# Patient Record
Sex: FEMALE | Race: WHITE | Employment: OTHER | URBAN - METROPOLITAN AREA
[De-identification: names, ages, dates, MRNs, and addresses within clinical notes are randomized per-mention and may not be internally consistent; named-entity substitution may affect disease eponyms.]

---

## 2017-01-10 RX ORDER — AMLODIPINE BESYLATE 10 MG/1
TABLET ORAL
Qty: 90 TABLET | Refills: 1 | Status: SHIPPED | OUTPATIENT
Start: 2017-01-10 | End: 2017-07-31 | Stop reason: SDUPTHER

## 2017-01-10 RX ORDER — SIMVASTATIN 20 MG
TABLET ORAL
Qty: 90 TABLET | Refills: 1 | Status: SHIPPED | OUTPATIENT
Start: 2017-01-10 | End: 2017-07-31 | Stop reason: SDUPTHER

## 2017-01-10 RX ORDER — LOSARTAN POTASSIUM 100 MG/1
TABLET ORAL
Qty: 90 TABLET | Refills: 1 | Status: SHIPPED | OUTPATIENT
Start: 2017-01-10 | End: 2017-07-31 | Stop reason: SDUPTHER

## 2017-01-10 RX ORDER — HYDROCHLOROTHIAZIDE 25 MG/1
TABLET ORAL
Qty: 90 TABLET | Refills: 1 | Status: SHIPPED | OUTPATIENT
Start: 2017-01-10 | End: 2017-07-31 | Stop reason: SDUPTHER

## 2017-01-10 RX ORDER — ALENDRONATE SODIUM 70 MG/1
TABLET ORAL
Qty: 12 TABLET | Refills: 1 | Status: SHIPPED | OUTPATIENT
Start: 2017-01-10 | End: 2017-07-31 | Stop reason: SDUPTHER

## 2017-05-16 ENCOUNTER — TELEPHONE (OUTPATIENT)
Dept: INTERNAL MEDICINE CLINIC | Age: 72
End: 2017-05-16

## 2017-05-16 DIAGNOSIS — I10 ESSENTIAL HYPERTENSION: ICD-10-CM

## 2017-05-16 DIAGNOSIS — Z13.9 SCREENING: ICD-10-CM

## 2017-05-16 DIAGNOSIS — R73.01 IFG (IMPAIRED FASTING GLUCOSE): ICD-10-CM

## 2017-05-16 DIAGNOSIS — M85.80 OSTEOPENIA: ICD-10-CM

## 2017-05-16 DIAGNOSIS — Z12.31 ENCOUNTER FOR SCREENING MAMMOGRAM FOR MALIGNANT NEOPLASM OF BREAST: ICD-10-CM

## 2017-05-16 DIAGNOSIS — E78.5 HYPERLIPIDEMIA, UNSPECIFIED HYPERLIPIDEMIA TYPE: Primary | ICD-10-CM

## 2017-06-05 ENCOUNTER — HOSPITAL ENCOUNTER (OUTPATIENT)
Dept: MAMMOGRAPHY | Age: 72
Discharge: HOME | End: 2017-06-05
Attending: INTERNAL MEDICINE | Admitting: INTERNAL MEDICINE

## 2017-06-05 DIAGNOSIS — Z12.31 ENCOUNTER FOR SCREENING MAMMOGRAM FOR BREAST CANCER: ICD-10-CM

## 2017-06-05 DIAGNOSIS — Z13.9 SCREENING: ICD-10-CM

## 2017-06-05 LAB
A/G RATIO: 1.9 (ref 1.1–2.2)
ALBUMIN SERPL-MCNC: 4.9 G/DL (ref 3.4–5)
ALP BLD-CCNC: 75 U/L (ref 40–129)
ALT SERPL-CCNC: 22 U/L (ref 10–40)
ANION GAP SERPL CALCULATED.3IONS-SCNC: 16 MMOL/L (ref 3–16)
AST SERPL-CCNC: 16 U/L (ref 15–37)
BILIRUB SERPL-MCNC: 0.7 MG/DL (ref 0–1)
BUN BLDV-MCNC: 15 MG/DL (ref 7–20)
CALCIUM SERPL-MCNC: 9.8 MG/DL (ref 8.3–10.6)
CHLORIDE BLD-SCNC: 100 MMOL/L (ref 99–110)
CHOLESTEROL, FASTING: 167 MG/DL (ref 0–199)
CO2: 27 MMOL/L (ref 21–32)
CREAT SERPL-MCNC: 0.6 MG/DL (ref 0.6–1.2)
GFR AFRICAN AMERICAN: >60
GFR NON-AFRICAN AMERICAN: >60
GLOBULIN: 2.6 G/DL
GLUCOSE FASTING: 104 MG/DL (ref 70–99)
HDLC SERPL-MCNC: 48 MG/DL (ref 40–60)
LDL CHOLESTEROL CALCULATED: 87 MG/DL
POTASSIUM SERPL-SCNC: 3.8 MMOL/L (ref 3.5–5.1)
SODIUM BLD-SCNC: 143 MMOL/L (ref 136–145)
TOTAL PROTEIN: 7.5 G/DL (ref 6.4–8.2)
TRIGLYCERIDE, FASTING: 161 MG/DL (ref 0–150)
VITAMIN D 25-HYDROXY: 32.3 NG/ML
VLDLC SERPL CALC-MCNC: 32 MG/DL

## 2017-06-08 ENCOUNTER — OFFICE VISIT (OUTPATIENT)
Dept: INTERNAL MEDICINE CLINIC | Age: 72
End: 2017-06-08

## 2017-06-08 VITALS
HEART RATE: 68 BPM | WEIGHT: 129 LBS | SYSTOLIC BLOOD PRESSURE: 136 MMHG | DIASTOLIC BLOOD PRESSURE: 80 MMHG | BODY MASS INDEX: 26.05 KG/M2 | OXYGEN SATURATION: 96 %

## 2017-06-08 DIAGNOSIS — L60.0 INGROWN TOENAIL: ICD-10-CM

## 2017-06-08 DIAGNOSIS — E78.5 HYPERLIPIDEMIA, UNSPECIFIED HYPERLIPIDEMIA TYPE: ICD-10-CM

## 2017-06-08 DIAGNOSIS — I10 ESSENTIAL HYPERTENSION: ICD-10-CM

## 2017-06-08 DIAGNOSIS — R73.01 IFG (IMPAIRED FASTING GLUCOSE): ICD-10-CM

## 2017-06-08 DIAGNOSIS — M79.2 NEURITIS: Primary | ICD-10-CM

## 2017-06-08 PROCEDURE — 1036F TOBACCO NON-USER: CPT | Performed by: INTERNAL MEDICINE

## 2017-06-08 PROCEDURE — 3017F COLORECTAL CA SCREEN DOC REV: CPT | Performed by: INTERNAL MEDICINE

## 2017-06-08 PROCEDURE — 3014F SCREEN MAMMO DOC REV: CPT | Performed by: INTERNAL MEDICINE

## 2017-06-08 PROCEDURE — 1090F PRES/ABSN URINE INCON ASSESS: CPT | Performed by: INTERNAL MEDICINE

## 2017-06-08 PROCEDURE — 1123F ACP DISCUSS/DSCN MKR DOCD: CPT | Performed by: INTERNAL MEDICINE

## 2017-06-08 PROCEDURE — 4040F PNEUMOC VAC/ADMIN/RCVD: CPT | Performed by: INTERNAL MEDICINE

## 2017-06-08 PROCEDURE — G8399 PT W/DXA RESULTS DOCUMENT: HCPCS | Performed by: INTERNAL MEDICINE

## 2017-06-08 PROCEDURE — G8427 DOCREV CUR MEDS BY ELIG CLIN: HCPCS | Performed by: INTERNAL MEDICINE

## 2017-06-08 PROCEDURE — G8420 CALC BMI NORM PARAMETERS: HCPCS | Performed by: INTERNAL MEDICINE

## 2017-06-08 PROCEDURE — 99214 OFFICE O/P EST MOD 30 MIN: CPT | Performed by: INTERNAL MEDICINE

## 2017-06-08 RX ORDER — MELOXICAM 15 MG/1
TABLET ORAL
COMMUNITY
Start: 2017-03-06 | End: 2017-07-28 | Stop reason: ALTCHOICE

## 2017-06-08 ASSESSMENT — PATIENT HEALTH QUESTIONNAIRE - PHQ9
SUM OF ALL RESPONSES TO PHQ9 QUESTIONS 1 & 2: 0
SUM OF ALL RESPONSES TO PHQ QUESTIONS 1-9: 0
2. FEELING DOWN, DEPRESSED OR HOPELESS: 0
1. LITTLE INTEREST OR PLEASURE IN DOING THINGS: 0

## 2017-06-08 ASSESSMENT — ENCOUNTER SYMPTOMS
CHEST TIGHTNESS: 0
ABDOMINAL DISTENTION: 0
COUGH: 0
VOMITING: 0
DIARRHEA: 0
WHEEZING: 0
SHORTNESS OF BREATH: 0
CONSTIPATION: 0
BACK PAIN: 1
NAUSEA: 0

## 2017-06-12 ENCOUNTER — TELEPHONE (OUTPATIENT)
Dept: INTERNAL MEDICINE CLINIC | Age: 72
End: 2017-06-12

## 2017-06-12 DIAGNOSIS — R92.8 ABNORMAL MAMMOGRAM: Primary | ICD-10-CM

## 2017-06-19 ENCOUNTER — HOSPITAL ENCOUNTER (OUTPATIENT)
Dept: MAMMOGRAPHY | Age: 72
Discharge: HOME | End: 2017-06-19
Attending: INTERNAL MEDICINE | Admitting: INTERNAL MEDICINE

## 2017-06-19 DIAGNOSIS — R92.8 OTHER ABNORMAL AND INCONCLUSIVE FINDINGS ON DIAGNOSTIC IMAGING OF BREAST: ICD-10-CM

## 2017-06-19 DIAGNOSIS — R92.8 ABNORMAL MAMMOGRAM, UNSPECIFIED: ICD-10-CM

## 2017-07-28 ENCOUNTER — OFFICE VISIT (OUTPATIENT)
Dept: PULMONOLOGY | Age: 72
End: 2017-07-28

## 2017-07-28 VITALS
HEIGHT: 59 IN | RESPIRATION RATE: 16 BRPM | SYSTOLIC BLOOD PRESSURE: 126 MMHG | HEART RATE: 72 BPM | WEIGHT: 129.4 LBS | OXYGEN SATURATION: 95 % | TEMPERATURE: 97.8 F | BODY MASS INDEX: 26.08 KG/M2 | DIASTOLIC BLOOD PRESSURE: 60 MMHG

## 2017-07-28 DIAGNOSIS — G47.10 EXCESSIVE SOMNOLENCE DISORDER: Primary | ICD-10-CM

## 2017-07-28 PROCEDURE — 99203 OFFICE O/P NEW LOW 30 MIN: CPT | Performed by: INTERNAL MEDICINE

## 2017-07-28 PROCEDURE — 1123F ACP DISCUSS/DSCN MKR DOCD: CPT | Performed by: INTERNAL MEDICINE

## 2017-07-28 PROCEDURE — 4040F PNEUMOC VAC/ADMIN/RCVD: CPT | Performed by: INTERNAL MEDICINE

## 2017-07-28 PROCEDURE — G8419 CALC BMI OUT NRM PARAM NOF/U: HCPCS | Performed by: INTERNAL MEDICINE

## 2017-07-28 PROCEDURE — 1090F PRES/ABSN URINE INCON ASSESS: CPT | Performed by: INTERNAL MEDICINE

## 2017-07-28 PROCEDURE — 3014F SCREEN MAMMO DOC REV: CPT | Performed by: INTERNAL MEDICINE

## 2017-07-28 PROCEDURE — G8399 PT W/DXA RESULTS DOCUMENT: HCPCS | Performed by: INTERNAL MEDICINE

## 2017-07-28 PROCEDURE — 1036F TOBACCO NON-USER: CPT | Performed by: INTERNAL MEDICINE

## 2017-07-28 PROCEDURE — 3017F COLORECTAL CA SCREEN DOC REV: CPT | Performed by: INTERNAL MEDICINE

## 2017-07-28 PROCEDURE — G8427 DOCREV CUR MEDS BY ELIG CLIN: HCPCS | Performed by: INTERNAL MEDICINE

## 2017-07-28 ASSESSMENT — SLEEP AND FATIGUE QUESTIONNAIRES
HOW LIKELY ARE YOU TO NOD OFF OR FALL ASLEEP WHILE SITTING AND TALKING TO SOMEONE: 0
HOW LIKELY ARE YOU TO NOD OFF OR FALL ASLEEP IN A CAR, WHILE STOPPED FOR A FEW MINUTES IN TRAFFIC: 0
HOW LIKELY ARE YOU TO NOD OFF OR FALL ASLEEP WHEN YOU ARE A PASSENGER IN A CAR FOR AN HOUR WITHOUT A BREAK: 2
NECK CIRCUMFERENCE (INCHES): 13
HOW LIKELY ARE YOU TO NOD OFF OR FALL ASLEEP WHILE LYING DOWN TO REST IN THE AFTERNOON WHEN CIRCUMSTANCES PERMIT: 1
HOW LIKELY ARE YOU TO NOD OFF OR FALL ASLEEP WHILE SITTING AND READING: 1
HOW LIKELY ARE YOU TO NOD OFF OR FALL ASLEEP WHILE SITTING QUIETLY AFTER LUNCH WITHOUT ALCOHOL: 1
ESS TOTAL SCORE: 10
HOW LIKELY ARE YOU TO NOD OFF OR FALL ASLEEP WHILE SITTING INACTIVE IN A PUBLIC PLACE: 2
HOW LIKELY ARE YOU TO NOD OFF OR FALL ASLEEP WHILE WATCHING TV: 3

## 2017-07-28 ASSESSMENT — ENCOUNTER SYMPTOMS
CHEST TIGHTNESS: 0
SORE THROAT: 0
CONSTIPATION: 0
EYE ITCHING: 0
ABDOMINAL PAIN: 0
EYE PAIN: 0
SHORTNESS OF BREATH: 0
COUGH: 0
VOICE CHANGE: 0
DIARRHEA: 0
EYE DISCHARGE: 0

## 2017-07-31 RX ORDER — HYDROCHLOROTHIAZIDE 25 MG/1
TABLET ORAL
Qty: 90 TABLET | Refills: 1 | Status: SHIPPED | OUTPATIENT
Start: 2017-07-31 | End: 2018-04-11 | Stop reason: SDUPTHER

## 2017-07-31 RX ORDER — SIMVASTATIN 20 MG
TABLET ORAL
Qty: 90 TABLET | Refills: 1 | Status: SHIPPED | OUTPATIENT
Start: 2017-07-31 | End: 2018-04-11 | Stop reason: SDUPTHER

## 2017-07-31 RX ORDER — LOSARTAN POTASSIUM 100 MG/1
TABLET ORAL
Qty: 90 TABLET | Refills: 1 | Status: SHIPPED | OUTPATIENT
Start: 2017-07-31 | End: 2018-04-11 | Stop reason: SDUPTHER

## 2017-07-31 RX ORDER — ALENDRONATE SODIUM 70 MG/1
TABLET ORAL
Qty: 12 TABLET | Refills: 1 | Status: SHIPPED | OUTPATIENT
Start: 2017-07-31 | End: 2018-04-09 | Stop reason: SDUPTHER

## 2017-07-31 RX ORDER — AMLODIPINE BESYLATE 10 MG/1
TABLET ORAL
Qty: 90 TABLET | Refills: 1 | Status: SHIPPED | OUTPATIENT
Start: 2017-07-31 | End: 2018-04-11 | Stop reason: SDUPTHER

## 2017-08-28 ENCOUNTER — OFFICE VISIT (OUTPATIENT)
Dept: INTERNAL MEDICINE CLINIC | Age: 72
End: 2017-08-28

## 2017-08-28 VITALS
WEIGHT: 131 LBS | HEART RATE: 85 BPM | BODY MASS INDEX: 26.46 KG/M2 | OXYGEN SATURATION: 97 % | SYSTOLIC BLOOD PRESSURE: 138 MMHG | DIASTOLIC BLOOD PRESSURE: 88 MMHG

## 2017-08-28 DIAGNOSIS — M54.41 CHRONIC RIGHT-SIDED LOW BACK PAIN WITH RIGHT-SIDED SCIATICA: ICD-10-CM

## 2017-08-28 DIAGNOSIS — R10.32 LEFT LOWER QUADRANT PAIN: Primary | ICD-10-CM

## 2017-08-28 DIAGNOSIS — I10 ESSENTIAL HYPERTENSION: ICD-10-CM

## 2017-08-28 DIAGNOSIS — G89.29 CHRONIC RIGHT-SIDED LOW BACK PAIN WITH RIGHT-SIDED SCIATICA: ICD-10-CM

## 2017-08-28 DIAGNOSIS — E78.5 HYPERLIPIDEMIA, UNSPECIFIED HYPERLIPIDEMIA TYPE: ICD-10-CM

## 2017-08-28 PROCEDURE — 3014F SCREEN MAMMO DOC REV: CPT | Performed by: INTERNAL MEDICINE

## 2017-08-28 PROCEDURE — 1036F TOBACCO NON-USER: CPT | Performed by: INTERNAL MEDICINE

## 2017-08-28 PROCEDURE — 1123F ACP DISCUSS/DSCN MKR DOCD: CPT | Performed by: INTERNAL MEDICINE

## 2017-08-28 PROCEDURE — G8419 CALC BMI OUT NRM PARAM NOF/U: HCPCS | Performed by: INTERNAL MEDICINE

## 2017-08-28 PROCEDURE — G8427 DOCREV CUR MEDS BY ELIG CLIN: HCPCS | Performed by: INTERNAL MEDICINE

## 2017-08-28 PROCEDURE — 99214 OFFICE O/P EST MOD 30 MIN: CPT | Performed by: INTERNAL MEDICINE

## 2017-08-28 PROCEDURE — 1090F PRES/ABSN URINE INCON ASSESS: CPT | Performed by: INTERNAL MEDICINE

## 2017-08-28 PROCEDURE — 3017F COLORECTAL CA SCREEN DOC REV: CPT | Performed by: INTERNAL MEDICINE

## 2017-08-28 PROCEDURE — 4040F PNEUMOC VAC/ADMIN/RCVD: CPT | Performed by: INTERNAL MEDICINE

## 2017-08-28 PROCEDURE — G8399 PT W/DXA RESULTS DOCUMENT: HCPCS | Performed by: INTERNAL MEDICINE

## 2017-08-28 RX ORDER — METRONIDAZOLE 500 MG/1
500 TABLET ORAL 3 TIMES DAILY
Qty: 30 TABLET | Refills: 0 | Status: SHIPPED | OUTPATIENT
Start: 2017-08-28 | End: 2017-09-07

## 2017-08-28 RX ORDER — CIPROFLOXACIN 500 MG/1
500 TABLET, FILM COATED ORAL 2 TIMES DAILY
Qty: 20 TABLET | Refills: 0 | Status: SHIPPED | OUTPATIENT
Start: 2017-08-28 | End: 2017-09-07

## 2017-08-28 ASSESSMENT — ENCOUNTER SYMPTOMS
NAUSEA: 0
CHEST TIGHTNESS: 0
VOMITING: 0
ABDOMINAL DISTENTION: 0
BACK PAIN: 0
COUGH: 0
SHORTNESS OF BREATH: 0
WHEEZING: 0
ABDOMINAL PAIN: 1
DIARRHEA: 0
CONSTIPATION: 0

## 2017-11-13 ENCOUNTER — OFFICE VISIT (OUTPATIENT)
Dept: INTERNAL MEDICINE CLINIC | Age: 72
End: 2017-11-13

## 2017-11-13 VITALS
WEIGHT: 134 LBS | HEART RATE: 75 BPM | SYSTOLIC BLOOD PRESSURE: 128 MMHG | OXYGEN SATURATION: 97 % | BODY MASS INDEX: 27.06 KG/M2 | DIASTOLIC BLOOD PRESSURE: 64 MMHG

## 2017-11-13 DIAGNOSIS — S33.6XXD STRAIN OF SACROILIAC LIGAMENT, SUBSEQUENT ENCOUNTER: ICD-10-CM

## 2017-11-13 DIAGNOSIS — L72.3 SEBACEOUS CYST: ICD-10-CM

## 2017-11-13 DIAGNOSIS — K57.92 DIVERTICULITIS OF INTESTINE WITHOUT PERFORATION OR ABSCESS WITHOUT BLEEDING, UNSPECIFIED PART OF INTESTINAL TRACT: Primary | ICD-10-CM

## 2017-11-13 PROCEDURE — 99214 OFFICE O/P EST MOD 30 MIN: CPT | Performed by: NURSE PRACTITIONER

## 2017-11-13 PROCEDURE — 1036F TOBACCO NON-USER: CPT | Performed by: NURSE PRACTITIONER

## 2017-11-13 PROCEDURE — G8417 CALC BMI ABV UP PARAM F/U: HCPCS | Performed by: NURSE PRACTITIONER

## 2017-11-13 PROCEDURE — G8484 FLU IMMUNIZE NO ADMIN: HCPCS | Performed by: NURSE PRACTITIONER

## 2017-11-13 PROCEDURE — G8399 PT W/DXA RESULTS DOCUMENT: HCPCS | Performed by: NURSE PRACTITIONER

## 2017-11-13 PROCEDURE — 3014F SCREEN MAMMO DOC REV: CPT | Performed by: NURSE PRACTITIONER

## 2017-11-13 PROCEDURE — 4040F PNEUMOC VAC/ADMIN/RCVD: CPT | Performed by: NURSE PRACTITIONER

## 2017-11-13 PROCEDURE — G8427 DOCREV CUR MEDS BY ELIG CLIN: HCPCS | Performed by: NURSE PRACTITIONER

## 2017-11-13 PROCEDURE — 3017F COLORECTAL CA SCREEN DOC REV: CPT | Performed by: NURSE PRACTITIONER

## 2017-11-13 PROCEDURE — 1090F PRES/ABSN URINE INCON ASSESS: CPT | Performed by: NURSE PRACTITIONER

## 2017-11-13 PROCEDURE — 1123F ACP DISCUSS/DSCN MKR DOCD: CPT | Performed by: NURSE PRACTITIONER

## 2017-11-13 RX ORDER — PREDNISONE 20 MG/1
TABLET ORAL
Qty: 19 TABLET | Refills: 0 | Status: SHIPPED | OUTPATIENT
Start: 2017-11-13 | End: 2017-12-27 | Stop reason: ALTCHOICE

## 2017-11-13 ASSESSMENT — ENCOUNTER SYMPTOMS
BACK PAIN: 1
FACIAL SWELLING: 0
COUGH: 0
NAUSEA: 0
SORE THROAT: 0
DIARRHEA: 0
ABDOMINAL PAIN: 1
SINUS PRESSURE: 0
VOMITING: 0

## 2017-11-13 NOTE — PROGRESS NOTES
Subjective:      Patient ID: Tiera Cline is a 81 y.o. female. HPI  Chief Complaint   Patient presents with    Follow-Up from Hospital    Diverticulitis     Diverticulitis  Since being home from ED she states she has improved slowly. She continues to feel some intermittent pressure of lower abdomen. No fever. No vomiting. Mild nausea intermittently. Loose stools since hospital. No blood in stool. Appetite normal.     Rt thoracic region with cyst? Or lump. Noticed about 1 month ago. No redness. No drainage. Non tender. Rt buttocks pain that radiates towards tailbone. Bothers are at night when lying. Denies numbness or tingling. Denies urinary symptoms. Denies IBUprofen, Advil but will use Tylenol extra strength - minimal relief. Tried ice/heat some relief. Walking increases the pain so she has decreased from 3mi/day. Has reviewed this with Dr. Jen Whitfield in the past and she recommended steroid tx. Prior to Visit Medications    Medication Sig Taking? Authorizing Provider   ciprofloxacin (CIPRO) 500 MG tablet Take 1 tablet by mouth 2 times daily for 10 days Yes Isiah Boyd MD   metroNIDAZOLE (FLAGYL) 500 MG tablet Take 1 tablet by mouth 3 times daily for 10 days Yes Isiah Boyd MD   amLODIPine (NORVASC) 10 MG tablet Take 1 tablet by mouth  daily Yes Kenneth Galan MD   alendronate (FOSAMAX) 70 MG tablet Take 1 tablet by mouth  every 7 days Yes Kenneth Galan MD   hydrochlorothiazide (HYDRODIURIL) 25 MG tablet Take 1 tablet by mouth  daily Yes Kenneth Galan MD   simvastatin (ZOCOR) 20 MG tablet Take 1 tablet by mouth  nightly Yes Kenneth Galan MD   losartan (COZAAR) 100 MG tablet Take 1 tablet by mouth  daily Yes Kenneth Galan MD   Calcium Carb-Cholecalciferol (CALCIUM + D3) 600-200 MG-UNIT TABS Take 1 tablet by mouth 2 times daily  Yes Historical Provider, MD   aspirin 81 MG EC tablet Take 81 mg by mouth daily.  Yes Historical Provider, MD     Social History     Social History    Marital status:      Spouse name: N/A    Number of children: N/A    Years of education: N/A     Occupational History    Not on file. Social History Main Topics    Smoking status: Former Smoker     Packs/day: 2.00     Years: 30.00     Quit date: 1/1/2004    Smokeless tobacco: Never Used    Alcohol use No    Drug use: No    Sexual activity: No     Other Topics Concern    Not on file     Social History Narrative    No narrative on file     Family History   Problem Relation Age of Onset    Heart Disease Mother     High Blood Pressure Mother     Heart Disease Father        Review of Systems   Constitutional: Negative for appetite change, chills, fatigue, fever and unexpected weight change. HENT: Negative for congestion, ear discharge, ear pain, facial swelling, hearing loss, sinus pressure, sneezing and sore throat. Respiratory: Negative for cough. Cardiovascular: Negative for chest pain. Gastrointestinal: Positive for abdominal pain. Negative for diarrhea (loose stool), nausea and vomiting. Genitourinary: Negative for difficulty urinating, dysuria, hematuria and urgency. Musculoskeletal: Positive for back pain (Rt lower back pain/buttocks). Negative for arthralgias and gait problem. Skin: Positive for rash and wound. Neurological: Negative for dizziness, weakness and headaches. Hematological: Negative for adenopathy. Psychiatric/Behavioral: Negative for sleep disturbance and suicidal ideas. Objective:   Physical Exam   Constitutional: She is oriented to person, place, and time. She appears well-developed and well-nourished. No distress. HENT:   Head: Normocephalic and atraumatic. Cardiovascular: Normal rate, regular rhythm and normal heart sounds. Pulmonary/Chest: Effort normal and breath sounds normal. She has no wheezes. She has no rales. Abdominal: Soft. She exhibits no mass. There is no rebound and no guarding. Hyperactive bowel sounds.  Tenderness over LLQ and

## 2017-12-27 ENCOUNTER — OFFICE VISIT (OUTPATIENT)
Dept: INTERNAL MEDICINE CLINIC | Age: 72
End: 2017-12-27

## 2017-12-27 VITALS
WEIGHT: 135 LBS | HEART RATE: 76 BPM | SYSTOLIC BLOOD PRESSURE: 126 MMHG | BODY MASS INDEX: 27.27 KG/M2 | OXYGEN SATURATION: 95 % | DIASTOLIC BLOOD PRESSURE: 78 MMHG

## 2017-12-27 DIAGNOSIS — I10 ESSENTIAL HYPERTENSION: Primary | ICD-10-CM

## 2017-12-27 DIAGNOSIS — E78.5 HYPERLIPIDEMIA, UNSPECIFIED HYPERLIPIDEMIA TYPE: ICD-10-CM

## 2017-12-27 DIAGNOSIS — R73.01 IFG (IMPAIRED FASTING GLUCOSE): ICD-10-CM

## 2017-12-27 DIAGNOSIS — S33.6XXD STRAIN OF SACROILIAC LIGAMENT, SUBSEQUENT ENCOUNTER: ICD-10-CM

## 2017-12-27 PROCEDURE — 99213 OFFICE O/P EST LOW 20 MIN: CPT | Performed by: INTERNAL MEDICINE

## 2017-12-27 PROCEDURE — 1036F TOBACCO NON-USER: CPT | Performed by: INTERNAL MEDICINE

## 2017-12-27 PROCEDURE — G8399 PT W/DXA RESULTS DOCUMENT: HCPCS | Performed by: INTERNAL MEDICINE

## 2017-12-27 PROCEDURE — 3017F COLORECTAL CA SCREEN DOC REV: CPT | Performed by: INTERNAL MEDICINE

## 2017-12-27 PROCEDURE — G8427 DOCREV CUR MEDS BY ELIG CLIN: HCPCS | Performed by: INTERNAL MEDICINE

## 2017-12-27 PROCEDURE — 4040F PNEUMOC VAC/ADMIN/RCVD: CPT | Performed by: INTERNAL MEDICINE

## 2017-12-27 PROCEDURE — 1123F ACP DISCUSS/DSCN MKR DOCD: CPT | Performed by: INTERNAL MEDICINE

## 2017-12-27 PROCEDURE — 3014F SCREEN MAMMO DOC REV: CPT | Performed by: INTERNAL MEDICINE

## 2017-12-27 PROCEDURE — 1090F PRES/ABSN URINE INCON ASSESS: CPT | Performed by: INTERNAL MEDICINE

## 2017-12-27 PROCEDURE — G8484 FLU IMMUNIZE NO ADMIN: HCPCS | Performed by: INTERNAL MEDICINE

## 2017-12-27 PROCEDURE — G8417 CALC BMI ABV UP PARAM F/U: HCPCS | Performed by: INTERNAL MEDICINE

## 2017-12-27 RX ORDER — PREDNISONE 20 MG/1
TABLET ORAL
Qty: 19 TABLET | Refills: 0 | Status: SHIPPED | OUTPATIENT
Start: 2017-12-27 | End: 2018-06-27 | Stop reason: CLARIF

## 2017-12-27 ASSESSMENT — ENCOUNTER SYMPTOMS
COUGH: 0
WHEEZING: 0
VOMITING: 0
CHEST TIGHTNESS: 0
SHORTNESS OF BREATH: 0
NAUSEA: 0
CONSTIPATION: 0
ABDOMINAL DISTENTION: 0
DIARRHEA: 0

## 2017-12-27 NOTE — PROGRESS NOTES
Subjective:      Patient ID: Donnell Sullivan is a 81 y.o. female. Hip Pain    Pertinent negatives include no numbness. Patient has a h/o htn, hyperlipidemia and IFG. Patient feels well. htn has improved since starting bp medication. Has been watching her diet. Takes cholesterol medication in the evenings. Review of Systems   Constitutional: Negative for unexpected weight change. Respiratory: Negative for cough, chest tightness, shortness of breath and wheezing. Cardiovascular: Negative for chest pain, palpitations and leg swelling. Gastrointestinal: Negative for abdominal distention, constipation, diarrhea, nausea and vomiting. Musculoskeletal: Negative for arthralgias and myalgias. Neurological: Negative for tremors and numbness. All other systems reviewed and are negative. Objective:   Physical Exam   Constitutional: She is oriented to person, place, and time. She appears well-developed and well-nourished. No distress. Neck: Neck supple. No thyromegaly present. Cardiovascular: Normal rate, regular rhythm and normal heart sounds. Pulmonary/Chest: Effort normal and breath sounds normal. No respiratory distress. She has no wheezes. She has no rales. Abdominal: Soft. She exhibits no distension. There is no tenderness. There is no rebound and no guarding. Musculoskeletal: She exhibits no edema. Neurological: She is alert and oriented to person, place, and time. Skin: She is not diaphoretic. Psychiatric: She has a normal mood and affect. Her behavior is normal. Judgment and thought content normal.   Vitals reviewed. Assessment:      IFG (impaired fasting glucose)  Discussed lifestyle modification and patient expressed understanding      Hyperlipidemia, unspecified hyperlipidemia type  Stable. Continue current regimen      Essential hypertension  Controlled.  Continue current regimen                  Plan:     See above plan

## 2018-03-06 ENCOUNTER — TELEPHONE (OUTPATIENT)
Dept: INTERNAL MEDICINE CLINIC | Age: 73
End: 2018-03-06

## 2018-03-06 RX ORDER — MELOXICAM 15 MG/1
15 TABLET ORAL DAILY
Qty: 30 TABLET | Refills: 0 | Status: SHIPPED | OUTPATIENT
Start: 2018-03-06 | End: 2018-04-09 | Stop reason: SDUPTHER

## 2018-04-09 ENCOUNTER — TELEPHONE (OUTPATIENT)
Dept: INTERNAL MEDICINE CLINIC | Age: 73
End: 2018-04-09

## 2018-04-09 DIAGNOSIS — M50.30 DDD (DEGENERATIVE DISC DISEASE), CERVICAL: Primary | ICD-10-CM

## 2018-04-09 RX ORDER — MELOXICAM 15 MG/1
15 TABLET ORAL DAILY
Qty: 90 TABLET | Refills: 0 | Status: SHIPPED | OUTPATIENT
Start: 2018-04-09 | End: 2018-05-28 | Stop reason: SDUPTHER

## 2018-04-09 RX ORDER — ALENDRONATE SODIUM 70 MG/1
TABLET ORAL
Qty: 12 TABLET | Refills: 0 | Status: SHIPPED | OUTPATIENT
Start: 2018-04-09 | End: 2018-06-22 | Stop reason: SDUPTHER

## 2018-04-11 RX ORDER — HYDROCHLOROTHIAZIDE 25 MG/1
TABLET ORAL
Qty: 90 TABLET | Refills: 1 | Status: SHIPPED | OUTPATIENT
Start: 2018-04-11 | End: 2018-08-30 | Stop reason: SDUPTHER

## 2018-04-11 RX ORDER — LOSARTAN POTASSIUM 100 MG/1
TABLET ORAL
Qty: 90 TABLET | Refills: 1 | Status: SHIPPED | OUTPATIENT
Start: 2018-04-11 | End: 2018-08-30 | Stop reason: SDUPTHER

## 2018-04-11 RX ORDER — AMLODIPINE BESYLATE 10 MG/1
TABLET ORAL
Qty: 90 TABLET | Refills: 1 | Status: SHIPPED | OUTPATIENT
Start: 2018-04-11 | End: 2018-08-30 | Stop reason: SDUPTHER

## 2018-04-11 RX ORDER — SIMVASTATIN 20 MG
TABLET ORAL
Qty: 90 TABLET | Refills: 1 | Status: SHIPPED | OUTPATIENT
Start: 2018-04-11 | End: 2018-08-30 | Stop reason: SDUPTHER

## 2018-06-27 ENCOUNTER — OFFICE VISIT (OUTPATIENT)
Dept: INTERNAL MEDICINE CLINIC | Age: 73
End: 2018-06-27

## 2018-06-27 ENCOUNTER — HOSPITAL ENCOUNTER (OUTPATIENT)
Dept: GENERAL RADIOLOGY | Age: 73
Discharge: HOME | End: 2018-06-27
Attending: INTERNAL MEDICINE | Admitting: INTERNAL MEDICINE

## 2018-06-27 VITALS
BODY MASS INDEX: 27.27 KG/M2 | WEIGHT: 135 LBS | DIASTOLIC BLOOD PRESSURE: 86 MMHG | OXYGEN SATURATION: 97 % | HEART RATE: 73 BPM | SYSTOLIC BLOOD PRESSURE: 128 MMHG

## 2018-06-27 DIAGNOSIS — R73.01 IFG (IMPAIRED FASTING GLUCOSE): ICD-10-CM

## 2018-06-27 DIAGNOSIS — E78.5 HYPERLIPIDEMIA, UNSPECIFIED HYPERLIPIDEMIA TYPE: ICD-10-CM

## 2018-06-27 DIAGNOSIS — Z11.59 SCREENING FOR VIRAL DISEASE: ICD-10-CM

## 2018-06-27 DIAGNOSIS — G89.29 CHRONIC RIGHT SHOULDER PAIN: ICD-10-CM

## 2018-06-27 DIAGNOSIS — Z12.31 SCREENING MAMMOGRAM, ENCOUNTER FOR: ICD-10-CM

## 2018-06-27 DIAGNOSIS — Z72.0 TOBACCO USE: ICD-10-CM

## 2018-06-27 DIAGNOSIS — I10 ESSENTIAL HYPERTENSION: Primary | ICD-10-CM

## 2018-06-27 DIAGNOSIS — I10 ESSENTIAL HYPERTENSION: ICD-10-CM

## 2018-06-27 DIAGNOSIS — M25.511 CHRONIC RIGHT SHOULDER PAIN: ICD-10-CM

## 2018-06-27 LAB
A/G RATIO: 2.1 (ref 1.1–2.2)
ALBUMIN SERPL-MCNC: 4.8 G/DL (ref 3.4–5)
ALP BLD-CCNC: 73 U/L (ref 40–129)
ALT SERPL-CCNC: 28 U/L (ref 10–40)
ANION GAP SERPL CALCULATED.3IONS-SCNC: 14 MMOL/L (ref 3–16)
AST SERPL-CCNC: 21 U/L (ref 15–37)
BILIRUB SERPL-MCNC: 0.6 MG/DL (ref 0–1)
BUN BLDV-MCNC: 12 MG/DL (ref 7–20)
CALCIUM SERPL-MCNC: 9.2 MG/DL (ref 8.3–10.6)
CHLORIDE BLD-SCNC: 100 MMOL/L (ref 99–110)
CHOLESTEROL, TOTAL: 186 MG/DL (ref 0–199)
CO2: 28 MMOL/L (ref 21–32)
CREAT SERPL-MCNC: 0.6 MG/DL (ref 0.6–1.2)
ESTIMATED AVERAGE GLUCOSE: 111.2 MG/DL
GFR AFRICAN AMERICAN: >60
GFR NON-AFRICAN AMERICAN: >60
GLOBULIN: 2.3 G/DL
GLUCOSE BLD-MCNC: 108 MG/DL (ref 70–99)
HBA1C MFR BLD: 5.5 %
HDLC SERPL-MCNC: 47 MG/DL (ref 40–60)
HEPATITIS C ANTIBODY INTERPRETATION: NORMAL
LDL CHOLESTEROL CALCULATED: 96 MG/DL
POTASSIUM SERPL-SCNC: 4.5 MMOL/L (ref 3.5–5.1)
SODIUM BLD-SCNC: 142 MMOL/L (ref 136–145)
TOTAL PROTEIN: 7.1 G/DL (ref 6.4–8.2)
TRIGL SERPL-MCNC: 216 MG/DL (ref 0–150)
VLDLC SERPL CALC-MCNC: 43 MG/DL

## 2018-06-27 PROCEDURE — 4040F PNEUMOC VAC/ADMIN/RCVD: CPT | Performed by: INTERNAL MEDICINE

## 2018-06-27 PROCEDURE — 1090F PRES/ABSN URINE INCON ASSESS: CPT | Performed by: INTERNAL MEDICINE

## 2018-06-27 PROCEDURE — 1036F TOBACCO NON-USER: CPT | Performed by: INTERNAL MEDICINE

## 2018-06-27 PROCEDURE — G8417 CALC BMI ABV UP PARAM F/U: HCPCS | Performed by: INTERNAL MEDICINE

## 2018-06-27 PROCEDURE — G8427 DOCREV CUR MEDS BY ELIG CLIN: HCPCS | Performed by: INTERNAL MEDICINE

## 2018-06-27 PROCEDURE — 99214 OFFICE O/P EST MOD 30 MIN: CPT | Performed by: INTERNAL MEDICINE

## 2018-06-27 PROCEDURE — 1123F ACP DISCUSS/DSCN MKR DOCD: CPT | Performed by: INTERNAL MEDICINE

## 2018-06-27 PROCEDURE — 3017F COLORECTAL CA SCREEN DOC REV: CPT | Performed by: INTERNAL MEDICINE

## 2018-06-27 PROCEDURE — G8399 PT W/DXA RESULTS DOCUMENT: HCPCS | Performed by: INTERNAL MEDICINE

## 2018-06-27 ASSESSMENT — ENCOUNTER SYMPTOMS
DIARRHEA: 0
CONSTIPATION: 0
COUGH: 0
CHEST TIGHTNESS: 0
WHEEZING: 0
SHORTNESS OF BREATH: 0
NAUSEA: 0
ABDOMINAL DISTENTION: 0
VOMITING: 0

## 2018-06-27 ASSESSMENT — PATIENT HEALTH QUESTIONNAIRE - PHQ9
1. LITTLE INTEREST OR PLEASURE IN DOING THINGS: 0
2. FEELING DOWN, DEPRESSED OR HOPELESS: 0
SUM OF ALL RESPONSES TO PHQ QUESTIONS 1-9: 0
SUM OF ALL RESPONSES TO PHQ9 QUESTIONS 1 & 2: 0

## 2018-07-10 ENCOUNTER — HOSPITAL ENCOUNTER (OUTPATIENT)
Dept: CT IMAGING | Age: 73
Discharge: HOME | End: 2018-07-10
Admitting: INTERNAL MEDICINE

## 2018-07-10 DIAGNOSIS — Z72.0 TOBACCO USE: ICD-10-CM

## 2018-07-10 DIAGNOSIS — Z12.31 ENCOUNTER FOR SCREENING MAMMOGRAM FOR BREAST CANCER: ICD-10-CM

## 2018-07-13 ENCOUNTER — TELEPHONE (OUTPATIENT)
Dept: INTERNAL MEDICINE CLINIC | Age: 73
End: 2018-07-13

## 2018-07-13 DIAGNOSIS — R92.8 ABNORMAL MAMMOGRAM: Primary | ICD-10-CM

## 2018-07-25 ENCOUNTER — HOSPITAL ENCOUNTER (OUTPATIENT)
Dept: MAMMOGRAPHY | Age: 73
Discharge: HOME | End: 2018-07-25
Payer: MEDICARE

## 2018-07-25 ENCOUNTER — HOSPITAL ENCOUNTER (OUTPATIENT)
Dept: ULTRASOUND IMAGING | Age: 73
Discharge: HOME | End: 2018-07-25
Payer: MEDICARE

## 2018-07-25 DIAGNOSIS — R92.8 ABNORMAL MAMMOGRAM: ICD-10-CM

## 2018-07-25 PROCEDURE — 77065 DX MAMMO INCL CAD UNI: CPT

## 2018-07-26 ENCOUNTER — TELEPHONE (OUTPATIENT)
Dept: INTERNAL MEDICINE CLINIC | Age: 73
End: 2018-07-26

## 2018-07-26 NOTE — TELEPHONE ENCOUNTER
Patient has an appointment with Dr. Mary Murguia on 7/31/18 @ 7:00 a.m. However, she wanted to know from Dr. Mary Murguia if she should continue with the therapy especially since she has an appointment for tomorrow the 27th. I indicated that Dr. Mary Murguia was not here until Monday, and that the decision would be up to her. If she felt she may get some benefit out of the therapy then do so. I advised patient that she can discuss further with Dr. Mary Murguia on her 31st visit.

## 2018-07-26 NOTE — TELEPHONE ENCOUNTER
Max Walter calling to discuss with Dr. Sam Alford that her physical therapy is coming to an end for her right arm. The therapist indicated that Max Walter speak with Dr. Sam Alford about pursuing ortho appointment, X-ray, CT Scan, or MRI of the right arm. Please call Max Walter at [phone] to discuss next steps.

## 2018-07-31 ENCOUNTER — HOSPITAL ENCOUNTER (OUTPATIENT)
Age: 73
Discharge: HOME | End: 2018-07-31
Payer: MEDICARE

## 2018-07-31 ENCOUNTER — HOSPITAL ENCOUNTER (OUTPATIENT)
Dept: GENERAL RADIOLOGY | Age: 73
Discharge: HOME | End: 2018-07-31
Payer: MEDICARE

## 2018-07-31 ENCOUNTER — OFFICE VISIT (OUTPATIENT)
Dept: INTERNAL MEDICINE CLINIC | Age: 73
End: 2018-07-31

## 2018-07-31 VITALS — OXYGEN SATURATION: 95 % | HEART RATE: 74 BPM | SYSTOLIC BLOOD PRESSURE: 132 MMHG | DIASTOLIC BLOOD PRESSURE: 80 MMHG

## 2018-07-31 DIAGNOSIS — S46.911D STRAIN OF RIGHT SHOULDER, SUBSEQUENT ENCOUNTER: ICD-10-CM

## 2018-07-31 DIAGNOSIS — S46.911D STRAIN OF RIGHT SHOULDER, SUBSEQUENT ENCOUNTER: Primary | ICD-10-CM

## 2018-07-31 PROCEDURE — 1090F PRES/ABSN URINE INCON ASSESS: CPT | Performed by: INTERNAL MEDICINE

## 2018-07-31 PROCEDURE — G8427 DOCREV CUR MEDS BY ELIG CLIN: HCPCS | Performed by: INTERNAL MEDICINE

## 2018-07-31 PROCEDURE — 3017F COLORECTAL CA SCREEN DOC REV: CPT | Performed by: INTERNAL MEDICINE

## 2018-07-31 PROCEDURE — 1036F TOBACCO NON-USER: CPT | Performed by: INTERNAL MEDICINE

## 2018-07-31 PROCEDURE — 73030 X-RAY EXAM OF SHOULDER: CPT

## 2018-07-31 PROCEDURE — 4040F PNEUMOC VAC/ADMIN/RCVD: CPT | Performed by: INTERNAL MEDICINE

## 2018-07-31 PROCEDURE — 1101F PT FALLS ASSESS-DOCD LE1/YR: CPT | Performed by: INTERNAL MEDICINE

## 2018-07-31 PROCEDURE — G8417 CALC BMI ABV UP PARAM F/U: HCPCS | Performed by: INTERNAL MEDICINE

## 2018-07-31 PROCEDURE — 99213 OFFICE O/P EST LOW 20 MIN: CPT | Performed by: INTERNAL MEDICINE

## 2018-07-31 PROCEDURE — G8399 PT W/DXA RESULTS DOCUMENT: HCPCS | Performed by: INTERNAL MEDICINE

## 2018-07-31 PROCEDURE — 1123F ACP DISCUSS/DSCN MKR DOCD: CPT | Performed by: INTERNAL MEDICINE

## 2018-07-31 ASSESSMENT — ENCOUNTER SYMPTOMS
BACK PAIN: 0
WHEEZING: 0
CHEST TIGHTNESS: 0
SHORTNESS OF BREATH: 0
COUGH: 0
NAUSEA: 0
CONSTIPATION: 0
DIARRHEA: 0
ABDOMINAL DISTENTION: 0
VOMITING: 0

## 2018-07-31 NOTE — PROGRESS NOTES
Subjective:      Patient ID: Mayra Trinh is a 81 y.o. female. HPI    Here with c/o right shoulder pain that is intermittent x 2 years. Described as achy. Made worse after PT. Pain does not radiate. Gets some relief with ice and Meloxicam. No injury. Denies numbness, tingling, weakness, neck pain    Review of Systems   Constitutional: Negative for unexpected weight change. Respiratory: Negative for cough, chest tightness, shortness of breath and wheezing. Cardiovascular: Negative for chest pain, palpitations and leg swelling. Gastrointestinal: Negative for abdominal distention, constipation, diarrhea, nausea and vomiting. Musculoskeletal: Positive for arthralgias. Negative for back pain and myalgias. Neurological: Negative for tremors and numbness. All other systems reviewed and are negative. Objective:   Physical Exam   Constitutional: She is oriented to person, place, and time. She appears well-developed and well-nourished. No distress. Neck: Neck supple. No thyromegaly present. Cardiovascular: Normal rate, regular rhythm, normal heart sounds and intact distal pulses. Exam reveals no gallop and no friction rub. No murmur heard. Pulmonary/Chest: Effort normal and breath sounds normal. No respiratory distress. She has no wheezes. She has no rales. She exhibits no tenderness. Musculoskeletal: She exhibits no edema. Shoulder with no palpable tenderness or deformity. Pain reproduced with passive internal and external rotation. Right UE neurovascularly intact   Neurological: She is alert and oriented to person, place, and time. Skin: She is not diaphoretic. Psychiatric: She has a normal mood and affect. Her behavior is normal. Judgment and thought content normal.   Vitals reviewed. Assessment:      Amarilys Kang was seen today for referral - general.    Diagnoses and all orders for this visit:    Strain of right shoulder, subsequent encounter  Chronic. Worsening.  Continue meloxicam

## 2018-08-01 ENCOUNTER — TELEPHONE (OUTPATIENT)
Dept: INTERNAL MEDICINE CLINIC | Age: 73
End: 2018-08-01

## 2018-08-27 RX ORDER — ALENDRONATE SODIUM 70 MG/1
TABLET ORAL
Qty: 12 TABLET | Refills: 1 | Status: SHIPPED | OUTPATIENT
Start: 2018-08-27 | End: 2019-02-21 | Stop reason: SDUPTHER

## 2018-08-27 NOTE — TELEPHONE ENCOUNTER
Refill request for fosamax medication.      Name of Pharmacy- [facility]    Last visit - 7-31-18     Pending visit - 12-3-18    Last refill -6-22-18    Medication Contract signed -   Last Brielle george-     Additional Comments

## 2018-08-30 RX ORDER — AMLODIPINE BESYLATE 10 MG/1
TABLET ORAL
Qty: 90 TABLET | Refills: 3 | Status: SHIPPED | OUTPATIENT
Start: 2018-08-30 | End: 2019-08-09 | Stop reason: SDUPTHER

## 2018-08-30 RX ORDER — HYDROCHLOROTHIAZIDE 25 MG/1
TABLET ORAL
Qty: 90 TABLET | Refills: 3 | Status: SHIPPED | OUTPATIENT
Start: 2018-08-30 | End: 2019-08-09 | Stop reason: SDUPTHER

## 2018-08-30 RX ORDER — LOSARTAN POTASSIUM 100 MG/1
TABLET ORAL
Qty: 90 TABLET | Refills: 3 | Status: SHIPPED | OUTPATIENT
Start: 2018-08-30 | End: 2019-08-09 | Stop reason: SDUPTHER

## 2018-08-30 RX ORDER — MELOXICAM 15 MG/1
15 TABLET ORAL DAILY
Qty: 90 TABLET | Refills: 1 | Status: SHIPPED | OUTPATIENT
Start: 2018-08-30 | End: 2019-02-04 | Stop reason: SDUPTHER

## 2018-08-30 RX ORDER — SIMVASTATIN 20 MG
TABLET ORAL
Qty: 90 TABLET | Refills: 3 | Status: SHIPPED | OUTPATIENT
Start: 2018-08-30 | End: 2019-08-09 | Stop reason: SDUPTHER

## 2018-08-30 NOTE — TELEPHONE ENCOUNTER
Refill request for zocor, losartan, amlodipine, hctz  medication.      Name of Pharmacy- [facility]    Last visit - 7-31-18     Pending visit - 12-3-18    Last refill -4-11-18    Medication Contract signed -   Last Stanton george-     Additional Comments

## 2018-12-03 ENCOUNTER — HOSPITAL ENCOUNTER (OUTPATIENT)
Age: 73
Discharge: HOME | End: 2018-12-03
Payer: MEDICARE

## 2018-12-03 ENCOUNTER — OFFICE VISIT (OUTPATIENT)
Dept: INTERNAL MEDICINE CLINIC | Age: 73
End: 2018-12-03
Payer: MEDICARE

## 2018-12-03 ENCOUNTER — TELEPHONE (OUTPATIENT)
Dept: INTERNAL MEDICINE CLINIC | Age: 73
End: 2018-12-03

## 2018-12-03 VITALS
HEART RATE: 81 BPM | SYSTOLIC BLOOD PRESSURE: 126 MMHG | BODY MASS INDEX: 27.67 KG/M2 | WEIGHT: 137 LBS | OXYGEN SATURATION: 96 % | DIASTOLIC BLOOD PRESSURE: 78 MMHG

## 2018-12-03 DIAGNOSIS — I10 ESSENTIAL HYPERTENSION: Primary | ICD-10-CM

## 2018-12-03 DIAGNOSIS — E78.5 HYPERLIPIDEMIA, UNSPECIFIED HYPERLIPIDEMIA TYPE: ICD-10-CM

## 2018-12-03 DIAGNOSIS — K21.9 GASTROESOPHAGEAL REFLUX DISEASE WITHOUT ESOPHAGITIS: ICD-10-CM

## 2018-12-03 DIAGNOSIS — R73.01 IFG (IMPAIRED FASTING GLUCOSE): ICD-10-CM

## 2018-12-03 DIAGNOSIS — I10 ESSENTIAL HYPERTENSION: ICD-10-CM

## 2018-12-03 DIAGNOSIS — D69.6 THROMBOCYTOPENIA (HCC): Primary | ICD-10-CM

## 2018-12-03 DIAGNOSIS — M72.2 PLANTAR FASCIITIS OF RIGHT FOOT: ICD-10-CM

## 2018-12-03 DIAGNOSIS — M54.50 ACUTE RIGHT-SIDED LOW BACK PAIN WITHOUT SCIATICA: ICD-10-CM

## 2018-12-03 LAB
A/G RATIO: 1.9 (ref 1.1–2.2)
ALBUMIN SERPL-MCNC: 4.9 G/DL (ref 3.4–5)
ALP BLD-CCNC: 70 U/L (ref 40–129)
ALT SERPL-CCNC: 24 U/L (ref 10–40)
ANION GAP SERPL CALCULATED.3IONS-SCNC: 12 MMOL/L (ref 3–16)
AST SERPL-CCNC: 17 U/L (ref 15–37)
ATYPICAL LYMPHOCYTE RELATIVE PERCENT: 2 % (ref 0–6)
BANDED NEUTROPHILS RELATIVE PERCENT: 1 % (ref 0–7)
BASOPHILS ABSOLUTE: 0.1 K/UL (ref 0–0.2)
BASOPHILS RELATIVE PERCENT: 1 %
BILIRUB SERPL-MCNC: 0.7 MG/DL (ref 0–1)
BUN BLDV-MCNC: 13 MG/DL (ref 7–20)
CALCIUM SERPL-MCNC: 9.6 MG/DL (ref 8.3–10.6)
CHLORIDE BLD-SCNC: 100 MMOL/L (ref 99–110)
CHOLESTEROL, TOTAL: 199 MG/DL (ref 0–199)
CO2: 26 MMOL/L (ref 21–32)
CREAT SERPL-MCNC: 0.6 MG/DL (ref 0.6–1.2)
EOSINOPHILS ABSOLUTE: 0.2 K/UL (ref 0–0.6)
EOSINOPHILS RELATIVE PERCENT: 2 %
ESTIMATED AVERAGE GLUCOSE: 116.9 MG/DL
GFR AFRICAN AMERICAN: >60
GFR NON-AFRICAN AMERICAN: >60
GLOBULIN: 2.6 G/DL
GLUCOSE BLD-MCNC: 108 MG/DL (ref 70–99)
HBA1C MFR BLD: 5.7 %
HCT VFR BLD CALC: 45 % (ref 36–48)
HDLC SERPL-MCNC: 48 MG/DL (ref 40–60)
HEMOGLOBIN: 15.3 G/DL (ref 12–16)
LDL CHOLESTEROL CALCULATED: 111 MG/DL
LYMPHOCYTES ABSOLUTE: 1.5 K/UL (ref 1–5.1)
LYMPHOCYTES RELATIVE PERCENT: 16 %
MCH RBC QN AUTO: 32 PG (ref 26–34)
MCHC RBC AUTO-ENTMCNC: 33.9 G/DL (ref 31–36)
MCV RBC AUTO: 94.2 FL (ref 80–100)
MONOCYTES ABSOLUTE: 1 K/UL (ref 0–1.3)
MONOCYTES RELATIVE PERCENT: 12 %
NEUTROPHILS ABSOLUTE: 5.5 K/UL (ref 1.7–7.7)
NEUTROPHILS RELATIVE PERCENT: 66 %
PDW BLD-RTO: 12.9 % (ref 12.4–15.4)
PLATELET # BLD: NORMAL K/UL (ref 135–450)
PLATELET SLIDE REVIEW: NORMAL
PMV BLD AUTO: NORMAL FL (ref 5–10.5)
POTASSIUM SERPL-SCNC: 3.6 MMOL/L (ref 3.5–5.1)
RBC # BLD: 4.78 M/UL (ref 4–5.2)
RBC # BLD: NORMAL 10*6/UL
SLIDE REVIEW: NORMAL
SODIUM BLD-SCNC: 138 MMOL/L (ref 136–145)
TOTAL PROTEIN: 7.5 G/DL (ref 6.4–8.2)
TRIGL SERPL-MCNC: 198 MG/DL (ref 0–150)
TSH SERPL DL<=0.05 MIU/L-ACNC: 3.59 UIU/ML (ref 0.27–4.2)
VLDLC SERPL CALC-MCNC: 40 MG/DL
WBC # BLD: 8.2 K/UL (ref 4–11)

## 2018-12-03 PROCEDURE — 84443 ASSAY THYROID STIM HORMONE: CPT

## 2018-12-03 PROCEDURE — 1090F PRES/ABSN URINE INCON ASSESS: CPT | Performed by: INTERNAL MEDICINE

## 2018-12-03 PROCEDURE — 1036F TOBACCO NON-USER: CPT | Performed by: INTERNAL MEDICINE

## 2018-12-03 PROCEDURE — G8417 CALC BMI ABV UP PARAM F/U: HCPCS | Performed by: INTERNAL MEDICINE

## 2018-12-03 PROCEDURE — G8427 DOCREV CUR MEDS BY ELIG CLIN: HCPCS | Performed by: INTERNAL MEDICINE

## 2018-12-03 PROCEDURE — G8399 PT W/DXA RESULTS DOCUMENT: HCPCS | Performed by: INTERNAL MEDICINE

## 2018-12-03 PROCEDURE — 80053 COMPREHEN METABOLIC PANEL: CPT

## 2018-12-03 PROCEDURE — 3017F COLORECTAL CA SCREEN DOC REV: CPT | Performed by: INTERNAL MEDICINE

## 2018-12-03 PROCEDURE — 80061 LIPID PANEL: CPT

## 2018-12-03 PROCEDURE — 99214 OFFICE O/P EST MOD 30 MIN: CPT | Performed by: INTERNAL MEDICINE

## 2018-12-03 PROCEDURE — 83036 HEMOGLOBIN GLYCOSYLATED A1C: CPT

## 2018-12-03 PROCEDURE — G8484 FLU IMMUNIZE NO ADMIN: HCPCS | Performed by: INTERNAL MEDICINE

## 2018-12-03 PROCEDURE — 36415 COLL VENOUS BLD VENIPUNCTURE: CPT

## 2018-12-03 PROCEDURE — 1101F PT FALLS ASSESS-DOCD LE1/YR: CPT | Performed by: INTERNAL MEDICINE

## 2018-12-03 PROCEDURE — 4040F PNEUMOC VAC/ADMIN/RCVD: CPT | Performed by: INTERNAL MEDICINE

## 2018-12-03 PROCEDURE — 85025 COMPLETE CBC W/AUTO DIFF WBC: CPT

## 2018-12-03 PROCEDURE — 1123F ACP DISCUSS/DSCN MKR DOCD: CPT | Performed by: INTERNAL MEDICINE

## 2018-12-03 ASSESSMENT — ENCOUNTER SYMPTOMS
CHEST TIGHTNESS: 0
ABDOMINAL DISTENTION: 0
SHORTNESS OF BREATH: 0
CONSTIPATION: 0
COUGH: 0
WHEEZING: 0
VOMITING: 0
BACK PAIN: 1
NAUSEA: 0
DIARRHEA: 0

## 2018-12-03 NOTE — TELEPHONE ENCOUNTER
Platelet count was 77 , however, there were clumps on the smear, so the value is not accurate. Patient will have to repeat the test if needed.

## 2018-12-03 NOTE — PROGRESS NOTES
No heel tenderness to palpation. No swelling or redness. Full  rom foot and ankle  No lumbar tenderness to palpation. Neg sLR B; symmetric reflexes 5/5 strength B LE   Neurological: She is alert and oriented to person, place, and time. Skin: She is not diaphoretic. Psychiatric: She has a normal mood and affect. Her behavior is normal. Judgment and thought content normal.   Vitals reviewed. Assessment:      Yael Jean was seen today for 6 month follow-up. Diagnoses and all orders for this visit:    Essential hypertension  Stable. Continue to monitor  -     Comprehensive Metabolic Panel; Future  -     TSH without Reflex; Future  -     CBC Auto Differential; Future    Hyperlipidemia, unspecified hyperlipidemia type  Improved. Continue to monitor. Discussed lifestyle modification and patient expressed understanding  -     Lipid Panel; Future  -     Comprehensive Metabolic Panel; Future  -     CBC Auto Differential; Future    IFG (impaired fasting glucose)  Improved. Continue to monitor  -     Hemoglobin A1C; Future  -     CBC Auto Differential; Future    Plantar fasciitis of right foot  Continue with stretches and mobic. Consider icing  -     Amb External Referral To Podiatry    Acute right-sided low back pain without sciatica  Continue mobic. Refuses PT or muscle relaxer. Gastroesophageal reflux disease without esophagitis  Stable.  Continue to monitor  -     CBC Auto Differential; Future          Plan:      See above plan          Malachi Castellanos MD

## 2018-12-05 ENCOUNTER — HOSPITAL ENCOUNTER (OUTPATIENT)
Age: 73
Discharge: HOME | End: 2018-12-05
Payer: MEDICARE

## 2018-12-05 DIAGNOSIS — D69.6 THROMBOCYTOPENIA (HCC): ICD-10-CM

## 2018-12-05 LAB
BASOPHILS ABSOLUTE: 0.1 K/UL (ref 0–0.2)
BASOPHILS RELATIVE PERCENT: 1 %
EOSINOPHILS ABSOLUTE: 0.3 K/UL (ref 0–0.6)
EOSINOPHILS RELATIVE PERCENT: 4.1 %
HCT VFR BLD CALC: 45.6 % (ref 36–48)
HEMOGLOBIN: 15.3 G/DL (ref 12–16)
LYMPHOCYTES ABSOLUTE: 2 K/UL (ref 1–5.1)
LYMPHOCYTES RELATIVE PERCENT: 29.3 %
MCH RBC QN AUTO: 31.6 PG (ref 26–34)
MCHC RBC AUTO-ENTMCNC: 33.5 G/DL (ref 31–36)
MCV RBC AUTO: 94.3 FL (ref 80–100)
MONOCYTES ABSOLUTE: 0.6 K/UL (ref 0–1.3)
MONOCYTES RELATIVE PERCENT: 8.7 %
NEUTROPHILS ABSOLUTE: 3.9 K/UL (ref 1.7–7.7)
NEUTROPHILS RELATIVE PERCENT: 56.9 %
PDW BLD-RTO: 12.9 % (ref 12.4–15.4)
PLATELET # BLD: 134 K/UL (ref 135–450)
PLATELET SLIDE REVIEW: ADEQUATE
PMV BLD AUTO: 9.5 FL (ref 5–10.5)
RBC # BLD: 4.84 M/UL (ref 4–5.2)
WBC # BLD: 6.9 K/UL (ref 4–11)

## 2018-12-05 PROCEDURE — 85025 COMPLETE CBC W/AUTO DIFF WBC: CPT

## 2018-12-05 PROCEDURE — 36415 COLL VENOUS BLD VENIPUNCTURE: CPT

## 2019-02-04 RX ORDER — MELOXICAM 15 MG/1
15 TABLET ORAL DAILY
Qty: 90 TABLET | Refills: 0 | Status: SHIPPED | OUTPATIENT
Start: 2019-02-04 | End: 2019-05-29 | Stop reason: SDUPTHER

## 2019-02-21 RX ORDER — ALENDRONATE SODIUM 70 MG/1
TABLET ORAL
Qty: 12 TABLET | Refills: 3 | Status: SHIPPED | OUTPATIENT
Start: 2019-02-21 | End: 2019-08-09 | Stop reason: SDUPTHER

## 2019-06-03 ENCOUNTER — HOSPITAL ENCOUNTER (OUTPATIENT)
Age: 74
Discharge: HOME | End: 2019-06-03
Payer: MEDICARE

## 2019-06-03 ENCOUNTER — OFFICE VISIT (OUTPATIENT)
Dept: INTERNAL MEDICINE CLINIC | Age: 74
End: 2019-06-03
Payer: MEDICARE

## 2019-06-03 VITALS
SYSTOLIC BLOOD PRESSURE: 126 MMHG | WEIGHT: 131 LBS | HEART RATE: 76 BPM | BODY MASS INDEX: 26.46 KG/M2 | DIASTOLIC BLOOD PRESSURE: 80 MMHG | OXYGEN SATURATION: 98 %

## 2019-06-03 DIAGNOSIS — Z12.39 SCREENING FOR BREAST CANCER: ICD-10-CM

## 2019-06-03 DIAGNOSIS — R73.01 IFG (IMPAIRED FASTING GLUCOSE): ICD-10-CM

## 2019-06-03 DIAGNOSIS — E78.5 HYPERLIPIDEMIA, UNSPECIFIED HYPERLIPIDEMIA TYPE: ICD-10-CM

## 2019-06-03 DIAGNOSIS — Z23 NEED FOR PROPHYLACTIC VACCINATION AGAINST STREPTOCOCCUS PNEUMONIAE (PNEUMOCOCCUS): ICD-10-CM

## 2019-06-03 DIAGNOSIS — I10 ESSENTIAL HYPERTENSION: ICD-10-CM

## 2019-06-03 DIAGNOSIS — M85.88 OSTEOPENIA OF LUMBAR SPINE: ICD-10-CM

## 2019-06-03 DIAGNOSIS — I10 ESSENTIAL HYPERTENSION: Primary | ICD-10-CM

## 2019-06-03 LAB
A/G RATIO: 1.9 (ref 1.1–2.2)
ALBUMIN SERPL-MCNC: 5 G/DL (ref 3.4–5)
ALP BLD-CCNC: 76 U/L (ref 40–129)
ALT SERPL-CCNC: 20 U/L (ref 10–40)
ANION GAP SERPL CALCULATED.3IONS-SCNC: 18 MMOL/L (ref 3–16)
AST SERPL-CCNC: 15 U/L (ref 15–37)
BILIRUB SERPL-MCNC: 0.7 MG/DL (ref 0–1)
BUN BLDV-MCNC: 14 MG/DL (ref 7–20)
CALCIUM SERPL-MCNC: 9.8 MG/DL (ref 8.3–10.6)
CHLORIDE BLD-SCNC: 98 MMOL/L (ref 99–110)
CHOLESTEROL, TOTAL: 192 MG/DL (ref 0–199)
CO2: 24 MMOL/L (ref 21–32)
CREAT SERPL-MCNC: 0.8 MG/DL (ref 0.6–1.2)
GFR AFRICAN AMERICAN: >60
GFR NON-AFRICAN AMERICAN: >60
GLOBULIN: 2.6 G/DL
GLUCOSE BLD-MCNC: 94 MG/DL (ref 70–99)
HDLC SERPL-MCNC: 52 MG/DL (ref 40–60)
LDL CHOLESTEROL CALCULATED: 100 MG/DL
POTASSIUM SERPL-SCNC: 3.4 MMOL/L (ref 3.5–5.1)
SODIUM BLD-SCNC: 140 MMOL/L (ref 136–145)
TOTAL PROTEIN: 7.6 G/DL (ref 6.4–8.2)
TRIGL SERPL-MCNC: 200 MG/DL (ref 0–150)
VLDLC SERPL CALC-MCNC: 40 MG/DL

## 2019-06-03 PROCEDURE — 80053 COMPREHEN METABOLIC PANEL: CPT

## 2019-06-03 PROCEDURE — G0009 ADMIN PNEUMOCOCCAL VACCINE: HCPCS | Performed by: INTERNAL MEDICINE

## 2019-06-03 PROCEDURE — 4040F PNEUMOC VAC/ADMIN/RCVD: CPT | Performed by: INTERNAL MEDICINE

## 2019-06-03 PROCEDURE — 1123F ACP DISCUSS/DSCN MKR DOCD: CPT | Performed by: INTERNAL MEDICINE

## 2019-06-03 PROCEDURE — 3017F COLORECTAL CA SCREEN DOC REV: CPT | Performed by: INTERNAL MEDICINE

## 2019-06-03 PROCEDURE — G8399 PT W/DXA RESULTS DOCUMENT: HCPCS | Performed by: INTERNAL MEDICINE

## 2019-06-03 PROCEDURE — G8427 DOCREV CUR MEDS BY ELIG CLIN: HCPCS | Performed by: INTERNAL MEDICINE

## 2019-06-03 PROCEDURE — 80061 LIPID PANEL: CPT

## 2019-06-03 PROCEDURE — 36415 COLL VENOUS BLD VENIPUNCTURE: CPT

## 2019-06-03 PROCEDURE — 99214 OFFICE O/P EST MOD 30 MIN: CPT | Performed by: INTERNAL MEDICINE

## 2019-06-03 PROCEDURE — 90670 PCV13 VACCINE IM: CPT | Performed by: INTERNAL MEDICINE

## 2019-06-03 PROCEDURE — G8417 CALC BMI ABV UP PARAM F/U: HCPCS | Performed by: INTERNAL MEDICINE

## 2019-06-03 PROCEDURE — 1090F PRES/ABSN URINE INCON ASSESS: CPT | Performed by: INTERNAL MEDICINE

## 2019-06-03 PROCEDURE — 1036F TOBACCO NON-USER: CPT | Performed by: INTERNAL MEDICINE

## 2019-06-03 ASSESSMENT — ENCOUNTER SYMPTOMS
NAUSEA: 0
CONSTIPATION: 0
CHEST TIGHTNESS: 0
WHEEZING: 0
DIARRHEA: 0
ABDOMINAL DISTENTION: 0
VOMITING: 0
SHORTNESS OF BREATH: 0
BACK PAIN: 0
COUGH: 0

## 2019-06-03 ASSESSMENT — PATIENT HEALTH QUESTIONNAIRE - PHQ9
1. LITTLE INTEREST OR PLEASURE IN DOING THINGS: 0
SUM OF ALL RESPONSES TO PHQ9 QUESTIONS 1 & 2: 0
SUM OF ALL RESPONSES TO PHQ QUESTIONS 1-9: 0
2. FEELING DOWN, DEPRESSED OR HOPELESS: 0
SUM OF ALL RESPONSES TO PHQ QUESTIONS 1-9: 0

## 2019-06-03 NOTE — PROGRESS NOTES
Physical Exam   Constitutional: She is oriented to person, place, and time. She appears well-developed and well-nourished. No distress. HENT:   Right Ear: External ear normal.   Left Ear: External ear normal.   Mouth/Throat: Oropharynx is clear and moist. No oropharyngeal exudate. Neck: Normal range of motion. Neck supple. No thyromegaly present. Cardiovascular: Normal rate, regular rhythm, normal heart sounds and intact distal pulses. Pulmonary/Chest: Effort normal and breath sounds normal. No respiratory distress. She has no wheezes. She has no rales. She exhibits no tenderness. Abdominal: Soft. She exhibits no distension and no mass. There is no tenderness. There is no rebound and no guarding. Musculoskeletal: She exhibits no edema. Neurological: She is alert and oriented to person, place, and time. Skin: She is not diaphoretic. Psychiatric: She has a normal mood and affect. Her behavior is normal. Judgment and thought content normal.   Vitals reviewed. Vitals:    06/03/19 0840   BP: 126/80   Pulse: 76   SpO2: 98%         ASSESSMENT/PLAN:  1. Essential hypertension  Stable. Continue to monitor  - Comprehensive Metabolic Panel; Future    2. Hyperlipidemia, unspecified hyperlipidemia type  Stable. Continue current regimen  - Lipid Panel; Future  - Comprehensive Metabolic Panel; Future    3. IFG (impaired fasting glucose)  Stable. Discussed lifestyle modification and patient expressed understanding      4. Need for prophylactic vaccination against Streptococcus pneumoniae (pneumococcus)  - Pneumococcal conjugate vaccine 13-valent    5. Screening for breast cancer  - [facility] DIGITAL SCREEN W CAD BILATERAL; Future    Osteopenia  Continue calcium with vitamin d as well as fosamax.  Dexa scan next visitt

## 2019-06-05 ENCOUNTER — HOSPITAL ENCOUNTER (OUTPATIENT)
Dept: MAMMOGRAPHY | Age: 74
Discharge: HOME | End: 2019-06-05
Payer: MEDICARE

## 2019-06-05 DIAGNOSIS — Z12.39 SCREENING FOR BREAST CANCER: ICD-10-CM

## 2019-06-05 PROCEDURE — 77063 BREAST TOMOSYNTHESIS BI: CPT

## 2019-08-05 ENCOUNTER — HOSPITAL ENCOUNTER (EMERGENCY)
Age: 74
Discharge: HOME | End: 2019-08-05
Payer: MEDICARE

## 2019-08-05 VITALS
WEIGHT: 131 LBS | HEART RATE: 83 BPM | TEMPERATURE: 97.7 F | SYSTOLIC BLOOD PRESSURE: 146 MMHG | DIASTOLIC BLOOD PRESSURE: 69 MMHG | BODY MASS INDEX: 26.41 KG/M2 | HEIGHT: 59 IN | OXYGEN SATURATION: 100 % | RESPIRATION RATE: 16 BRPM

## 2019-08-05 DIAGNOSIS — L72.3 INFECTED SEBACEOUS CYST: Primary | ICD-10-CM

## 2019-08-05 DIAGNOSIS — L08.9 INFECTED SEBACEOUS CYST: Primary | ICD-10-CM

## 2019-08-05 PROCEDURE — 4500000022 HC ED LEVEL 2 PROCEDURE

## 2019-08-05 PROCEDURE — 99282 EMERGENCY DEPT VISIT SF MDM: CPT

## 2019-08-05 RX ORDER — SULFAMETHOXAZOLE AND TRIMETHOPRIM 800; 160 MG/1; MG/1
1 TABLET ORAL 2 TIMES DAILY
Qty: 14 TABLET | Refills: 0 | Status: SHIPPED | OUTPATIENT
Start: 2019-08-05 | End: 2019-08-12

## 2019-08-05 ASSESSMENT — PAIN SCALES - GENERAL: PAINLEVEL_OUTOF10: 4

## 2019-08-05 NOTE — ED NOTES
Assisted Albert HOPKINS at bedside with pt abscess drainage to back/wound applied with gauze/tape.      Bard Jessenia LPN  06/32/37 [id number]

## 2019-08-05 NOTE — ED NOTES
Pt scripts x1 instructed to follow up with PCP/General Surgeon. Assessed per Keshav HOPKINS.      Solitario Ruelas LPN  32/69/65 [id number]

## 2019-08-08 ENCOUNTER — OFFICE VISIT (OUTPATIENT)
Dept: INTERNAL MEDICINE CLINIC | Age: 74
End: 2019-08-08
Payer: MEDICARE

## 2019-08-08 VITALS
SYSTOLIC BLOOD PRESSURE: 158 MMHG | BODY MASS INDEX: 26.61 KG/M2 | WEIGHT: 132 LBS | HEIGHT: 59 IN | HEART RATE: 77 BPM | DIASTOLIC BLOOD PRESSURE: 70 MMHG | OXYGEN SATURATION: 98 %

## 2019-08-08 DIAGNOSIS — I10 ESSENTIAL HYPERTENSION: ICD-10-CM

## 2019-08-08 DIAGNOSIS — E78.5 HYPERLIPIDEMIA, UNSPECIFIED HYPERLIPIDEMIA TYPE: ICD-10-CM

## 2019-08-08 DIAGNOSIS — L08.9 INFECTED SEBACEOUS CYST: Primary | ICD-10-CM

## 2019-08-08 DIAGNOSIS — R73.01 IFG (IMPAIRED FASTING GLUCOSE): ICD-10-CM

## 2019-08-08 DIAGNOSIS — L72.3 INFECTED SEBACEOUS CYST: Primary | ICD-10-CM

## 2019-08-08 PROCEDURE — 99213 OFFICE O/P EST LOW 20 MIN: CPT | Performed by: INTERNAL MEDICINE

## 2019-08-08 PROCEDURE — 1123F ACP DISCUSS/DSCN MKR DOCD: CPT | Performed by: INTERNAL MEDICINE

## 2019-08-08 PROCEDURE — 4040F PNEUMOC VAC/ADMIN/RCVD: CPT | Performed by: INTERNAL MEDICINE

## 2019-08-08 PROCEDURE — G8417 CALC BMI ABV UP PARAM F/U: HCPCS | Performed by: INTERNAL MEDICINE

## 2019-08-08 PROCEDURE — 3288F FALL RISK ASSESSMENT DOCD: CPT | Performed by: INTERNAL MEDICINE

## 2019-08-08 PROCEDURE — 3017F COLORECTAL CA SCREEN DOC REV: CPT | Performed by: INTERNAL MEDICINE

## 2019-08-08 PROCEDURE — 1090F PRES/ABSN URINE INCON ASSESS: CPT | Performed by: INTERNAL MEDICINE

## 2019-08-08 PROCEDURE — G8427 DOCREV CUR MEDS BY ELIG CLIN: HCPCS | Performed by: INTERNAL MEDICINE

## 2019-08-08 PROCEDURE — G8399 PT W/DXA RESULTS DOCUMENT: HCPCS | Performed by: INTERNAL MEDICINE

## 2019-08-08 PROCEDURE — 1036F TOBACCO NON-USER: CPT | Performed by: INTERNAL MEDICINE

## 2019-08-08 ASSESSMENT — ENCOUNTER SYMPTOMS
BACK PAIN: 0
CHEST TIGHTNESS: 0
WHEEZING: 0
ABDOMINAL DISTENTION: 0
CONSTIPATION: 0
DIARRHEA: 0
VOMITING: 0
SHORTNESS OF BREATH: 0
COUGH: 0
NAUSEA: 0
ROS SKIN COMMENTS: CYST

## 2019-08-09 RX ORDER — HYDROCHLOROTHIAZIDE 25 MG/1
TABLET ORAL
Qty: 90 TABLET | Refills: 3 | Status: SHIPPED | OUTPATIENT
Start: 2019-08-09 | End: 2020-07-30

## 2019-08-09 RX ORDER — LOSARTAN POTASSIUM 100 MG/1
TABLET ORAL
Qty: 90 TABLET | Refills: 3 | Status: SHIPPED | OUTPATIENT
Start: 2019-08-09 | End: 2020-07-30

## 2019-08-09 RX ORDER — ALENDRONATE SODIUM 70 MG/1
70 TABLET ORAL
Qty: 12 TABLET | Refills: 3 | Status: SHIPPED | OUTPATIENT
Start: 2019-08-09 | End: 2020-07-24

## 2019-08-09 RX ORDER — SIMVASTATIN 20 MG
TABLET ORAL
Qty: 90 TABLET | Refills: 3 | Status: SHIPPED | OUTPATIENT
Start: 2019-08-09 | End: 2020-07-30

## 2019-08-09 RX ORDER — AMLODIPINE BESYLATE 10 MG/1
TABLET ORAL
Qty: 90 TABLET | Refills: 3 | Status: SHIPPED | OUTPATIENT
Start: 2019-08-09 | End: 2020-07-30

## 2019-08-09 RX ORDER — MELOXICAM 15 MG/1
15 TABLET ORAL DAILY
Qty: 90 TABLET | Refills: 0 | Status: ON HOLD | OUTPATIENT
Start: 2019-08-09 | End: 2019-08-23

## 2019-08-10 ENCOUNTER — HOSPITAL ENCOUNTER (EMERGENCY)
Age: 74
Discharge: HOME | End: 2019-08-11
Attending: EMERGENCY MEDICINE
Payer: MEDICARE

## 2019-08-10 ENCOUNTER — APPOINTMENT (OUTPATIENT)
Dept: GENERAL RADIOLOGY | Age: 74
End: 2019-08-10
Payer: MEDICARE

## 2019-08-10 DIAGNOSIS — R07.9 CHEST PAIN, UNSPECIFIED TYPE: Primary | ICD-10-CM

## 2019-08-10 DIAGNOSIS — R19.7 NAUSEA VOMITING AND DIARRHEA: ICD-10-CM

## 2019-08-10 DIAGNOSIS — R11.2 NAUSEA VOMITING AND DIARRHEA: ICD-10-CM

## 2019-08-10 PROCEDURE — 99285 EMERGENCY DEPT VISIT HI MDM: CPT

## 2019-08-10 PROCEDURE — 81001 URINALYSIS AUTO W/SCOPE: CPT

## 2019-08-10 PROCEDURE — 80053 COMPREHEN METABOLIC PANEL: CPT

## 2019-08-10 PROCEDURE — 2580000003 HC RX 258: Performed by: EMERGENCY MEDICINE

## 2019-08-10 PROCEDURE — 71046 X-RAY EXAM CHEST 2 VIEWS: CPT

## 2019-08-10 PROCEDURE — 83690 ASSAY OF LIPASE: CPT

## 2019-08-10 PROCEDURE — 93005 ELECTROCARDIOGRAM TRACING: CPT | Performed by: EMERGENCY MEDICINE

## 2019-08-10 PROCEDURE — 85025 COMPLETE CBC W/AUTO DIFF WBC: CPT

## 2019-08-10 PROCEDURE — 84484 ASSAY OF TROPONIN QUANT: CPT

## 2019-08-10 PROCEDURE — 6360000002 HC RX W HCPCS: Performed by: EMERGENCY MEDICINE

## 2019-08-10 PROCEDURE — 96374 THER/PROPH/DIAG INJ IV PUSH: CPT

## 2019-08-10 PROCEDURE — 36415 COLL VENOUS BLD VENIPUNCTURE: CPT

## 2019-08-10 PROCEDURE — 6370000000 HC RX 637 (ALT 250 FOR IP): Performed by: EMERGENCY MEDICINE

## 2019-08-10 RX ORDER — OXYCODONE HYDROCHLORIDE AND ACETAMINOPHEN 5; 325 MG/1; MG/1
1 TABLET ORAL ONCE
Status: COMPLETED | OUTPATIENT
Start: 2019-08-11 | End: 2019-08-10

## 2019-08-10 RX ORDER — ONDANSETRON 2 MG/ML
4 INJECTION INTRAMUSCULAR; INTRAVENOUS
Status: DISCONTINUED | OUTPATIENT
Start: 2019-08-10 | End: 2019-08-11 | Stop reason: HOSPADM

## 2019-08-10 RX ORDER — 0.9 % SODIUM CHLORIDE 0.9 %
1000 INTRAVENOUS SOLUTION INTRAVENOUS ONCE
Status: COMPLETED | OUTPATIENT
Start: 2019-08-10 | End: 2019-08-11

## 2019-08-10 RX ORDER — ASPIRIN 81 MG/1
324 TABLET, CHEWABLE ORAL ONCE
Status: COMPLETED | OUTPATIENT
Start: 2019-08-10 | End: 2019-08-10

## 2019-08-10 RX ORDER — MORPHINE SULFATE 4 MG/ML
4 INJECTION, SOLUTION INTRAMUSCULAR; INTRAVENOUS
Status: DISCONTINUED | OUTPATIENT
Start: 2019-08-10 | End: 2019-08-10

## 2019-08-10 RX ADMIN — ASPIRIN 81 MG 324 MG: 81 TABLET ORAL at 22:30

## 2019-08-10 RX ADMIN — OXYCODONE HYDROCHLORIDE AND ACETAMINOPHEN 1 TABLET: 5; 325 TABLET ORAL at 23:58

## 2019-08-10 RX ADMIN — ONDANSETRON 4 MG: 2 INJECTION INTRAMUSCULAR; INTRAVENOUS at 22:30

## 2019-08-10 RX ADMIN — SODIUM CHLORIDE 1000 ML: 9 INJECTION, SOLUTION INTRAVENOUS at 22:29

## 2019-08-10 ASSESSMENT — PAIN DESCRIPTION - FREQUENCY: FREQUENCY: CONTINUOUS

## 2019-08-10 ASSESSMENT — PAIN DESCRIPTION - DESCRIPTORS: DESCRIPTORS: STABBING

## 2019-08-10 ASSESSMENT — PAIN SCALES - GENERAL: PAINLEVEL_OUTOF10: 8

## 2019-08-10 ASSESSMENT — PAIN DESCRIPTION - LOCATION: LOCATION: CHEST;BACK

## 2019-08-10 ASSESSMENT — PAIN DESCRIPTION - PAIN TYPE: TYPE: ACUTE PAIN

## 2019-08-11 VITALS
HEART RATE: 75 BPM | TEMPERATURE: 97.6 F | WEIGHT: 131 LBS | HEIGHT: 59 IN | RESPIRATION RATE: 22 BRPM | BODY MASS INDEX: 26.41 KG/M2 | DIASTOLIC BLOOD PRESSURE: 97 MMHG | SYSTOLIC BLOOD PRESSURE: 110 MMHG | OXYGEN SATURATION: 100 %

## 2019-08-11 LAB
A/G RATIO: 1.7 (ref 1.1–2.2)
ALBUMIN SERPL-MCNC: 4.9 G/DL (ref 3.4–5)
ALP BLD-CCNC: 82 U/L (ref 40–129)
ALT SERPL-CCNC: 21 U/L (ref 10–40)
ANION GAP SERPL CALCULATED.3IONS-SCNC: 13 MMOL/L (ref 3–16)
AST SERPL-CCNC: 17 U/L (ref 15–37)
BACTERIA: ABNORMAL /HPF
BASOPHILS ABSOLUTE: 0.1 K/UL (ref 0–0.2)
BASOPHILS RELATIVE PERCENT: 1.1 %
BILIRUB SERPL-MCNC: 0.4 MG/DL (ref 0–1)
BILIRUBIN URINE: NEGATIVE
BLOOD, URINE: ABNORMAL
BUN BLDV-MCNC: 20 MG/DL (ref 7–20)
CALCIUM SERPL-MCNC: 9.9 MG/DL (ref 8.3–10.6)
CHLORIDE BLD-SCNC: 101 MMOL/L (ref 99–110)
CLARITY: CLEAR
CO2: 26 MMOL/L (ref 21–32)
COLOR: YELLOW
CREAT SERPL-MCNC: 1.1 MG/DL (ref 0.6–1.2)
EKG ATRIAL RATE: 82 BPM
EKG DIAGNOSIS: NORMAL
EKG P AXIS: 73 DEGREES
EKG P-R INTERVAL: 158 MS
EKG Q-T INTERVAL: 388 MS
EKG QRS DURATION: 76 MS
EKG QTC CALCULATION (BAZETT): 453 MS
EKG R AXIS: 71 DEGREES
EKG T AXIS: 50 DEGREES
EKG VENTRICULAR RATE: 82 BPM
EOSINOPHILS ABSOLUTE: 0.3 K/UL (ref 0–0.6)
EOSINOPHILS RELATIVE PERCENT: 2.5 %
EPITHELIAL CELLS, UA: ABNORMAL /HPF
GFR AFRICAN AMERICAN: 59
GFR NON-AFRICAN AMERICAN: 49
GLOBULIN: 2.9 G/DL
GLUCOSE BLD-MCNC: 115 MG/DL (ref 70–99)
GLUCOSE URINE: NEGATIVE MG/DL
HCT VFR BLD CALC: 44.8 % (ref 36–48)
HEMOGLOBIN: 15.3 G/DL (ref 12–16)
KETONES, URINE: NEGATIVE MG/DL
LEUKOCYTE ESTERASE, URINE: NEGATIVE
LIPASE: 57 U/L (ref 13–60)
LYMPHOCYTES ABSOLUTE: 1.7 K/UL (ref 1–5.1)
LYMPHOCYTES RELATIVE PERCENT: 16.1 %
MCH RBC QN AUTO: 31.8 PG (ref 26–34)
MCHC RBC AUTO-ENTMCNC: 34 G/DL (ref 31–36)
MCV RBC AUTO: 93.5 FL (ref 80–100)
MICROSCOPIC EXAMINATION: YES
MONOCYTES ABSOLUTE: 0.9 K/UL (ref 0–1.3)
MONOCYTES RELATIVE PERCENT: 8.3 %
NEUTROPHILS ABSOLUTE: 7.4 K/UL (ref 1.7–7.7)
NEUTROPHILS RELATIVE PERCENT: 72 %
NITRITE, URINE: NEGATIVE
PDW BLD-RTO: 12.7 % (ref 12.4–15.4)
PH UA: 6 (ref 5–8)
PLATELET # BLD: 201 K/UL (ref 135–450)
PMV BLD AUTO: 9.4 FL (ref 5–10.5)
POTASSIUM REFLEX MAGNESIUM: 3.6 MMOL/L (ref 3.5–5.1)
PROTEIN UA: NEGATIVE MG/DL
RBC # BLD: 4.8 M/UL (ref 4–5.2)
RBC UA: ABNORMAL /HPF (ref 0–2)
SODIUM BLD-SCNC: 140 MMOL/L (ref 136–145)
SPECIFIC GRAVITY UA: 1.02 (ref 1–1.03)
TOTAL PROTEIN: 7.8 G/DL (ref 6.4–8.2)
TROPONIN: <0.01 NG/ML
URINE REFLEX TO CULTURE: ABNORMAL
URINE TYPE: ABNORMAL
UROBILINOGEN, URINE: 0.2 E.U./DL
WBC # BLD: 10.3 K/UL (ref 4–11)
WBC UA: ABNORMAL /HPF (ref 0–5)

## 2019-08-11 PROCEDURE — 93010 ELECTROCARDIOGRAM REPORT: CPT | Performed by: INTERNAL MEDICINE

## 2019-08-11 PROCEDURE — 6370000000 HC RX 637 (ALT 250 FOR IP): Performed by: EMERGENCY MEDICINE

## 2019-08-11 RX ORDER — VALACYCLOVIR HYDROCHLORIDE 1 G/1
1000 TABLET, FILM COATED ORAL 3 TIMES DAILY
Qty: 21 TABLET | Refills: 0 | Status: SHIPPED | OUTPATIENT
Start: 2019-08-11 | End: 2019-08-19

## 2019-08-11 RX ORDER — ONDANSETRON 4 MG/1
4 TABLET, ORALLY DISINTEGRATING ORAL EVERY 8 HOURS PRN
Qty: 20 TABLET | Refills: 0 | Status: SHIPPED | OUTPATIENT
Start: 2019-08-11 | End: 2019-08-19 | Stop reason: ALTCHOICE

## 2019-08-11 RX ORDER — PREDNISONE 50 MG/1
50 TABLET ORAL DAILY
Qty: 5 TABLET | Refills: 0 | Status: SHIPPED | OUTPATIENT
Start: 2019-08-11 | End: 2019-08-19

## 2019-08-11 RX ORDER — OXYCODONE HYDROCHLORIDE AND ACETAMINOPHEN 5; 325 MG/1; MG/1
1 TABLET ORAL EVERY 6 HOURS PRN
Qty: 12 TABLET | Refills: 0 | Status: SHIPPED | OUTPATIENT
Start: 2019-08-11 | End: 2019-08-14

## 2019-08-11 RX ORDER — PROMETHAZINE HYDROCHLORIDE 25 MG/1
25 TABLET ORAL ONCE
Status: COMPLETED | OUTPATIENT
Start: 2019-08-11 | End: 2019-08-11

## 2019-08-11 RX ADMIN — PROMETHAZINE HYDROCHLORIDE 25 MG: 25 TABLET ORAL at 00:33

## 2019-08-11 NOTE — ED TRIAGE NOTES
Pt had cyst removed from right back on Monday has been on Bactrim since. Pt started with N/V/D yesterday and has chest and back pain started today.

## 2019-08-13 ENCOUNTER — INITIAL CONSULT (OUTPATIENT)
Dept: SURGERY | Age: 74
End: 2019-08-13
Payer: MEDICARE

## 2019-08-13 VITALS
HEIGHT: 59 IN | WEIGHT: 132 LBS | DIASTOLIC BLOOD PRESSURE: 74 MMHG | BODY MASS INDEX: 26.61 KG/M2 | SYSTOLIC BLOOD PRESSURE: 132 MMHG

## 2019-08-13 DIAGNOSIS — R10.11 RUQ PAIN: ICD-10-CM

## 2019-08-13 DIAGNOSIS — L72.3 INFECTED SEBACEOUS CYST: Primary | ICD-10-CM

## 2019-08-13 DIAGNOSIS — L08.9 INFECTED SEBACEOUS CYST: Primary | ICD-10-CM

## 2019-08-13 PROCEDURE — 99203 OFFICE O/P NEW LOW 30 MIN: CPT | Performed by: SURGERY

## 2019-08-13 PROCEDURE — 1036F TOBACCO NON-USER: CPT | Performed by: SURGERY

## 2019-08-13 PROCEDURE — G8427 DOCREV CUR MEDS BY ELIG CLIN: HCPCS | Performed by: SURGERY

## 2019-08-13 PROCEDURE — 1090F PRES/ABSN URINE INCON ASSESS: CPT | Performed by: SURGERY

## 2019-08-13 PROCEDURE — G8399 PT W/DXA RESULTS DOCUMENT: HCPCS | Performed by: SURGERY

## 2019-08-13 PROCEDURE — 1123F ACP DISCUSS/DSCN MKR DOCD: CPT | Performed by: SURGERY

## 2019-08-13 PROCEDURE — 4040F PNEUMOC VAC/ADMIN/RCVD: CPT | Performed by: SURGERY

## 2019-08-13 PROCEDURE — G8417 CALC BMI ABV UP PARAM F/U: HCPCS | Performed by: SURGERY

## 2019-08-13 PROCEDURE — 3017F COLORECTAL CA SCREEN DOC REV: CPT | Performed by: SURGERY

## 2019-08-13 RX ORDER — SODIUM CHLORIDE 0.9 % (FLUSH) 0.9 %
10 SYRINGE (ML) INJECTION EVERY 12 HOURS SCHEDULED
Status: CANCELLED | OUTPATIENT
Start: 2019-08-13

## 2019-08-13 RX ORDER — SODIUM CHLORIDE 0.9 % (FLUSH) 0.9 %
10 SYRINGE (ML) INJECTION PRN
Status: CANCELLED | OUTPATIENT
Start: 2019-08-13

## 2019-08-15 ENCOUNTER — HOSPITAL ENCOUNTER (OUTPATIENT)
Dept: ULTRASOUND IMAGING | Age: 74
Discharge: HOME | End: 2019-08-15
Payer: MEDICARE

## 2019-08-15 ENCOUNTER — TELEPHONE (OUTPATIENT)
Dept: SURGERY | Age: 74
End: 2019-08-15

## 2019-08-15 ENCOUNTER — PREP FOR PROCEDURE (OUTPATIENT)
Dept: SURGERY | Age: 74
End: 2019-08-15

## 2019-08-15 DIAGNOSIS — R10.11 RUQ PAIN: ICD-10-CM

## 2019-08-15 PROCEDURE — 76705 ECHO EXAM OF ABDOMEN: CPT

## 2019-08-15 NOTE — TELEPHONE ENCOUNTER
Called pt informed her U/S was negative, surgery planned for Cyst excision next week. Pt understands.

## 2019-08-19 ENCOUNTER — OFFICE VISIT (OUTPATIENT)
Dept: INTERNAL MEDICINE CLINIC | Age: 74
End: 2019-08-19
Payer: MEDICARE

## 2019-08-19 VITALS
BODY MASS INDEX: 26.61 KG/M2 | HEIGHT: 59 IN | RESPIRATION RATE: 16 BRPM | OXYGEN SATURATION: 74 % | SYSTOLIC BLOOD PRESSURE: 140 MMHG | DIASTOLIC BLOOD PRESSURE: 62 MMHG | WEIGHT: 132 LBS | HEART RATE: 72 BPM

## 2019-08-19 DIAGNOSIS — K21.9 GASTROESOPHAGEAL REFLUX DISEASE WITHOUT ESOPHAGITIS: Primary | ICD-10-CM

## 2019-08-19 DIAGNOSIS — Z23 NEED FOR PROPHYLACTIC VACCINATION AND INOCULATION AGAINST VARICELLA: ICD-10-CM

## 2019-08-19 DIAGNOSIS — R94.4 DECREASED GFR: ICD-10-CM

## 2019-08-19 PROCEDURE — 3017F COLORECTAL CA SCREEN DOC REV: CPT | Performed by: NURSE PRACTITIONER

## 2019-08-19 PROCEDURE — 1036F TOBACCO NON-USER: CPT | Performed by: NURSE PRACTITIONER

## 2019-08-19 PROCEDURE — G8399 PT W/DXA RESULTS DOCUMENT: HCPCS | Performed by: NURSE PRACTITIONER

## 2019-08-19 PROCEDURE — 99213 OFFICE O/P EST LOW 20 MIN: CPT | Performed by: NURSE PRACTITIONER

## 2019-08-19 PROCEDURE — G8427 DOCREV CUR MEDS BY ELIG CLIN: HCPCS | Performed by: NURSE PRACTITIONER

## 2019-08-19 PROCEDURE — 1090F PRES/ABSN URINE INCON ASSESS: CPT | Performed by: NURSE PRACTITIONER

## 2019-08-19 PROCEDURE — G8417 CALC BMI ABV UP PARAM F/U: HCPCS | Performed by: NURSE PRACTITIONER

## 2019-08-19 PROCEDURE — 4040F PNEUMOC VAC/ADMIN/RCVD: CPT | Performed by: NURSE PRACTITIONER

## 2019-08-19 PROCEDURE — 1123F ACP DISCUSS/DSCN MKR DOCD: CPT | Performed by: NURSE PRACTITIONER

## 2019-08-19 NOTE — PROGRESS NOTES
SUBJECTIVE:  Marce aguiar 69 y. o.female    Chief Complaint   Patient presents with    Follow-Up from Hospital     was in ER did not get admitted    Abdominal Pain     Ongoing for about 2 weeks now    Back Pain     isabela has today, and pain goes from back and radiates to her chest/upper stomach    Chest Pain     No pain today comes every once in a while, feels like pressure more in the upper stomach than chest       Patient recently see in ER for abdominal and back/chest pain. She had had an infected skin cyst removed and was taking bactrim. She started with abd/back pain, N/V/D and went to the ED. CXR negative, EKG unchanged and Troponin negative. She was sent home with pain and nausea medication and she is here to follow up. She does have a history of GERD and diverticulitis. Pain is easing and still present intermittently. She has not been taking stomach medication like she should and has been taking NSAIDs for her back pain. Denies any further N/V. Denies fever/chills. Past Medical History:   Diagnosis Date    Diverticulitis     Hyperlipidemia     Hypertension     Osteoarthritis     Osteopenia     Restless legs syndrome        Past Surgical History:   Procedure Laterality Date    BREAST REDUCTION SURGERY  10/15/15    COLONOSCOPY  11/15/2016    colon polyps, diverticulosis    OTHER SURGICAL HISTORY  08/23/2019    excision back cyst times two    OVARIAN CYST REMOVAL      CT EXCISION TUMOR SOFT TISSUE BACK/FLANK SUBQ <3CM N/A 8/23/2019    EXCISION BACK CYST TIMES TWO performed by Cristela De Paz MD at [facility] OR       Family History   Problem Relation Age of Onset    Heart Disease Mother     High Blood Pressure Mother     Heart Disease Father        Social History     Socioeconomic History    Marital status:       Spouse name: Not on file    Number of children: Not on file    Years of education: Not on file    Highest education level: Not on file   Occupational History    Not on file   Social Needs    Financial resource strain: Not on file    Food insecurity:     Worry: Not on file     Inability: Not on file    Transportation needs:     Medical: Not on file     Non-medical: Not on file   Tobacco Use    Smoking status: Former Smoker     Packs/day: 2.00     Years: 30.00     Pack years: 60.00     Last attempt to quit: 1/1/2004     Years since quitting: 15.6    Smokeless tobacco: Never Used   Substance and Sexual Activity    Alcohol use: No    Drug use: No    Sexual activity: Not on file   Lifestyle    Physical activity:     Days per week: Not on file     Minutes per session: Not on file    Stress: Not on file   Relationships    Social connections:     Talks on phone: Not on file     Gets together: Not on file     Attends Holiness service: Not on file     Active member of club or organization: Not on file     Attends meetings of clubs or organizations: Not on file     Relationship status: Not on file    Intimate partner violence:     Fear of current or ex partner: Not on file     Emotionally abused: Not on file     Physically abused: Not on file     Forced sexual activity: Not on file   Other Topics Concern    Not on file   Social History Narrative    Not on file       Review of Systems   Constitutional: Negative for chills and fever. Respiratory: Negative for cough, chest tightness and shortness of breath. Cardiovascular: Negative for chest pain. Gastrointestinal: Positive for abdominal pain, diarrhea and nausea. Negative for blood in stool, constipation and vomiting. Endocrine: Negative. Genitourinary: Negative. Musculoskeletal: Positive for back pain. Allergic/Immunologic: Negative. Neurological: Negative for dizziness, syncope and light-headedness. Hematological: Negative. Psychiatric/Behavioral: The patient is nervous/anxious.         OBJECTIVE:  BP (!) 140/62 (Site: Right Upper Arm, Position: Sitting, Cuff Size: Medium Adult)   Pulse 72   Resp

## 2019-08-22 ENCOUNTER — ANESTHESIA EVENT (OUTPATIENT)
Dept: OPERATING ROOM | Age: 74
End: 2019-08-22
Payer: MEDICARE

## 2019-08-23 ENCOUNTER — ANESTHESIA (OUTPATIENT)
Dept: OPERATING ROOM | Age: 74
End: 2019-08-23
Payer: MEDICARE

## 2019-08-23 ENCOUNTER — HOSPITAL ENCOUNTER (OUTPATIENT)
Age: 74
Setting detail: OUTPATIENT SURGERY
Discharge: HOME | End: 2019-08-23
Attending: SURGERY | Admitting: SURGERY
Payer: MEDICARE

## 2019-08-23 VITALS — OXYGEN SATURATION: 99 % | SYSTOLIC BLOOD PRESSURE: 108 MMHG | DIASTOLIC BLOOD PRESSURE: 71 MMHG

## 2019-08-23 VITALS
BODY MASS INDEX: 26.41 KG/M2 | DIASTOLIC BLOOD PRESSURE: 80 MMHG | HEIGHT: 59 IN | HEART RATE: 70 BPM | WEIGHT: 131 LBS | SYSTOLIC BLOOD PRESSURE: 152 MMHG | TEMPERATURE: 97.5 F | RESPIRATION RATE: 16 BRPM | OXYGEN SATURATION: 97 %

## 2019-08-23 DIAGNOSIS — L08.9 INFECTED SEBACEOUS CYST: Primary | ICD-10-CM

## 2019-08-23 DIAGNOSIS — L72.3 INFECTED SEBACEOUS CYST: Primary | ICD-10-CM

## 2019-08-23 PROCEDURE — 11402 EXC TR-EXT B9+MARG 1.1-2 CM: CPT | Performed by: SURGERY

## 2019-08-23 PROCEDURE — 2709999900 HC NON-CHARGEABLE SUPPLY: Performed by: SURGERY

## 2019-08-23 PROCEDURE — 6360000002 HC RX W HCPCS: Performed by: SURGERY

## 2019-08-23 PROCEDURE — 2500000003 HC RX 250 WO HCPCS: Performed by: SURGERY

## 2019-08-23 PROCEDURE — 88304 TISSUE EXAM BY PATHOLOGIST: CPT

## 2019-08-23 PROCEDURE — 11401 EXC TR-EXT B9+MARG 0.6-1 CM: CPT | Performed by: SURGERY

## 2019-08-23 PROCEDURE — 2500000003 HC RX 250 WO HCPCS: Performed by: ANESTHESIOLOGY

## 2019-08-23 PROCEDURE — 3600000012 HC SURGERY LEVEL 2 ADDTL 15MIN: Performed by: SURGERY

## 2019-08-23 PROCEDURE — 12031 INTMD RPR S/A/T/EXT 2.5 CM/<: CPT | Performed by: SURGERY

## 2019-08-23 PROCEDURE — 7100000011 HC PHASE II RECOVERY - ADDTL 15 MIN: Performed by: SURGERY

## 2019-08-23 PROCEDURE — 6370000000 HC RX 637 (ALT 250 FOR IP): Performed by: ANESTHESIOLOGY

## 2019-08-23 PROCEDURE — 2580000003 HC RX 258: Performed by: SURGERY

## 2019-08-23 PROCEDURE — 2500000003 HC RX 250 WO HCPCS: Performed by: NURSE ANESTHETIST, CERTIFIED REGISTERED

## 2019-08-23 PROCEDURE — 3700000000 HC ANESTHESIA ATTENDED CARE: Performed by: SURGERY

## 2019-08-23 PROCEDURE — 2580000003 HC RX 258

## 2019-08-23 PROCEDURE — 6360000002 HC RX W HCPCS: Performed by: NURSE ANESTHETIST, CERTIFIED REGISTERED

## 2019-08-23 PROCEDURE — 2580000003 HC RX 258: Performed by: ANESTHESIOLOGY

## 2019-08-23 PROCEDURE — 7100000010 HC PHASE II RECOVERY - FIRST 15 MIN: Performed by: SURGERY

## 2019-08-23 PROCEDURE — 3700000001 HC ADD 15 MINUTES (ANESTHESIA): Performed by: SURGERY

## 2019-08-23 PROCEDURE — 3600000002 HC SURGERY LEVEL 2 BASE: Performed by: SURGERY

## 2019-08-23 RX ORDER — MIDAZOLAM HYDROCHLORIDE 1 MG/ML
INJECTION INTRAMUSCULAR; INTRAVENOUS PRN
Status: DISCONTINUED | OUTPATIENT
Start: 2019-08-23 | End: 2019-08-23 | Stop reason: SDUPTHER

## 2019-08-23 RX ORDER — SODIUM CHLORIDE 0.9 % (FLUSH) 0.9 %
10 SYRINGE (ML) INJECTION PRN
Status: DISCONTINUED | OUTPATIENT
Start: 2019-08-23 | End: 2019-08-23 | Stop reason: HOSPADM

## 2019-08-23 RX ORDER — OXYCODONE HYDROCHLORIDE AND ACETAMINOPHEN 5; 325 MG/1; MG/1
2 TABLET ORAL PRN
Status: COMPLETED | OUTPATIENT
Start: 2019-08-23 | End: 2019-08-23

## 2019-08-23 RX ORDER — HYDRALAZINE HYDROCHLORIDE 20 MG/ML
5 INJECTION INTRAMUSCULAR; INTRAVENOUS EVERY 10 MIN PRN
Status: DISCONTINUED | OUTPATIENT
Start: 2019-08-23 | End: 2019-08-23 | Stop reason: HOSPADM

## 2019-08-23 RX ORDER — SODIUM CHLORIDE 0.9 % (FLUSH) 0.9 %
10 SYRINGE (ML) INJECTION EVERY 12 HOURS SCHEDULED
Status: DISCONTINUED | OUTPATIENT
Start: 2019-08-23 | End: 2019-08-23 | Stop reason: HOSPADM

## 2019-08-23 RX ORDER — LIDOCAINE HYDROCHLORIDE 20 MG/ML
INJECTION, SOLUTION INFILTRATION; PERINEURAL PRN
Status: DISCONTINUED | OUTPATIENT
Start: 2019-08-23 | End: 2019-08-23 | Stop reason: SDUPTHER

## 2019-08-23 RX ORDER — LABETALOL HYDROCHLORIDE 5 MG/ML
5 INJECTION, SOLUTION INTRAVENOUS EVERY 10 MIN PRN
Status: DISCONTINUED | OUTPATIENT
Start: 2019-08-23 | End: 2019-08-23 | Stop reason: HOSPADM

## 2019-08-23 RX ORDER — PROPOFOL 10 MG/ML
INJECTION, EMULSION INTRAVENOUS PRN
Status: DISCONTINUED | OUTPATIENT
Start: 2019-08-23 | End: 2019-08-23 | Stop reason: SDUPTHER

## 2019-08-23 RX ORDER — HYDROCODONE BITARTRATE AND ACETAMINOPHEN 5; 325 MG/1; MG/1
1 TABLET ORAL EVERY 4 HOURS PRN
Qty: 18 TABLET | Refills: 0 | Status: SHIPPED | OUTPATIENT
Start: 2019-08-23 | End: 2019-08-26

## 2019-08-23 RX ORDER — LIDOCAINE HYDROCHLORIDE 10 MG/ML
INJECTION, SOLUTION EPIDURAL; INFILTRATION; INTRACAUDAL; PERINEURAL PRN
Status: DISCONTINUED | OUTPATIENT
Start: 2019-08-23 | End: 2019-08-23 | Stop reason: ALTCHOICE

## 2019-08-23 RX ORDER — LIDOCAINE HYDROCHLORIDE 10 MG/ML
1 INJECTION, SOLUTION EPIDURAL; INFILTRATION; INTRACAUDAL; PERINEURAL
Status: COMPLETED | OUTPATIENT
Start: 2019-08-23 | End: 2019-08-23

## 2019-08-23 RX ORDER — SODIUM CHLORIDE, SODIUM LACTATE, POTASSIUM CHLORIDE, CALCIUM CHLORIDE 600; 310; 30; 20 MG/100ML; MG/100ML; MG/100ML; MG/100ML
INJECTION, SOLUTION INTRAVENOUS
Status: COMPLETED
Start: 2019-08-23 | End: 2019-08-23

## 2019-08-23 RX ORDER — BUPIVACAINE HYDROCHLORIDE 5 MG/ML
INJECTION, SOLUTION EPIDURAL; INTRACAUDAL PRN
Status: DISCONTINUED | OUTPATIENT
Start: 2019-08-23 | End: 2019-08-23 | Stop reason: ALTCHOICE

## 2019-08-23 RX ORDER — ONDANSETRON 2 MG/ML
4 INJECTION INTRAMUSCULAR; INTRAVENOUS EVERY 10 MIN PRN
Status: DISCONTINUED | OUTPATIENT
Start: 2019-08-23 | End: 2019-08-23 | Stop reason: HOSPADM

## 2019-08-23 RX ORDER — OXYCODONE HYDROCHLORIDE AND ACETAMINOPHEN 5; 325 MG/1; MG/1
1 TABLET ORAL PRN
Status: COMPLETED | OUTPATIENT
Start: 2019-08-23 | End: 2019-08-23

## 2019-08-23 RX ORDER — SODIUM CHLORIDE, SODIUM LACTATE, POTASSIUM CHLORIDE, CALCIUM CHLORIDE 600; 310; 30; 20 MG/100ML; MG/100ML; MG/100ML; MG/100ML
INJECTION, SOLUTION INTRAVENOUS CONTINUOUS
Status: DISCONTINUED | OUTPATIENT
Start: 2019-08-23 | End: 2019-08-23 | Stop reason: HOSPADM

## 2019-08-23 RX ADMIN — LIDOCAINE HYDROCHLORIDE 50 MG: 20 INJECTION, SOLUTION INFILTRATION; PERINEURAL at 07:38

## 2019-08-23 RX ADMIN — SODIUM CHLORIDE, POTASSIUM CHLORIDE, SODIUM LACTATE AND CALCIUM CHLORIDE 1000 ML: 600; 310; 30; 20 INJECTION, SOLUTION INTRAVENOUS at 06:42

## 2019-08-23 RX ADMIN — LIDOCAINE HYDROCHLORIDE 1 ML: 10 INJECTION, SOLUTION EPIDURAL; INFILTRATION; INTRACAUDAL; PERINEURAL at 06:43

## 2019-08-23 RX ADMIN — VANCOMYCIN HYDROCHLORIDE 1000 MG: 1 INJECTION, POWDER, LYOPHILIZED, FOR SOLUTION INTRAVENOUS at 07:26

## 2019-08-23 RX ADMIN — OXYCODONE HYDROCHLORIDE AND ACETAMINOPHEN 1 TABLET: 5; 325 TABLET ORAL at 08:38

## 2019-08-23 RX ADMIN — MIDAZOLAM 2 MG: 1 INJECTION INTRAMUSCULAR; INTRAVENOUS at 07:30

## 2019-08-23 RX ADMIN — PROPOFOL 120 MG: 10 INJECTION, EMULSION INTRAVENOUS at 07:38

## 2019-08-23 RX ADMIN — SODIUM CHLORIDE, POTASSIUM CHLORIDE, SODIUM LACTATE AND CALCIUM CHLORIDE: 600; 310; 30; 20 INJECTION, SOLUTION INTRAVENOUS at 07:24

## 2019-08-23 ASSESSMENT — PAIN DESCRIPTION - LOCATION: LOCATION: BACK

## 2019-08-23 ASSESSMENT — PAIN DESCRIPTION - ORIENTATION: ORIENTATION: RIGHT;MID

## 2019-08-23 ASSESSMENT — PULMONARY FUNCTION TESTS
PIF_VALUE: 0

## 2019-08-23 ASSESSMENT — PAIN - FUNCTIONAL ASSESSMENT: PAIN_FUNCTIONAL_ASSESSMENT: 0-10

## 2019-08-23 ASSESSMENT — PAIN SCALES - GENERAL
PAINLEVEL_OUTOF10: 5
PAINLEVEL_OUTOF10: 0

## 2019-08-23 ASSESSMENT — PAIN DESCRIPTION - PAIN TYPE: TYPE: SURGICAL PAIN

## 2019-08-23 ASSESSMENT — PAIN DESCRIPTION - DESCRIPTORS: DESCRIPTORS: BURNING

## 2019-08-23 NOTE — PROGRESS NOTES
Arrived from OR awakens easily and respirations unlabored. Dressings times 2 to back dry and intact. Report received from YAMILEX Thomas RN. No c/o pain.

## 2019-08-23 NOTE — ANESTHESIA PRE PROCEDURE
Date     08/10/2019    K 3.6 08/10/2019     08/10/2019    CO2 26 08/10/2019    BUN 20 08/10/2019    CREATININE 1.1 08/10/2019    GFRAA 59 08/10/2019    GFRAA >60 10/29/2010    AGRATIO 1.7 08/10/2019    LABGLOM 49 08/10/2019    GLUCOSE 115 08/10/2019    PROT 7.8 08/10/2019    PROT 6.9 10/29/2010    CALCIUM 9.9 08/10/2019    BILITOT 0.4 08/10/2019    ALKPHOS 82 08/10/2019    AST 17 08/10/2019    ALT 21 08/10/2019       POC Tests: No results for input(s): POCGLU, POCNA, POCK, POCCL, POCBUN, POCHEMO, POCHCT in the last 72 hours. Coags: No results found for: PROTIME, INR, APTT    HCG (If Applicable): No results found for: PREGTESTUR, PREGSERUM, HCG, HCGQUANT     ABGs: No results found for: PHART, PO2ART, QLD1PFA, XNN3IUV, BEART, F8JMEVFR     Type & Screen (If Applicable):  No results found for: LABABO, [address]    Anesthesia Evaluation  Patient summary reviewed no history of anesthetic complications:   Airway: Mallampati: II  TM distance: >3 FB   Neck ROM: full  Mouth opening: > = 3 FB Dental: normal exam         Pulmonary:Negative Pulmonary ROS                              Cardiovascular:    (+) hypertension:,                   Neuro/Psych:   Negative Neuro/Psych ROS              GI/Hepatic/Renal: Neg GI/Hepatic/Renal ROS       (-) GERD, liver disease and no renal disease       Endo/Other: Negative Endo/Other ROS       (-) diabetes mellitus               Abdominal:           Vascular: negative vascular ROS. Anesthesia Plan      MAC     ASA 2       Induction: intravenous. MIPS: Prophylactic antiemetics administered. Anesthetic plan and risks discussed with patient, child/children and spouse. Plan discussed with CRNA. All questions answered and agrees with plan.         Garner Spatz, MD   8/23/2019

## 2019-08-23 NOTE — H&P
I have reviewed the progress note serving as history and physical dated August/13/ 2019 and examined the patient and find no relevant changes. I have reviewed with the patient and/or family the risks, benefits, and alternatives to the procedure.

## 2019-08-27 ENCOUNTER — HOSPITAL ENCOUNTER (OUTPATIENT)
Age: 74
Discharge: HOME | End: 2019-08-27
Payer: MEDICARE

## 2019-08-27 DIAGNOSIS — R94.4 DECREASED GFR: ICD-10-CM

## 2019-08-27 LAB
A/G RATIO: 2.1 (ref 1.1–2.2)
ALBUMIN SERPL-MCNC: 4.8 G/DL (ref 3.4–5)
ALP BLD-CCNC: 78 U/L (ref 40–129)
ALT SERPL-CCNC: 21 U/L (ref 10–40)
ANION GAP SERPL CALCULATED.3IONS-SCNC: 14 MMOL/L (ref 3–16)
AST SERPL-CCNC: 15 U/L (ref 15–37)
BILIRUB SERPL-MCNC: 0.5 MG/DL (ref 0–1)
BUN BLDV-MCNC: 21 MG/DL (ref 7–20)
CALCIUM SERPL-MCNC: 9.8 MG/DL (ref 8.3–10.6)
CHLORIDE BLD-SCNC: 102 MMOL/L (ref 99–110)
CO2: 29 MMOL/L (ref 21–32)
CREAT SERPL-MCNC: 0.8 MG/DL (ref 0.6–1.2)
GFR AFRICAN AMERICAN: >60
GFR NON-AFRICAN AMERICAN: >60
GLOBULIN: 2.3 G/DL
GLUCOSE FASTING: 109 MG/DL (ref 70–99)
POTASSIUM SERPL-SCNC: 3.8 MMOL/L (ref 3.5–5.1)
SODIUM BLD-SCNC: 145 MMOL/L (ref 136–145)
TOTAL PROTEIN: 7.1 G/DL (ref 6.4–8.2)

## 2019-08-27 PROCEDURE — 80053 COMPREHEN METABOLIC PANEL: CPT

## 2019-08-27 PROCEDURE — 36415 COLL VENOUS BLD VENIPUNCTURE: CPT

## 2019-08-28 ENCOUNTER — TELEPHONE (OUTPATIENT)
Dept: INTERNAL MEDICINE CLINIC | Age: 74
End: 2019-08-28

## 2019-09-03 ENCOUNTER — OFFICE VISIT (OUTPATIENT)
Dept: INTERNAL MEDICINE CLINIC | Age: 74
End: 2019-09-03
Payer: MEDICARE

## 2019-09-03 VITALS
OXYGEN SATURATION: 97 % | DIASTOLIC BLOOD PRESSURE: 70 MMHG | RESPIRATION RATE: 16 BRPM | SYSTOLIC BLOOD PRESSURE: 126 MMHG | WEIGHT: 130.4 LBS | HEIGHT: 59 IN | HEART RATE: 76 BPM | BODY MASS INDEX: 26.29 KG/M2

## 2019-09-03 DIAGNOSIS — R73.01 IFG (IMPAIRED FASTING GLUCOSE): ICD-10-CM

## 2019-09-03 DIAGNOSIS — K21.9 GASTROESOPHAGEAL REFLUX DISEASE, ESOPHAGITIS PRESENCE NOT SPECIFIED: Primary | ICD-10-CM

## 2019-09-03 PROCEDURE — 1090F PRES/ABSN URINE INCON ASSESS: CPT | Performed by: NURSE PRACTITIONER

## 2019-09-03 PROCEDURE — 99213 OFFICE O/P EST LOW 20 MIN: CPT | Performed by: NURSE PRACTITIONER

## 2019-09-03 PROCEDURE — G8399 PT W/DXA RESULTS DOCUMENT: HCPCS | Performed by: NURSE PRACTITIONER

## 2019-09-03 PROCEDURE — G8427 DOCREV CUR MEDS BY ELIG CLIN: HCPCS | Performed by: NURSE PRACTITIONER

## 2019-09-03 PROCEDURE — G8417 CALC BMI ABV UP PARAM F/U: HCPCS | Performed by: NURSE PRACTITIONER

## 2019-09-03 PROCEDURE — 4040F PNEUMOC VAC/ADMIN/RCVD: CPT | Performed by: NURSE PRACTITIONER

## 2019-09-03 PROCEDURE — G8510 SCR DEP NEG, NO PLAN REQD: HCPCS | Performed by: NURSE PRACTITIONER

## 2019-09-03 PROCEDURE — 1036F TOBACCO NON-USER: CPT | Performed by: NURSE PRACTITIONER

## 2019-09-03 PROCEDURE — 3288F FALL RISK ASSESSMENT DOCD: CPT | Performed by: NURSE PRACTITIONER

## 2019-09-03 PROCEDURE — 1123F ACP DISCUSS/DSCN MKR DOCD: CPT | Performed by: NURSE PRACTITIONER

## 2019-09-03 PROCEDURE — 3017F COLORECTAL CA SCREEN DOC REV: CPT | Performed by: NURSE PRACTITIONER

## 2019-09-03 RX ORDER — SUCRALFATE 1 G/1
1 TABLET ORAL 4 TIMES DAILY
Qty: 54 TABLET | Refills: 0 | Status: SHIPPED | OUTPATIENT
Start: 2019-09-03 | End: 2020-06-30

## 2019-09-03 RX ORDER — OMEPRAZOLE 10 MG/1
10 CAPSULE, DELAYED RELEASE ORAL DAILY
Qty: 90 CAPSULE | Refills: 3 | Status: SHIPPED | OUTPATIENT
Start: 2019-09-03 | End: 2020-07-30

## 2019-09-03 RX ORDER — OMEPRAZOLE 10 MG/1
10 CAPSULE, DELAYED RELEASE ORAL DAILY
COMMUNITY
End: 2019-09-03 | Stop reason: SDUPTHER

## 2019-09-03 ASSESSMENT — PATIENT HEALTH QUESTIONNAIRE - PHQ9
1. LITTLE INTEREST OR PLEASURE IN DOING THINGS: 0
2. FEELING DOWN, DEPRESSED OR HOPELESS: 0
SUM OF ALL RESPONSES TO PHQ QUESTIONS 1-9: 0
SUM OF ALL RESPONSES TO PHQ9 QUESTIONS 1 & 2: 0
SUM OF ALL RESPONSES TO PHQ QUESTIONS 1-9: 0

## 2019-09-03 ASSESSMENT — ENCOUNTER SYMPTOMS
ABDOMINAL PAIN: 1
VOMITING: 0
SHORTNESS OF BREATH: 0
ALLERGIC/IMMUNOLOGIC NEGATIVE: 1
DIARRHEA: 0
NAUSEA: 0
COUGH: 0
CHEST TIGHTNESS: 0
ABDOMINAL DISTENTION: 0
CONSTIPATION: 0

## 2019-09-03 NOTE — PROGRESS NOTES
the epigastric area and left upper quadrant. There is no rebound, no guarding and no CVA tenderness. Musculoskeletal: Normal range of motion. She exhibits no edema. Neurological: She is alert and oriented to person, place, and time. Skin: Skin is warm, dry and intact. Nails show no clubbing. Psychiatric: She has a normal mood and affect. Her speech is normal and behavior is normal. Thought content normal. Cognition and memory are normal.   Vitals reviewed. ASSESSMENT/PLAN:    1. Gastroesophageal reflux disease, esophagitis presence not specified  Take medication as prescribed. Elevated HOB. Avoid spicy and citrus foods. Do not lay down 2-3 hours after a meal.  Notify office if you have no improvement or worsening of condition. If no improvement will consider scan/and referral to GI.  - omeprazole (PRILOSEC) 10 MG delayed release capsule; Take 1 capsule by mouth daily  Dispense: 90 capsule; Refill: 3  Start- sucralfate (CARAFATE) 1 GM tablet; Take 1 tablet by mouth 4 times daily  Dispense: 54 tablet; Refill: 0    2. IFG (impaired fasting glucose)  Continue to monitor. Monitor labs. Limit sweets and carbs in diet. Exercise at least 30 minutes 3 times a week. Return in about 10 days (around 9/13/2019) for GERD.

## 2019-09-05 ASSESSMENT — ENCOUNTER SYMPTOMS
CONSTIPATION: 0
VOMITING: 0
BLOOD IN STOOL: 0
CHEST TIGHTNESS: 0
SHORTNESS OF BREATH: 0
DIARRHEA: 1
BACK PAIN: 1
ALLERGIC/IMMUNOLOGIC NEGATIVE: 1
NAUSEA: 1
ABDOMINAL PAIN: 1
COUGH: 0

## 2019-09-16 ENCOUNTER — OFFICE VISIT (OUTPATIENT)
Dept: INTERNAL MEDICINE CLINIC | Age: 74
End: 2019-09-16
Payer: MEDICARE

## 2019-09-16 VITALS
HEIGHT: 59 IN | DIASTOLIC BLOOD PRESSURE: 70 MMHG | HEART RATE: 75 BPM | WEIGHT: 129.2 LBS | OXYGEN SATURATION: 98 % | SYSTOLIC BLOOD PRESSURE: 122 MMHG | BODY MASS INDEX: 26.04 KG/M2 | RESPIRATION RATE: 16 BRPM

## 2019-09-16 DIAGNOSIS — K21.9 GASTROESOPHAGEAL REFLUX DISEASE WITHOUT ESOPHAGITIS: ICD-10-CM

## 2019-09-16 DIAGNOSIS — E78.5 HYPERLIPIDEMIA, UNSPECIFIED HYPERLIPIDEMIA TYPE: ICD-10-CM

## 2019-09-16 DIAGNOSIS — I10 ESSENTIAL HYPERTENSION: Primary | ICD-10-CM

## 2019-09-16 PROCEDURE — 3017F COLORECTAL CA SCREEN DOC REV: CPT | Performed by: NURSE PRACTITIONER

## 2019-09-16 PROCEDURE — 1090F PRES/ABSN URINE INCON ASSESS: CPT | Performed by: NURSE PRACTITIONER

## 2019-09-16 PROCEDURE — G8399 PT W/DXA RESULTS DOCUMENT: HCPCS | Performed by: NURSE PRACTITIONER

## 2019-09-16 PROCEDURE — G8427 DOCREV CUR MEDS BY ELIG CLIN: HCPCS | Performed by: NURSE PRACTITIONER

## 2019-09-16 PROCEDURE — 1036F TOBACCO NON-USER: CPT | Performed by: NURSE PRACTITIONER

## 2019-09-16 PROCEDURE — 4040F PNEUMOC VAC/ADMIN/RCVD: CPT | Performed by: NURSE PRACTITIONER

## 2019-09-16 PROCEDURE — G8417 CALC BMI ABV UP PARAM F/U: HCPCS | Performed by: NURSE PRACTITIONER

## 2019-09-16 PROCEDURE — 99213 OFFICE O/P EST LOW 20 MIN: CPT | Performed by: NURSE PRACTITIONER

## 2019-09-16 PROCEDURE — 1123F ACP DISCUSS/DSCN MKR DOCD: CPT | Performed by: NURSE PRACTITIONER

## 2019-09-25 ENCOUNTER — TELEPHONE (OUTPATIENT)
Dept: INTERNAL MEDICINE CLINIC | Age: 74
End: 2019-09-25

## 2019-10-16 ASSESSMENT — ENCOUNTER SYMPTOMS
CHEST TIGHTNESS: 0
NAUSEA: 0
ALLERGIC/IMMUNOLOGIC NEGATIVE: 1
ABDOMINAL PAIN: 0
DIARRHEA: 0
VOMITING: 0
SHORTNESS OF BREATH: 0
CONSTIPATION: 0
COUGH: 0

## 2019-12-05 ENCOUNTER — OFFICE VISIT (OUTPATIENT)
Dept: INTERNAL MEDICINE CLINIC | Age: 74
End: 2019-12-05
Payer: MEDICARE

## 2019-12-05 VITALS
DIASTOLIC BLOOD PRESSURE: 78 MMHG | BODY MASS INDEX: 26.21 KG/M2 | HEART RATE: 80 BPM | OXYGEN SATURATION: 98 % | WEIGHT: 130 LBS | HEIGHT: 59 IN | SYSTOLIC BLOOD PRESSURE: 134 MMHG

## 2019-12-05 DIAGNOSIS — H26.9 CATARACT OF BOTH EYES, UNSPECIFIED CATARACT TYPE: ICD-10-CM

## 2019-12-05 PROCEDURE — G8399 PT W/DXA RESULTS DOCUMENT: HCPCS | Performed by: NURSE PRACTITIONER

## 2019-12-05 PROCEDURE — 1123F ACP DISCUSS/DSCN MKR DOCD: CPT | Performed by: NURSE PRACTITIONER

## 2019-12-05 PROCEDURE — G8417 CALC BMI ABV UP PARAM F/U: HCPCS | Performed by: NURSE PRACTITIONER

## 2019-12-05 PROCEDURE — 3017F COLORECTAL CA SCREEN DOC REV: CPT | Performed by: NURSE PRACTITIONER

## 2019-12-05 PROCEDURE — G8484 FLU IMMUNIZE NO ADMIN: HCPCS | Performed by: NURSE PRACTITIONER

## 2019-12-05 PROCEDURE — 1090F PRES/ABSN URINE INCON ASSESS: CPT | Performed by: NURSE PRACTITIONER

## 2019-12-05 PROCEDURE — 99214 OFFICE O/P EST MOD 30 MIN: CPT | Performed by: NURSE PRACTITIONER

## 2019-12-05 PROCEDURE — 1036F TOBACCO NON-USER: CPT | Performed by: NURSE PRACTITIONER

## 2019-12-05 PROCEDURE — 4040F PNEUMOC VAC/ADMIN/RCVD: CPT | Performed by: NURSE PRACTITIONER

## 2019-12-05 PROCEDURE — G8427 DOCREV CUR MEDS BY ELIG CLIN: HCPCS | Performed by: NURSE PRACTITIONER

## 2019-12-05 PROCEDURE — G0304 PRE-OP SERVICE LVRS 1-9 DOS: HCPCS | Performed by: NURSE PRACTITIONER

## 2019-12-05 ASSESSMENT — ENCOUNTER SYMPTOMS
CONSTIPATION: 0
CHEST TIGHTNESS: 0
SHORTNESS OF BREATH: 0
ABDOMINAL DISTENTION: 0
NAUSEA: 0
DIARRHEA: 0
COUGH: 1
WHEEZING: 1
VOMITING: 0

## 2019-12-10 ENCOUNTER — TELEPHONE (OUTPATIENT)
Dept: INTERNAL MEDICINE CLINIC | Age: 74
End: 2019-12-10

## 2020-05-20 ENCOUNTER — OFFICE VISIT (OUTPATIENT)
Dept: ORTHOPEDIC SURGERY | Age: 75
End: 2020-05-20
Payer: MEDICARE

## 2020-05-20 VITALS — HEIGHT: 59 IN | BODY MASS INDEX: 26.22 KG/M2 | WEIGHT: 130.07 LBS

## 2020-05-20 PROCEDURE — 1036F TOBACCO NON-USER: CPT | Performed by: ORTHOPAEDIC SURGERY

## 2020-05-20 PROCEDURE — G8417 CALC BMI ABV UP PARAM F/U: HCPCS | Performed by: ORTHOPAEDIC SURGERY

## 2020-05-20 PROCEDURE — 4040F PNEUMOC VAC/ADMIN/RCVD: CPT | Performed by: ORTHOPAEDIC SURGERY

## 2020-05-20 PROCEDURE — 1123F ACP DISCUSS/DSCN MKR DOCD: CPT | Performed by: ORTHOPAEDIC SURGERY

## 2020-05-20 PROCEDURE — 1090F PRES/ABSN URINE INCON ASSESS: CPT | Performed by: ORTHOPAEDIC SURGERY

## 2020-05-20 PROCEDURE — G8427 DOCREV CUR MEDS BY ELIG CLIN: HCPCS | Performed by: ORTHOPAEDIC SURGERY

## 2020-05-20 PROCEDURE — 3017F COLORECTAL CA SCREEN DOC REV: CPT | Performed by: ORTHOPAEDIC SURGERY

## 2020-05-20 PROCEDURE — 99203 OFFICE O/P NEW LOW 30 MIN: CPT | Performed by: ORTHOPAEDIC SURGERY

## 2020-05-20 PROCEDURE — G8399 PT W/DXA RESULTS DOCUMENT: HCPCS | Performed by: ORTHOPAEDIC SURGERY

## 2020-05-20 RX ORDER — METHYLPREDNISOLONE ACETATE 40 MG/ML
80 INJECTION, SUSPENSION INTRA-ARTICULAR; INTRALESIONAL; INTRAMUSCULAR; SOFT TISSUE ONCE
Status: COMPLETED | OUTPATIENT
Start: 2020-05-20 | End: 2020-05-20

## 2020-05-20 RX ORDER — BUPIVACAINE HYDROCHLORIDE 2.5 MG/ML
30 INJECTION, SOLUTION INFILTRATION; PERINEURAL ONCE
Status: COMPLETED | OUTPATIENT
Start: 2020-05-20 | End: 2020-05-20

## 2020-05-20 RX ORDER — LIDOCAINE HYDROCHLORIDE 10 MG/ML
20 INJECTION, SOLUTION INFILTRATION; PERINEURAL ONCE
Status: COMPLETED | OUTPATIENT
Start: 2020-05-20 | End: 2020-05-20

## 2020-05-20 RX ADMIN — BUPIVACAINE HYDROCHLORIDE 75 MG: 2.5 INJECTION, SOLUTION INFILTRATION; PERINEURAL at 09:21

## 2020-05-20 RX ADMIN — LIDOCAINE HYDROCHLORIDE 20 ML: 10 INJECTION, SOLUTION INFILTRATION; PERINEURAL at 09:21

## 2020-05-20 RX ADMIN — METHYLPREDNISOLONE ACETATE 80 MG: 40 INJECTION, SUSPENSION INTRA-ARTICULAR; INTRALESIONAL; INTRAMUSCULAR; SOFT TISSUE at 09:21

## 2020-05-26 ENCOUNTER — TELEPHONE (OUTPATIENT)
Dept: INTERNAL MEDICINE CLINIC | Age: 75
End: 2020-05-26

## 2020-06-09 ENCOUNTER — HOSPITAL ENCOUNTER (OUTPATIENT)
Dept: GENERAL RADIOLOGY | Age: 75
Discharge: HOME | End: 2020-06-09
Payer: MEDICARE

## 2020-06-09 ENCOUNTER — HOSPITAL ENCOUNTER (OUTPATIENT)
Age: 75
Discharge: HOME | End: 2020-06-09
Payer: MEDICARE

## 2020-06-09 ENCOUNTER — TELEPHONE (OUTPATIENT)
Dept: MAMMOGRAPHY | Age: 75
End: 2020-06-09

## 2020-06-09 ENCOUNTER — HOSPITAL ENCOUNTER (OUTPATIENT)
Dept: MAMMOGRAPHY | Age: 75
Discharge: HOME | End: 2020-06-09
Payer: MEDICARE

## 2020-06-09 LAB
A/G RATIO: 1.9 (ref 1.1–2.2)
ALBUMIN SERPL-MCNC: 4.5 G/DL (ref 3.4–5)
ALP BLD-CCNC: 64 U/L (ref 40–129)
ALT SERPL-CCNC: 18 U/L (ref 10–40)
ANION GAP SERPL CALCULATED.3IONS-SCNC: 12 MMOL/L (ref 3–16)
AST SERPL-CCNC: 14 U/L (ref 15–37)
BASOPHILS ABSOLUTE: 0.1 K/UL (ref 0–0.2)
BASOPHILS RELATIVE PERCENT: 1.3 %
BILIRUB SERPL-MCNC: 0.8 MG/DL (ref 0–1)
BUN BLDV-MCNC: 17 MG/DL (ref 7–20)
CALCIUM SERPL-MCNC: 9 MG/DL (ref 8.3–10.6)
CHLORIDE BLD-SCNC: 101 MMOL/L (ref 99–110)
CHOLESTEROL, TOTAL: 175 MG/DL (ref 0–199)
CO2: 25 MMOL/L (ref 21–32)
CREAT SERPL-MCNC: 0.5 MG/DL (ref 0.6–1.2)
EOSINOPHILS ABSOLUTE: 0.1 K/UL (ref 0–0.6)
EOSINOPHILS RELATIVE PERCENT: 2.2 %
GFR AFRICAN AMERICAN: >60
GFR NON-AFRICAN AMERICAN: >60
GLOBULIN: 2.4 G/DL
GLUCOSE FASTING: 91 MG/DL (ref 70–99)
HCT VFR BLD CALC: 43.6 % (ref 36–48)
HDLC SERPL-MCNC: 54 MG/DL (ref 40–60)
HEMOGLOBIN: 14.6 G/DL (ref 12–16)
LDL CHOLESTEROL CALCULATED: 93 MG/DL
LYMPHOCYTES ABSOLUTE: 1.7 K/UL (ref 1–5.1)
LYMPHOCYTES RELATIVE PERCENT: 25.8 %
MCH RBC QN AUTO: 31.4 PG (ref 26–34)
MCHC RBC AUTO-ENTMCNC: 33.5 G/DL (ref 31–36)
MCV RBC AUTO: 93.7 FL (ref 80–100)
MONOCYTES ABSOLUTE: 0.6 K/UL (ref 0–1.3)
MONOCYTES RELATIVE PERCENT: 8.4 %
NEUTROPHILS ABSOLUTE: 4.2 K/UL (ref 1.7–7.7)
NEUTROPHILS RELATIVE PERCENT: 62.3 %
PDW BLD-RTO: 13.5 % (ref 12.4–15.4)
PLATELET # BLD: NORMAL K/UL (ref 135–450)
PLATELET SLIDE REVIEW: NORMAL
PMV BLD AUTO: NORMAL FL (ref 5–10.5)
POTASSIUM SERPL-SCNC: 3.1 MMOL/L (ref 3.5–5.1)
RBC # BLD: 4.65 M/UL (ref 4–5.2)
SLIDE REVIEW: NORMAL
SODIUM BLD-SCNC: 138 MMOL/L (ref 136–145)
TOTAL PROTEIN: 6.9 G/DL (ref 6.4–8.2)
TRIGL SERPL-MCNC: 141 MG/DL (ref 0–150)
VLDLC SERPL CALC-MCNC: 28 MG/DL
WBC # BLD: 6.7 K/UL (ref 4–11)

## 2020-06-09 PROCEDURE — 36415 COLL VENOUS BLD VENIPUNCTURE: CPT

## 2020-06-09 PROCEDURE — 80061 LIPID PANEL: CPT

## 2020-06-09 PROCEDURE — 77063 BREAST TOMOSYNTHESIS BI: CPT

## 2020-06-09 PROCEDURE — 80053 COMPREHEN METABOLIC PANEL: CPT

## 2020-06-09 PROCEDURE — 85025 COMPLETE CBC W/AUTO DIFF WBC: CPT

## 2020-06-09 PROCEDURE — 83036 HEMOGLOBIN GLYCOSYLATED A1C: CPT

## 2020-06-09 PROCEDURE — 77080 DXA BONE DENSITY AXIAL: CPT

## 2020-06-10 LAB
ESTIMATED AVERAGE GLUCOSE: 111.2 MG/DL
HBA1C MFR BLD: 5.5 %

## 2020-06-18 ENCOUNTER — HOSPITAL ENCOUNTER (EMERGENCY)
Age: 75
Discharge: HOME | End: 2020-06-18
Attending: EMERGENCY MEDICINE
Payer: MEDICARE

## 2020-06-18 ENCOUNTER — APPOINTMENT (OUTPATIENT)
Dept: GENERAL RADIOLOGY | Age: 75
End: 2020-06-18
Payer: MEDICARE

## 2020-06-18 VITALS
RESPIRATION RATE: 18 BRPM | HEART RATE: 70 BPM | DIASTOLIC BLOOD PRESSURE: 74 MMHG | TEMPERATURE: 98.6 F | BODY MASS INDEX: 25 KG/M2 | HEIGHT: 59 IN | WEIGHT: 124 LBS | SYSTOLIC BLOOD PRESSURE: 112 MMHG | OXYGEN SATURATION: 96 %

## 2020-06-18 PROCEDURE — 73030 X-RAY EXAM OF SHOULDER: CPT

## 2020-06-18 PROCEDURE — 99283 EMERGENCY DEPT VISIT LOW MDM: CPT

## 2020-06-18 ASSESSMENT — PAIN SCALES - GENERAL
PAINLEVEL_OUTOF10: 3
PAINLEVEL_OUTOF10: 6

## 2020-06-18 ASSESSMENT — ENCOUNTER SYMPTOMS
VOMITING: 0
ABDOMINAL PAIN: 0
SORE THROAT: 0
NAUSEA: 0
COUGH: 0
BACK PAIN: 0
SHORTNESS OF BREATH: 0

## 2020-06-18 ASSESSMENT — PAIN DESCRIPTION - ORIENTATION
ORIENTATION: RIGHT
ORIENTATION: RIGHT

## 2020-06-18 ASSESSMENT — PAIN DESCRIPTION - FREQUENCY: FREQUENCY: CONTINUOUS

## 2020-06-18 ASSESSMENT — PAIN DESCRIPTION - ONSET: ONSET: SUDDEN

## 2020-06-18 ASSESSMENT — PAIN DESCRIPTION - PROGRESSION: CLINICAL_PROGRESSION: NOT CHANGED

## 2020-06-18 ASSESSMENT — PAIN DESCRIPTION - DESCRIPTORS
DESCRIPTORS: ACHING;SORE
DESCRIPTORS: CONSTANT;THROBBING

## 2020-06-18 ASSESSMENT — PAIN DESCRIPTION - PAIN TYPE: TYPE: ACUTE PAIN

## 2020-06-18 ASSESSMENT — PAIN DESCRIPTION - LOCATION
LOCATION: SHOULDER
LOCATION: SHOULDER

## 2020-06-18 NOTE — ED NOTES
WESLEY cleary intact, applied per ER medic, [facility], without incident.  RLE distal circ checks WNL     William Villareal RN  06/18/20 [id number]

## 2020-06-18 NOTE — ED PROVIDER NOTES
for light-headedness and headaches. All other systems reviewed and are negative. Except as noted above the remainder of the review of systems was reviewed and negative. PAST MEDICAL HISTORY     Past Medical History:   Diagnosis Date    Diverticulitis     Hyperlipidemia     Hypertension     Osteoarthritis     Osteopenia     Restless legs syndrome          SURGICAL HISTORY       Past Surgical History:   Procedure Laterality Date    BREAST REDUCTION SURGERY  10/15/15    COLONOSCOPY  11/15/2016    colon polyps, diverticulosis    OTHER SURGICAL HISTORY  08/23/2019    excision back cyst times two    OVARIAN CYST REMOVAL      MN EXCISION TUMOR SOFT TISSUE BACK/FLANK SUBQ <3CM N/A 8/23/2019    EXCISION BACK CYST TIMES TWO performed by Norma Llanos MD at [address]       Previous Medications    ALENDRONATE (FOSAMAX) 70 MG TABLET    Take 1 tablet by mouth every 7 days    AMLODIPINE (NORVASC) 10 MG TABLET    TAKE 1 TABLET BY MOUTH  DAILY    ASPIRIN 81 MG TABLET    Take 81 mg by mouth daily    CALCIUM CARB-CHOLECALCIFEROL (CALCIUM + D3) 600-200 MG-UNIT TABS    Take 1 tablet by mouth 2 times daily     HYDROCHLOROTHIAZIDE (HYDRODIURIL) 25 MG TABLET    TAKE 1 TABLET BY MOUTH  DAILY    LOSARTAN (COZAAR) 100 MG TABLET    TAKE 1 TABLET BY MOUTH  DAILY    OMEPRAZOLE (PRILOSEC) 10 MG DELAYED RELEASE CAPSULE    Take 1 capsule by mouth daily    SIMVASTATIN (ZOCOR) 20 MG TABLET    TAKE 1 TABLET BY MOUTH  NIGHTLY    SUCRALFATE (CARAFATE) 1 GM TABLET    Take 1 tablet by mouth 4 times daily       ALLERGIES     Naproxen    FAMILY HISTORY       Family History   Problem Relation Age of Onset    Heart Disease Mother     High Blood Pressure Mother     Heart Disease Father           SOCIAL HISTORY       Social History     Socioeconomic History    Marital status:       Spouse name: None    Number of children: None    Years of education: None    Highest education level: None Rate and Rhythm: Normal rate. Pulses: Normal pulses. Pulmonary:      Effort: Pulmonary effort is normal. No respiratory distress. Musculoskeletal:      Right shoulder: She exhibits decreased range of motion, tenderness and bony tenderness. She exhibits no swelling, no effusion, no deformity and normal pulse. Comments: Patient remains neurovascularly intact with 2+ radial pulse. Pain along the anterior portion of the shoulder near the biceps tendon insertion as well as along the posterior shoulder blade. Patient has range of motion but is severely limited due to pain. Skin:     General: Skin is warm and dry. Neurological:      General: No focal deficit present. Mental Status: She is alert and oriented to person, place, and time. DIAGNOSTIC RESULTS     EKG: All EKG's are interpreted by the Emergency Department Physician who either signs or Co-signs this chart in the absence of a cardiologist.        RADIOLOGY:     Interpretation per the Radiologist below, if available at the time of this note:    XR SHOULDER RIGHT (MIN 2 VIEWS)   Final Result   Negative               LABS:  Labs Reviewed - No data to display    All other labs were within normal range or not returned as of this dictation. EMERGENCY DEPARTMENT COURSE and DIFFERENTIAL DIAGNOSIS/MDM:   Vitals:    Vitals:    06/18/20 1128   BP: 131/65   Pulse: 77   Resp: 14   Temp: 98.3 °F (36.8 °C)   TempSrc: Oral   SpO2: 96%   Weight: 124 lb (56.2 kg)   Height: 4' 11\" (1.499 m)       Patient evaluated and previous record reviewed. Patient presents with right shoulder pain after an injury yesterday. Vital signs stable and within normal limits. Physical exam as documented above. Differential includes fracture, dislocation, sprain, strain. X-ray negative for acute fracture or dislocation. Lul is most consistent with a rotator cuff injury.   Will place patient in a sling for comfort and have her follow-up with orthopedics as an outpatient. Patient is amenable with this plan of care. Patient discharged home with return precautions. CONSULTS:  None    PROCEDURES:  Unless otherwise noted below, none     Procedures      FINAL IMPRESSION      1. Acute pain of right shoulder          DISPOSITION/PLAN   DISPOSITION Discharge - Pending Orders Complete 06/18/2020 12:47:42 PM      PATIENT REFERRED TO:  Sofia [place] [facility] [id number]  [phone]    Schedule an appointment as soon as possible for a visit         DISCHARGE MEDICATIONS:  New Prescriptions    No medications on file     Controlled Substances Monitoring:     No flowsheet data found.     (Please note that portions of this note were completed with a voice recognition program.  Efforts were made to edit the dictations but occasionally words are mis-transcribed.)    Tam Kim MD (electronically signed)  Attending Emergency Physician            Alfredo Hernandez MD  06/18/20 [id number]

## 2020-06-30 ENCOUNTER — VIRTUAL VISIT (OUTPATIENT)
Dept: INTERNAL MEDICINE CLINIC | Age: 75
End: 2020-06-30
Payer: MEDICARE

## 2020-06-30 PROCEDURE — 1123F ACP DISCUSS/DSCN MKR DOCD: CPT | Performed by: INTERNAL MEDICINE

## 2020-06-30 PROCEDURE — 4040F PNEUMOC VAC/ADMIN/RCVD: CPT | Performed by: INTERNAL MEDICINE

## 2020-06-30 PROCEDURE — 1090F PRES/ABSN URINE INCON ASSESS: CPT | Performed by: INTERNAL MEDICINE

## 2020-06-30 PROCEDURE — 3017F COLORECTAL CA SCREEN DOC REV: CPT | Performed by: INTERNAL MEDICINE

## 2020-06-30 PROCEDURE — 1036F TOBACCO NON-USER: CPT | Performed by: INTERNAL MEDICINE

## 2020-06-30 PROCEDURE — 99442 PR PHYS/QHP TELEPHONE EVALUATION 11-20 MIN: CPT | Performed by: INTERNAL MEDICINE

## 2020-06-30 PROCEDURE — G8427 DOCREV CUR MEDS BY ELIG CLIN: HCPCS | Performed by: INTERNAL MEDICINE

## 2020-06-30 PROCEDURE — G8399 PT W/DXA RESULTS DOCUMENT: HCPCS | Performed by: INTERNAL MEDICINE

## 2020-06-30 PROCEDURE — G8417 CALC BMI ABV UP PARAM F/U: HCPCS | Performed by: INTERNAL MEDICINE

## 2020-06-30 ASSESSMENT — PATIENT HEALTH QUESTIONNAIRE - PHQ9
2. FEELING DOWN, DEPRESSED OR HOPELESS: 0
SUM OF ALL RESPONSES TO PHQ9 QUESTIONS 1 & 2: 0
SUM OF ALL RESPONSES TO PHQ QUESTIONS 1-9: 0
1. LITTLE INTEREST OR PLEASURE IN DOING THINGS: 0
SUM OF ALL RESPONSES TO PHQ QUESTIONS 1-9: 0

## 2020-07-02 ENCOUNTER — NURSE ONLY (OUTPATIENT)
Dept: INTERNAL MEDICINE CLINIC | Age: 75
End: 2020-07-02
Payer: MEDICARE

## 2020-07-02 VITALS — TEMPERATURE: 97.7 F

## 2020-07-02 PROCEDURE — G0009 ADMIN PNEUMOCOCCAL VACCINE: HCPCS | Performed by: NURSE PRACTITIONER

## 2020-07-02 PROCEDURE — 90732 PPSV23 VACC 2 YRS+ SUBQ/IM: CPT | Performed by: NURSE PRACTITIONER

## 2020-07-20 ENCOUNTER — TELEPHONE (OUTPATIENT)
Dept: DERMATOLOGY | Age: 75
End: 2020-07-20

## 2020-07-20 NOTE — TELEPHONE ENCOUNTER
Dr Monet Israel new patient    Patient called and would like to get in. She has a few spots on her arm that needs cecked. She did see a derm doctor in [place] said that the is on her arm and chest that is caner.     Call back # [phone]

## 2020-07-22 ENCOUNTER — OFFICE VISIT (OUTPATIENT)
Dept: ORTHOPEDIC SURGERY | Age: 75
End: 2020-07-22
Payer: MEDICARE

## 2020-07-22 VITALS — HEIGHT: 59 IN | WEIGHT: 130 LBS | BODY MASS INDEX: 26.21 KG/M2

## 2020-07-22 PROCEDURE — G8399 PT W/DXA RESULTS DOCUMENT: HCPCS | Performed by: ORTHOPAEDIC SURGERY

## 2020-07-22 PROCEDURE — 1123F ACP DISCUSS/DSCN MKR DOCD: CPT | Performed by: ORTHOPAEDIC SURGERY

## 2020-07-22 PROCEDURE — 3017F COLORECTAL CA SCREEN DOC REV: CPT | Performed by: ORTHOPAEDIC SURGERY

## 2020-07-22 PROCEDURE — 4040F PNEUMOC VAC/ADMIN/RCVD: CPT | Performed by: ORTHOPAEDIC SURGERY

## 2020-07-22 PROCEDURE — G8428 CUR MEDS NOT DOCUMENT: HCPCS | Performed by: ORTHOPAEDIC SURGERY

## 2020-07-22 PROCEDURE — 1036F TOBACCO NON-USER: CPT | Performed by: ORTHOPAEDIC SURGERY

## 2020-07-22 PROCEDURE — 99214 OFFICE O/P EST MOD 30 MIN: CPT | Performed by: ORTHOPAEDIC SURGERY

## 2020-07-22 PROCEDURE — G8417 CALC BMI ABV UP PARAM F/U: HCPCS | Performed by: ORTHOPAEDIC SURGERY

## 2020-07-22 PROCEDURE — 1090F PRES/ABSN URINE INCON ASSESS: CPT | Performed by: ORTHOPAEDIC SURGERY

## 2020-07-22 NOTE — PROGRESS NOTES
MD Armin Frankel, [place]         Orthopaedic Surgery and Sports Medicine      Patient Name: Barrington Anderson  YOB: 1945  Patient's PCP is Dia Miller MD    SUBJECTIVE  Chief Complaint:  No chief complaint on file. History of Present Illness:  Barrington Anderson is a right handed 69 y.o. female here regarding right shoulder pain. She is Ferdinand Lundborg mother-in-law. She is a patient of . Was recently seen by Dr. Deven Pitts on 5/20/2020 for her left shoulder. That day was diagnosed with left shoulder rotator cuff tendinitis. Was given an injection of corticosteroid and then referred home with home exercise program.    She apparently was taking meloxicam which was actually helping her shoulder but it was stopped because of a lab abnormality. She cannot take Advil or Aleve because of GI distress. 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 . There was a history of injury. Location: diffusely throughout the shoulder  Quality: aching, sharp and nagging   Pain Scale: 5/10  Context: overall course is worsening   Alleviating Factors: rest  Exacerbating Factors: elevation, activity, lifting and repetitive activity  Associated Symptoms: none    Sleep pattern is affected by the chief complaint: Yes  The patient has not had PT. The patient has not had an injection. The patient has not taken NSAIDs. The patient is not working. Pain Assessment:       Review of Systems:  Viktoriya Rubio review of systems has been performed by intake and observation. All past and current ROS forms have been scanned into the medical record. She has been instructed to contact her primary care provider regarding ROS issues if not already being addressed at this time. There are no recent changes.  The most recent ROS was scanned into media on 5/20/2020    Past Medical History:  (see most recent intake form scanned into media on above date)  Past Medical History:   Diagnosis Date    Diverticulitis     Hyperlipidemia     Hypertension     Osteoarthritis     Osteopenia     Restless legs syndrome        Past Surgical History:  (see most recent intake form scanned into media on above date)  Past Surgical History:   Procedure Laterality Date    BREAST REDUCTION SURGERY  10/15/15    COLONOSCOPY  11/15/2016    colon polyps, diverticulosis    OTHER SURGICAL HISTORY  08/23/2019    excision back cyst times two    OVARIAN CYST REMOVAL      TX EXCISION TUMOR SOFT TISSUE BACK/FLANK SUBQ <3CM N/A 8/23/2019    EXCISION BACK CYST TIMES TWO performed by Ximena Khan MD at [facility]       Allergies:  (see most recent intake form scanned into media on above date)  Allergies   Allergen Reactions    Naproxen Anaphylaxis       Medications:  (see most recent intake form scanned into media on above date)  Current Outpatient Medications   Medication Sig Dispense Refill    omeprazole (PRILOSEC) 10 MG delayed release capsule Take 1 capsule by mouth daily (Patient taking differently: Take 10 mg by mouth every 48 hours ) 90 capsule 3    hydrochlorothiazide (HYDRODIURIL) 25 MG tablet TAKE 1 TABLET BY MOUTH  DAILY 90 tablet 3    losartan (COZAAR) 100 MG tablet TAKE 1 TABLET BY MOUTH  DAILY 90 tablet 3    amLODIPine (NORVASC) 10 MG tablet TAKE 1 TABLET BY MOUTH  DAILY 90 tablet 3    simvastatin (ZOCOR) 20 MG tablet TAKE 1 TABLET BY MOUTH  NIGHTLY 90 tablet 3    alendronate (FOSAMAX) 70 MG tablet Take 1 tablet by mouth every 7 days 12 tablet 3    aspirin 81 MG tablet Take 81 mg by mouth daily      Calcium Carb-Cholecalciferol (CALCIUM + D3) 600-200 MG-UNIT TABS Take 1 tablet by mouth 2 times daily        No current facility-administered medications for this visit.          Coagulation:  On a blood thinner: No  History of a bleeding disorder: No  History of a previous blood clot: No    Goal for treatment: Improve function and decrease pain    OBJECTIVE  PHYSICAL EXAM  Vital Signs: There were no vitals filed for this visit. There is no height or weight on file to calculate BMI. General Appearance: Patient is adequately groomed with no evidence of malnutrition   Orientation: Patient is alert and oriented to person, place and time  Mood and Affect: Neutral/Euthymic(normal)  Gait and Station: normal    Right Shoulder Examination  Inspection:    Visual deformity noted: No    No swelling noted. No erythema or ecchymosis. Palpation: mild tenderness to palpation on the anterior, lateral region. Range of Motion: Her active range of motion is poor both forward flexion abduction and external rotation. She struggles to get to 90 degrees of forward flexion only reached about 70 degrees of abduction. Passive range of motion is much better but very painful. Stability and Special Testing:  Obriens test: positive              Apprehension test: not tested              Load and Shift test: not tested                                    Impingement test: positive                         Sulcus sign: not tested              Bear Hug test: negative            Lift-Off test: not tested                   Cuff Drop Arm test:  positive    Strength: Forward flexion: 3+/5        Abduction: 3+/5        Internal Rotation: 5/5        External Rotation: 4/5    Neurologic: Sensation is intact to light touch throughout the median, ulnar and radial nerve distribution. Vascular: The bilateral upper extremities are warm and well-perfused with brisk capillary refill. Lymphatic: The lymphatic examination bilaterally reveals all areas to be without enlargement or induration    Skin: intact with no cellulitis, rashes, ulcerations, lymphedema or cutaneous lesions noted.     Additional Examinations:  Left Upper Extremity: Examination of the left

## 2020-07-24 ENCOUNTER — HOSPITAL ENCOUNTER (OUTPATIENT)
Dept: MRI IMAGING | Age: 75
Discharge: HOME | End: 2020-07-24
Payer: MEDICARE

## 2020-07-24 PROCEDURE — 73221 MRI JOINT UPR EXTREM W/O DYE: CPT

## 2020-07-30 RX ORDER — OMEPRAZOLE 10 MG/1
10 CAPSULE, DELAYED RELEASE ORAL DAILY
Qty: 90 CAPSULE | Refills: 3 | Status: SHIPPED | OUTPATIENT
Start: 2020-07-30 | End: 2021-05-23

## 2020-07-30 RX ORDER — AMLODIPINE BESYLATE 10 MG/1
TABLET ORAL
Qty: 90 TABLET | Refills: 3 | Status: ON HOLD | OUTPATIENT
Start: 2020-07-30 | End: 2020-11-16 | Stop reason: HOSPADM

## 2020-07-30 RX ORDER — LOSARTAN POTASSIUM 100 MG/1
TABLET ORAL
Qty: 90 TABLET | Refills: 3 | Status: ON HOLD | OUTPATIENT
Start: 2020-07-30 | End: 2020-11-16 | Stop reason: HOSPADM

## 2020-07-30 RX ORDER — SIMVASTATIN 20 MG
TABLET ORAL
Qty: 90 TABLET | Refills: 3 | Status: SHIPPED | OUTPATIENT
Start: 2020-07-30 | End: 2021-05-23

## 2020-07-30 RX ORDER — HYDROCHLOROTHIAZIDE 25 MG/1
TABLET ORAL
Qty: 90 TABLET | Refills: 3 | Status: ON HOLD | OUTPATIENT
Start: 2020-07-30 | End: 2020-10-22 | Stop reason: HOSPADM

## 2020-07-31 NOTE — TELEPHONE ENCOUNTER
Left message on voicemail for patient to call the office back.   Please find out if she had records transferred

## 2020-08-03 NOTE — TELEPHONE ENCOUNTER
Calling to discuss her medical records request to see if you received any information from [facility] [address].

## 2020-08-05 ENCOUNTER — TELEPHONE (OUTPATIENT)
Dept: INTERNAL MEDICINE CLINIC | Age: 75
End: 2020-08-05

## 2020-08-05 ENCOUNTER — OFFICE VISIT (OUTPATIENT)
Dept: ORTHOPEDIC SURGERY | Age: 75
End: 2020-08-05
Payer: MEDICARE

## 2020-08-05 ENCOUNTER — TELEPHONE (OUTPATIENT)
Dept: ORTHOPEDIC SURGERY | Age: 75
End: 2020-08-05

## 2020-08-05 VITALS — HEIGHT: 59 IN | BODY MASS INDEX: 26.21 KG/M2 | WEIGHT: 130 LBS

## 2020-08-05 PROCEDURE — 3017F COLORECTAL CA SCREEN DOC REV: CPT | Performed by: ORTHOPAEDIC SURGERY

## 2020-08-05 PROCEDURE — 1036F TOBACCO NON-USER: CPT | Performed by: ORTHOPAEDIC SURGERY

## 2020-08-05 PROCEDURE — 1090F PRES/ABSN URINE INCON ASSESS: CPT | Performed by: ORTHOPAEDIC SURGERY

## 2020-08-05 PROCEDURE — G8399 PT W/DXA RESULTS DOCUMENT: HCPCS | Performed by: ORTHOPAEDIC SURGERY

## 2020-08-05 PROCEDURE — G8417 CALC BMI ABV UP PARAM F/U: HCPCS | Performed by: ORTHOPAEDIC SURGERY

## 2020-08-05 PROCEDURE — 1123F ACP DISCUSS/DSCN MKR DOCD: CPT | Performed by: ORTHOPAEDIC SURGERY

## 2020-08-05 PROCEDURE — L3670 SO ACRO/CLAV CAN WEB PRE OTS: HCPCS | Performed by: ORTHOPAEDIC SURGERY

## 2020-08-05 PROCEDURE — 4040F PNEUMOC VAC/ADMIN/RCVD: CPT | Performed by: ORTHOPAEDIC SURGERY

## 2020-08-05 PROCEDURE — G8427 DOCREV CUR MEDS BY ELIG CLIN: HCPCS | Performed by: ORTHOPAEDIC SURGERY

## 2020-08-05 PROCEDURE — 99213 OFFICE O/P EST LOW 20 MIN: CPT | Performed by: ORTHOPAEDIC SURGERY

## 2020-08-05 NOTE — PROGRESS NOTES
MD Maco Sofia, [place]         Orthopaedic Surgery and Sports Medicine    Patient Name: Aletha Aponte  YOB: 1945  Patient's PCP is Aby Samuel MD    SUBJECTIVE  Chief Complaint:  Shoulder Pain (RIGHT   MRI RESULTS)      History of Present Illness:  Aletha Aponte is a right handed 69 y.o. female here regarding right shoulder pain. She is Jimmy Manrique mother-in-law. Was recently seen by Dr. Lonnie Jon on 5/20/2020 for her left shoulder. That day was diagnosed with left shoulder rotator cuff tendinitis. Was given an injection of corticosteroid and then referred home with home exercise program.    She apparently was taking meloxicam which was actually helping her shoulder but it was stopped because of a lab abnormality. She cannot take Advil or Aleve because of GI distress. 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 . There was a history of injury. Location: diffusely throughout the shoulder  Quality: aching, sharp and nagging   Pain Scale: 8/10  Context: overall course is worsening   Alleviating Factors: rest  Exacerbating Factors: elevation, activity, lifting and repetitive activity  Associated Symptoms: none    Sleep pattern is affected by the chief complaint: Yes  The patient has not had PT. The patient has not had an injection. The patient has not taken NSAIDs. The patient is not working. At her last visit we referred her for an MRI and she is here to go over those results today. She reports her symptoms are essentially unchanged. It bothers her all day every day.     Pain Assessment:  Pain Assessment  Location of Pain: Shoulder  Location Modifiers: Right  Severity of Pain: 8    Review of Systems:  [address] review of systems has been performed by intake and observation. All past and current ROS forms have been scanned into the medical record. She has been instructed to contact her primary care provider regarding ROS issues if not already being addressed at this time. There are no recent changes.  The most recent ROS was scanned into media on 05/20/2020    Past Medical History:  (see most recent intake form scanned into media on above date)  Past Medical History:   Diagnosis Date    Diverticulitis     Hyperlipidemia     Hypertension     Osteoarthritis     Osteopenia     Restless legs syndrome        Past Surgical History:  (see most recent intake form scanned into media on above date)  Past Surgical History:   Procedure Laterality Date    BREAST REDUCTION SURGERY  10/15/15    COLONOSCOPY  11/15/2016    colon polyps, diverticulosis    OTHER SURGICAL HISTORY  08/23/2019    excision back cyst times two    OVARIAN CYST REMOVAL      CO EXCISION TUMOR SOFT TISSUE BACK/FLANK SUBQ <3CM N/A 8/23/2019    EXCISION BACK CYST TIMES TWO performed by Chintan Esposito MD at [facility]       Allergies:  (see most recent intake form scanned into media on above date)  Allergies   Allergen Reactions    Naproxen Anaphylaxis       Medications:  (see most recent intake form scanned into media on above date)  Current Outpatient Medications   Medication Sig Dispense Refill    losartan (COZAAR) 100 MG tablet TAKE 1 TABLET BY MOUTH  DAILY 90 tablet 3    hydroCHLOROthiazide (HYDRODIURIL) 25 MG tablet TAKE 1 TABLET BY MOUTH  DAILY 90 tablet 3    simvastatin (ZOCOR) 20 MG tablet TAKE 1 TABLET BY MOUTH  NIGHTLY 90 tablet 3    omeprazole (PRILOSEC) 10 MG delayed release capsule TAKE 1 CAPSULE BY MOUTH  DAILY 90 capsule 3    amLODIPine (NORVASC) 10 MG tablet TAKE 1 TABLET BY MOUTH  DAILY 90 tablet 3    alendronate (FOSAMAX) 70 MG tablet TAKE 1 TABLET BY MOUTH  EVERY 7 DAYS 12 tablet 3    aspirin 81 MG tablet Take 81 mg by mouth daily      Calcium Carb-Cholecalciferol (CALCIUM + D3) intact with no cellulitis, rashes, ulcerations, lymphedema or cutaneous lesions noted. Additional Examinations:  Left Upper Extremity: Examination of the left upper extremity does not show any tenderness, deformity or injury. Range of motion is thin normal limits. There is no gross instability. There are no rashes, ulcerations or lesions. Strength and tone are normal.  Neck: Examination of the neck does not show any tenderness, deformity or injury. Range of motion is within normal limits. There is no gross instability. There are no rashes, ulcerations or lesions. Strength and tone are normal.      DIAGNOSTICS:  Xrays obtained in office today: No  Xrays reviewed today: Yes  3 views of the right shoulder show   Fracture: No  Dislocation: No  Acromion morphology: Type III   Acromioclavicular joint arthritis: mild  Glenohumeral joint arthritis: mild  Humeral head superior migration: moderate    MRI of the right shoulder from July 24, 2020 shows moderate size full-thickness supraspinatus tendon tear. Mild myotendinous junction retraction and moderate atrophy. No significant fatty infiltration. Moderate infraspinatus tendinopathy with tiny subcentimeter interstitial tearing. Mild subscapularis tendinopathy. Full-thickness long head biceps tendon tear. Mild AC and glenohumeral degenerative changes. ASSESSMENT (Medical Decision Making)    Alee Rolon is a 69 y.o. female with the following diagnosis: Right shoulder rotator cuff tear      ICD-10-CM    1. Right shoulder pain, unspecified chronicity  M25.511 So acro/clav can web pre ots   2. Complete tear of right rotator cuff, unspecified whether traumatic  M75.121            PLAN (Medical Decision Making)  Office Procedures:  Orders Placed This Encounter   Procedures    So acro/clav can web pre ots     Patient was prescribed a Ossur Smartsling Shoulder Brace. The right shoulder will require stabilization / immobilization from this orthosis.   The orthosis will assist in protecting the affected area, provide functional support and facilitate healing. The device was ordered and fit on 8/5/20. The patient was educated and fit by a healthcare professional with expert knowledge and specialization in brace application while under the direct supervision of the treating physician. Verbal and written instructions for the use of and application of this item were provided. They were instructed to contact the office immediately should the brace result in increased pain, decreased sensation, increased swelling or worsening of the condition. Treatment Plan:    I discussed the diagnosis and treatment options with Belinda Bucio today. After going over the MRI results with the patient today she is elected to proceed with surgical intervention. We will plan for a right shoulder video arthroscopy with rotator cuff repair, subacromial decompression and debridement. After discussion today, the patient has elected to proceed with surgical intervention. The surgical procedure was discussed in detail. The risks and possible complications of the surgery in general, as well as those directly related to this procedure were reviewed today. General risks include but are not limited to persistent pain, infection (including COVID-19), excessive blood loss, neurovascular injury, chronic swelling or edema, and the potential need for additional treatment or surgical intervention in the future. The patient understands that, again, the risks and possible complications are not limited to those specifically stated above. In addition today, we discussed the preoperative and postoperative protocol, the often lengthy recovery, and the expectation that the patient will be compliant with instructions and perform the appropriate rehab program as prescribed. The patient accepted the above risks, possible complications, and protocol and elects to proceed.  All questions were answered appropriately, and they understand that they can contact the office at any time to further discuss any questions or concerns. Non-steroidal anti-inflammatories medications (NSAIDs) can be used to assist with pain control and to reduce inflammatory changes. These medications may be over-the-counter or prescribed. We discussed taking the NSAID properly and the precautions. The patient understands that this medication may potentially interfere with other medications. Patient was also instructed to immediately discontinue the medication is there is any possible complication. Oliver Newman was instructed to call the office if her symptoms worsen or if new symptoms appear prior to the next scheduled visit. She is specifically instructed to contact the office between now and schedule appointment if she has concerns related to her condition or if she needs assistance in scheduling any above tests. She is welcome to call for an appointment sooner if she has any additional concerns or questions. Senthil Schmidt PA-C, scribing for and in the presence of Dr. Qureshi Daily   8/5/2020 9:14 AM    I, Dr. Fazal Tovar, personally performed the services described in this documentation as scribed by Darryle Croak. JACE Carson in my presence, and it is both accurate and complete. Fazal Tovar MD    This dictation was performed with a verbal recognition program [facility]) and it was checked for errors. It is possible that there are still dictated errors within this office note. If so, please bring any errors to my attention for an addendum. All efforts were made to ensure that this office note is accurate.

## 2020-08-06 ENCOUNTER — OFFICE VISIT (OUTPATIENT)
Dept: INTERNAL MEDICINE CLINIC | Age: 75
End: 2020-08-06
Payer: MEDICARE

## 2020-08-06 VITALS
HEIGHT: 59 IN | OXYGEN SATURATION: 97 % | TEMPERATURE: 97 F | SYSTOLIC BLOOD PRESSURE: 136 MMHG | BODY MASS INDEX: 26 KG/M2 | HEART RATE: 77 BPM | DIASTOLIC BLOOD PRESSURE: 78 MMHG | WEIGHT: 129 LBS

## 2020-08-06 PROCEDURE — 3017F COLORECTAL CA SCREEN DOC REV: CPT | Performed by: INTERNAL MEDICINE

## 2020-08-06 PROCEDURE — 1123F ACP DISCUSS/DSCN MKR DOCD: CPT | Performed by: INTERNAL MEDICINE

## 2020-08-06 PROCEDURE — 1090F PRES/ABSN URINE INCON ASSESS: CPT | Performed by: INTERNAL MEDICINE

## 2020-08-06 PROCEDURE — G8399 PT W/DXA RESULTS DOCUMENT: HCPCS | Performed by: INTERNAL MEDICINE

## 2020-08-06 PROCEDURE — 99214 OFFICE O/P EST MOD 30 MIN: CPT | Performed by: INTERNAL MEDICINE

## 2020-08-06 PROCEDURE — G8417 CALC BMI ABV UP PARAM F/U: HCPCS | Performed by: INTERNAL MEDICINE

## 2020-08-06 PROCEDURE — 1036F TOBACCO NON-USER: CPT | Performed by: INTERNAL MEDICINE

## 2020-08-06 PROCEDURE — 4040F PNEUMOC VAC/ADMIN/RCVD: CPT | Performed by: INTERNAL MEDICINE

## 2020-08-06 PROCEDURE — 93000 ELECTROCARDIOGRAM COMPLETE: CPT | Performed by: INTERNAL MEDICINE

## 2020-08-06 PROCEDURE — G8427 DOCREV CUR MEDS BY ELIG CLIN: HCPCS | Performed by: INTERNAL MEDICINE

## 2020-08-06 NOTE — PROGRESS NOTES
Preoperative Consultation      Aletha Aponte  YOB: 1945    Date of Service:  8/6/2020    Vitals:    08/06/20 0852   BP: 136/78   Pulse: 77   Temp: 97 °F (36.1 °C)   SpO2: 97%   Weight: 129 lb (58.5 kg)   Height: 4' 11\" (1.499 m)      Wt Readings from Last 2 Encounters:   08/06/20 129 lb (58.5 kg)   08/05/20 130 lb (59 kg)     BP Readings from Last 3 Encounters:   08/06/20 136/78   06/18/20 112/74   12/05/19 134/78        Chief Complaint   Patient presents with    Pre-op Exam     rotator cuff 8/25/20 Dr. Laura Christianson     Allergies   Allergen Reactions    Naproxen Anaphylaxis     Outpatient Medications Marked as Taking for the 8/6/20 encounter (Office Visit) with Lea Mccain MD   Medication Sig Dispense Refill    losartan (COZAAR) 100 MG tablet TAKE 1 TABLET BY MOUTH  DAILY 90 tablet 3    hydroCHLOROthiazide (HYDRODIURIL) 25 MG tablet TAKE 1 TABLET BY MOUTH  DAILY 90 tablet 3    simvastatin (ZOCOR) 20 MG tablet TAKE 1 TABLET BY MOUTH  NIGHTLY 90 tablet 3    omeprazole (PRILOSEC) 10 MG delayed release capsule TAKE 1 CAPSULE BY MOUTH  DAILY 90 capsule 3    amLODIPine (NORVASC) 10 MG tablet TAKE 1 TABLET BY MOUTH  DAILY 90 tablet 3    alendronate (FOSAMAX) 70 MG tablet TAKE 1 TABLET BY MOUTH  EVERY 7 DAYS 12 tablet 3    aspirin 81 MG tablet Take 81 mg by mouth daily      Calcium Carb-Cholecalciferol (CALCIUM + D3) 600-200 MG-UNIT TABS Take 1 tablet by mouth 2 times daily          This patient presents to the office today for a preoperative consultation at the request of surgeon, Dr. Yefri Randhawa, who plans on performing rotator cuff repair on August 25 at [facility].  The current problem began 1 month ago, and symptoms have been worsening with time.   Conservative therapy: No.    Planned anesthesia: General   Known anesthesia problems: None   Bleeding risk: No recent or remote history of abnormal bleeding  Personal or FH of DVT/PE: No    Patient objection to receiving blood products: No    Patient Active Problem List   Diagnosis    Hypertension    Hyperlipidemia    Osteopenia    IFG (impaired fasting glucose)    DDD (degenerative disc disease), cervical    Thoracic sprain    Macromastia    Infected sebaceous cyst       Past Medical History:   Diagnosis Date    Diverticulitis     Hyperlipidemia     Hypertension     Osteoarthritis     Osteopenia     Restless legs syndrome      Past Surgical History:   Procedure Laterality Date    BREAST REDUCTION SURGERY  10/15/15    COLONOSCOPY  11/15/2016    colon polyps, diverticulosis    OTHER SURGICAL HISTORY  2019    excision back cyst times two    OVARIAN CYST REMOVAL      RI EXCISION TUMOR SOFT TISSUE BACK/FLANK SUBQ <3CM N/A 2019    EXCISION BACK CYST TIMES TWO performed by Sarah Blankenship MD at [facility] OR     Family History   Problem Relation Age of Onset    Heart Disease Mother     High Blood Pressure Mother     Heart Disease Father      Social History     Socioeconomic History    Marital status:       Spouse name: Not on file    Number of children: Not on file    Years of education: Not on file    Highest education level: Not on file   Occupational History    Not on file   Social Needs    Financial resource strain: Not on file    Food insecurity     Worry: Not on file     Inability: Not on file    Transportation needs     Medical: Not on file     Non-medical: Not on file   Tobacco Use    Smoking status: Former Smoker     Packs/day: 2.00     Years: 30.00     Pack years: 60.00     Last attempt to quit: 2004     Years since quittin.6    Smokeless tobacco: Never Used   Substance and Sexual Activity    Alcohol use: No    Drug use: No    Sexual activity: Not on file   Lifestyle    Physical activity     Days per week: Not on file     Minutes per session: Not on file    Stress: Not on file   Relationships    Social connections     Talks on phone: Not on file     Gets together: Not on file     Attends Sikhism service: Not on file     Active member of club or organization: Not on file     Attends meetings of clubs or organizations: Not on file     Relationship status: Not on file    Intimate partner violence     Fear of current or ex partner: Not on file     Emotionally abused: Not on file     Physically abused: Not on file     Forced sexual activity: Not on file   Other Topics Concern    Not on file   Social History Narrative    Not on file       Review of Systems  A comprehensive review of systems was negative except for what was noted in the HPI. Physical Exam   Constitutional: She is oriented to person, place, and time. She appears well-developed and well-nourished. No distress. HENT:   Head: Normocephalic and atraumatic. Mouth/Throat: Uvula is midline, oropharynx is clear and moist and mucous membranes are normal.   Eyes: Conjunctivae and EOM are normal. Pupils are equal, round, and reactive to light. Neck: Trachea normal and normal range of motion. Neck supple. No JVD present. Carotid bruit is not present. No mass and no thyromegaly present. Cardiovascular: Normal rate, regular rhythm, normal heart sounds and intact distal pulses. Exam reveals no gallop and no friction rub. No murmur heard. Pulmonary/Chest: Effort normal and breath sounds normal. No respiratory distress. She has no wheezes. She has no rales. Abdominal: Soft. Normal aorta and bowel sounds are normal. She exhibits no distension and no mass. There is no hepatosplenomegaly. No tenderness. Musculoskeletal: She exhibits no edema and no tenderness. Neurological: She is alert and oriented to person, place, and time. She has normal strength. No cranial nerve deficit or sensory deficit. Coordination and gait normal.   Skin: Skin is warm and dry. No rash noted. No erythema. Psychiatric: She has a normal mood and affect. Her behavior is normal.     EKG Interpretation:  normal EKG, normal sinus rhythm.     Lab Review   Hospital Outpatient Visit on 06/09/2020   Component Date Value    Hemoglobin A1C 06/09/2020 5.5     eAG 06/09/2020 111.2     Cholesterol, Total 06/09/2020 175     Triglycerides 06/09/2020 141     HDL 06/09/2020 54     LDL Calculated 06/09/2020 93     VLDL Cholesterol Calcula* 06/09/2020 28     Sodium 06/09/2020 138     Potassium 06/09/2020 3.1*    Chloride 06/09/2020 101     CO2 06/09/2020 25     Anion Gap 06/09/2020 12     Glucose, Fasting 06/09/2020 91     BUN 06/09/2020 17     CREATININE 06/09/2020 0.5*    GFR Non- 06/09/2020 >60     GFR  06/09/2020 >60     Calcium 06/09/2020 9.0     Total Protein 06/09/2020 6.9     Alb 06/09/2020 4.5     Albumin/Globulin Ratio 06/09/2020 1.9     Total Bilirubin 06/09/2020 0.8     Alkaline Phosphatase 06/09/2020 64     ALT 06/09/2020 18     AST 06/09/2020 14*    Globulin 06/09/2020 2.4     WBC 06/09/2020 6.7     RBC 06/09/2020 4.65     Hemoglobin 06/09/2020 14.6     Hematocrit 06/09/2020 43.6     MCV 06/09/2020 93.7     MCH 06/09/2020 31.4     MCHC 06/09/2020 33.5     RDW 06/09/2020 13.5     Platelets 72/87/6163 see below     MPV 06/09/2020 see below     PLATELET SLIDE REVIEW 06/09/2020 Clumped     SLIDE REVIEW 06/09/2020 see below     Neutrophils % 06/09/2020 62.3     Lymphocytes % 06/09/2020 25.8     Monocytes % 06/09/2020 8.4     Eosinophils % 06/09/2020 2.2     Basophils % 06/09/2020 1.3     Neutrophils Absolute 06/09/2020 4.2     Lymphocytes Absolute 06/09/2020 1.7     Monocytes Absolute 06/09/2020 0.6     Eosinophils Absolute 06/09/2020 0.1     Basophils Absolute 06/09/2020 0.1            Assessment:       69 y.o. patient with planned surgery as above. Known risk factors for perioperative complications: None  Current medications which may produce withdrawal symptoms if withheld perioperatively: none      Plan:     1.  Preoperative workup as follows: ECG, hemoglobin, hematocrit,

## 2020-08-13 ENCOUNTER — TELEPHONE (OUTPATIENT)
Dept: ORTHOPEDIC SURGERY | Age: 75
End: 2020-08-13

## 2020-08-13 NOTE — TELEPHONE ENCOUNTER
Auth: # NPR    Date: 08/25/2020  Type of SX:  OP   Location: [facility]  CPT: [id number]  [id number]  [id number]   DX Code: M75.121  M75.41  SX area: VAS RT RCR    Insurance: [employer]

## 2020-08-17 NOTE — PROGRESS NOTES
Tayo Lowell    Age 69 y.o.    female    1945    N [id number]    8/25/2020  Arrival Time_____________  OR Time____________[address]     Procedure(s):  VIDEO ARTHROSCOPY RIGHT SHOULDER, ROTATOR CUFF REPAIR, DECOMPRESSION, EXTENSIVE DEBRIDEMENT -BLOCK-                      General    Surgeon(s):  Alexa Harrington, MD       Phone [phone] ([place])     [address] [place] [address] [address] [facility]  Cell Work  _________________________________________________________________  _________________________________________________________________  _________________________________________________________________  _________________________________________________________________  _________________________________________________________________      PCP _____________________________ Phone_________________       H&P__________________Bringing    Chart            Epic  DOS     Called_______  EKG__________________Bringing    Chart            Epic  DOS     Called_______  LAB__________________ Bringing    Chart            Epic  DOS     Called_______  Cardiac Clearance_______Bringing    Chart            Epic      DOS       Called_______    Cardiologist________________________ Phone___________________________      ? Moravian concerns / Waiver on Chart            PAT Communications_____________  ? Pre-op Instructions Given [place]          ______________________________  ? Directions to Surgery Center                          ______________________________  ? Transportation Home_______________      _______________________________  ?  Crutches/Walker__________________        _______________________________      ________Pre-op Orders   _______Transcribed    _______Wt.  ________Pharmacy          _______SCD  ______VTE     ______[employer]  ________Consent             ________TED Consuello Hasting

## 2020-08-19 ENCOUNTER — OFFICE VISIT (OUTPATIENT)
Dept: INTERNAL MEDICINE CLINIC | Age: 75
End: 2020-08-19
Payer: MEDICARE

## 2020-08-19 ENCOUNTER — OFFICE VISIT (OUTPATIENT)
Dept: PRIMARY CARE CLINIC | Age: 75
End: 2020-08-19
Payer: MEDICARE

## 2020-08-19 VITALS
SYSTOLIC BLOOD PRESSURE: 134 MMHG | HEIGHT: 59 IN | OXYGEN SATURATION: 98 % | DIASTOLIC BLOOD PRESSURE: 62 MMHG | BODY MASS INDEX: 25.8 KG/M2 | TEMPERATURE: 98.2 F | WEIGHT: 128 LBS | HEART RATE: 75 BPM

## 2020-08-19 PROCEDURE — G8428 CUR MEDS NOT DOCUMENT: HCPCS | Performed by: NURSE PRACTITIONER

## 2020-08-19 PROCEDURE — 99211 OFF/OP EST MAY X REQ PHY/QHP: CPT | Performed by: NURSE PRACTITIONER

## 2020-08-19 PROCEDURE — G0438 PPPS, INITIAL VISIT: HCPCS | Performed by: NURSE PRACTITIONER

## 2020-08-19 PROCEDURE — 4040F PNEUMOC VAC/ADMIN/RCVD: CPT | Performed by: NURSE PRACTITIONER

## 2020-08-19 PROCEDURE — 1123F ACP DISCUSS/DSCN MKR DOCD: CPT | Performed by: NURSE PRACTITIONER

## 2020-08-19 PROCEDURE — G8417 CALC BMI ABV UP PARAM F/U: HCPCS | Performed by: NURSE PRACTITIONER

## 2020-08-19 PROCEDURE — 3017F COLORECTAL CA SCREEN DOC REV: CPT | Performed by: NURSE PRACTITIONER

## 2020-08-19 ASSESSMENT — LIFESTYLE VARIABLES: HOW OFTEN DO YOU HAVE A DRINK CONTAINING ALCOHOL: 0

## 2020-08-19 ASSESSMENT — PATIENT HEALTH QUESTIONNAIRE - PHQ9
SUM OF ALL RESPONSES TO PHQ QUESTIONS 1-9: 0
SUM OF ALL RESPONSES TO PHQ QUESTIONS 1-9: 0

## 2020-08-19 NOTE — PROGRESS NOTES
Ada Culver received a viral test for COVID-19. They were educated on isolation and quarantine as appropriate. For any symptoms, they were directed to seek care from their PCP, given contact information to establish with a doctor, directed to an urgent care or the emergency room.

## 2020-08-19 NOTE — PATIENT INSTRUCTIONS
Advance Directives: Care Instructions  Overview  An advance directive is a legal way to state your wishes at the end of your life. It tells your family and your doctor what to do if you can't say what you want. There are two main types of advance directives. You can change them any time your wishes change. Living will. This form tells your family and your doctor your wishes about life support and other treatment. The form is also called a declaration. Medical power of . This form lets you name a person to make treatment decisions for you when you can't speak for yourself. This person is called a health care agent (health care proxy, health care surrogate). The form is also called a durable power of  for health care. If you do not have an advance directive, decisions about your medical care may be made by a family member, or by a doctor or a  who doesn't know you. It may help to think of an advance directive as a gift to the people who care for you. If you have one, they won't have to make tough decisions by themselves. Follow-up care is a key part of your treatment and safety. Be sure to make and go to all appointments, and call your doctor if you are having problems. It's also a good idea to know your test results and keep a list of the medicines you take. What should you include in an advance directive? Many states have a unique advance directive form. (It may ask you to address specific issues.) Or you might use a universal form that's approved by many states. If your form doesn't tell you what to address, it may be hard to know what to include in your advance directive. Use the questions below to help you get started. · Who do you want to make decisions about your medical care if you are not able to? · What life-support measures do you want if you have a serious illness that gets worse over time or can't be cured? · What are you most afraid of that might happen? (Maybe you're afraid of having pain, losing your independence, or being kept alive by machines.)  · Where would you prefer to die? (Your home? A hospital? A nursing home?)  · Do you want to donate your organs when you die? · Do you want certain Muslim practices performed before you die? When should you call for help? Be sure to contact your doctor if you have any questions. Where can you learn more? Go to [email or web address] org and sign in to your [employer] account. Enter R264 in the [employer] box to learn more about \"Advance Directives: Care Instructions. \"     If you do not have an account, please click on the \"Sign Up Now\" link. Current as of: December 9, 2019               Content Version: 12.5  © 1703-2949 Healthwise, Incorporated. Care instructions adapted under license by [facility] ([facility]). If you have questions about a medical condition or this instruction, always ask your healthcare professional. [place] [address] any warranty or liability for your use of this information. Personalized Preventive Plan for Willy Christianson - 8/19/2020  Medicare offers a range of preventive health benefits. Some of the tests and screenings are paid in full while other may be subject to a deductible, co-insurance, and/or copay. Some of these benefits include a comprehensive review of your medical history including lifestyle, illnesses that may run in your family, and various assessments and screenings as appropriate. After reviewing your medical record and screening and assessments performed today your provider may have ordered immunizations, labs, imaging, and/or referrals for you. A list of these orders (if applicable) as well as your Preventive Care list are included within your After Visit Summary for your review.     Other Preventive Recommendations:    · A preventive eye exam performed by an eye specialist is recommended every 1-2 years to screen for glaucoma; cataracts, macular degeneration, and other eye disorders. · A preventive dental visit is recommended every 6 months. · Try to get at least 150 minutes of exercise per week or 10,000 steps per day on a pedometer . · Order or download the FREE \"Exercise & Physical Activity: Your Everyday Guide\" from [employer]. Call 2-719.791.3478 or search [employer] online. · You need 8914-8315 mg of calcium and 4301-5387 IU of vitamin D per day. It is possible to meet your calcium requirement with diet alone, but a vitamin D supplement is usually necessary to meet this goal.  · When exposed to the sun, use a sunscreen that protects against both UVA and UVB radiation with an SPF of 30 or greater. Reapply every 2 to 3 hours or after sweating, drying off with a towel, or swimming. · Always wear a seat belt when traveling in a car. Always wear a helmet when riding a bicycle or motorcycle. Advance Directives: Care Instructions  Overview  An advance directive is a legal way to state your wishes at the end of your life. It tells your family and your doctor what to do if you can't say what you want. There are two main types of advance directives. You can change them any time your wishes change. Living will. This form tells your family and your doctor your wishes about life support and other treatment. The form is also called a declaration. Medical power of . This form lets you name a person to make treatment decisions for you when you can't speak for yourself. This person is called a health care agent (health care proxy, health care surrogate). The form is also called a durable power of  for health care. If you do not have an advance directive, decisions about your medical care may be made by a family member, or by a doctor or a  who doesn't know you. It may help to think of an advance directive as a gift to the people who care for you.  If you have one, they won't have to make tough decisions by themselves. Follow-up care is a key part of your treatment and safety. Be sure to make and go to all appointments, and call your doctor if you are having problems. It's also a good idea to know your test results and keep a list of the medicines you take. What should you include in an advance directive? Many states have a unique advance directive form. (It may ask you to address specific issues.) Or you might use a universal form that's approved by many states. If your form doesn't tell you what to address, it may be hard to know what to include in your advance directive. Use the questions below to help you get started. Who do you want to make decisions about your medical care if you are not able to? What life-support measures do you want if you have a serious illness that gets worse over time or can't be cured? What are you most afraid of that might happen? (Maybe you're afraid of having pain, losing your independence, or being kept alive by machines.)  Where would you prefer to die? (Your home? A hospital? A nursing home?)  Do you want to donate your organs when you die? Do you want certain Lutheran practices performed before you die? When should you call for help? Be sure to contact your doctor if you have any questions. Where can you learn more? Go to [email or web address] [email or web address] and sign in to your [employer] account. Enter R264 in the [employer] box to learn more about \"Advance Directives: Care Instructions. \"     If you do not have an account, please click on the \"Sign Up Now\" link. Current as of: December 9, 2019               Content Version: 12.5  © 0059-0004 Healthwise, Incorporated. Care instructions adapted under license by [facility] [facility]  If you have questions about a medical condition or this instruction, always ask your healthcare professional. [address] any warranty or liability for your use of

## 2020-08-19 NOTE — PROGRESS NOTES
Living Will?: (!) No(patient is not interested in getting info to start this.)  General Health Risk Interventions:  · No Living Will: additional information provided . · Trouble sleeping. States she takes melatonin intermittently. Denies concerns. Health Habits/Nutrition:  Health Habits/Nutrition  Do you exercise for at least 20 minutes 2-3 times per week?: Yes  Have you lost any weight without trying in the past 3 months?: No  Do you eat fewer than 2 meals per day?: No  Have you seen a dentist within the past year?: (!) No(Patient does not currently have a dentist.)  Body mass index is 25.85 kg/m². Health Habits/Nutrition Interventions:  · Dental exam overdue:  patient encouraged to make appointment with his/her dentist    Safety:  Safety  Do you have working smoke detectors?: Yes  Have all throw rugs been removed or fastened?: Yes  Do you have non-slip mats or surfaces in all bathtubs/showers?: (!) No  Do all of your stairways have a railing or banister?: Yes  Are your doorways, halls and stairs free of clutter?: Yes  Do you always fasten your seatbelt when you are in a car?: (!) No(Patient states with her height the seat belt chokes her.)  Safety Interventions:  · Home safety tips provided  · Encouraged seat belt use 100% of time.  Investigate into adaptive belts    Personalized Preventive Plan   Current Health Maintenance Status  Immunization History   Administered Date(s) Administered    Pneumococcal Conjugate 13-valent ([id number]) 06/03/2019    Pneumococcal Polysaccharide ([id number]) 07/02/2020    Zoster Recombinant (Shingrix) 09/23/2019, 11/19/2019        Health Maintenance   Topic Date Due    DTaP/Tdap/Td vaccine (1 - Tdap) 08/27/1966    Annual Wellness Visit (AWV)  06/03/2019    Flu vaccine (1) 09/01/2020    A1C test (Diabetic or Prediabetic)  06/09/2021    Lipid screen  06/09/2021    Potassium monitoring  06/09/2021    Creatinine monitoring  06/09/2021    Breast cancer screen  06/09/2022  Colon cancer screen colonoscopy  11/15/2026    DEXA (modify frequency per FRAX score)  Completed    Shingles Vaccine  Completed    Pneumococcal 65+ years Vaccine  Completed    Hepatitis C screen  Completed    Hepatitis A vaccine  Aged Out    Hepatitis B vaccine  Aged Out    Hib vaccine  Aged Out    Meningococcal (ACWY) vaccine  Aged Out     Recommendations for [employer] Due: see orders and patient instructions/AVS.  . Recommended screening schedule for the next 5-10 years is provided to the patient in written form: see Patient Instructions/AVS.    Autumn Silverio was seen today for medicare awv. Diagnoses and all orders for this visit:    Routine general medical examination at a health care facility    Advanced Care Planning: Discussed the patients choices for care and treatment in case of a health event that adversely affects decision-making abilities. Also discussed the patients long-term treatment options. Reviewed with the patient the [address] [place] [address] Declaration forms  Reviewed the process of designating a competent adult as an Agent (or -in-fact) that could take make health care decisions for the patient if incompetent. Patient was asked to complete the declaration forms, either acknowledge the forms by a public notary or an eligible witness and provide a signed copy to the practice office. Time spent (minutes): 2     Cardiovascular Disease Risk Counseling: Assessed the patient's risk to develop cardiovascular disease and reviewed main risk factors.    Reviewed steps to reduce disease risk including:   · Quitting tobacco use, reducing amount smoked, or not starting the habit  · Making healthy food choices  · Being physically active and gradualy increasing activity levels   · Reduce weight and determine a healthy BMI goal  · Monitor blood pressure and treat if higher than 140/90 mmHg  · Maintain blood total cholesterol levels under 5 mmol/l or 190

## 2020-08-20 LAB — SARS-COV-2, NAA: NOT DETECTED

## 2020-08-24 ENCOUNTER — ANESTHESIA EVENT (OUTPATIENT)
Dept: OPERATING ROOM | Age: 75
End: 2020-08-24
Payer: MEDICARE

## 2020-08-25 ENCOUNTER — ANESTHESIA (OUTPATIENT)
Dept: OPERATING ROOM | Age: 75
End: 2020-08-25
Payer: MEDICARE

## 2020-08-25 ENCOUNTER — HOSPITAL ENCOUNTER (OUTPATIENT)
Age: 75
Setting detail: OUTPATIENT SURGERY
Discharge: HOME | End: 2020-08-25
Attending: ORTHOPAEDIC SURGERY | Admitting: ORTHOPAEDIC SURGERY
Payer: MEDICARE

## 2020-08-25 VITALS
BODY MASS INDEX: 26 KG/M2 | HEART RATE: 66 BPM | WEIGHT: 129 LBS | TEMPERATURE: 96.2 F | OXYGEN SATURATION: 92 % | DIASTOLIC BLOOD PRESSURE: 52 MMHG | SYSTOLIC BLOOD PRESSURE: 134 MMHG | RESPIRATION RATE: 12 BRPM | HEIGHT: 59 IN

## 2020-08-25 VITALS
OXYGEN SATURATION: 92 % | DIASTOLIC BLOOD PRESSURE: 49 MMHG | SYSTOLIC BLOOD PRESSURE: 94 MMHG | RESPIRATION RATE: 13 BRPM

## 2020-08-25 PROBLEM — M75.121 COMPLETE TEAR OF RIGHT ROTATOR CUFF: Status: ACTIVE | Noted: 2020-08-25

## 2020-08-25 PROCEDURE — 3600000004 HC SURGERY LEVEL 4 BASE: Performed by: ORTHOPAEDIC SURGERY

## 2020-08-25 PROCEDURE — 64415 NJX AA&/STRD BRCH PLXS IMG: CPT | Performed by: ANESTHESIOLOGY

## 2020-08-25 PROCEDURE — 2709999900 HC NON-CHARGEABLE SUPPLY: Performed by: ORTHOPAEDIC SURGERY

## 2020-08-25 PROCEDURE — 6360000002 HC RX W HCPCS: Performed by: ORTHOPAEDIC SURGERY

## 2020-08-25 PROCEDURE — 7100000000 HC PACU RECOVERY - FIRST 15 MIN: Performed by: ORTHOPAEDIC SURGERY

## 2020-08-25 PROCEDURE — 3700000000 HC ANESTHESIA ATTENDED CARE: Performed by: ORTHOPAEDIC SURGERY

## 2020-08-25 PROCEDURE — 6360000002 HC RX W HCPCS: Performed by: ANESTHESIOLOGY

## 2020-08-25 PROCEDURE — 2720000010 HC SURG SUPPLY STERILE: Performed by: ORTHOPAEDIC SURGERY

## 2020-08-25 PROCEDURE — 7100000010 HC PHASE II RECOVERY - FIRST 15 MIN: Performed by: ORTHOPAEDIC SURGERY

## 2020-08-25 PROCEDURE — 3700000001 HC ADD 15 MINUTES (ANESTHESIA): Performed by: ORTHOPAEDIC SURGERY

## 2020-08-25 PROCEDURE — 7100000001 HC PACU RECOVERY - ADDTL 15 MIN: Performed by: ORTHOPAEDIC SURGERY

## 2020-08-25 PROCEDURE — 2500000003 HC RX 250 WO HCPCS: Performed by: NURSE ANESTHETIST, CERTIFIED REGISTERED

## 2020-08-25 PROCEDURE — C1713 ANCHOR/SCREW BN/BN,TIS/BN: HCPCS | Performed by: ORTHOPAEDIC SURGERY

## 2020-08-25 PROCEDURE — 2580000003 HC RX 258: Performed by: ANESTHESIOLOGY

## 2020-08-25 PROCEDURE — 6360000002 HC RX W HCPCS: Performed by: NURSE ANESTHETIST, CERTIFIED REGISTERED

## 2020-08-25 PROCEDURE — 2580000003 HC RX 258: Performed by: ORTHOPAEDIC SURGERY

## 2020-08-25 PROCEDURE — 7100000011 HC PHASE II RECOVERY - ADDTL 15 MIN: Performed by: ORTHOPAEDIC SURGERY

## 2020-08-25 PROCEDURE — 3600000014 HC SURGERY LEVEL 4 ADDTL 15MIN: Performed by: ORTHOPAEDIC SURGERY

## 2020-08-25 RX ORDER — LIDOCAINE HYDROCHLORIDE 10 MG/ML
1 INJECTION, SOLUTION EPIDURAL; INFILTRATION; INTRACAUDAL; PERINEURAL
Status: DISCONTINUED | OUTPATIENT
Start: 2020-08-25 | End: 2020-08-25 | Stop reason: HOSPADM

## 2020-08-25 RX ORDER — OXYCODONE HYDROCHLORIDE AND ACETAMINOPHEN 5; 325 MG/1; MG/1
1 TABLET ORAL PRN
Status: DISCONTINUED | OUTPATIENT
Start: 2020-08-25 | End: 2020-08-25 | Stop reason: HOSPADM

## 2020-08-25 RX ORDER — SODIUM CHLORIDE 0.9 % (FLUSH) 0.9 %
10 SYRINGE (ML) INJECTION EVERY 12 HOURS SCHEDULED
Status: DISCONTINUED | OUTPATIENT
Start: 2020-08-25 | End: 2020-08-25 | Stop reason: HOSPADM

## 2020-08-25 RX ORDER — LIDOCAINE HYDROCHLORIDE 20 MG/ML
INJECTION, SOLUTION INFILTRATION; PERINEURAL PRN
Status: DISCONTINUED | OUTPATIENT
Start: 2020-08-25 | End: 2020-08-25 | Stop reason: SDUPTHER

## 2020-08-25 RX ORDER — MEPERIDINE HYDROCHLORIDE 50 MG/ML
12.5 INJECTION INTRAMUSCULAR; INTRAVENOUS; SUBCUTANEOUS EVERY 5 MIN PRN
Status: DISCONTINUED | OUTPATIENT
Start: 2020-08-25 | End: 2020-08-25 | Stop reason: HOSPADM

## 2020-08-25 RX ORDER — ONDANSETRON 2 MG/ML
INJECTION INTRAMUSCULAR; INTRAVENOUS PRN
Status: DISCONTINUED | OUTPATIENT
Start: 2020-08-25 | End: 2020-08-25 | Stop reason: SDUPTHER

## 2020-08-25 RX ORDER — SODIUM CHLORIDE, SODIUM LACTATE, POTASSIUM CHLORIDE, AND CALCIUM CHLORIDE .6; .31; .03; .02 G/100ML; G/100ML; G/100ML; G/100ML
IRRIGANT IRRIGATION PRN
Status: DISCONTINUED | OUTPATIENT
Start: 2020-08-25 | End: 2020-08-25 | Stop reason: ALTCHOICE

## 2020-08-25 RX ORDER — ONDANSETRON 2 MG/ML
4 INJECTION INTRAMUSCULAR; INTRAVENOUS EVERY 30 MIN PRN
Status: DISCONTINUED | OUTPATIENT
Start: 2020-08-25 | End: 2020-08-25 | Stop reason: HOSPADM

## 2020-08-25 RX ORDER — SODIUM CHLORIDE, SODIUM LACTATE, POTASSIUM CHLORIDE, CALCIUM CHLORIDE 600; 310; 30; 20 MG/100ML; MG/100ML; MG/100ML; MG/100ML
INJECTION, SOLUTION INTRAVENOUS CONTINUOUS
Status: DISCONTINUED | OUTPATIENT
Start: 2020-08-25 | End: 2020-08-25 | Stop reason: HOSPADM

## 2020-08-25 RX ORDER — DEXAMETHASONE SODIUM PHOSPHATE 4 MG/ML
INJECTION, SOLUTION INTRA-ARTICULAR; INTRALESIONAL; INTRAMUSCULAR; INTRAVENOUS; SOFT TISSUE PRN
Status: DISCONTINUED | OUTPATIENT
Start: 2020-08-25 | End: 2020-08-25 | Stop reason: SDUPTHER

## 2020-08-25 RX ORDER — ROCURONIUM BROMIDE 10 MG/ML
INJECTION, SOLUTION INTRAVENOUS PRN
Status: DISCONTINUED | OUTPATIENT
Start: 2020-08-25 | End: 2020-08-25 | Stop reason: SDUPTHER

## 2020-08-25 RX ORDER — MIDAZOLAM HYDROCHLORIDE 1 MG/ML
INJECTION INTRAMUSCULAR; INTRAVENOUS PRN
Status: DISCONTINUED | OUTPATIENT
Start: 2020-08-25 | End: 2020-08-25 | Stop reason: SDUPTHER

## 2020-08-25 RX ORDER — HYDRALAZINE HYDROCHLORIDE 20 MG/ML
5 INJECTION INTRAMUSCULAR; INTRAVENOUS EVERY 30 MIN PRN
Status: DISCONTINUED | OUTPATIENT
Start: 2020-08-25 | End: 2020-08-25 | Stop reason: HOSPADM

## 2020-08-25 RX ORDER — OXYCODONE HYDROCHLORIDE AND ACETAMINOPHEN 5; 325 MG/1; MG/1
1 TABLET ORAL EVERY 6 HOURS PRN
Qty: 28 TABLET | Refills: 0 | Status: SHIPPED | OUTPATIENT
Start: 2020-08-25 | End: 2020-09-01

## 2020-08-25 RX ORDER — SODIUM CHLORIDE 0.9 % (FLUSH) 0.9 %
10 SYRINGE (ML) INJECTION PRN
Status: DISCONTINUED | OUTPATIENT
Start: 2020-08-25 | End: 2020-08-25 | Stop reason: HOSPADM

## 2020-08-25 RX ORDER — PROPOFOL 10 MG/ML
INJECTION, EMULSION INTRAVENOUS PRN
Status: DISCONTINUED | OUTPATIENT
Start: 2020-08-25 | End: 2020-08-25 | Stop reason: SDUPTHER

## 2020-08-25 RX ORDER — DIPHENHYDRAMINE HYDROCHLORIDE 50 MG/ML
6.25 INJECTION INTRAMUSCULAR; INTRAVENOUS
Status: DISCONTINUED | OUTPATIENT
Start: 2020-08-25 | End: 2020-08-25 | Stop reason: HOSPADM

## 2020-08-25 RX ORDER — OXYCODONE HYDROCHLORIDE AND ACETAMINOPHEN 5; 325 MG/1; MG/1
2 TABLET ORAL PRN
Status: DISCONTINUED | OUTPATIENT
Start: 2020-08-25 | End: 2020-08-25 | Stop reason: HOSPADM

## 2020-08-25 RX ORDER — LABETALOL HYDROCHLORIDE 5 MG/ML
5 INJECTION, SOLUTION INTRAVENOUS
Status: DISCONTINUED | OUTPATIENT
Start: 2020-08-25 | End: 2020-08-25 | Stop reason: HOSPADM

## 2020-08-25 RX ORDER — MAGNESIUM HYDROXIDE 1200 MG/15ML
LIQUID ORAL CONTINUOUS PRN
Status: COMPLETED | OUTPATIENT
Start: 2020-08-25 | End: 2020-08-25

## 2020-08-25 RX ADMIN — ROCURONIUM BROMIDE 50 MG: 10 SOLUTION INTRAVENOUS at 10:30

## 2020-08-25 RX ADMIN — HYDROMORPHONE HYDROCHLORIDE 0.5 MG: 1 INJECTION, SOLUTION INTRAMUSCULAR; INTRAVENOUS; SUBCUTANEOUS at 12:30

## 2020-08-25 RX ADMIN — CEFAZOLIN SODIUM 2 G: 10 INJECTION, POWDER, FOR SOLUTION INTRAVENOUS at 10:27

## 2020-08-25 RX ADMIN — ONDANSETRON 4 MG: 2 INJECTION INTRAMUSCULAR; INTRAVENOUS at 10:40

## 2020-08-25 RX ADMIN — HYDROMORPHONE HYDROCHLORIDE 0.5 MG: 1 INJECTION, SOLUTION INTRAMUSCULAR; INTRAVENOUS; SUBCUTANEOUS at 12:17

## 2020-08-25 RX ADMIN — DEXAMETHASONE SODIUM PHOSPHATE 6 MG: 4 INJECTION, SOLUTION INTRAMUSCULAR; INTRAVENOUS at 10:40

## 2020-08-25 RX ADMIN — PROPOFOL 150 MG: 10 INJECTION, EMULSION INTRAVENOUS at 10:30

## 2020-08-25 RX ADMIN — MIDAZOLAM HYDROCHLORIDE 2 MG: 2 INJECTION, SOLUTION INTRAMUSCULAR; INTRAVENOUS at 10:27

## 2020-08-25 RX ADMIN — SUGAMMADEX 200 MG: 100 INJECTION, SOLUTION INTRAVENOUS at 11:40

## 2020-08-25 RX ADMIN — LIDOCAINE HYDROCHLORIDE 60 MG: 20 INJECTION, SOLUTION INFILTRATION; PERINEURAL at 10:30

## 2020-08-25 RX ADMIN — SODIUM CHLORIDE, POTASSIUM CHLORIDE, SODIUM LACTATE AND CALCIUM CHLORIDE: 600; 310; 30; 20 INJECTION, SOLUTION INTRAVENOUS at 10:00

## 2020-08-25 ASSESSMENT — PULMONARY FUNCTION TESTS
PIF_VALUE: 18
PIF_VALUE: 19
PIF_VALUE: 18
PIF_VALUE: 7
PIF_VALUE: 24
PIF_VALUE: 1
PIF_VALUE: 19
PIF_VALUE: 18
PIF_VALUE: 17
PIF_VALUE: 18
PIF_VALUE: 1
PIF_VALUE: 1
PIF_VALUE: 24
PIF_VALUE: 17
PIF_VALUE: 11
PIF_VALUE: 1
PIF_VALUE: 16
PIF_VALUE: 18
PIF_VALUE: 18
PIF_VALUE: 19
PIF_VALUE: 18
PIF_VALUE: 18
PIF_VALUE: 17
PIF_VALUE: 18
PIF_VALUE: 24
PIF_VALUE: 19
PIF_VALUE: 18
PIF_VALUE: 17
PIF_VALUE: 18
PIF_VALUE: 25
PIF_VALUE: 19
PIF_VALUE: 18
PIF_VALUE: 18
PIF_VALUE: 19
PIF_VALUE: 18
PIF_VALUE: 18
PIF_VALUE: 8
PIF_VALUE: 18
PIF_VALUE: 16
PIF_VALUE: 18
PIF_VALUE: 18
PIF_VALUE: 19
PIF_VALUE: 23
PIF_VALUE: 17
PIF_VALUE: 21
PIF_VALUE: 0
PIF_VALUE: 18
PIF_VALUE: 17
PIF_VALUE: 1
PIF_VALUE: 19
PIF_VALUE: 24
PIF_VALUE: 10
PIF_VALUE: 16
PIF_VALUE: 24
PIF_VALUE: 3
PIF_VALUE: 18
PIF_VALUE: 23
PIF_VALUE: 1
PIF_VALUE: 22
PIF_VALUE: 18
PIF_VALUE: 17
PIF_VALUE: 25
PIF_VALUE: 18
PIF_VALUE: 17
PIF_VALUE: 18
PIF_VALUE: 18
PIF_VALUE: 17
PIF_VALUE: 18
PIF_VALUE: 19
PIF_VALUE: 18
PIF_VALUE: 9
PIF_VALUE: 18
PIF_VALUE: 25
PIF_VALUE: 18

## 2020-08-25 ASSESSMENT — PAIN SCALES - GENERAL
PAINLEVEL_OUTOF10: 0
PAINLEVEL_OUTOF10: 7

## 2020-08-25 ASSESSMENT — PAIN DESCRIPTION - DESCRIPTORS: DESCRIPTORS: TIGHTNESS;POUNDING

## 2020-08-25 ASSESSMENT — PAIN - FUNCTIONAL ASSESSMENT: PAIN_FUNCTIONAL_ASSESSMENT: 0-10

## 2020-08-25 NOTE — PROGRESS NOTES
Notified dr Ceclia Hammans  Pt o2 sats drop to 88 then goes back up to 94  Instructed on IS    Ok to go home

## 2020-08-25 NOTE — H&P
I have reviewed the history and physical and examined the patient and find no relevant changes. I have reviewed with the patient and/or family the risks, benefits, and alternatives to the procedure.     Sanaz Rios MD  8/25/2020

## 2020-08-25 NOTE — ANESTHESIA POSTPROCEDURE EVALUATION
Department of Anesthesiology  Postprocedure Note    Patient: Shiva Auguste  MRN: [id number]  YOB: 1945  Date of evaluation: 8/25/2020  Time:  12:23 PM     Procedure Summary     Date:  08/25/20 Room / Location:  [address] [place] [address]    Anesthesia Start:  1025 Anesthesia Stop:  1152    Procedure:  VIDEO ARTHROSCOPY RIGHT SHOULDER, ROTATOR CUFF REPAIR, DECOMPRESSION, EXTENSIVE DEBRIDEMENT (Right Shoulder) Diagnosis:       Complete tear of right rotator cuff, unspecified whether traumatic      Impingement syndrome of right shoulder      (RIGHT SHOULDER ROTATOR CUFF TEAR, IMPINGEMENT)    Surgeon:  Estephania Helm MD Responsible Provider:  Nola Stephen MD    Anesthesia Type:  general, regional ASA Status:  3          Anesthesia Type: general, regional    Cheng Phase I: Cheng Score: 8    Cheng Phase II:      Last vitals: Reviewed and per EMR flowsheets.        Anesthesia Post Evaluation    Comments: Postoperative Anesthesia Note    Name:    Shiva Auguste  MRN:      [id number]    Patient Vitals in the past 12 hrs:  08/25/20 1215, BP:134/77, Temp:96 °F (35.6 °C), Pulse:66, Resp:13, SpO2:96 %   08/25/20 1210, BP:(!) 142/63, Pulse:63, Resp:16, SpO2:96 %  08/25/20 1205, BP:(!) 136/51, Pulse:68, Resp:19, SpO2:95 %  08/25/20 1200, BP:(!) 134/57, Pulse:70, Resp:18, SpO2:92 %  08/25/20 1156, BP:(!) 141/59, Temp:96 °F (35.6 °C), Pulse:66, Resp:12, SpO2:93 %  08/25/20 0955, BP:(!) 140/66, Temp:96.5 °F (35.8 °C), Temp src:Temporal, Pulse:73, Resp:16, SpO2:95 %, Height:4' 11\" (1.499 m), Weight:129 lb (58.5 kg)     LABS:    CBC  Lab Results       Component                Value               Date/Time                  WBC                      6.7                 06/09/2020 08:17 AM        HGB                      14.6                06/09/2020 08:17 AM        HCT                      43.6                06/09/2020 08:17 AM        PLT                      see below 06/09/2020 08:17 AM   RENAL  Lab Results       Component                Value               Date/Time                  NA                       138                 06/09/2020 08:17 AM        K                        3.1 (L)             06/09/2020 08:17 AM        K                        3.6                 08/10/2019 10:06 PM        CL                       101                 06/09/2020 08:17 AM        CO2                      25                  06/09/2020 08:17 AM        BUN                      17                  06/09/2020 08:17 AM        CREATININE               0.5 (L)             06/09/2020 08:17 AM        GLUCOSE                  115 (H)             08/10/2019 10:06 PM   COAGS  No results found for: PROTIME, INR, APTT    Intake & Output: In: 700 (I.V.:700)  Out: -     Nausea & Vomiting:  No    Level of Consciousness:  Awake    Pain Assessment:  Adequate analgesia    Anesthesia Complications:  No apparent anesthetic complications    SUMMARY      Vital signs stable  OK to discharge from Stage I post anesthesia care.   Care transferred from Anesthesiology department on discharge from perioperative area

## 2020-08-25 NOTE — ANESTHESIA PRE PROCEDURE
Department of Anesthesiology  Preprocedure Note       Name:  Lucrecia Severe   Age:  69 y.o.  :  1945                                          MRN:  [id number]         Date:  2020      Surgeon: Fito Hogue):  Ruby Abraham MD    Procedure: Procedure(s):  VIDEO ARTHROSCOPY RIGHT SHOULDER, ROTATOR CUFF REPAIR, DECOMPRESSION, EXTENSIVE DEBRIDEMENT -BLOCK- -SERG=3-    Medications prior to admission:   Prior to Admission medications    Medication Sig Start Date End Date Taking? Authorizing Provider   oxyCODONE-acetaminophen (PERCOCET) 5-325 MG per tablet Take 1 tablet by mouth every 6 hours as needed for Pain for up to 7 days. Intended supply: 7 days.  Take lowest dose possible to manage pain 20 Yes China Egan PA-C   losartan (COZAAR) 100 MG tablet TAKE 1 TABLET BY MOUTH  DAILY 20  Yes Jose Jones MD   hydroCHLOROthiazide (HYDRODIURIL) 25 MG tablet TAKE 1 TABLET BY MOUTH  DAILY 20  Yes Jose Jones MD   simvastatin (ZOCOR) 20 MG tablet TAKE 1 TABLET BY MOUTH  NIGHTLY 20  Yes Jose Jones MD   omeprazole (PRILOSEC) 10 MG delayed release capsule TAKE 1 CAPSULE BY MOUTH  DAILY  Patient taking differently: Take 10 mg by mouth daily as needed  20  Yes Jose Jones MD   amLODIPine (NORVASC) 10 MG tablet TAKE 1 TABLET BY MOUTH  DAILY 20  Yes Jose Jones MD   alendronate (FOSAMAX) 70 MG tablet TAKE 1 TABLET BY MOUTH  EVERY 7 DAYS 20  Yes Jossie Leger, APRN - CNP   aspirin 81 MG tablet Take 81 mg by mouth daily   Yes Historical Provider, MD   Calcium Carb-Cholecalciferol (CALCIUM + D3) 600-200 MG-UNIT TABS Take 1 tablet by mouth 2 times daily    Yes Historical Provider, MD       Current medications:    Current Facility-Administered Medications   Medication Dose Route Frequency Provider Last Rate Last Dose    ceFAZolin (ANCEF) 2 g in dextrose 5 % 100 mL IVPB  2 g Intravenous On Call to 321 MD Ivana Jamison ringers infusion   Intravenous Continuous Jose D Cortez MD        sodium chloride flush 0.9 % injection 10 mL  10 mL Intravenous 2 times per day Jose D Cortez MD        sodium chloride flush 0.9 % injection 10 mL  10 mL Intravenous PRN Jose D Cortez MD        lidocaine PF 1 % injection 1 mL  1 mL Intradermal Once PRN Jose D Cortez MD        HYDROmorphone (DILAUDID) injection 0.5 mg  0.5 mg Intravenous Q10 Min PRN Yolie Pruett MD        HYDROmorphone (DILAUDID) injection 0.5 mg  0.5 mg Intravenous Q5 Min PRN Yolie Pruett MD        oxyCODONE-acetaminophen (PERCOCET) 5-325 MG per tablet 1 tablet  1 tablet Oral PRN Yolie Pruett MD        Or    oxyCODONE-acetaminophen (PERCOCET) 5-325 MG per tablet 2 tablet  2 tablet Oral PRN Yolie Pruett MD        diphenhydrAMINE (BENADRYL) injection 6.25 mg  6.25 mg Intravenous Once PRN Yolie Pruett MD        ondansetron [facility]) injection 4 mg  4 mg Intravenous Q30 Min PRN Yolie Pruett MD        labetalol (NORMODYNE;TRANDATE) injection 5 mg  5 mg Intravenous Q15 Min PRN Yolie Pruett MD        hydrALAZINE (APRESOLINE) injection 5 mg  5 mg Intravenous Q30 Min PRN Yolie Pruett MD        meperidine (DEMEROL) injection 12.5 mg  12.5 mg Intravenous Q5 Min PRN Yolie Pruett MD           Allergies: Allergies   Allergen Reactions    Naproxen Anaphylaxis       Problem List:    Patient Active Problem List   Diagnosis Code    Hypertension I10    Hyperlipidemia E78.5    Osteopenia M85.80    IFG (impaired fasting glucose) R73.01    DDD (degenerative disc disease), cervical M50.30    Thoracic sprain S23. 9XXA    Macromastia N62    Infected sebaceous cyst L72.3, L08.9    Complete tear of right rotator cuff M75.121       Past Medical History:        Diagnosis Date    Diverticulitis     GERD (gastroesophageal reflux disease)     Hyperlipidemia     Hypertension     Osteoarthritis  Osteopenia     PONV (postoperative nausea and vomiting)     Prolonged emergence from general anesthesia     Restless legs syndrome        Past Surgical History:        Procedure Laterality Date    BREAST REDUCTION SURGERY  10/15/15    COLONOSCOPY  11/15/2016    colon polyps, diverticulosis    OVARIAN CYST REMOVAL      DE EXCISION TUMOR SOFT TISSUE BACK/FLANK SUBQ <3CM N/A 2019    EXCISION BACK CYST TIMES TWO performed by Kimberly Painting MD at [place] [address] History:    Social History     Tobacco Use    Smoking status: Former Smoker     Packs/day: 2.00     Years: 30.00     Pack years: 60.00     Last attempt to quit: 2004     Years since quittin.6    Smokeless tobacco: Never Used   Substance Use Topics    Alcohol use: Yes     Comment: very rare - one a year                                Counseling given: Not Answered      Vital Signs (Current):   Vitals:    20 1428 20 0955   BP:  (!) 140/66   Pulse:  73   Resp:  16   Temp:  96.5 °F (35.8 °C)   TempSrc:  Temporal   SpO2:  95%   Weight: 129 lb (58.5 kg) 129 lb (58.5 kg)   Height: 4' 11\" (1.499 m) 4' 11\" (1.499 m)                                              BP Readings from Last 3 Encounters:   20 (!) 140/66   20 134/62   20 136/78       NPO Status: Time of last liquid consumption:                         Time of last solid consumption:                         Date of last liquid consumption: 20                        Date of last solid food consumption: 20    BMI:   Wt Readings from Last 3 Encounters:   20 129 lb (58.5 kg)   20 128 lb (58.1 kg)   20 129 lb (58.5 kg)     Body mass index is 26.05 kg/m².     CBC:   Lab Results   Component Value Date    WBC 6.7 2020    RBC 4.65 2020    HGB 14.6 2020    HCT 43.6 2020    MCV 93.7 2020    RDW 13.5 2020    PLT see below 2020       CMP:   Lab Results   Component Value Date

## 2020-08-25 NOTE — OP NOTE
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                                            Postoperative Diagnosis:  · Same as above      Procedure-        · right Shoulder arthroscopic rotator cuff repair, single row (CPT- 07068)   · right Shoulder arthroscopic intra-articular extensive debridement of anterior and posterior labral tears(CPT- 90945)  · right Shoulder arthroscopic Subacromial decompression with partial acromioplasty (CPT- 16207)         Surgeon-  Smith Rodríguez MD    Assistant -  Nick Harrington PA-C  The Physician Assistants services included preoperative and postoperative assessment and documentation, patient positioning, prep and drape. Also the services included intraoperative positioning and retraction, closure and postoperative dressing and postoperative orders. This significantly facilitated the procedure, limiting operative times and the associated coexisting morbidities. No other MD assistance was available. This surgical procedure was assisted by my physician assistant. Her presence was needed throughout the case for manipulation and positioning of the extremity as well as positioning the surgical instruments and primarily assisting me through the technical part of this complex procedure. The skill set of an orthopaedic physician assistant was needed throughout the case. During the surgical case the surgical tech was working the back table and was not available for assistance.     Anesthesia- General & Interscalene nerve block - all performed and under direction of the Anesthesia Service    Implants- bio-composite suture anchor(s)    EBL: Minimal      Indications for Operation  This patient was evaluated in our office for right shoulder pain and functional impairment. Initial nonsurgical treatment methods had been employed but were unsuccessful. The patient's physical examination was felt to be consistent with a probable injury to the rotator cuff tissue  MRI scan was requested and the MRI confirmed rotator cuff tear. After identifying that a significant injury to the rotator cuff had occurred the treatment options at this point were discussed with the patient as well as the associated potential risks and complications with each of those treatment methods. After adequate counseling was given to the patient, the patient chose to proceed with the aforementioned procedures. At no time were any guarantees implied or stated. Also the patient was informed that rotator cuff surgery often requires many months for healing and on the months of rehabilitation to fully recover. Patient was also informed that with severe injuries to the rotator cuff a return to function comparable to that prior to their injury may not be possible. The patient understands and accepts the risks and has agreed to proceed. Site Marking and Surgical Prep  The patient was seen in the holding area and the appropriate extremity marked with a pen. The patient was taken to the operative suite, identified, placed on the operating room table in the supine position. After induction of general anesthesia, the patient was placed in the lateral decubitus position with an axillary roll and all bony prominences adequately padded. Examination  Under Anesthesia   Examination under anesthesia was then performed. Full symmetric range of motion, no instability. Examination was found to be consistent with the preoperative diagnosis. The arm was then placed in the arm davis. switch to visualization with the arthroscope from anterior to adequately see and debridement the injury to the posterior labrum. There was extensive damage to the labrum. In addition, we also debrided the hypertrophic synovium within the glenohumeral joint. This pathology within the intra-articular space again required significant and extensive intra-articular debridement. Subacromial Decompression  The trocar and cannula were redirected to the subacromial space. The arthroscope and inflow were inserted. With the arthroscope in the intra-articular space we were able to perform a diagnostic video arthroscopy in this region as well. This identified inflammatory changes involving the subacromial bursa. There was evidence of impingement syndrome and outlet stenosis within the space. There was significant fraying of the coracoacromial ligament which is an indication of impingement syndrome. A lateral portal was established after localizing the subacromial space with a spinal needle. Using a both a shaver and a radiofrequency probe, the bursa and the tissue beneath the acromion were removed. The coracromial ligament was debrided. A motorized shaver and yvette were placed through the lateral portal and the acromioplasty with subacromial decompression was performed. Rotator Cuff Repair    We are now able to inspect the bursal surface of the rotator cuff for pathology. The tear pattern of the rotator cuff was explored and identified. The surgical plan was to repair the rotator cuff in a fashion that would restore the anatomy to its pre--tear configuration. Traction sutures were used when necessary to confirm our initial evaluation of the rotator cuff tear pattern. An operative plan of the repair sequence was then plotted. Initially, the greater tuberosity was abraded to a bleeding bony surface.   Great caution was taken during this minimal decortication of the greater tuberosity to be certain that we did not disturb the bone to the point that it would decrease the fixation properties of the greater tuberosity. Suture anchors were placed in the tuberosity. An addition portal was used for placement of the suture anchors in order to provide a proper angle of placement to reduce the risk of potential pullout. The suture limbs were retrieved in a simple fashion using the antegrade suture passer. Sutures were isolated and tied using a sliding knot and three alternating half hitches. The repair was performed under minimal tension. When appropriate, marrow stimulation techniques were used to stimulate bone healing of the rotator cuff to the tuberosity. At this time, a repeat diagnostic video arthroscopy was performed of the entire subacromial space. This diagnostic video arthroscopy did not identify any additional pathology within the subacromial space or the intra-articular space. The shoulder was drained. Arthroscopic equipment was removed and the portals closed with 3-0 Nylon. Sterile dressings were applied. A sling was applied. The patient was awakened and taken to the postoperative area in stable condition.

## 2020-08-28 ENCOUNTER — OFFICE VISIT (OUTPATIENT)
Dept: ORTHOPEDIC SURGERY | Age: 75
End: 2020-08-28

## 2020-08-28 VITALS — BODY MASS INDEX: 26.21 KG/M2 | WEIGHT: 130 LBS | HEIGHT: 59 IN

## 2020-08-28 PROCEDURE — 99024 POSTOP FOLLOW-UP VISIT: CPT | Performed by: ORTHOPAEDIC SURGERY

## 2020-08-28 NOTE — PROGRESS NOTES
MD Herlinda Coyle, [place]         Orthopaedic Surgery and Sports Medicine    Patient Name: Baron Dye  YOB: 1945  Patient's PCP is Arlen Garcia MD      SUBJECTIVE  Chief Complaint  Post op right shoulder arthroscopy with rotator cuff repair and subacromial decompression  Date of Surgery: 08/28/2020    History of Present Illness:  Baron Dye is a 70 y.o. female  Here for first follow up of right shoulder arthroscopy with rotator cuff repair and subacromial decompression. Has not yet started outpatient physical therapy, but is planning on beginning later this week. The patient is wearing sling with abduction pillow, which was placed the day of surgery, as directed. The patient's pain is rated at 6/10. The patient denies fever, wound drainage, increasing redness, pus, increasing swelling. Post op problems reported: None  Taking oral pain medication as needed. Using local cold therapy as needed. She was having some nausea in the office today most likely due to her Percocet and and riding in the car. Pain Assessment:  Pain Assessment  Location of Pain: Shoulder  Location Modifiers: Right  Severity of Pain: 6  Quality of Pain: Dull, Aching  Frequency of Pain: Intermittent  Aggravating Factors: Straightening, Stretching, Exercise  Limiting Behavior: Yes  Relieving Factors: Rest, Ice, Other (Comment)  Result of Injury: No  Work-Related Injury: No  Are there other pain locations you wish to document?: No        OBJECTIVE  PHYSICAL EXAM  Vital Signs: There were no vitals filed for this visit. Body mass index is 26.26 kg/m². General Appearance: Patient is adequately groomed with no evidence of malnutrition   Orientation: Patient is alert and oriented to person, place and time  Mood and Affect: Neutral/Euthymic (normal)    Surgical affected shoulder Examination:  Dressing removed today.    The wound is clean

## 2020-09-01 ENCOUNTER — HOSPITAL ENCOUNTER (OUTPATIENT)
Dept: PHYSICAL THERAPY | Age: 75
Setting detail: THERAPIES SERIES
Discharge: HOME | End: 2020-09-01
Payer: MEDICARE

## 2020-09-01 PROCEDURE — 97016 VASOPNEUMATIC DEVICE THERAPY: CPT

## 2020-09-01 PROCEDURE — 97140 MANUAL THERAPY 1/> REGIONS: CPT

## 2020-09-01 PROCEDURE — 97110 THERAPEUTIC EXERCISES: CPT

## 2020-09-01 PROCEDURE — 97162 PT EVAL MOD COMPLEX 30 MIN: CPT

## 2020-09-01 NOTE — FLOWSHEET NOTE
[address] [place] [address] [facility] [address]  Phone [phone]  Fax [phone]    Outpatient Physical Therapy     [x] Daily Treatment Note   [] Progress Note   [] Discharge Note    Date:  9/1/2020    Patient Name:  Lucrecia Severe         YOB: 1945    Medical Diagnosis: R RTC repair            (small/medium)     Treatment Diagnosis: same                                   Onset Date:  8/25/20               Referral Date:  8/25/20           Referring Physician: Morena Branham MD                                                Visits Allowed/Insurance/Certification Information: Medicare, [facility]     Restrictions/Precautions: Per protocol MD sent on script. Patient will remain in sling with the abduction pillow for 3 weeks post-op, then can discontinue the use of the abduction pillow, but should remain in regular sling for 3 more weeks. Plan of care sent to provider:   []NA   []Faxed   [x]Co-signature       Plan of care signed:    [x]NA   []Yes   []No      Progress Note covers period from (if applicable):    [x]NA    []From          To           Next Progress Note due:   9/29/20    Visit# / total visits:     Visit # Insurance Allowable Auth Required   In Person 1 PRN []  Yes     [x]  No    Tele Health 0  []  Yes     []  No    Total 1       Plan for Next Session:  Continue with PROM only for flex, abd, ER, scapular strengthening weeks 0-4      Subjective:  See eval     Pain level:   AT EVAL: Patient describes pain to be constant R shoulder (anterior and superior aspects), and R upper arm  Patient reports pain is 5-6 /10 pain at present and 7-8 /10 pain at its worst.      Objective:   See eval    Exercises:    Exercises in bold performed in department today. Items not bolded are carried forward from prior visits for continuity of the record.   Exercise/Equipment Resistance/Repetitions Other comments     codmans x10      PROM R shoulder (flex, abd, IR/ER) 2x10 each direction      scap retraction x10      scap elevation and depression x10      Seated UT stretch 30 sec/ x3                                                                                                    Therapeutic Exercise/Home Exercise Program:  x15 min. Patient educated on eval findings, plan of care, and goals. Instructed in HEP with handout and red tputty given for  strengthening. Group Therapy:    0 minutes    Therapeutic Activity:  0 minutes     Gait: 0 minutes    Neuromuscular Re-Education:  0 minutes      Canalith Repositioning Procedure:  0 minutes    Manual Therapy: 15 minutes. PROM to R shoulder as noted above. Support provided under R UE. Modalities: 15 minutes. Game ready to R shoulder with her seated, 34 deg, min pressure. Functional Outcome Measure:   []NA  Measure Used:  Quick dash  Date Assessed:9/1/20  Score: 48 total= 84%    Assessment/Treatment/Activity Tolerance:    Patients response to treatment:   [x]Patient tolerated treatment well with no adverse reactions noted  []Patient limited by fatigue   []Patient limited by pain  []Patient limited by other medical complications   [x]Other:  Pain decreased to 1-2/10 after treatment. Goals:   Progress towards goals:  Goals established on 9/1/20  GOALS    Short Term Goals:  6   weeks Long Term Goals:  12   weeks   1). Establish HEP 1). Pt independent with HEP   2). Pain 5 /10 or less 2). Pain 2 /10 or less   3). Progress ROM per protocol 3). AROM R shoulder WNL   4). Progress strength per protocol 4). MMT 4+/5 or >throughout R shoulder   5). Patient able to sleep without waking due to pain 5). Patient able to return to prior level of activity without limitations noted.         Prognosis: [x]Good   []Fair   []Poor    Patient Requires Follow-up:  [x]Yes  []No    Plan: [x]Plan of care initiated     []Continue per plan of care    [] Alter current plan (see comments)    []Hold pending MD visit []Discharge    Timed Code Treatment Minutes:  30    Total Treatment Minutes:  60    Medicare Cap total YTD:  $166      Electronically signed by:  Hardik King, PT, MPT 1995    Note: If patient does not return for scheduled/ recommended follow up visits, this note will serve as a discharge from care along with most recent update on progress.

## 2020-09-01 NOTE — PROGRESS NOTES
[address] [facility], [address]  Phone [phone]  Fax [phone]        The following patient has been evaluated for physical therapy services. In order for therapy to continue, Medicare requires physician review of the treatment plan. Please review the attached evaluation and/or summary of the patient's plan of care, and verify that you agree by signing below and sending it back to our office. Thank you for this referral.    Physician signature_______________________ Date________________    Fax to:   [facility] [phone]       UPPER QUARTER PHYSICAL THERAPY EVALUATION      Evaluation Date:  9/1/2020         Patient Name:  Shruthi Romero       YOB: 1945       Medical Diagnosis: R RTC repair  (small/medium)    Treatment Diagnosis: same        Onset Date: 8/25/20     Referral Date:  8/25/20     Referring Physician: Monika Garcia MD        Visits Allowed/Insurance/Certification Information: Medicare, [facility]    Restrictions/Precautions: Per protocol MD sent on script. Patient will remain in sling with the abduction pillow for 3 weeks post-op, then can discontinue the use of the abduction pillow, but should remain in regular sling for 3 more weeks.      C-SSRS Triggered by Intake questionnaire (Past 2 wk assessment):   [x] No, Questionnaire did not trigger screening.   [] Yes, Patient intake triggered further evaluation      [] C-SSRS Screening completed  [] PCP notified via Plan of Care  [] Emergency services notified     Pt Occupation/Job Duties:  Retired, enjoys watching her grandkids play sports, goes to [facility] in the winter, walks, gardens    Social support/Environment:     Family/caregiver support:   [x]Yes,  but boyfriend living with her right now   []No    Health History reviewed with pt:   [x]Yes   []No      PMH:  OA, HTN,HLD, RLS, osteopenia, GERD, diverticulitis, UTI, cyst removed from back x2, ovarian cyst removed, breast reduction surgery    SUBJECTIVE FINDINGS      History of Present Illness:   Patient reports having B shoulder pain for the past 2 years. Has been to PT in the past for R shoulder pain. Recently went to MD for L shoulder injection in May 2020. Then, she was starting her roto-tiller and felt a pop in her R shoulder on 6/17/20. She went to ED and then was referred to ortho. She had an MRI of R shoulder on 7/24/20 with results as follows:  Impression    1. Moderate-sized (1.5 x 1.3 cm) full-thickness supraspinatus tendon tear. Mild myotendinous junction retraction and moderate atrophy.  No significant    fatty infiltration. 2. Moderate infraspinatus tendinopathy with tiny subcentimeter interstitial    tearing. 3. Mild subscapularis tendinopathy. 4. Full-thickness long head biceps tendon tear. 5. Mild AC and glenohumeral degenerative change. 6. Trace fluid in the subacromial-subdeltoid bursa. She is now s/p VIDEO ARTHROSCOPY RIGHT SHOULDER, ROTATOR CUFF REPAIR, DECOMPRESSION, EXTENSIVE DEBRIDEMENT on 8/25/20. She is left hand dominant. Followed up with MD on Friday, 8/28/20. Now referred for PT treatment. Pain       Patient describes pain to be constant R shoulder (anterior and superior aspects), and R upper arm  Patient reports pain is 5-6 /10 pain at present and 7-8 /10 pain at its worst.  Worsened by:  Up doing activity    Improved by:  ice       Current Functional Limitations:   [x]Yes   []No  Functional Complaints:  Difficulty with bathing and dressing, limited with household chores, not driving yet    PLOF:   [x]No functional limitations   []Pt with previous functional limitations  Pt's sleep is affected?    [x]Yes, difficulty sleeping due to pain   []No         Patient goal for therapy:  \"get back to normal, quit hurting\"      OBJECTIVE FINDINGS   Posture:   [x]Forward head  [x]Forward flexed trunk []Pronounced CT junction    []Increased thoracic kyphosis   []Increased lumbar lordosis   []Scoliosis   []Swayback  []Decreased WB on   []R   []L   []Scapular winging  [x]Other:   R arm in sling with abd pillow    Range of Motion   [x]Cervical Spine   []Thoracic Spine     Range Tested AROM PROM MMT/Resisted Tests   Flexion WNL  5/5   Extension \"  5/5   Sidebending Left \"  5/5   Sidebending Right \"  5/5   Rotation Left \"  5/5   Rotation Right \"  5/5   Comments:    UE Range of Motion/Strength Testing     [x]All ROM WNL except as marked below   [x]All strength WNL (5/5) except as marked below (measured out of 5)     Range Tested AROM PROM Resisted Comments   *denotes pain Left Right NT Left Right Left Right NT    Shoulder Flexion 160   65 4/5     Shoulder Extension 60    5/5     Shoulder Abduction 145   60 4-/5     Shoulder Adduction           Shoulder IR          Shoulder ER    20      Elbow Flexion WNL WFL        Elbow Extension  \" \"        Pronation \" \"        Supination \" \"        Wrist Flexion \" \"        Wrist Extension \" \"        Radial Deviation \" \"        Ulnar Deviation \" \"                                Dermatomes/Myotomes     [x]All dermatomes WNL for light touch     Special Tests- UE seated  -deferred due to s/p surgery    Palpation     Patient reported tenderness with palpation:  [x]Yes   []No   []NA  Location:  Throughout R shoulder and upper arm  PT notes warmth:  []Yes   [x]No   []NA  Location:   PT notes increased muscle tone:   [x]Yes   []No   []NA  Location: muscle guarding in R UT and shoulder  PT notes crepitus with palpation:   []Yes   [x]No   []NA  Location:   Ligament tenderness/provocation:   []Yes   []No   [x]NA  Location:    PT notes decreased scar mobility:   []Yes   []No   [x]NA    Appearance  5 portals covered with bandaids  PT notes swelling:   [x]Yes   []No   []NA  Location:   R shoulder  PT notes redness:  []Yes   [x]No   []NA  Location:   PT notes drainage:   []Yes   [x]No []NA  Location:        Functional Outcome Measure:   []NA  Measure Used: Quick dash  Date Assessed:  9/1/20  Score: 48 total= 84%    ASSESSMENT   Patient presents with s/s consistent with Dx. Doing well since surgery with no signs of infection. Still having high reports of pain with expected PROM of R shoulder following surgery. Recommend PT treatment to address problem list so that the patient may return to regular activities without any functional limitations noted. Problems        []Decreased cervical/thoracic ROM  [x]Decreased UE ROM  [x]Decreased strength  []Positive neurological findings  []Decreased joint mobility  [x]Increased pain  []Decreased flexibility  []Abnormality of gait  [x]Increased swelling  [x]Poor posture/alignment  [x]Decreased functional status     Rehabilitation Potential:  Good for goals listed below. Strengths for achieving goals include:  [x]Pt motivated   [x]PLOF   [x]Family support   Limitations for achieving goals include:  [x]None   []Severity of condition     []Cognitive   []Lack of family support   []Lack of resources     []Other:         Prognosis:   [x]Good   []Fair   []Poor    GOALS    Short Term Goals:  6   weeks Long Term Goals:  12   weeks   1). Establish HEP 1). Pt independent with HEP   2). Pain 5 /10 or less 2). Pain 2 /10 or less   3). Progress ROM per protocol 3). AROM R shoulder WNL   4). Progress strength per protocol 4). MMT 4+/5 or >throughout R shoulder   5). Patient able to sleep without waking due to pain 5). Patient able to return to prior level of activity without limitations noted. 6). 6).      PLAN OF CARE     To see patient 1-2  x/week for 12  weeks for the following treatment interventions:  [x]Therapeutic Exercise  [x]Modalities of Choice: []Heat []Cold []US []Estim []Ionto []Vaso-pneumatic device  []Mechanical Traction   [x]Manual Therapy  [x]Neuromuscular Re-Education  []Gait Training   [x]Therapeutic Activity  []Other     Thank you for the referral of this patient.       Timed Code Treatment Minutes:   30  minutes     Total Treatment Time:  60  minutes    Alin Martinez PT, MPT [id number]

## 2020-09-09 ENCOUNTER — HOSPITAL ENCOUNTER (OUTPATIENT)
Dept: PHYSICAL THERAPY | Age: 75
Setting detail: THERAPIES SERIES
Discharge: HOME | End: 2020-09-09
Payer: MEDICARE

## 2020-09-09 PROCEDURE — 97140 MANUAL THERAPY 1/> REGIONS: CPT

## 2020-09-09 PROCEDURE — 97016 VASOPNEUMATIC DEVICE THERAPY: CPT

## 2020-09-09 PROCEDURE — 97110 THERAPEUTIC EXERCISES: CPT

## 2020-09-09 NOTE — FLOWSHEET NOTE
[address] [place] [address] [facility] [address]  Phone [phone]  Fax [phone]    Outpatient Physical Therapy     [x] Daily Treatment Note   [] Progress Note   [] Discharge Note    Date:  9/9/2020    Patient Name:  Michel Schroeder         YOB: 1945    Medical Diagnosis: R RTC repair            (small/medium)     Treatment Diagnosis: same                                   Onset Date:  8/25/20               Referral Date:  8/25/20           Referring Physician: Karen Daniels MD                                                Visits Allowed/Insurance/Certification Information: Medicare, [facility]     Restrictions/Precautions: Per protocol MD sent on script. Patient will remain in sling with the abduction pillow for 3 weeks post-op, then can discontinue the use of the abduction pillow, but should remain in regular sling for 3 more weeks. Plan of care sent to provider:   []NA   []Faxed   [x]Co-signature       Plan of care signed:    [x]NA   []Yes   []No      Progress Note covers period from (if applicable):    [x]NA    []From          To           Next Progress Note due:   9/29/20    Visit# / total visits:     Visit # Insurance Allowable Auth Required   In Person 2 PRN []  Yes     [x]  No    Tele Health 0  []  Yes     []  No    Total 2       Plan for Next Session:  Continue with PROM only for flex, abd, ER, scapular strengthening weeks 0-4      Subjective:  Reports she has taken a tylenol extra strength before coming in today     Pain level: 4-5/10  Today after shower   AT EVAL: Patient describes pain to be constant R shoulder (anterior and superior aspects), and R upper arm  Patient reports pain is 5-6 /10 pain at present and 7-8 /10 pain at its worst.      Objective:   See eval    Exercises:    2 wks p/o 9/8/20  Exercises in bold performed in department today.   Items not bolded are carried forward from prior visits for Follow-up:  [x]Yes  []No    Plan: [x]Plan of care initiated     []Continue per plan of care    [] Alter current plan (see comments)    []Hold pending MD visit []Discharge    Timed Code Treatment Minutes:  38    Total Treatment Minutes:  53  Medicare Cap total YTD:  $284    Electronically signed by:  Germain West, [id number]    Note: If patient does not return for scheduled/ recommended follow up visits, this note will serve as a discharge from care along with most recent update on progress.

## 2020-09-13 ENCOUNTER — APPOINTMENT (OUTPATIENT)
Dept: CT IMAGING | Age: 75
End: 2020-09-13
Payer: MEDICARE

## 2020-09-13 ENCOUNTER — HOSPITAL ENCOUNTER (EMERGENCY)
Age: 75
Discharge: HOME | End: 2020-09-13
Attending: EMERGENCY MEDICINE
Payer: MEDICARE

## 2020-09-13 VITALS
OXYGEN SATURATION: 96 % | DIASTOLIC BLOOD PRESSURE: 72 MMHG | TEMPERATURE: 97.7 F | RESPIRATION RATE: 16 BRPM | WEIGHT: 128 LBS | HEART RATE: 84 BPM | BODY MASS INDEX: 25.8 KG/M2 | HEIGHT: 59 IN | SYSTOLIC BLOOD PRESSURE: 142 MMHG

## 2020-09-13 LAB
A/G RATIO: 2.3 (ref 1.1–2.2)
ALBUMIN SERPL-MCNC: 4.6 G/DL (ref 3.4–5)
ALP BLD-CCNC: 81 U/L (ref 40–129)
ALT SERPL-CCNC: 16 U/L (ref 10–40)
ANION GAP SERPL CALCULATED.3IONS-SCNC: 12 MMOL/L (ref 3–16)
AST SERPL-CCNC: 17 U/L (ref 15–37)
BACTERIA: ABNORMAL /HPF
BASOPHILS ABSOLUTE: 0.1 K/UL (ref 0–0.2)
BASOPHILS RELATIVE PERCENT: 1.3 %
BILIRUB SERPL-MCNC: 0.6 MG/DL (ref 0–1)
BILIRUBIN URINE: NEGATIVE
BLOOD, URINE: ABNORMAL
BUN BLDV-MCNC: 12 MG/DL (ref 7–20)
CALCIUM SERPL-MCNC: 9.9 MG/DL (ref 8.3–10.6)
CHLORIDE BLD-SCNC: 101 MMOL/L (ref 99–110)
CLARITY: CLEAR
CO2: 28 MMOL/L (ref 21–32)
COLOR: YELLOW
CREAT SERPL-MCNC: 0.6 MG/DL (ref 0.6–1.2)
EKG ATRIAL RATE: 82 BPM
EKG DIAGNOSIS: NORMAL
EKG P AXIS: 69 DEGREES
EKG P-R INTERVAL: 160 MS
EKG Q-T INTERVAL: 390 MS
EKG QRS DURATION: 78 MS
EKG QTC CALCULATION (BAZETT): 455 MS
EKG R AXIS: 78 DEGREES
EKG T AXIS: 60 DEGREES
EKG VENTRICULAR RATE: 82 BPM
EOSINOPHILS ABSOLUTE: 0.1 K/UL (ref 0–0.6)
EOSINOPHILS RELATIVE PERCENT: 1.6 %
EPITHELIAL CELLS, UA: ABNORMAL /HPF (ref 0–5)
GFR AFRICAN AMERICAN: >60
GFR NON-AFRICAN AMERICAN: >60
GLOBULIN: 2 G/DL
GLUCOSE BLD-MCNC: 104 MG/DL (ref 70–99)
GLUCOSE URINE: NEGATIVE MG/DL
HCT VFR BLD CALC: 41.7 % (ref 36–48)
HEMOGLOBIN: 14.2 G/DL (ref 12–16)
KETONES, URINE: NEGATIVE MG/DL
LEUKOCYTE ESTERASE, URINE: ABNORMAL
LYMPHOCYTES ABSOLUTE: 1.5 K/UL (ref 1–5.1)
LYMPHOCYTES RELATIVE PERCENT: 19.6 %
MCH RBC QN AUTO: 31.2 PG (ref 26–34)
MCHC RBC AUTO-ENTMCNC: 34 G/DL (ref 31–36)
MCV RBC AUTO: 91.8 FL (ref 80–100)
MICROSCOPIC EXAMINATION: YES
MONOCYTES ABSOLUTE: 0.7 K/UL (ref 0–1.3)
MONOCYTES RELATIVE PERCENT: 9.2 %
NEUTROPHILS ABSOLUTE: 5.1 K/UL (ref 1.7–7.7)
NEUTROPHILS RELATIVE PERCENT: 68.3 %
NITRITE, URINE: NEGATIVE
PDW BLD-RTO: 13 % (ref 12.4–15.4)
PH UA: 6 (ref 5–8)
PLATELET # BLD: 224 K/UL (ref 135–450)
PMV BLD AUTO: 9.3 FL (ref 5–10.5)
POTASSIUM REFLEX MAGNESIUM: 3.7 MMOL/L (ref 3.5–5.1)
PROTEIN UA: NEGATIVE MG/DL
RBC # BLD: 4.54 M/UL (ref 4–5.2)
RBC UA: ABNORMAL /HPF (ref 0–4)
SODIUM BLD-SCNC: 141 MMOL/L (ref 136–145)
SPECIFIC GRAVITY UA: 1.01 (ref 1–1.03)
TOTAL PROTEIN: 6.6 G/DL (ref 6.4–8.2)
TROPONIN: <0.01 NG/ML
URINE TYPE: ABNORMAL
UROBILINOGEN, URINE: 0.2 E.U./DL
WBC # BLD: 7.5 K/UL (ref 4–11)
WBC UA: ABNORMAL /HPF (ref 0–5)

## 2020-09-13 PROCEDURE — 85025 COMPLETE CBC W/AUTO DIFF WBC: CPT

## 2020-09-13 PROCEDURE — 36415 COLL VENOUS BLD VENIPUNCTURE: CPT

## 2020-09-13 PROCEDURE — 93005 ELECTROCARDIOGRAM TRACING: CPT | Performed by: EMERGENCY MEDICINE

## 2020-09-13 PROCEDURE — 99284 EMERGENCY DEPT VISIT MOD MDM: CPT

## 2020-09-13 PROCEDURE — 93010 ELECTROCARDIOGRAM REPORT: CPT | Performed by: INTERNAL MEDICINE

## 2020-09-13 PROCEDURE — 84484 ASSAY OF TROPONIN QUANT: CPT

## 2020-09-13 PROCEDURE — 71250 CT THORAX DX C-: CPT

## 2020-09-13 PROCEDURE — 80053 COMPREHEN METABOLIC PANEL: CPT

## 2020-09-13 PROCEDURE — 74176 CT ABD & PELVIS W/O CONTRAST: CPT

## 2020-09-13 PROCEDURE — 81001 URINALYSIS AUTO W/SCOPE: CPT

## 2020-09-13 RX ORDER — TRAMADOL HYDROCHLORIDE 50 MG/1
25 TABLET ORAL EVERY 6 HOURS PRN
Qty: 6 TABLET | Refills: 0 | Status: SHIPPED | OUTPATIENT
Start: 2020-09-13 | End: 2020-09-16

## 2020-09-13 ASSESSMENT — PAIN DESCRIPTION - LOCATION
LOCATION: FLANK
LOCATION: BACK;FLANK

## 2020-09-13 ASSESSMENT — PAIN DESCRIPTION - PAIN TYPE
TYPE: ACUTE PAIN
TYPE: ACUTE PAIN

## 2020-09-13 ASSESSMENT — PAIN SCALES - WONG BAKER: WONGBAKER_NUMERICALRESPONSE: 0

## 2020-09-13 ASSESSMENT — PAIN DESCRIPTION - DESCRIPTORS
DESCRIPTORS: ACHING;DISCOMFORT
DESCRIPTORS: ACHING

## 2020-09-13 ASSESSMENT — PAIN DESCRIPTION - ORIENTATION
ORIENTATION: RIGHT
ORIENTATION: RIGHT

## 2020-09-13 ASSESSMENT — PAIN SCALES - GENERAL
PAINLEVEL_OUTOF10: 3
PAINLEVEL_OUTOF10: 3

## 2020-09-13 ASSESSMENT — PAIN DESCRIPTION - FREQUENCY
FREQUENCY: INTERMITTENT
FREQUENCY: INTERMITTENT

## 2020-09-13 NOTE — ED NOTES
Reviewed patient discharge instructions at this time, copy given to patient. No questions or concerns. Patient voiced understanding.         Halima Goldman RN  09/13/20 [id number]

## 2020-09-13 NOTE — ED PROVIDER NOTES
Emergency Physician Note  [address] [address]  Dept: [phone]  Loc: [phone]    Chief Complaint  Flank Pain (back pain (rt) started  last night  pt states pain move around to the front flank )       History of Present Illness  Aletha Aponte is a 70 y.o. female  has a past medical history of Diverticulitis, GERD (gastroesophageal reflux disease), Hyperlipidemia, Hypertension, Osteoarthritis, Osteopenia, PONV (postoperative nausea and vomiting), Prolonged emergence from general anesthesia, and Restless legs syndrome. who presents to the ED for right upper back pain. She states the pain started last night around midnight. It did wake her up from her sleep. She states it feels like a \"tiny hard bubble rising up through my chest\". Pain is constant. It radiates straight through to the front underneath her right breast and right upper quadrant abdominal area. Patient had rotator cuff repair on August 25 of her right shoulder, she has had some pain associated with her right shoulder. She tried taking 1000 mg of Tylenol but has not had any relief of pain of her right back right upper quadrant. Denies any dysuria, hematuria. Denies fever,   malaise,  shortness of breath, cough,  nausea, vomiting, diarrhea, headache, sore throat, dysuria, back pain, rash. No palliative/provocative factors.        Unless otherwise stated in this report or unable to obtain because of the patient's clinical or mental status as evidenced by the medical record, this patient's positive and negative responses for review of systems, constitutional, psych, eyes, ENT, cardiovascular, respiratory, gastrointestinal, neurological, genitourinary, musculoskeletal, integument systems and systems related to the presenting problem are either stated in the preceding paragraph or were not pertinent or were negative for the symptoms and/or complaints related to the medical problem. I have reviewed the following from the nursing documentation:      Prior to Admission medications    Medication Sig Start Date End Date Taking?  Authorizing Provider   losartan (COZAAR) 100 MG tablet TAKE 1 TABLET BY MOUTH  DAILY 7/30/20  Yes Ben Castañeda MD   hydroCHLOROthiazide (HYDRODIURIL) 25 MG tablet TAKE 1 TABLET BY MOUTH  DAILY 7/30/20  Yes Ben Castañeda MD   simvastatin (ZOCOR) 20 MG tablet TAKE 1 TABLET BY MOUTH  NIGHTLY 7/30/20  Yes Ben Castañeda MD   omeprazole (Tay Vikki) 10 MG delayed release capsule TAKE 1 CAPSULE BY MOUTH  DAILY  Patient taking differently: Take 10 mg by mouth daily as needed  7/30/20  Yes Ben Castañeda MD   amLODIPine (NORVASC) 10 MG tablet TAKE 1 TABLET BY MOUTH  DAILY 7/30/20  Yes Ben Castañeda MD   alendronate (FOSAMAX) 70 MG tablet TAKE 1 TABLET BY MOUTH  EVERY 7 DAYS 7/24/20  Yes JEANNA Brown - CNP   aspirin 81 MG tablet Take 81 mg by mouth daily   Yes Historical Provider, MD   Calcium Carb-Cholecalciferol (CALCIUM + D3) 600-200 MG-UNIT TABS Take 1 tablet by mouth 2 times daily    Yes Historical Provider, MD       Allergies as of 09/13/2020 - Review Complete 09/13/2020   Allergen Reaction Noted    Naproxen Anaphylaxis 11/11/2014       Past Medical History:   Diagnosis Date    Diverticulitis     GERD (gastroesophageal reflux disease)     Hyperlipidemia     Hypertension     Osteoarthritis     Osteopenia     PONV (postoperative nausea and vomiting)     Prolonged emergence from general anesthesia     Restless legs syndrome         Surgical History:   Past Surgical History:   Procedure Laterality Date    BREAST REDUCTION SURGERY  10/15/15    COLONOSCOPY  11/15/2016    colon polyps, diverticulosis    OVARIAN CYST REMOVAL      [place] EXCISION TUMOR SOFT TISSUE BACK/FLANK SUBQ <3CM N/A 8/23/2019    EXCISION BACK CYST TIMES TWO performed by Mary Pereira MD at [address] [place] [address] notes reviewed. ED Triage Vitals [09/13/20 0919]   Enc Vitals Group      BP (!) 153/75      Pulse 86      Resp 20      Temp 97.9 °F (36.6 °C)      Temp Source Oral      SpO2 95 %      Weight 128 lb (58.1 kg)      Height 4' 11\" (1.499 m)      Head Circumference       Peak Flow       Pain Score       Pain Loc       Pain Edu? Excl. in [address]? GENERAL:    Awake, alert. Well developed, well nourished with no apparent distress. Nontoxic-appearing, non-ill-appearing. HENT:    Normocephalic, Atraumatic, no lacerations. No ENT exam due to PPE. EYES:    Conjunctiva normal,   Pupils equal round and reactive to light,   Extraocular movements normal.  NECK:    Trachea is midline. No stridor. CHEST:    Regular rate and regular rhythm, no murmurs/rubs/gallops,   normal S1/S2, chest wall non-tender. LUNGS:    No respiratory distress. No abdominal retractions, no sternal retractions. Clear to auscultation bilaterally, no wheezing, no rhochi, no rales  Speaking comfortably in full sentences  ABDOMEN:    Soft, non-tender, non-distended. No guarding. No rebound. No costovertebral angle tenderness to palpation. Normal BS, no organomegaly, no abdominal masses  EXTREMITIES:   Moves extremities x4 with purpose. Normal range of motion, no edema,   no tenderness, no deformity,   distal pulses present and equal bilaterally. 4 small incisions on the anterior portion of her right shoulder that are clean, dry, intact and without any signs of infection  SKIN:    Warm, dry and intact. NEUROLOGIC:  Normal mental status. Moving all extremities to command. Alert and oriented x4   without focal motor deficit or gross sensory deficit. Normal speech.     PSYCHIATRIC:  Not anxious,   normal mood and affect,   thoughts are linear and organized,   without delusions/hallucinations,   responds appropriately to questions      LABS and DIAGNOSTIC RESULTS  EKG  The Ekg interpreted by me shows  normal sinus rhythm with a rate of 82  Axis is   Normal  QTc is  within an acceptable range  Intervals and Durations are unremarkable. ST Segments: no acute change and normal  Delta waves, Brugada Syndrome, and Short [place] are not present. Prior EKG to compare with was available. No significant changes compared to prior EKG from August 6, 2020      RADIOLOGY  X-RAYS:  I have reviewed radiologic plain film image(s). ALL OTHER NON-PLAIN FILM IMAGES SUCH AS CT, ULTRASOUND AND MRI HAVE BEEN READ BY THE RADIOLOGIST. CT CHEST WO CONTRAST   Final Result   2 spiculated nodules in the right lower lobe, the larger measuring 1.8 cm,   suspicious for neoplasm. Biopsy and/or CT PET for staging purposes sees   suggested. CT ABDOMEN PELVIS WO CONTRAST Additional Contrast? None   Final Result   No acute intra-abdominal findings. No renal calculus. No hydronephrosis. Right lower lobe masslike consolidation is concerning for malignancy in the   absence of any findings to suggest pneumonia. Follow-up with PET scan may be   helpful.               LABS  Results for orders placed or performed during the hospital encounter of 09/13/20   Urinalysis, reflex to microscopic   Result Value Ref Range    Color, UA Yellow Straw/Yellow    Clarity, UA Clear Clear    Glucose, Ur Negative Negative mg/dL    Bilirubin Urine Negative Negative    Ketones, Urine Negative Negative mg/dL    Specific Gravity, UA 1.010 1.005 - 1.030    Blood, Urine MODERATE (A) Negative    pH, UA 6.0 5.0 - 8.0    Protein, UA Negative Negative mg/dL    Urobilinogen, Urine 0.2 <2.0 E.U./dL    Nitrite, Urine Negative Negative    Leukocyte Esterase, Urine SMALL (A) Negative    Microscopic Examination YES     Urine Type NotGiven    Microscopic Urinalysis   Result Value Ref Range    WBC, UA 6-9 (A) 0 - 5 /HPF    RBC, UA 5-10 (A) 0 - 4 /HPF    Epithelial Cells, UA 2-5 0 - 5 /HPF    Bacteria, UA 1+ (A) None Seen /HPF   CBC Auto Differential   Result Value Ref Range    WBC 7.5 EMBOLISM W CONTRAST    Urinalysis, reflex to microscopic    Microscopic Urinalysis    CBC Auto Differential    Comprehensive Metabolic Panel w/ Reflex to MG    EKG 12 Lead       Medications ordered for this visit  No orders of the defined types were placed in this encounter. ED course notes for this visit       I wore goggles, surgical mask, N95 mask and gloves when I evaluated the patient. I evaluated the patient in room 08/08    I did explain to the patient that I was not able to rule out a PE, at this time we decided to hold off on blood thinners. Patient is aware that this PE can travel and become dispersed and cause her to go to respiratory arrest however she has decided not to do it at this time as she wants to speak with her orthopedic surgeon who she sees tomorrow. I discussed the nature and purpose, risks and benefits, as well as, the alternatives of opiates for pain relief with Robert Venegas. The risks discussed included but were not limit ed to medication reaction, overdose, addiction, dependence, and tolerance. Robert Venegas was given the time and opportunity to ask questions and consider their options, and after the discussion, Robert Venegas decided to verbally consent to opiates. Prior to consenting, I believe that Robert Venegas has the capacity to make this medical decision. This is a very pleasant patient with right upper back pain. On physical exam, patient does not have any abnormal heart or lung sounds. The work up in the ED indicates patient is without significant evidence of acute coronary syndrome,thoracic aortic dissection, pericarditis, pneumothorax, esophageal rupture, pneumonia, toxicity, shock, sepsis, unstable arrhythmia, hemodynamic or cardiopulmonary instability, herpes zoster, or any disease process requiring other immediate medical or surgical intervention at this time. Patient is aware I still have concerns for a possible PE.   I spoke to the patient and her boyfriend several times and also discussed the results of the CT scan, they understand the importance of follow-up with pulmonology to do further evaluation of this possible lung carcinoma it is understood that if the patient is not improving as expected or if other new symptoms or signs of concern develop, other etiologies or diagnoses may need to be considered requiring other tests, treatments, consultations, and/or admission. The diagnosis, plan, expected course, follow-up, and return precautions were discussed and all questions were answered. Final Impression    1. Right lower lobe lung mass    2. Upper back pain on right side        Blood pressure 139/72, pulse 84, temperature 97.7 °F (36.5 °C), temperature source Oral, resp. rate 16, height 4' 11\" (1.499 m), weight 128 lb (58.1 kg), SpO2 91 %, not currently breastfeeding. Disposition  At this point I do not feel the patient requires further work up and it is reasonable to discharge the patient. I had a discussion with the patient and/or their surrogate regarding diagnosis, diagnostic testing results, treatment/ plan of care, and follow up. Patient and/or companions verbalized understanding of the ED workup, any relevant findings as well as any incidental findings, and the disposition and plan. There was shared decision-making between myself as well as the patient and/or their surrogate and we are all in agreement with discharge home. There was an opportunity for questions and all questions were answered to the best of my ability and to the satisfaction of the patient and/or patient family. Patient agreed to follow upas recommend for further evaluation/treatment. The patient was given strict return precautions as we discussed symptoms that would necessitate return to the ED. Patient will return to ED for new/worsening symptoms. The patient verbalized their understanding and agreement with the above plan.   Please refer to AVS for further details regarding discharge instructions. Patient was given scripts for the following medications. I counseled patient how to take these medications. New Prescriptions    TRAMADOL (ULTRAM) 50 MG TABLET    Take 0.5 tablets by mouth every 6 hours as needed for Pain for up to 3 days. Intended supply: 3 days. Take lowest dose possible to manage pain       Pt is in stable condition upon Discharge to home. The note was completed using Dragon voice recognition transcription. Every effort was made to ensure accuracy; however, inadvertent transcription errors may be present despite my best efforts to edit errors.     Olive Neely MD  [facility]        Olive Neely MD  09/13/20 0752

## 2020-09-13 NOTE — ED TRIAGE NOTES
Alert and oriented x 4. Respirations easy and even. No respiratory distress noted. Pulse regular and non-bounding. Abdomen soft and tender. Peripheral pulses present.

## 2020-09-14 ENCOUNTER — TELEPHONE (OUTPATIENT)
Dept: PULMONOLOGY | Age: 75
End: 2020-09-14

## 2020-09-14 ENCOUNTER — OFFICE VISIT (OUTPATIENT)
Dept: ORTHOPEDIC SURGERY | Age: 75
End: 2020-09-14

## 2020-09-14 ENCOUNTER — VIRTUAL VISIT (OUTPATIENT)
Dept: PULMONOLOGY | Age: 75
End: 2020-09-14
Payer: MEDICARE

## 2020-09-14 ENCOUNTER — CARE COORDINATION (OUTPATIENT)
Dept: CARE COORDINATION | Age: 75
End: 2020-09-14

## 2020-09-14 VITALS — WEIGHT: 130 LBS | BODY MASS INDEX: 26.21 KG/M2 | HEIGHT: 59 IN

## 2020-09-14 PROCEDURE — G8399 PT W/DXA RESULTS DOCUMENT: HCPCS | Performed by: INTERNAL MEDICINE

## 2020-09-14 PROCEDURE — G8427 DOCREV CUR MEDS BY ELIG CLIN: HCPCS | Performed by: INTERNAL MEDICINE

## 2020-09-14 PROCEDURE — 4040F PNEUMOC VAC/ADMIN/RCVD: CPT | Performed by: INTERNAL MEDICINE

## 2020-09-14 PROCEDURE — 3017F COLORECTAL CA SCREEN DOC REV: CPT | Performed by: INTERNAL MEDICINE

## 2020-09-14 PROCEDURE — 1090F PRES/ABSN URINE INCON ASSESS: CPT | Performed by: INTERNAL MEDICINE

## 2020-09-14 PROCEDURE — 1123F ACP DISCUSS/DSCN MKR DOCD: CPT | Performed by: INTERNAL MEDICINE

## 2020-09-14 PROCEDURE — 99203 OFFICE O/P NEW LOW 30 MIN: CPT | Performed by: INTERNAL MEDICINE

## 2020-09-14 PROCEDURE — 99024 POSTOP FOLLOW-UP VISIT: CPT | Performed by: ORTHOPAEDIC SURGERY

## 2020-09-14 ASSESSMENT — ENCOUNTER SYMPTOMS
ABDOMINAL PAIN: 0
CHEST TIGHTNESS: 0
CHOKING: 0
VOICE CHANGE: 0
SORE THROAT: 0
DIARRHEA: 0
STRIDOR: 0
EYE ITCHING: 0
EYE DISCHARGE: 0
CONSTIPATION: 0
EYE PAIN: 0

## 2020-09-14 NOTE — TELEPHONE ENCOUNTER
MA Communication: The following orders are received by verbal communication from Dr. Felicia Kc. Orders include:  PET scan scheduled 9/25/20 @ 9:00 AM (arrival 8:30 AM) at [facility]  Need to give directions for testing as well.         CT Needle Biopsy (to be scheduled after PET)    Directions/Prep:  * NPO after midnight  * Bring med list   * Must be off blood thinners 5 days prior  * Stop Ibuprofen, Aleve, etc. 24 hrs prior  * No caffeine 24 hours prior  * Must have a          FU to discuss results (pending testing

## 2020-09-14 NOTE — PATIENT INSTRUCTIONS
Remember to bring all pulmonary medications to your next appointment with the office. Please keep all of your future appointments scheduled by [facility] office. Out of respect for other patients and providers, you may be asked to reschedule your appointment if you arrive later than your scheduled appointment time. Appointments cancelled less than 24hrs in advance will be considered a no show. Patients with three missed appointments within 1 year or four missed appointments within 2 years can be dismissed from the practice. You may receive a survey regarding the care you received during your visit. Your input is valuable to us. We encourage you to complete and return your survey. We hope you will choose us in the future for your healthcare needs.

## 2020-09-14 NOTE — PROGRESS NOTES
Pulmonary Outpatient Note   Barak Bermudez MD       2020    Chief Complaint:  Pulmonary Nodule (New patient (last seen ))     HPI:   70y.o. year old female here for further eval of a pulm nodule. Virtual visit through [employer]. me    CT chest personally reviewed, right sided peripheral spiculated nodule and adjacent nodule  This was done in ED and incidentally found during workup of flank pain  She had recent shoulder surgery and cannot raise her right arm     Past Medical History:   Diagnosis Date    Diverticulitis     GERD (gastroesophageal reflux disease)     Hyperlipidemia     Hypertension     Osteoarthritis     Osteopenia     PONV (postoperative nausea and vomiting)     Prolonged emergence from general anesthesia     Restless legs syndrome        Past Surgical History:   Procedure Laterality Date    BREAST REDUCTION SURGERY  10/15/15    COLONOSCOPY  11/15/2016    colon polyps, diverticulosis    OVARIAN CYST REMOVAL      CT EXCISION TUMOR SOFT TISSUE BACK/FLANK SUBQ <3CM N/A 2019    EXCISION BACK CYST TIMES TWO performed by Ralph Billings MD at [place] [address] ARTHROSCOPY Right 2020    VIDEO ARTHROSCOPY RIGHT SHOULDER, ROTATOR CUFF REPAIR, DECOMPRESSION, EXTENSIVE DEBRIDEMENT performed by Ulises Jean MD at [place] [address] History     Tobacco Use    Smoking status: Former Smoker     Packs/day: 2.00     Years: 30.00     Pack years: 60.00     Types: Cigarettes     Last attempt to quit: 2004     Years since quittin.7    Smokeless tobacco: Never Used   Substance Use Topics    Alcohol use: Yes     Comment: very rare - one a year       Family History   Problem Relation Age of Onset    Heart Disease Mother     High Blood Pressure Mother     Alzheimer's Disease Mother     Heart Disease Father     Heart Disease Sister     Heart Attack Sister     Heart Disease Brother          Current Outpatient Medications:     traMADol (ULTRAM) 50 MG tablet, PLAN:    -PETCT  -CT guided biopsy of nodule through radiology. If [facility] radiology refuses send to [facility] interventional radiology   -Follow up after to discuss results and pursue further workup as appropriate    Orders Placed This Encounter   Procedures    PET CT SKULL BASE TO MID THIGH    CT GUIDED NEEDLE PLACEMENT       Lizbeth Barclay MD    José Miguel Giron is a 70 y.o. female being evaluated by a Virtual Visit (video visit) encounter to address concerns as mentioned above. A caregiver was present when appropriate. Due to this being a TeleHealth encounter (During GWB-83 public health emergency), evaluation of the following organ systems was limited: Vitals/Constitutional/EENT/Resp/CV/GI//MS/Neuro/Skin/Heme-Lymph-Imm. Pursuant to the emergency declaration under the [address] [place] [address] waiver authority and the [employer] Act, this Virtual Visit was conducted with patient's (and/or legal guardian's) consent, to reduce the patient's risk of exposure to COVID-19 and provide necessary medical care. The patient (and/or legal guardian) has also been advised to contact this office for worsening conditions or problems, and seek emergency medical treatment and/or call 911 if deemed necessary. Patient identification was verified at the start of the visit: Yes    Total time spent for this encounter: Not billed by time    Services were provided through a video synchronous discussion virtually to substitute for in-person clinic visit. Patient and provider were located at their individual homes. --Faizan Cheek MD on 9/14/2020 at 2:36 PM    An electronic signature was used to authenticate this note.

## 2020-09-14 NOTE — PROGRESS NOTES
MA Communication: The following orders are received by verbal communication from Dr. Анна Rothman.     Orders include:  PET scan scheduled       Needle biopsy (awaiting approval)       FU to discuss results (pending)

## 2020-09-14 NOTE — TELEPHONE ENCOUNTER
Per Dr. Brittany Soler, he does not want to give approval until he sees the PET scan results first.    I will follow up with Dr. Eladia Rivera after 9/25/20 to get his determination.

## 2020-09-14 NOTE — PROGRESS NOTES
MD Lyndsay [place], [place]         Orthopaedic Surgery and Sports Medicine    Patient Name: Loren Gomez  YOB: 1945  Patient's PCP is Evangelina Dewey MD      SUBJECTIVE  Chief Complaint  Post op right shoulder arthroscopy with rotator cuff repair and subacromial decompression  Date of Surgery: 08/25/2020    History of Present Illness:  Loren Gomez is a 70 y.o. female  Here for second follow up of right shoulder arthroscopy with with rotator cuff repair and subacromial decompression. Has continued outpatient physical therapy and is progressing well and as expected. The patient is wearing regular sling with abduction pillow as directed. The patient's pain is rated at 4/10. The patient denies fever, wound drainage, increasing redness, pus, increasing swelling. Post op problems reported: Had an incident this weekend where he felt that she was having spasms in her lung and was seen in the emergency department where a CT scan was performed and 2 nodules in the right lower lobe were found. She has an appointment with a pulmonologist later today. Taking oral pain medication as needed. Using local cold therapy as needed. Pain Assessment:  Pain Assessment  Location of Pain: Shoulder  Location Modifiers: Right  Severity of Pain: 4  Quality of Pain: Dull, Aching  Frequency of Pain: Intermittent  Aggravating Factors: Straightening, Stretching, Exercise  Limiting Behavior: Yes  Relieving Factors: Rest, Ice, Exercise  Result of Injury: Yes  Work-Related Injury: No  Are there other pain locations you wish to document?: No        OBJECTIVE  PHYSICAL EXAM  Vital Signs: There were no vitals filed for this visit. Body mass index is 26.26 kg/m².   General Appearance: Patient is adequately groomed with no evidence of malnutrition   Orientation: Patient is alert and oriented to person, place and time  Mood and Affect: patient understands that this medication may potentially interfere with other medications. Patient was also instructed to immediately discontinue the medication is there is any possible complication. Katina Pineda was instructed to call the office if her symptoms worsen or if new symptoms appear prior to the next scheduled visit. She is specifically instructed to contact the office between now and schedule appointment if she has concerns related to her condition or if she needs assistance in scheduling any above tests. She is welcome to call for an appointment sooner if she has any additional concerns or questions. Misty Pearson PA-C, scribing for and in the presence of Dr. Memo Manning   9/14/2020 10:24 AM    I, Dr. Justino Austin, personally performed the services described in this documentation as scribed by Dominguez Macias. JACE Carson in my presence, and it is both accurate and complete. Justino Austin MD    This dictation was performed with a verbal recognition program [facility]) and it was checked for errors. It is possible that there are still dictated errors within this office note. If so, please bring any errors to my attention for an addendum. All efforts were made to ensure that this office note is accurate.

## 2020-09-14 NOTE — TELEPHONE ENCOUNTER
Within this Telehealth Consent, the terms you and yours refer to the person using the Telehealth Service (Service), or in the case of a use of the Service by or on behalf of a minor, you and yours refer to and include (i) the parent or legal guardian who provides consent to the use of the Service by such minor or uses the Service on behalf of such minor, and (ii) the minor for whom consent is being provided or on whose behalf the Service is being utilized. When using Service, you will be consulting with your health care providers via the use of Telehealth.   Telehealth involves the delivery of healthcare services using electronic communications, information technology or other means between a healthcare provider and a patient who are not in the same physical location. Telehealth may be used for diagnosis, treatment, follow-up and/or patient education, and may include, but is not limited to, one or more of the following:    Electronic transmission of medical records, photo images, personal health information or other data between a patient and a healthcare provider    Interactions between a patient and healthcare provider via audio, video and/or data communications    Use of output data from medical devices, sound and video files    Anticipated Benefits   The use of Telehealth by your Provider(s) through the Service may have the following possible benefits:    Making it easier and more efficient for you to access medical care and treatment for the conditions treated by such Provider(s) utilizing the Service    Allowing you to obtain medical care and treatment by Provider(s) at times that are convenient for you    Enabling you to interact with Provider(s) without the necessity of an in-office appointment     Possible Risks   While the use of Telehealth can provide potential benefits for you, there are also potential risks associated with the use of Telehealth.  These risks include, but may not be limited to the following:    Your Provider(s) may not able to provide medical treatment for your particular condition and you may be required to seek alternative healthcare or emergency care services.  The electronic systems or other security protocols or safeguards used in the Service could fail, causing a breach of privacy of your medical or other information.  Given regulatory requirements in certain jurisdictions, your Provider(s) diagnosis and/or treatment options, especially pertaining to certain prescriptions, may be limited. Acceptance   1. You understand that Services will be provided via Telehealth. This process involves the use of HIPAA compliant and secure, real-time audio-visual interfacing with a qualified and appropriately trained provider located at [facility]. 2. You understand that, under no circumstances, will this session be recorded. 3. You understand that the Provider(s) at [facility] and other clinical participants will be party to the information obtained during the Telehealth session in accordance with best medical practices. 4. You understand that the information obtained during the Telehealth session will be used to help determine the most appropriate treatment options. 5. You understand that You have the right to revoke this consent at any point in time. 6. You understand that Telehealth is voluntary, and that continued treatment is not dependent upon consent. 7. You understand that, in the event of non-consent to Telehealth services and/or technical difficulties, you will obtain services as typically provided in the absence of Telehealth technology. 8. You understand that this consent will be kept in Your medical record. 9. No potential benefits from the use of Telehealth or specific results can be guaranteed. Your condition may not be cured or improved and, in some cases, may get worse.    10. There are limitations in the provision of medical care and treatment via Telehealth and the Service and you may not be able to receive diagnosis and/or treatment through the Service for every condition for which you seek diagnosis and/or treatment. 11. There are potential risks to the use of Telehealth, including but not limited to the risks described in this Telehealth Consent. 12. Your Provider(s) have discussed the use of Telehealth and the Service with you, including the benefits and risks of such and you have provided oral consent to your Provider(s) for the use of Telehealth and the Service. 15. You understand that it is your duty to provide your Provider(s) truthful, accurate and complete information, including all relevant information regarding care that you may have received or may be receiving from other healthcare providers outside of the Service. 14. You understand that each of your Provider(s) may determine in his or sole discretion that your condition is not suitable for diagnosis and/or treatment using the Service, and that you may need to seek medical care and treatment a specialist or other healthcare provider, outside of the Service. 15. You understand that you are fully responsible for payment for all services provided by Provider(s) or through use of the Service and that you may not be able to use third-party insurance. 16. You represent that (a) you have read this Telehealth Consent carefully, (b) you understand the risks and benefits of the Service and the use of Telehealth in the medical care and treatment provided to you by Provider(s) using the Service, and (c) you have the legal capacity and authority to provide this consent for yourself and/or the minor for which you are consenting under applicable federal and state laws, including laws relating to the age of [de-identified] and/or parental/guardian consent.    17. You give your informed consent to the use of Telehealth by Provider(s) using the Service under the terms described in the Terms of Service and this Telehealth Consent. The patient was read the following statement and has consented to the visit as of 9/14/20. The patient has been scheduled for their first telehealth visit on 9/14/20 with Dr. Abraham Sullivan.

## 2020-09-14 NOTE — CARE COORDINATION
Patient contacted regarding recent discharge and COVID-19 risk. Care Transition Nurse/ Ambulatory Care Manager contacted the parent by telephone to perform post discharge assessment. Verified name and  with parent as identifiers. Patient has following risk factors of: Diverticulitis, GERD, HLD, HTN, OA, osteopenia, PONV, RLS. CTN/ACM reviewed discharge instructions, medical action plan and red flags related to discharge diagnosis. Reviewed and educated them on any new and changed medications related to discharge diagnosis. Advised obtaining a 90-day supply of all daily and as-needed medications. Education provided regarding infection prevention, and signs and symptoms of COVID-19 and when to seek medical attention with patient who verbalized understanding. Discussed exposure protocols and quarantine from [address] you at higher risk for severe illness  and given an opportunity for questions and concerns. The patient agrees to contact the COVID-19 hotline [phone] or PCP office for questions related to their healthcare. CTN/ACM provided contact information for future reference. From CDC: Are you at higher risk for severe illness?  Wash your hands often.  Avoid close contact (6 feet, which is about two arm lengths) with people who are sick.  Put distance between yourself and other people if COVID-19 is spreading in your community.  Clean and disinfect frequently touched surfaces.  Avoid all cruise travel and non-essential air travel.  Call your healthcare professional if you have concerns about COVID-19 and your underlying condition or if you are sick. For more information on steps you can take to protect yourself, see CDC's How to [address] [place] [address] for follow-up call in 7-14 days based on severity of symptoms and risk factors. Patient states she took one ultram last pm and slept though night. Her pain has been gone since.   She saw ortho MD this am and has new virtual visit with Pulmonologist this afternoon. Offered [employer] LOOP and her email is not working currently. Plan  ED FU in 2 weeks    Neva Voss.  Earnest Hill RN, BSN, [address] [place] [address] Primary Care  [phone]

## 2020-09-15 NOTE — TELEPHONE ENCOUNTER
Daughter given information about PET and that we will find out about the biopsy once PET is finished.

## 2020-09-16 ENCOUNTER — HOSPITAL ENCOUNTER (OUTPATIENT)
Dept: PHYSICAL THERAPY | Age: 75
Setting detail: THERAPIES SERIES
Discharge: HOME | End: 2020-09-16
Payer: MEDICARE

## 2020-09-16 PROCEDURE — 97110 THERAPEUTIC EXERCISES: CPT

## 2020-09-16 PROCEDURE — 97016 VASOPNEUMATIC DEVICE THERAPY: CPT

## 2020-09-16 PROCEDURE — 97140 MANUAL THERAPY 1/> REGIONS: CPT

## 2020-09-16 NOTE — FLOWSHEET NOTE
[facility], [address]  Phone [phone]  Fax [phone]    Outpatient Physical Therapy     [x] Daily Treatment Note   [] Progress Note   [] Discharge Note    Date:  9/16/2020    Patient Name:  Patience Machado         YOB: 1945    Medical Diagnosis: R RTC repair            (small/medium)     Treatment Diagnosis: same                                   Onset Date:  8/25/20               Referral Date:  8/25/20           Referring Physician: Jeff Maldonado MD                                                Visits Allowed/Insurance/Certification Information: Medicare, [facility]     Restrictions/Precautions: Per protocol MD sent on script. Patient will remain in sling with the abduction pillow for 3 weeks post-op, then can discontinue the use of the abduction pillow, but should remain in regular sling for 3 more weeks. Plan of care sent to provider:   []NA   []Faxed   [x]Co-signature       Plan of care signed:    [x]NA   []Yes   []No      Progress Note covers period from (if applicable):    [x]NA    []From          To           Next Progress Note due:   9/29/20    Visit# / total visits:     Visit # Insurance Allowable Auth Required   In Person 3 PRN []  Yes     [x]  No    Tele Health 0  []  Yes     []  No    Total 3       Plan for Next Session:  Continue with PROM only for flex, abd, ER, scapular strengthening weeks 0-4      Subjective:    Reports she has taken a tylenol extra strength before coming in today. Reports she was breaking beans alot yesterday and did not rest well last night and is having increased pain  Pt reports on Saturday night she had pain on right side from her back around to the front of her rib cage, reports it felt weird like air moving. On Sunday she went to urgent care in [address] And they did CT scan and found two spots in her right lung. R/O kidney stone.    Pt reports she saw well with no adverse reactions noted  []Patient limited by fatigue   []Patient limited by pain  []Patient limited by other medical complications   [x]Other:  Pain decreased to 1-2/10 after treatment. Goals:   Progress towards goals:  Goals established on 9/1/20  GOALS    Short Term Goals:  6   weeks Long Term Goals:  12   weeks   1). Establish HEP 1). Pt independent with HEP   2). Pain 5 /10 or less 2). Pain 2 /10 or less   3). Progress ROM per protocol 3). AROM R shoulder WNL   4). Progress strength per protocol 4). MMT 4+/5 or >throughout R shoulder   5). Patient able to sleep without waking due to pain 5). Patient able to return to prior level of activity without limitations noted. Prognosis: [x]Good   []Fair   []Poor    Patient Requires Follow-up:  [x]Yes  []No    Plan: []Plan of care initiated     [x]Continue per plan of care    [] Alter current plan (see comments)    []Hold pending MD visit []Discharge    Timed Code Treatment Minutes:  34    Total Treatment Minutes:  49  Medicare Cap total YTD:  $074  Electronically signed by:  Pete Feliciano [id number]    Note: If patient does not return for scheduled/ recommended follow up visits, this note will serve as a discharge from care along with most recent update on progress.

## 2020-09-23 ENCOUNTER — HOSPITAL ENCOUNTER (OUTPATIENT)
Dept: PHYSICAL THERAPY | Age: 75
Setting detail: THERAPIES SERIES
Discharge: HOME | End: 2020-09-23
Payer: MEDICARE

## 2020-09-23 PROCEDURE — 97140 MANUAL THERAPY 1/> REGIONS: CPT

## 2020-09-23 PROCEDURE — 97016 VASOPNEUMATIC DEVICE THERAPY: CPT

## 2020-09-23 NOTE — FLOWSHEET NOTE
[facility], [address]  Phone [phone]  Fax [phone]    Outpatient Physical Therapy     [x] Daily Treatment Note   [] Progress Note   [] Discharge Note    Date:  9/23/2020    Patient Name:  Shiva Auguste         YOB: 1945    Medical Diagnosis: R RTC repair            (small/medium)     Treatment Diagnosis: same                                   Onset Date:  8/25/20               Referral Date:  8/25/20           Referring Physician: Ashely Valladares MD                                                Visits Allowed/Insurance/Certification Information: Medicare, [facility]     Restrictions/Precautions: Per protocol MD sent on script. Patient will remain in sling with the abduction pillow for 3 weeks post-op, then can discontinue the use of the abduction pillow, but should remain in regular sling for 3 more weeks. Can d/c sling at 6 weeks. Progress PROM and begin AAROM weeks 4-6. See script for protocol     Plan of care sent to provider:   []NA   []Faxed   [x]Co-signature       Plan of care signed:    []NA   [x]Yes, signed on 9/2/20   []No      Progress Note covers period from (if applicable):    [x]NA    []From          To           Next Progress Note due:   9/29/20    Visit# / total visits:     Visit # Insurance Allowable Auth Required   In Person 4 PRN []  Yes     [x]  No    Tele Health 0  []  Yes     []  No    Total 4       Plan for Next Session:  Progress to AAROM in supine at next visit      Subjective:    Patient saw MD on 9/14/20 who was pleased with her progress. Took out abduction pillow. Pt reports she is to have PET scan on 9/25/20 due to nodules found in her lungs in ED on 9/13/20. Patient has not had any more reports of lung pain since 9/14/20. She reports the right shoulder is doing well. Still having a lot of pain, but better than before. Performing HEP daily, 2x/day, and icing PRN. Sleeping on couch with sling or supported on pillows, but frequently waking due to pain. She is only taking tylenol PRN for pain. Pain level: 4.5/10 currently superior and lateral aspects of R shoulder and upper arm, 8/10 at worst mostly at night, 6/10 at worst during the day. AT EVAL: Patient describes pain to be constant R shoulder (anterior and superior aspects), and R upper arm  Patient reports pain is 5-6 /10 pain at present and 7-8 /10 pain at its worst.      Objective:      Exercises:    4 wks p/o 9/22/20  Exercises in bold performed in department today. Items not bolded are carried forward from prior visits for continuity of the record. Exercise/Equipment Resistance/Repetitions Other comments     codmans x10      PROM R shoulder (flex, abd, IR/ER) 2x10 each direction      scap retraction x10      scap elevation and depression  x10      Seated UT stretch 30 sec/ x3      Sternum up posture  Many a day      Supine back pocket shrugs 3x10                                                                                      Therapeutic Exercise/Home Exercise Program:  x0 min. Continue with current HEP. Instructed in HEP with handout   Pt does have red tputty for  strengthening. Group Therapy:    0 minutes    Therapeutic Activity:  0 minutes     Gait: 0 minutes    Neuromuscular Re-Education:  0 minutes    Manual Therapy: 30 minutes. PROM to R shoulder as noted above. Support provided under R UE. PROM [email or web address], IR 65 @ 65 deg, Flex 85, scaption 105    Modalities: 15 minutes. Game ready to R shoulder with her supine, 34 deg, min pressure.       Functional Outcome Measure:   []NA  Measure Used:  Quick dash  Date Assessed:9/1/20  Score: 48 total= 84%    Assessment/Treatment/Activity Tolerance:    Patients response to treatment:   [x]Patient tolerated treatment well with no adverse reactions noted  []Patient limited by fatigue   []Patient limited by pain  []Patient limited by other medical complications   [x]Other:  Pain decreased to 0/10 after treatment. Goals:   Progress towards goals:  Goals established on 9/1/20  GOALS    Short Term Goals:  6   weeks Long Term Goals:  12   weeks   1). Establish HEP 1). Pt independent with HEP   2). Pain 5 /10 or less 2). Pain 2 /10 or less   3). Progress ROM per protocol 3). AROM R shoulder WNL   4). Progress strength per protocol 4). MMT 4+/5 or >throughout R shoulder   5). Patient able to sleep without waking due to pain 5). Patient able to return to prior level of activity without limitations noted. Prognosis: [x]Good   []Fair   []Poor    Patient Requires Follow-up:  [x]Yes  []No    Plan: []Plan of care initiated     [x]Continue per plan of care    [] Alter current plan (see comments)    []Hold pending MD visit []Discharge    Timed Code Treatment Minutes: 27  (2 man, 1 vaso)    Total Treatment Minutes:  45  Medicare Cap total YTD:  $[id number]  Electronically signed by:  Rio Nava PT, MPT [id number]    Note: If patient does not return for scheduled/ recommended follow up visits, this note will serve as a discharge from care along with most recent update on progress.

## 2020-09-25 ENCOUNTER — HOSPITAL ENCOUNTER (OUTPATIENT)
Dept: PET IMAGING | Age: 75
Discharge: HOME | End: 2020-09-25
Payer: MEDICARE

## 2020-09-25 VITALS — HEIGHT: 59 IN | BODY MASS INDEX: 26.21 KG/M2 | WEIGHT: 130 LBS

## 2020-09-25 PROCEDURE — 78815 PET IMAGE W/CT SKULL-THIGH: CPT

## 2020-09-25 PROCEDURE — A9552 F18 FDG: HCPCS | Performed by: INTERNAL MEDICINE

## 2020-09-25 PROCEDURE — 3430000000 HC RX DIAGNOSTIC RADIOPHARMACEUTICAL: Performed by: INTERNAL MEDICINE

## 2020-09-25 RX ORDER — FLUDEOXYGLUCOSE F 18 200 MCI/ML
14.53 INJECTION, SOLUTION INTRAVENOUS
Status: COMPLETED | OUTPATIENT
Start: 2020-09-25 | End: 2020-09-25

## 2020-09-25 RX ADMIN — FLUDEOXYGLUCOSE F 18 14.53 MILLICURIE: 200 INJECTION, SOLUTION INTRAVENOUS at 08:58

## 2020-09-28 ENCOUNTER — HOSPITAL ENCOUNTER (OUTPATIENT)
Dept: PHYSICAL THERAPY | Age: 75
Setting detail: THERAPIES SERIES
Discharge: HOME | End: 2020-09-28
Payer: MEDICARE

## 2020-09-28 PROCEDURE — 97140 MANUAL THERAPY 1/> REGIONS: CPT

## 2020-09-28 PROCEDURE — 97110 THERAPEUTIC EXERCISES: CPT

## 2020-09-28 PROCEDURE — 97016 VASOPNEUMATIC DEVICE THERAPY: CPT

## 2020-09-28 NOTE — FLOWSHEET NOTE
[address] [place] [address] and [facility] [address]  Phone [phone]  Fax [phone]    Outpatient Physical Therapy     [x] Daily Treatment Note   [] Progress Note   [] Discharge Note    Date:  9/28/2020    Patient Name:  Dayday De La Garza         YOB: 1945    Medical Diagnosis: R RTC repair            (small/medium)     Treatment Diagnosis: same                                   Onset Date:  8/25/20               Referral Date:  8/25/20           Referring Physician: Fransisca Nash MD                                                Visits Allowed/Insurance/Certification Information: Medicare, [facility]     Restrictions/Precautions: Per protocol MD sent on script. Patient will remain in sling with the abduction pillow for 3 weeks post-op, then can discontinue the use of the abduction pillow, but should remain in regular sling for 3 more weeks. Can d/c sling at 6 weeks. Progress PROM and begin AAROM weeks 4-6. See script for protocol     Plan of care sent to provider:   []NA   []Faxed   [x]Co-signature       Plan of care signed:    []NA   [x]Yes, signed on 9/2/20   []No      Progress Note covers period from (if applicable):    [x]NA    []From          To           Next Progress Note due:   9/29/20    Visit# / total visits:     Visit # Insurance Allowable Auth Required   In Person 5 PRN []  Yes     [x]  No    [facility] 0  []  Yes     []  No    Total 5       Plan for Next Session:        Subjective:    Patient reports her shoulder is doing better. Has not found out the PET scan results         Pain level: 3/10 currently superior and lateral aspects of R shoulder and upper arm, 6/10 at worst mostly at night, 4/10 at worst during the day.    AT EVAL: Patient describes pain to be constant R shoulder (anterior and superior aspects), and R upper arm  Patient reports pain is 5-6 /10 pain at present and 7-8 /10 pain at its worst.      Objective:      Exercises:    4 wks p/o 9/22/20  Exercises in bold performed in department today. Items not bolded are carried forward from prior visits for continuity of the record. Exercise/Equipment Resistance/Repetitions Other comments     codmans x10      PROM R shoulder (flex, abd, IR/ER) 2x10 each direction      scap retraction x10      scap elevation and depression  x10      Seated UT stretch 30 sec/ x3      Sternum up posture  Many a day      Supine back pocket shrugs 3x10     AAROM cane flex supine x5        \"          Cane ER supine x10                                                                        Therapeutic Exercise/Home Exercise Program:  x10 min. Continue with current HEP. Instructed in HEP with handout   Pt does have red tputty for  strengthening. Group Therapy:    0 minutes    Therapeutic Activity:  0 minutes     Gait: 0 minutes    Neuromuscular Re-Education:  0 minutes    Manual Therapy: 20 minutes. PROM to R shoulder as noted above. Support provided under R UE. PROM [email or web address], IR 65 @ 65 deg, Flex 108, scaption 100 all limited with pain except IR    Modalities: 15 minutes. Game ready to R shoulder with her supine, 34 deg, min pressure. Functional Outcome Measure:   []NA  Measure Used:  Quick dash  Date Assessed:9/1/20  Score: 48 total= 84%    Assessment/Treatment/Activity Tolerance:    Patients response to treatment:   [x]Patient tolerated treatment well with no adverse reactions noted  []Patient limited by fatigue   []Patient limited by pain  []Patient limited by other medical complications   [x]Other:  Pain decreased to 0/10 after treatment. Goals:   Progress towards goals:  Goals established on 9/1/20  GOALS    Short Term Goals:  6   weeks Long Term Goals:  12   weeks   1). Establish HEP 1). Pt independent with HEP   2). Pain 5 /10 or less 2). Pain 2 /10 or less   3). Progress ROM per protocol 3). AROM R shoulder WNL   4).   Progress strength per protocol 4). MMT 4+/5 or >throughout R shoulder   5). Patient able to sleep without waking due to pain 5). Patient able to return to prior level of activity without limitations noted. Prognosis: [x]Good   []Fair   []Poor    Patient Requires Follow-up:  [x]Yes  []No    Plan: []Plan of care initiated     [x]Continue per plan of care    [] Alter current plan (see comments)    []Hold pending MD visit []Discharge    Timed Code Treatment Minutes: 27  (2 man, 1 vaso)    Total Treatment Minutes:  45  Medicare Cap total YTD:  $[id number]  Electronically signed by:  Sharon Vera, [id number]    Note: If patient does not return for scheduled/ recommended follow up visits, this note will serve as a discharge from care along with most recent update on progress.

## 2020-09-28 NOTE — TELEPHONE ENCOUNTER
Pts daughter called and scheduled follow up with Dr. Cortes Gomes tomorrow, 9/29/20 to discuss PET results.

## 2020-09-29 ENCOUNTER — VIRTUAL VISIT (OUTPATIENT)
Dept: PULMONOLOGY | Age: 75
End: 2020-09-29
Payer: MEDICARE

## 2020-09-29 ENCOUNTER — CARE COORDINATION (OUTPATIENT)
Dept: CARE COORDINATION | Age: 75
End: 2020-09-29

## 2020-09-29 PROCEDURE — 99213 OFFICE O/P EST LOW 20 MIN: CPT | Performed by: INTERNAL MEDICINE

## 2020-09-29 PROCEDURE — 1123F ACP DISCUSS/DSCN MKR DOCD: CPT | Performed by: INTERNAL MEDICINE

## 2020-09-29 PROCEDURE — 3017F COLORECTAL CA SCREEN DOC REV: CPT | Performed by: INTERNAL MEDICINE

## 2020-09-29 PROCEDURE — G8427 DOCREV CUR MEDS BY ELIG CLIN: HCPCS | Performed by: INTERNAL MEDICINE

## 2020-09-29 PROCEDURE — 1090F PRES/ABSN URINE INCON ASSESS: CPT | Performed by: INTERNAL MEDICINE

## 2020-09-29 PROCEDURE — G8399 PT W/DXA RESULTS DOCUMENT: HCPCS | Performed by: INTERNAL MEDICINE

## 2020-09-29 PROCEDURE — 4040F PNEUMOC VAC/ADMIN/RCVD: CPT | Performed by: INTERNAL MEDICINE

## 2020-09-29 ASSESSMENT — ENCOUNTER SYMPTOMS
CHEST TIGHTNESS: 0
DIARRHEA: 0
SORE THROAT: 0
CONSTIPATION: 0
ABDOMINAL PAIN: 0
STRIDOR: 0
CHOKING: 0
EYE PAIN: 0
EYE DISCHARGE: 0
EYE ITCHING: 0
VOICE CHANGE: 0

## 2020-09-29 NOTE — PROGRESS NOTES
MA Communication: The following orders are received by verbal communication from Dr. Magy Mora.     Orders include:  PET scan forwarded to Dr. Anupama Blackburn       3 mo fu scheduled 1/5/21       CT Chest scheduled 1/5/21

## 2020-09-29 NOTE — CARE COORDINATION
Your Patient resolved from the Care Transitions episode on 9/14/2020    Patient/family has been provided the following resources and education related to COVID-19:                         Signs, symptoms and red flags related to COVID-19            CDC exposure and quarantine guidelines            Conduit exposure contact - [phone]            Contact for their local Department of Health                 Patient currently reports that the following symptoms have improved:  right uppper back pain, lower right breast pain and RUQ abdominal pain and no new/worsening symptoms. Patient states her pain above is better now. She is waiting on further test results coming back today. No further outreach scheduled with this CTN/ACM. Episode of Care resolved. Patient has this CTN/ACM contact information if future needs arise. Plan  Resolve 14 day ED FU    Dany Rees.  Saeed Cardoza, RN, BSN, [address] [place] [address] Primary Care  [phone]

## 2020-09-29 NOTE — PROGRESS NOTES
Pulmonary Outpatient Note   Felicitas Núñez MD       2020    Chief Complaint:  Results (PET fu)     HPI:   70y.o. year old female here for follow of a nodule. Virtual visit through [facility]. me    PET CT reviewed, nodule not positive but did have parotid gland finding  Denies any new resp symptoms  Did not go through with biopsy     Past Medical History:   Diagnosis Date    Diverticulitis     GERD (gastroesophageal reflux disease)     Hyperlipidemia     Hypertension     Osteoarthritis     Osteopenia     PONV (postoperative nausea and vomiting)     Prolonged emergence from general anesthesia     Restless legs syndrome        Past Surgical History:   Procedure Laterality Date    BREAST REDUCTION SURGERY  10/15/15    COLONOSCOPY  11/15/2016    colon polyps, diverticulosis    OVARIAN CYST REMOVAL      IN EXCISION TUMOR SOFT TISSUE BACK/FLANK SUBQ <3CM N/A 2019    EXCISION BACK CYST TIMES TWO performed by Andree Clay MD at [place] [address] ARTHROSCOPY Right 2020    VIDEO ARTHROSCOPY RIGHT SHOULDER, ROTATOR CUFF REPAIR, DECOMPRESSION, EXTENSIVE DEBRIDEMENT performed by Aashish Vasquez MD at [place] [address] History     Tobacco Use    Smoking status: Former Smoker     Packs/day: 2.00     Years: 30.00     Pack years: 60.00     Types: Cigarettes     Last attempt to quit: 2004     Years since quittin.7    Smokeless tobacco: Never Used   Substance Use Topics    Alcohol use: Yes     Comment: very rare - one a year       Family History   Problem Relation Age of Onset    Heart Disease Mother     High Blood Pressure Mother     Alzheimer's Disease Mother     Heart Disease Father     Heart Disease Sister     Heart Attack Sister     Heart Disease Brother          Current Outpatient Medications:     losartan (COZAAR) 100 MG tablet, TAKE 1 TABLET BY MOUTH  DAILY, Disp: 90 tablet, Rfl: 3    hydroCHLOROthiazide (HYDRODIURIL) 25 MG tablet, TAKE 1 TABLET BY MOUTH  DAILY, Disp: 90 tablet, Rfl: 3    simvastatin (ZOCOR) 20 MG tablet, TAKE 1 TABLET BY MOUTH  NIGHTLY, Disp: 90 tablet, Rfl: 3    omeprazole (PRILOSEC) 10 MG delayed release capsule, TAKE 1 CAPSULE BY MOUTH  DAILY (Patient taking differently: Take 10 mg by mouth daily as needed ), Disp: 90 capsule, Rfl: 3    amLODIPine (NORVASC) 10 MG tablet, TAKE 1 TABLET BY MOUTH  DAILY, Disp: 90 tablet, Rfl: 3    alendronate (FOSAMAX) 70 MG tablet, TAKE 1 TABLET BY MOUTH  EVERY 7 DAYS, Disp: 12 tablet, Rfl: 3    aspirin 81 MG tablet, Take 81 mg by mouth daily, Disp: , Rfl:     Calcium Carb-Cholecalciferol (CALCIUM + D3) 600-200 MG-UNIT TABS, Take 1 tablet by mouth 2 times daily , Disp: , Rfl:     Naproxen    There were no vitals filed for this visit. Review of Systems   Constitutional: Negative for chills, fever and unexpected weight change. HENT: Negative for mouth sores, sore throat and voice change. Eyes: Negative for pain, discharge and itching. Respiratory: Negative for choking, chest tightness and stridor. Cardiovascular: Negative for chest pain, palpitations and leg swelling. Gastrointestinal: Negative for abdominal pain, constipation and diarrhea. Endocrine: Negative for cold intolerance, heat intolerance and polydipsia. Genitourinary: Negative for dysuria, frequency and hematuria. Musculoskeletal: Negative for gait problem, joint swelling and neck stiffness. Neurological: Negative for dizziness, numbness and headaches. Psychiatric/Behavioral: Negative for agitation, confusion and hallucinations.        ASSESSMENT:    1. Pulmonary nodule, right      PLAN:    -CT in 3 months  -Discussed option of a biopsy, can hold off pending repeat imaging since PET was neg  -Defer to Dr. Janine Dillon if ENT referral needed for parotid gland finding on pet  -Return to clinic after CT     Orders Placed This Encounter   Procedures    CT CHEST Catherine Roach MD    Michael Benitez is a 70 y.o. female being evaluated by a Virtual Visit (video visit) encounter to address concerns as mentioned above. A caregiver was present when appropriate. Due to this being a TeleHealth encounter (During ODKSP-14 public health emergency), evaluation of the following organ systems was limited: Vitals/Constitutional/EENT/Resp/CV/GI//MS/Neuro/Skin/Heme-Lymph-Imm. Pursuant to the emergency declaration under the [address] [place] [address] and the [employer] Act, this Virtual Visit was conducted with patient's (and/or legal guardian's) consent, to reduce the patient's risk of exposure to COVID-19 and provide necessary medical care. The patient (and/or legal guardian) has also been advised to contact this office for worsening conditions or problems, and seek emergency medical treatment and/or call 911 if deemed necessary. Patient identification was verified at the start of the visit: Yes    Total time spent for this encounter: Not billed by time    Services were provided through a video synchronous discussion virtually to substitute for in-person clinic visit. Patient and provider were located at their individual homes. --Bonnie Quiles MD on 9/29/2020 at 11:21 AM    An electronic signature was used to authenticate this note.

## 2020-09-29 NOTE — PATIENT INSTRUCTIONS
Remember to bring all pulmonary medications to your next appointment with the office. Please keep all of your future appointments scheduled by [address], [facility]. Out of respect for other patients and providers, you may be asked to reschedule your appointment if you arrive later than your scheduled appointment time. Appointments cancelled less than 24hrs in advance will be considered a no show. Patients with three missed appointments within 1 year or four missed appointments within 2 years can be dismissed from the practice. You may receive a survey regarding the care you received during your visit. Your input is valuable to us. We encourage you to complete and return your survey. We hope you will choose us in the future for your healthcare needs.

## 2020-10-01 ENCOUNTER — HOSPITAL ENCOUNTER (OUTPATIENT)
Dept: PHYSICAL THERAPY | Age: 75
Setting detail: THERAPIES SERIES
Discharge: HOME | End: 2020-10-01
Payer: MEDICARE

## 2020-10-01 PROCEDURE — 97016 VASOPNEUMATIC DEVICE THERAPY: CPT

## 2020-10-01 PROCEDURE — 97140 MANUAL THERAPY 1/> REGIONS: CPT

## 2020-10-01 PROCEDURE — 97110 THERAPEUTIC EXERCISES: CPT

## 2020-10-01 NOTE — FLOWSHEET NOTE
[address] [facility], [address]  Phone [phone]  Fax [phone]    Outpatient Physical Therapy     [x] Daily Treatment Note   [x] Progress Note 10/1/209   [] Discharge Note    Date:  10/1/2020    Patient Name:  Aleena Hurt         YOB: 1945    Medical Diagnosis: R RTC repair            (small/medium)     Treatment Diagnosis: same                                   Onset Date:  8/25/20               Referral Date:  8/25/20           Referring Physician: Ascencion Ramos MD                                                Visits Allowed/Insurance/Certification Information: Medicare, [facility]     Restrictions/Precautions: Per protocol MD sent on script. Patient will remain in sling with the abduction pillow for 3 weeks post-op, then can discontinue the use of the abduction pillow, but should remain in regular sling for 3 more weeks. Can d/c sling at 6 weeks. Progress PROM and begin AAROM weeks 4-6. See script for protocol     Plan of care sent to provider:   []NA   []Faxed   [x]Co-signature       Plan of care signed:    []NA   [x]Yes, signed on 9/2/20   []No      Progress Note covers period from (if applicable):    []NA    [x]From 9/1/20-10/1/20    Next Progress Note due:   10/29/20    Visit# / total visits:     Visit # Insurance Allowable Auth Required   In Person 6 PRN []  Yes     [x]  No    Tele Health 0  []  Yes     []  No    Total 6       Plan for Next Session:  Continue with PROM and AAROM per protocol      Subjective:  Patient reports increased pain today. She is not sure what has caused her increased pain except for performing her normal ADL's (showering, washing hair, dressing). She is taking tylenol for pain. Still wearing the sling at all times. Not sleeping well due to pain. She says PET scan was negative for malignancy in her lungs, but she is following up with CT scan in 3 months.   She did have R UE when at rest.  Manual resistance with L sidelying scapular elevation/depression, and protraction/retraction 2x10 each direction. Tolerated well. Modalities: 15 minutes. Game ready to R shoulder with her supine, 34 deg, min pressure. Functional Outcome Measure:   []NA  Measure Used:  Quick dash  Date Assessed:9/1/20  Score: 48 total= 84%    Assessment/Treatment/Activity Tolerance:  Patient making good progress with PT treatment. PROM progressing appropriately. Increased pain noted today. Concerned patient may be using R UE for too much functional activity at home. Reviewed importance of compliance with following R shoulder p/o precautions. Patient verbalized understanding. Recommend continuation of PT treatment as indicated in initial POC. Patients response to treatment:   [x]Patient tolerated treatment well with no adverse reactions noted  []Patient limited by fatigue   []Patient limited by pain  []Patient limited by other medical complications   [x]Other:  Pain decreased to 0/10 after treatment. Goals:   Progress towards goals:  STG #1,3,4 MET. Progressing towards remaining goals. GOALS    Short Term Goals:  6   weeks Long Term Goals:  12   weeks   1). Establish HEP- MET 1). Pt independent with HEP   2). Pain 5 /10 or less 2). Pain 2 /10 or less   3). Progress ROM per protocol- MET 3). AROM R shoulder WNL   4). Progress strength per protocol-MET 4). MMT 4+/5 or >throughout R shoulder   5). Patient able to sleep without waking due to pain 5). Patient able to return to prior level of activity without limitations noted.         Prognosis: [x]Good   []Fair   []Poor    Patient Requires Follow-up:  [x]Yes  []No    Plan: []Plan of care initiated     [x]Continue per plan of care    [] Alter current plan (see comments)    []Hold pending MD visit []Discharge    Timed Code Treatment Minutes: 36  (2 man, 1 ex, 1 vaso)    Total Treatment Minutes:  55  Medicare Cap total YTD: $[id number]    Electronically signed by:  Juhi Quiles, PT,MPT [id number]    Note: If patient does not return for scheduled/ recommended follow up visits, this note will serve as a discharge from care along with most recent update on progress.

## 2020-10-05 ENCOUNTER — HOSPITAL ENCOUNTER (OUTPATIENT)
Dept: PHYSICAL THERAPY | Age: 75
Setting detail: THERAPIES SERIES
Discharge: HOME | End: 2020-10-05
Payer: MEDICARE

## 2020-10-05 ENCOUNTER — OFFICE VISIT (OUTPATIENT)
Dept: ORTHOPEDIC SURGERY | Age: 75
End: 2020-10-05
Payer: MEDICARE

## 2020-10-05 VITALS — BODY MASS INDEX: 26.21 KG/M2 | HEIGHT: 59 IN | WEIGHT: 130 LBS

## 2020-10-05 PROCEDURE — 20610 DRAIN/INJ JOINT/BURSA W/O US: CPT | Performed by: ORTHOPAEDIC SURGERY

## 2020-10-05 PROCEDURE — 97110 THERAPEUTIC EXERCISES: CPT

## 2020-10-05 PROCEDURE — 99024 POSTOP FOLLOW-UP VISIT: CPT | Performed by: ORTHOPAEDIC SURGERY

## 2020-10-05 PROCEDURE — 97016 VASOPNEUMATIC DEVICE THERAPY: CPT

## 2020-10-05 PROCEDURE — 97140 MANUAL THERAPY 1/> REGIONS: CPT

## 2020-10-05 RX ORDER — BUPIVACAINE HYDROCHLORIDE 2.5 MG/ML
60 INJECTION, SOLUTION INFILTRATION; PERINEURAL ONCE
Status: COMPLETED | OUTPATIENT
Start: 2020-10-05 | End: 2020-10-05

## 2020-10-05 RX ORDER — LIDOCAINE HYDROCHLORIDE 10 MG/ML
20 INJECTION, SOLUTION INFILTRATION; PERINEURAL ONCE
Status: COMPLETED | OUTPATIENT
Start: 2020-10-05 | End: 2020-10-05

## 2020-10-05 RX ORDER — METHYLPREDNISOLONE ACETATE 40 MG/ML
80 INJECTION, SUSPENSION INTRA-ARTICULAR; INTRALESIONAL; INTRAMUSCULAR; SOFT TISSUE ONCE
Status: COMPLETED | OUTPATIENT
Start: 2020-10-05 | End: 2020-10-05

## 2020-10-05 RX ADMIN — METHYLPREDNISOLONE ACETATE 80 MG: 40 INJECTION, SUSPENSION INTRA-ARTICULAR; INTRALESIONAL; INTRAMUSCULAR; SOFT TISSUE at 08:55

## 2020-10-05 RX ADMIN — BUPIVACAINE HYDROCHLORIDE 150 MG: 2.5 INJECTION, SOLUTION INFILTRATION; PERINEURAL at 08:54

## 2020-10-05 RX ADMIN — LIDOCAINE HYDROCHLORIDE 20 ML: 10 INJECTION, SOLUTION INFILTRATION; PERINEURAL at 08:55

## 2020-10-05 NOTE — PROGRESS NOTES
MD Mia Isabel, [place]         Orthopaedic Surgery and Sports Medicine    Patient Name: Kevin Diamond  YOB: 1945  Patient's PCP is Robert Jimenez MD      SUBJECTIVE  Chief Complaint  Post op right shoulder arthroscopy with rotator cuff repair and subacromial decompression  Date of Surgery: 08/25/2020    History of Present Illness:  Kevin Diamond is a 70 y.o. female  Here for second follow up of right shoulder arthroscopy with with rotator cuff repair and subacromial decompression. Has continued outpatient physical therapy and is progressing well and as expected. The patient is wearing regular sling as directed. The patient's pain is rated at 3-4/10. The patient denies fever, wound drainage, increasing redness, pus, increasing swelling. Post op problems reported: Had a follow-up PET scan for her lung nodules which came back negative however she did have an issue with her parotid gland and she is going to meet with an ENT tomorrow regarding that. Taking oral pain medication as needed. Using local cold therapy as needed. Patient is also complaining today of left shoulder pain which she has had for even months before her surgery. She last received an injection in July 2020 and is interested in another one today. Pain Assessment:  Pain Assessment  Location of Pain: Shoulder  Location Modifiers: Right  Severity of Pain: 4  Quality of Pain: Aching  Frequency of Pain: Constant  Aggravating Factors: Stretching, Straightening, Exercise  Relieving Factors: Rest, Ice, Other (Comment)  Result of Injury: No  Work-Related Injury: No  Are there other pain locations you wish to document?: No        OBJECTIVE  PHYSICAL EXAM  Vital Signs: There were no vitals filed for this visit. Body mass index is 26.26 kg/m².   General Appearance: Patient is adequately groomed with no evidence of malnutrition   Orientation: Patient is alert and oriented to person, place and time  Mood and Affect: Neutral/Euthymic(normal)    Right Shoulder Examination: The wound is clean, dry, healing. There is no warmth, erythema, or purulent drainage over the incision. Portals have healed well. Skin is intact. No ecchymosis. Sling was removed for exam.  Distal circulatory status and neurologic status is intact. Sensation is intact to light touch in the axillary, median, radial, and ulnar nerve distribution. The EPL, FPL, and interossei are grossly intact. The right upper extremity is warm and well-perfused with brisk capillary refill. Patient tolerated gentle passive range of motion. DIAGNOSTICS:   No new xrays taken today. ASSESSMENT (Medical Decision Making)    Shelton Henry is a 70 y.o. female progressing well from right shoulder arthroscopy with rotator cuff repair and subacromial decompression. Left shoulder rotator cuff tendinitis versus rotator cuff tear      ICD-10-CM    1. Complete tear of right rotator cuff, unspecified whether traumatic  M75.121    2. Tendinitis of left rotator cuff  M75.82 14418 - [place] DRAIN/INJECT LARGE JOINT/BURSA           PLAN (Medical Decision Making)  Office Procedures:  Orders Placed This Encounter   Procedures    14225 - [place] DRAIN/INJECT LARGE JOINT/BURSA     The risks and benefits of an injection were discussed with the patient. The patient had full opportunity to ask questions and all were answered. The patient then provided verbal informed consent. The skin was then prepped with betadine solution and alcohol. Under aseptic conditions, the left shoulder, subacromial space, was injected with 2cc of 1% xylocaine, then a mixture of 3cc of 0.25% marcaine and 2cc (80 mg) of Depomedrol. There were no immediate complications following the injection. The patient was advised of the possibility of injection site reaction and instructed to apply ice to the area.     Treatment Plan:  Instructions for sling usage and slow progressive mobilization of shoulder were reviewed. She can discontinue the use of her sling at 6 weeks postop. Specific precautions were reviewed and emphasized. Patient will continue outpatient physical therapy as scheduled. She is okay to begin gentle active range of motion and strengthening at this time. Today, we will also plan on injecting the patient's left shoulder with a cortisone injection. She has had this in the past and helped a great deal.  Follow up appointment was arranged at patient's convenience in approximately 4 weeks. Non-steroidal anti-inflammatories medications (NSAIDs) can be used to assist with pain control and to reduce inflammatory changes. These medications may be over-the-counter or prescribed. We discussed taking the NSAID properly and the precautions. The patient understands that this medication may potentially interfere with other medications. Patient was also instructed to immediately discontinue the medication is there is any possible complication. Kostas Sandy was instructed to call the office if her symptoms worsen or if new symptoms appear prior to the next scheduled visit. She is specifically instructed to contact the office between now and schedule appointment if she has concerns related to her condition or if she needs assistance in scheduling any above tests. She is welcome to call for an appointment sooner if she has any additional concerns or questions. I, Dr. Mady Solitario, personally performed the services described in this documentation as scribed by Yeison Gunn. JACE Carson in my presence, and it is both accurate and complete. Mady Solitario MD    This dictation was performed with a verbal recognition program [facility]) and it was checked for errors. It is possible that there are still dictated errors within this office note. If so, please bring any errors to my attention for an addendum.  All efforts were made to ensure that this office note is accurate.

## 2020-10-05 NOTE — FLOWSHEET NOTE
Visit # Insurance Allowable Auth Required   In Person 6 PRN []  Yes     [x]  No    Tele Health 0  []  Yes     []  No    Total 6       Plan for Next Session:  Continue with PROM and AAROM per protocol      Subjective:  Patient reports she saw MD this morning and visit went well (see precautions for updated MD plan)        Pain level: 3/10 currently superior and lateral aspects of R shoulder and upper arm, 6/10 at worst mostly at night, 4/10 at worst during the day. AT EVAL: Patient describes pain to be constant R shoulder (anterior and superior aspects), and R upper arm  Patient reports pain is 5-6 /10 pain at present and 7-8 /10 pain at its worst.      Objective:    PROM: R shoulder oupm220**, scaption 110**, ER 65* deg in 65 deg abd, IR 80* deg in 65 deg abd      Exercises:    5 wks p/o 9/29/20  Exercises in bold performed in department today. Items not bolded are carried forward from prior visits for continuity of the record. Exercise/Equipment Resistance/Repetitions Other comments     codmans x10      PROM R shoulder (flex, abd, IR/ER) 2x10 each direction      scap retraction x10 supine      scap elevation and depression  x10 supine      Seated UT stretch 30 sec/ x3      Sternum up posture  Many a day      Supine back pocket shrugs 3x10     AAROM cane flex supine x5-  Hold today due to pain        \"          Cane ER supine x10- hold today due to pain    Table slides flex and er     3x10 at end of 10 hold 10+ sec ea     active IR/ER seated small rom   3x10                                                       Therapeutic Exercise/Home Exercise Program:  x10 min. Continue with current HEP. Patient reminded of importance of compliance with wearing sling and not using R UE for any activity to avoid injuring R shoulder. Patient states that she uses her L arm to prop her R arm on a towel rack to help with doing her hair. Advised her that was not a good idea, especially since she is having increased pain. Patient verbalized understanding. Assessment done for progress note. Pt does have red tputty for  strengthening. Group Therapy:    0 minutes    Therapeutic Activity:  0 minutes     Gait: 0 minutes    Neuromuscular Re-Education:  0 minutes    Manual Therapy: 20 minutes. PROM to R shoulder as noted above. Support provided under R UE when at rest.  Manual resistance with L sidelying scapular elevation/depression, and protraction/retraction 2x10 each direction. Tolerated well. Modalities: 15 minutes. Game ready to R shoulder with her supine, 34 deg, min pressure. Functional Outcome Measure:   []NA  Measure Used:  Quick dash  Date Assessed:9/1/20  Score: 48 total= 84%    Assessment/Treatment/Activity Tolerance:  Patient making good progress with PT treatment. PROM progressing appropriately. Increased pain noted today. Concerned patient may be using R UE for too much functional activity at home. Reviewed importance of compliance with following R shoulder p/o precautions. Patient verbalized understanding. Recommend continuation of PT treatment as indicated in initial POC. Patients response to treatment:   [x]Patient tolerated treatment well with no adverse reactions noted  []Patient limited by fatigue   []Patient limited by pain  []Patient limited by other medical complications   [x]Other:  Pain decreased to 0/10 after treatment. Goals:   Progress towards goals:  STG #1,3,4 MET. Progressing towards remaining goals. GOALS    Short Term Goals:  6   weeks Long Term Goals:  12   weeks   1). Establish HEP- MET 1). Pt independent with HEP   2). Pain 5 /10 or less 2). Pain 2 /10 or less   3). Progress ROM per protocol- MET 3). AROM R shoulder WNL   4). Progress strength per protocol-MET 4). MMT 4+/5 or >throughout R shoulder   5). Patient able to sleep without waking due to pain 5). Patient able to return to prior level of activity without limitations noted. Prognosis: [x]Good   []Fair   []Poor    Patient Requires Follow-up:  [x]Yes  []No    Plan: []Plan of care initiated     [x]Continue per plan of care    [] Alter current plan (see comments)    []Hold pending MD visit []Discharge    Timed Code Treatment Minutes: 27  (1 man, 1 ex, 1 vaso)    Total Treatment Minutes:  45  Medicare Cap total YTD:  $765    Electronically signed by:  FLO [email or web address]    Note: If patient does not return for scheduled/ recommended follow up visits, this note will serve as a discharge from care along with most recent update on progress.

## 2020-10-06 ENCOUNTER — OFFICE VISIT (OUTPATIENT)
Dept: ENT CLINIC | Age: 75
End: 2020-10-06
Payer: MEDICARE

## 2020-10-06 VITALS
WEIGHT: 132.2 LBS | TEMPERATURE: 96.4 F | HEART RATE: 89 BPM | HEIGHT: 59 IN | BODY MASS INDEX: 26.65 KG/M2 | DIASTOLIC BLOOD PRESSURE: 66 MMHG | SYSTOLIC BLOOD PRESSURE: 134 MMHG

## 2020-10-06 PROBLEM — K11.8 PAROTID MASS: Status: ACTIVE | Noted: 2020-10-06

## 2020-10-06 PROCEDURE — 1090F PRES/ABSN URINE INCON ASSESS: CPT | Performed by: OTOLARYNGOLOGY

## 2020-10-06 PROCEDURE — 1036F TOBACCO NON-USER: CPT | Performed by: OTOLARYNGOLOGY

## 2020-10-06 PROCEDURE — G8417 CALC BMI ABV UP PARAM F/U: HCPCS | Performed by: OTOLARYNGOLOGY

## 2020-10-06 PROCEDURE — G8484 FLU IMMUNIZE NO ADMIN: HCPCS | Performed by: OTOLARYNGOLOGY

## 2020-10-06 PROCEDURE — 1123F ACP DISCUSS/DSCN MKR DOCD: CPT | Performed by: OTOLARYNGOLOGY

## 2020-10-06 PROCEDURE — 99203 OFFICE O/P NEW LOW 30 MIN: CPT | Performed by: OTOLARYNGOLOGY

## 2020-10-06 PROCEDURE — 4040F PNEUMOC VAC/ADMIN/RCVD: CPT | Performed by: OTOLARYNGOLOGY

## 2020-10-06 PROCEDURE — G8427 DOCREV CUR MEDS BY ELIG CLIN: HCPCS | Performed by: OTOLARYNGOLOGY

## 2020-10-06 PROCEDURE — 3017F COLORECTAL CA SCREEN DOC REV: CPT | Performed by: OTOLARYNGOLOGY

## 2020-10-06 PROCEDURE — G8399 PT W/DXA RESULTS DOCUMENT: HCPCS | Performed by: OTOLARYNGOLOGY

## 2020-10-06 NOTE — PROGRESS NOTES
[address] [facility]  NEW PATIENT HISTORY AND PHYSICAL NOTE      Patient Name: Narciso Vo  Medical Record Number:  [id number]  Primary Care Physician:  Rachell Youssef MD    ChiefComplaint     Chief Complaint   Patient presents with   [facility] Other     Had a PET scan on 09/25/2020, her parotid gland \"lit up\". When she was laying for the scan, she felt a knot in her throat and thought it was due to sinus drainage       History of Present Illness     Narciso Vo is an 70 y.o. female referred for parotid lesion - has history of pulmonary nodules, received PET/CT imaging 9/25/2020 showing a 1.6 x 1.9cm lesion in the right parotid gland, SUV 11.84. On review of images this appears to be in the deep lobe of the parotid gland, posterior to the ramus of the mandible, with a plane between the mass and the internal jugular vein. Per history, the patient denies any cheek/neck fullness, difficulty with neck range of motion, palpable mass, voice changes, referred otalgia, dysphagia/odynophagia or difficulty with tongue mobility. No fevers, chills, night sweats, rapid weight loss. No prior family history of head and neck cancer. Of interest, she states prior history of skin cancer removal from the nasal tip and the left cheek, with prior radiation therapy-this was performed in [place], year unknown. Currently takes ASA 81mg, unknown reason. . Significant tobacco history - 2PPD x 30y, quit 2004.  Rare EtOH use (per chart, once per year)    Past Medical History     Past Medical History:   Diagnosis Date    Diverticulitis     GERD (gastroesophageal reflux disease)     Hyperlipidemia     Hypertension     Osteoarthritis     Osteopenia     PONV (postoperative nausea and vomiting)     Prolonged emergence from general anesthesia     Restless legs syndrome        Past Surgical History     Past Surgical History:   Procedure Laterality Date    BREAST REDUCTION SURGERY  10/15/15  COLONOSCOPY  11/15/2016    colon polyps, diverticulosis    OVARIAN CYST REMOVAL      IL EXCISION TUMOR SOFT TISSUE BACK/FLANK SUBQ <3CM N/A 2019    EXCISION BACK CYST TIMES TWO performed by Janee Menendez MD at [place] [address] ARTHROSCOPY Right 2020    VIDEO ARTHROSCOPY RIGHT SHOULDER, ROTATOR CUFF REPAIR, DECOMPRESSION, EXTENSIVE DEBRIDEMENT performed by Yuki Orozco MD at [address] [place] [address]         Family History     Family History   Problem Relation Age of Onset    Heart Disease Mother     High Blood Pressure Mother     Alzheimer's Disease Mother     Heart Disease Father     Heart Disease Sister     Heart Attack Sister     Heart Disease Brother        Social History     Social History     Tobacco Use    Smoking status: Former Smoker     Packs/day: 2.00     Years: 30.00     Pack years: 60.00     Types: Cigarettes     Last attempt to quit: 2004     Years since quittin.7    Smokeless tobacco: Never Used   Substance Use Topics    Alcohol use: Yes     Comment: very rare - one a year    Drug use: No        Allergies     Allergies   Allergen Reactions    Naproxen Anaphylaxis    Percocet [Oxycodone-Acetaminophen] Nausea Only       Medications     Current Outpatient Medications   Medication Sig Dispense Refill    losartan (COZAAR) 100 MG tablet TAKE 1 TABLET BY MOUTH  DAILY 90 tablet 3    hydroCHLOROthiazide (HYDRODIURIL) 25 MG tablet TAKE 1 TABLET BY MOUTH  DAILY 90 tablet 3    simvastatin (ZOCOR) 20 MG tablet TAKE 1 TABLET BY MOUTH  NIGHTLY 90 tablet 3    omeprazole (PRILOSEC) 10 MG delayed release capsule TAKE 1 CAPSULE BY MOUTH  DAILY (Patient taking differently: Take 10 mg by mouth daily as needed ) 90 capsule 3    amLODIPine (NORVASC) 10 MG tablet TAKE 1 TABLET BY MOUTH  DAILY 90 tablet 3    alendronate (FOSAMAX) 70 MG tablet TAKE 1 TABLET BY MOUTH  EVERY 7 DAYS 12 tablet 3    aspirin 81 MG tablet Take 81 mg by mouth daily      Calcium Carb-Cholecalciferol (CALCIUM + D3) 600-200 MG-UNIT TABS Take 1 tablet by mouth 2 times daily        No current facility-administered medications for this visit. Review of Systems     REVIEW OF SYSTEMS  The following systems were reviewed and revealed the following in addition to any already discussed in the HPI:    CONSTITUTIONAL: No weight loss, no fever, no night sweats, no chills  EYES: no vision changes, no blurry vision  EARS: no changes in hearing, no otalgia  NOSE: no epistaxis, no rhinorrhea  RESPIRATORY: No difficulty breathing, no shortness of breath  CV: no chest pain, no peripheral vascular disease  HEME: No coagulation disorder, no bleeding disorder  NEURO: No TIA or stroke-like symptoms  SKIN: No new rashes in the head and neck, no recent skin cancers  MOUTH: No new ulcers, no recent teeth infections  GASTROINTESTINAL: No diarrhea, stomach pain  PSYCH: No anxiety, no depression    PhysicalExam     Vitals:    10/06/20 0901   BP: 134/66   Site: Left Lower Arm   Position: Sitting   Cuff Size: Small Adult   Pulse: 89   Temp: 96.4 °F (35.8 °C)   TempSrc: Infrared   Weight: 132 lb 3.2 oz (60 kg)   Height: 4' 11\" (1.499 m)       PHYSICAL EXAM  /66 (Site: Left Lower Arm, Position: Sitting, Cuff Size: Small Adult)   Pulse 89   Temp 96.4 °F (35.8 °C) (Infrared)   Ht 4' 11\" (1.499 m)   Wt 132 lb 3.2 oz (60 kg)   BMI 26.70 kg/m²     GENERAL: No Acute Distress, Alert and Oriented, no hoarseness  EYES: EOMI, Anti-icteric  NOSE: On anterior rhinoscopy there is no epistaxis, nasal mucosa within normal limits, no purulent drainage  EARS: Normal external appearance; on portable otomicroscopy:  -Right ear: External auditory canal without stenosis, tympanic membrane clear, no middle ear effusions or retractions  -Left ear: External auditory canal without stenosis, tympanic membrane clear, no middle ear effusions or retractions  FACE: 1/6 House-Brackmann Scale, symmetric, sensation equal bilaterally.   No reproducible pain over the parotid line, no palpable parotid mass. ORAL CAVITY: No masses or lesions palpated, uvula is midline, moist mucous membranes, 0 tonsils, missing areas of dentition, no fullness of the soft palate-no palpable mass in the parapharyngeal space  -Dental mirror exam: Base of tongue with no masses, uvula anatomically normal   NECK: Normal range of motion, no thyromegaly, trachea is midline, no lymphadenopathy, no neck masses, no crepitus  CHEST: Normal respiratory effort, no retractions, breathing comfortably  SKIN: No rashes, normal appearing skin, no evidence of skin lesions/tumors over the face or scalp. NEURO: CN 2, 3, 4, 5, 6, 7, 11, 12 intact bilaterally     Data/Imaging Review       Procedure     None    Assessment and Plan     1. Parotid mass  Patient is a 82-year-old female with right deep lobe of the parotid mass, with significant SUV uptake on PET/CT. She has prior history of tobacco use, and radiation to the nose for skin cancer, in addition to a significant pack-year history. On examination we do not appreciate any facial paresis, masses in the parapharyngeal space or parotid masses externally. We will order IR guided FNA of the lesion, and notify her with results. She states she is okay with receiving results via a virtual visit  - IR GUIDED FNA; Future    Return if symptoms worsen or fail to improve. Angelita Murray MD  [facility]  Department of Otolaryngology/Head and Neck Surgery  10/6/20    I have performed a head and neck physical exam personally or was physically present during the key or critical portions of the service. Medical Decision Making:   The following items were considered in medical decision making:  Independent review of images  Review / order clinical lab tests  Review / order radiology tests  Decision to obtain old records  Review and summation of old records as accessed through [facility] (a summary of my findings in these old records: None)     Portions of this note were dictated using Dragon.  There may be linguistic errors secondary to the use of this program.

## 2020-10-08 ENCOUNTER — HOSPITAL ENCOUNTER (OUTPATIENT)
Dept: PHYSICAL THERAPY | Age: 75
Setting detail: THERAPIES SERIES
Discharge: HOME | End: 2020-10-08
Payer: MEDICARE

## 2020-10-08 PROCEDURE — 97140 MANUAL THERAPY 1/> REGIONS: CPT

## 2020-10-08 PROCEDURE — 97016 VASOPNEUMATIC DEVICE THERAPY: CPT

## 2020-10-08 PROCEDURE — 97110 THERAPEUTIC EXERCISES: CPT

## 2020-10-08 NOTE — FLOWSHEET NOTE
[address] [facility], [address]  Phone [phone]  Fax [phone]    Outpatient Physical Therapy     [x] Daily Treatment Note   [] Progress Note 10/1/209   [] Discharge Note    Date:  10/8/2020    Patient Name:  Kb Dia         YOB: 1945    Medical Diagnosis: R RTC repair            (small/medium)     Treatment Diagnosis: same                                   Onset Date:  8/25/20               Referral Date:  8/25/20           Referring Physician: Mercy Miramontes MD                                                Visits Allowed/Insurance/Certification Information: Medicare, [facility]     Restrictions/Precautions: Per protocol MD sent on script. Patient will remain in sling with the abduction pillow for 3 weeks post-op, then can discontinue the use of the abduction pillow, but should remain in regular sling for 3 more weeks. Can d/c sling at 6 weeks. Progress PROM and begin AAROM weeks 4-6. See script for protocol   10/5/20 Treatment Plan:  Instructions for sling usage and slow progressive mobilization of shoulder were reviewed. She can discontinue the use of her sling at 6 weeks postop. Specific precautions were reviewed and emphasized. Patient will continue outpatient physical therapy as scheduled. She is okay to begin gentle active range of motion and strengthening at this time. Today, we will also plan on injecting the patient's left shoulder with a cortisone injection.   She has had this in the past and helped a great deal.  Follow up appointment was arranged at patient's convenience in approximately 4 weeksPlan of care sent to provider:   []NA   []Faxed   [x]Co-signature       Plan of care signed:    []NA   [x]Yes, signed on 9/2/20   []No      Progress Note covers period from (if applicable):    []NA    [x]From 9/1/20-10/1/20    Next Progress Note due:   10/29/20    Visit# / total visits: does have red tputty for  strengthening. Group Therapy:    0 minutes    Therapeutic Activity:  0 minutes     Gait: 0 minutes    Neuromuscular Re-Education:  0 minutes    Manual Therapy: 23 minutes. inst in scar massage  PROM to R shoulder as noted above. Support provided under R UE when at rest.  Manual resistance with L sidelying scapular elevation/depression, and protraction/retraction 2x10 each direction. Tolerated well. Modalities: 15 minutes. Game ready to R shoulder with her supine, 34 deg, min pressure. Functional Outcome Measure:   []NA  Measure Used:  Quick dash  Date Assessed:9/1/20  Score: 48 total= 84%    Assessment/Treatment/Activity Tolerance:     Patients response to treatment:   [x]Patient tolerated treatment well with no adverse reactions noted  []Patient limited by fatigue   []Patient limited by pain  []Patient limited by other medical complications   [x]Other:  Pain decreased to 0/10 after treatment. Goals:   Progress towards goals:  STG #1,3,4 MET. Progressing towards remaining goals. GOALS    Short Term Goals:  6   weeks Long Term Goals:  12   weeks   1). Establish HEP- MET 1). Pt independent with HEP   2). Pain 5 /10 or less 2). Pain 2 /10 or less   3). Progress ROM per protocol- MET 3). AROM R shoulder WNL   4). Progress strength per protocol-MET 4). MMT 4+/5 or >throughout R shoulder   5). Patient able to sleep without waking due to pain 5). Patient able to return to prior level of activity without limitations noted.         Prognosis: [x]Good   []Fair   []Poor    Patient Requires Follow-up:  [x]Yes  []No    Plan: []Plan of care initiated     [x]Continue per plan of care    [] Alter current plan (see comments)    []Hold pending MD visit []Discharge    Timed Code Treatment Minutes: 36  (2 man, 1 ex, 1 vaso)    Total Treatment Minutes:  55  Medicare Cap total YTD:  [id number]  Electronically signed by:  Valentino Sings, [id number]    Note: If patient does not return for scheduled/ recommended follow up visits, this note will serve as a discharge from care along with most recent update on progress.

## 2020-10-09 ENCOUNTER — HOSPITAL ENCOUNTER (OUTPATIENT)
Dept: ULTRASOUND IMAGING | Age: 75
Discharge: HOME | End: 2020-10-09
Payer: MEDICARE

## 2020-10-09 PROCEDURE — 88173 CYTOPATH EVAL FNA REPORT: CPT

## 2020-10-09 PROCEDURE — 10005 FNA BX W/US GDN 1ST LES: CPT

## 2020-10-09 PROCEDURE — 88305 TISSUE EXAM BY PATHOLOGIST: CPT

## 2020-10-09 NOTE — BRIEF OP NOTE
Brief Postoperative Note    Erika Glez  YOB: 1945  [id number]    Pre-operative Diagnosis: right parotid lesion    Post-operative Diagnosis: Same    Procedure: US-guided right parotid lesion FNA    Anesthesia: Local    Surgeons: Tay Naik MD    Estimated Blood Loss: Less than 5 mL    Complications: None    Specimens: Was Obtained: 4 FNA specimens sent for analysis    Findings: Successful US-guided right parotid lesion FNA.     Electronically signed by Tay Naik MD on 10/9/2020 at 8:52 AM

## 2020-10-12 ENCOUNTER — HOSPITAL ENCOUNTER (OUTPATIENT)
Dept: PHYSICAL THERAPY | Age: 75
Setting detail: THERAPIES SERIES
Discharge: HOME | End: 2020-10-12
Payer: MEDICARE

## 2020-10-12 PROCEDURE — 97140 MANUAL THERAPY 1/> REGIONS: CPT

## 2020-10-12 PROCEDURE — 97016 VASOPNEUMATIC DEVICE THERAPY: CPT

## 2020-10-12 PROCEDURE — 97110 THERAPEUTIC EXERCISES: CPT

## 2020-10-12 NOTE — FLOWSHEET NOTE
[address] [facility] and [facility] [address]  Phone [phone]  Fax [phone]    Outpatient Physical Therapy     [x] Daily Treatment Note   [] Progress Note 10/1/209   [] Discharge Note    Date:  10/12/2020    Patient Name:  Madison Conrad         YOB: 1945    Medical Diagnosis: R RTC repair            (small/medium)     Treatment Diagnosis: same                                   Onset Date:  8/25/20               Referral Date:  8/25/20           Referring Physician: Anna Sorto MD                                                Visits Allowed/Insurance/Certification Information: Medicare, [facility]     Restrictions/Precautions: Per protocol MD sent on script. Patient will remain in sling with the abduction pillow for 3 weeks post-op, then can discontinue the use of the abduction pillow, but should remain in regular sling for 3 more weeks. Can d/c sling at 6 weeks. Progress PROM and begin AAROM weeks 4-6. See script for protocol   10/5/20 Treatment Plan:  Instructions for sling usage and slow progressive mobilization of shoulder were reviewed. She can discontinue the use of her sling at 6 weeks postop. Specific precautions were reviewed and emphasized. Patient will continue outpatient physical therapy as scheduled. She is okay to begin gentle active range of motion and strengthening at this time. Today, we will also plan on injecting the patient's left shoulder with a cortisone injection.   She has had this in the past and helped a great deal.  Follow up appointment was arranged at patient's convenience in approximately 4 weeksPlan of care sent to provider:   []NA   []Faxed   [x]Co-signature       Plan of care signed:    []NA   [x]Yes, signed on 9/2/20   []No      Progress Note covers period from (if applicable):    []NA    [x]From 9/1/20-10/1/20    Next Progress Note due:   10/29/20    Visit# / total visits: Activity:  0 minutes     Gait: 0 minutes    Neuromuscular Re-Education:  0 minutes    Manual Therapy: 20 minutes. inst in scar massage  PROM to R shoulder as noted above. Support provided under R UE when at rest.  Manual resistance with L sidelying scapular elevation/depression, and protraction/retraction 2x10 each direction. Tolerated well. Modalities: 15 minutes. Game ready to R shoulder with her supine, 34 deg, min pressure. Functional Outcome Measure:   []NA  Measure Used:  Quick dash  Date Assessed:9/1/20  Score: 48 total= 84%    Assessment/Treatment/Activity Tolerance:     Patients response to treatment:   [x]Patient tolerated treatment well with no adverse reactions noted  []Patient limited by fatigue   []Patient limited by pain  []Patient limited by other medical complications   [x]Other:  Pain decreased to 0/10 after treatment. Goals:   Progress towards goals:  STG #1,3,4 MET. Progressing towards remaining goals. GOALS    Short Term Goals:  6   weeks Long Term Goals:  12   weeks   1). Establish HEP- MET 1). Pt independent with HEP   2). Pain 5 /10 or less 2). Pain 2 /10 or less   3). Progress ROM per protocol- MET 3). AROM R shoulder WNL   4). Progress strength per protocol-MET 4). MMT 4+/5 or >throughout R shoulder   5). Patient able to sleep without waking due to pain 5). Patient able to return to prior level of activity without limitations noted.         Prognosis: [x]Good   []Fair   []Poor    Patient Requires Follow-up:  [x]Yes  []No    Plan: []Plan of care initiated     [x]Continue per plan of care    [] Alter current plan (see comments)    []Hold pending MD visit []Discharge    Timed Code Treatment Minutes: 27  (1 man, 1 ex, 1 vaso)    Total Treatment Minutes:  45    Medicare Cap total YTD:  $963    Electronically signed by:  Vida Perry, [id number]    Note: If patient does not return for scheduled/ recommended follow up visits, this note will serve as a discharge from care along with most recent update on progress.

## 2020-10-13 ENCOUNTER — TELEPHONE (OUTPATIENT)
Dept: OTOLARYNGOLOGY | Age: 75
End: 2020-10-13

## 2020-10-13 NOTE — TELEPHONE ENCOUNTER
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 We discussed options including surgery or observation with serial imaging. Patient wishes for observation at this time. We have ordered repeat CT imaging with contrast in approximately 1 year for determining any size progression. We counseled the patient on signs of malignancy including facial nerve paresis/paralysis or spasms, difficulty with chewing or mouth opening, dysphagia/odynophagia, neck swelling. She will call us back on an as-needed basis if any of these occur.

## 2020-10-15 ENCOUNTER — HOSPITAL ENCOUNTER (OUTPATIENT)
Dept: PHYSICAL THERAPY | Age: 75
Setting detail: THERAPIES SERIES
Discharge: HOME | End: 2020-10-15
Payer: MEDICARE

## 2020-10-15 PROCEDURE — 97016 VASOPNEUMATIC DEVICE THERAPY: CPT

## 2020-10-15 PROCEDURE — 97140 MANUAL THERAPY 1/> REGIONS: CPT

## 2020-10-15 PROCEDURE — 97110 THERAPEUTIC EXERCISES: CPT

## 2020-10-15 NOTE — FLOWSHEET NOTE
[address] [place] [address] and [address] [facility] [address]  Phone [phone]  Fax [phone]    Outpatient Physical Therapy     [x] Daily Treatment Note   [] Progress Note 10/1/209   [] Discharge Note    Date:  10/15/2020    Patient Name:  Lissy Saez         YOB: 1945    Medical Diagnosis: R RTC repair            (small/medium)     Treatment Diagnosis: same                                   Onset Date:  8/25/20               Referral Date:  8/25/20           Referring Physician: Zander Landaverde MD                                                Visits Allowed/Insurance/Certification Information: Medicare, [facility]     Restrictions/Precautions: Per protocol MD sent on script. Patient will remain in sling with the abduction pillow for 3 weeks post-op, then can discontinue the use of the abduction pillow, but should remain in regular sling for 3 more weeks. Can d/c sling at 6 weeks. Progress PROM and begin AAROM weeks 4-6. See script for protocol   10/5/20 Treatment Plan:  Instructions for sling usage and slow progressive mobilization of shoulder were reviewed. She can discontinue the use of her sling at 6 weeks postop. Specific precautions were reviewed and emphasized. Patient will continue outpatient physical therapy as scheduled. She is okay to begin gentle active range of motion and strengthening at this time. Today, we will also plan on injecting the patient's left shoulder with a cortisone injection.   She has had this in the past and helped a great deal.  Follow up appointment was arranged at patient's convenience in approximately 4 weeks    Plan of care sent to provider:   []NA   []Faxed   [x]Co-signature       Plan of care signed:    []NA   [x]Yes, signed on 9/2/20, 10/9/20   []No      Progress Note covers period from (if applicable):    [x]NA    []From 9/1/20-10/1/20    Next Progress Note due:   10/29/20    Visit# / total visits:     Visit # Insurance Allowable Auth Required   In Person 9 PRN []  Yes     [x]  No    [facility] 0  []  Yes     []  No    Total 9       Plan for Next Session:  Progress per protocol. Can initiate AROM and gentle strengthening      Subjective:  Parathyroid biopsy was negative for CA. L shoulder feeling better since cortisone injection on 10/5/20. She is leaving for [place] on either Monday or Tuesday next week, but planning to come to PT visit on Monday before leaving. She reports having some increased pain in her R anterior shoulder region that started yesterday with insidious onset, maybe in her sleep. She is planning to continue with her PT treatment in [place]. Had some sleep disturbances the past 2 nights due to R shoulder pain. Follows up with MD in late November. Pain level: 3/10 currently anterior aspect R shoulder and upper arm,  4/10 at worst during the day. AT EVAL: Patient describes pain to be constant R shoulder (anterior and superior aspects), and R upper arm  Patient reports pain is 5-6 /10 pain at present and 7-8 /10 pain at its worst.      Objective:    PROM: R shoulder [id number], scaption 173, ER 80 deg in 90 deg abd, IR 80 deg in 90 deg abd      Exercises:   7 wks p/o 10/13/20  Exercises in bold performed in department today. Items not bolded are carried forward from prior visits for continuity of the record.   Exercise/Equipment Resistance/Repetitions Other comments     codmans x10      PROM R shoulder (flex, abd, IR/ER) 2x10 each direction      scap retraction 2x10 yellow tband    Standing tricep kickback with tband 2x10 yellow    Standing bicep curls with tband 2x10 yellow           Supine back pocket shrugs 3x10      AAROM cane flex supine x5-  Hold today due to pain        \"          Cane ER supine x10- hold today due to pain    Table slides flex and er     3x10 at end of 10 hold 10+ sec ea Changed due to L shoulder pain   active IR/ER seated small rom   3x10    Supine arom flex elbow bent  2x3     kashmir adduct, wall flex, ext,abd, IR, ER   5\" x 5 progress to 10x gentle    Standing reach upper trap then high hip  x5 prog to 2x10 gradually      Rhythmic stabilization in supine 90/90 x1 min with manual resistance      Ceiling punches 2x10      Supine figure 8 2x10      Supine chest press 2x10      Therapeutic Exercise/Home Exercise Program:  x25 min. Progressed exercises as noted above with new handout and yellow tband given. Pt does have red tputty for  strengthening. Group Therapy:    0 minutes    Therapeutic Activity:  0 minutes     Gait: 0 minutes    Neuromuscular Re-Education:  0 minutes    Manual Therapy: 10 minutes. PROM to R shoulder as noted above. Support provided under R UE when at rest.    Modalities: 15 minutes. Game ready to R shoulder with her supine, 34 deg, min pressure. Functional Outcome Measure:   []NA  Measure Used:  Quick dash  Date Assessed:9/1/20  Score: 48 total= 84%    Assessment/Treatment/Activity Tolerance:     Patients response to treatment:   [x]Patient tolerated treatment well with no adverse reactions noted  []Patient limited by fatigue   []Patient limited by pain  []Patient limited by other medical complications   [x]Other:  Pain decreased to 1/10 after treatment. Goals:      GOALS    Short Term Goals:  6   weeks Long Term Goals:  12   weeks   1). Establish HEP- MET 1). Pt independent with HEP   2). Pain 5 /10 or less 2). Pain 2 /10 or less   3). Progress ROM per protocol- MET 3). AROM R shoulder WNL   4). Progress strength per protocol-MET 4). MMT 4+/5 or >throughout R shoulder   5). Patient able to sleep without waking due to pain 5). Patient able to return to prior level of activity without limitations noted.         Prognosis: [x]Good   []Fair   []Poor    Patient Requires Follow-up:  [x]Yes  []No    Plan: []Plan of care initiated     [x]Continue per plan of care    [] Alter current plan (see comments)    []Hold pending MD visit []Discharge    Timed Code Treatment Minutes: 28  (1 man, 1 ex, 1 vaso)    Total Treatment Minutes:  50    Medicare Cap total YTD:  $[id number]    Electronically signed by:  Howard Lerner, PT, MPT [id number]    Note: If patient does not return for scheduled/ recommended follow up visits, this note will serve as a discharge from care along with most recent update on progress.

## 2020-10-19 ENCOUNTER — HOSPITAL ENCOUNTER (OUTPATIENT)
Dept: PHYSICAL THERAPY | Age: 75
Setting detail: THERAPIES SERIES
Discharge: HOME | End: 2020-10-19
Payer: MEDICARE

## 2020-10-19 PROCEDURE — 97016 VASOPNEUMATIC DEVICE THERAPY: CPT

## 2020-10-19 PROCEDURE — 97110 THERAPEUTIC EXERCISES: CPT

## 2020-10-19 PROCEDURE — 97140 MANUAL THERAPY 1/> REGIONS: CPT

## 2020-10-19 NOTE — FLOWSHEET NOTE
[address] [place] [address] [facility] [address]  Phone [phone]  Fax [phone]    Outpatient Physical Therapy     [x] Daily Treatment Note   [] Progress Note 10/1/209   [] Discharge Note    Date:  10/19/2020    Patient Name:  Maxwell Julian         YOB: 1945    Medical Diagnosis: R RTC repair            (small/medium)     Treatment Diagnosis: same                                   Onset Date:  8/25/20               Referral Date:  8/25/20           Referring Physician: Joe Hall MD                                                Visits Allowed/Insurance/Certification Information: Medicare, [facility]     Restrictions/Precautions: Per protocol MD sent on script. Patient will remain in sling with the abduction pillow for 3 weeks post-op, then can discontinue the use of the abduction pillow, but should remain in regular sling for 3 more weeks. Can d/c sling at 6 weeks. Progress PROM and begin AAROM weeks 4-6. See script for protocol   10/5/20 Treatment Plan:  Instructions for sling usage and slow progressive mobilization of shoulder were reviewed. She can discontinue the use of her sling at 6 weeks postop. Specific precautions were reviewed and emphasized. Patient will continue outpatient physical therapy as scheduled. She is okay to begin gentle active range of motion and strengthening at this time. Today, we will also plan on injecting the patient's left shoulder with a cortisone injection.   She has had this in the past and helped a great deal.  Follow up appointment was arranged at patient's convenience in approximately 4 weeks    Plan of care sent to provider:   []NA   []Faxed   [x]Co-signature       Plan of care signed:    []NA   [x]Yes, signed on 9/2/20, 10/9/20   []No      Progress Note covers period from (if applicable):    []NA    [x]From 10/1/20-10/19/20    Next Progress Note due:       Visit# / total visits:     Visit # Insurance Allowable Auth Required   In Person 10 PRN []  Yes     [x]  No    Tele Health 0  []  Yes     []  No    Total 10       Plan for Next Session:  Progress per protocol. Can initiate AROM and gentle strengthening      Subjective: Follows up with MD in late November. Leaving for [facility] today or tomorrow. Will be there for 4-6 weeks. Planning to continue with PT treatment while in [place]. Pain level: 0/10 at best   2/10 currently anterior aspect R shoulder and upper arm,  4/10 at worst during the day. AT EVAL: Patient describes pain to be constant R shoulder (anterior and superior aspects), and R upper arm  Patient reports pain is 5-6 /10 pain at present and 7-8 /10 pain at its worst.      Objective:    PROM: R shoulder [id number], abd 130, scaption 173, ER 85 deg in 90 deg abd, IR 85 deg in 90 deg abd    AROM                           160         155     IR and ER are just min decreased compared to L (both WNL)  Strength globally still decreased 4- to 4/5    Exercises:   8 wks p/o 10/20/20  Exercises in bold performed in department today. Items not bolded are carried forward from prior visits for continuity of the record.   Exercise/Equipment Resistance/Repetitions Other comments     codmans x10      PROM R shoulder (flex, abd, IR/ER) 2x10 each direction      scap retraction 2x10 yellow tband    Standing tricep kickback with tband 2x10 yellow    Standing bicep curls with tband 2x10 yellow           Supine back pocket shrugs 3x10      AAROM cane flex supine x5-  Hold today due to pain        \"          Cane ER supine x10- hold today due to pain    Table slides flex and er     3x10 at end of 10 hold 10+ sec ea Changed due to L shoulder pain   active IR/ER seated small rom   3x10    Supine arom flex elbow bent  2x3     kashmir adduct, wall flex, ext,abd, IR, ER   5\" x 5 progress to 10x gentle    Standing reach upper trap then high hip  x5 prog to 2x10 gradually      Rhythmic stabilization

## 2020-10-20 ENCOUNTER — TELEPHONE (OUTPATIENT)
Dept: PULMONOLOGY | Age: 75
End: 2020-10-20

## 2020-10-20 ENCOUNTER — APPOINTMENT (OUTPATIENT)
Dept: CT IMAGING | Age: 75
DRG: 264 | End: 2020-10-20
Payer: MEDICARE

## 2020-10-20 ENCOUNTER — APPOINTMENT (OUTPATIENT)
Dept: GENERAL RADIOLOGY | Age: 75
DRG: 264 | End: 2020-10-20
Payer: MEDICARE

## 2020-10-20 ENCOUNTER — HOSPITAL ENCOUNTER (INPATIENT)
Age: 75
LOS: 1 days | Discharge: HOME | DRG: 264 | End: 2020-10-22
Attending: EMERGENCY MEDICINE | Admitting: INTERNAL MEDICINE
Payer: MEDICARE

## 2020-10-20 PROBLEM — R07.9 CHEST PAIN: Status: ACTIVE | Noted: 2020-10-20

## 2020-10-20 LAB
A/G RATIO: 1.7 (ref 1.1–2.2)
ALBUMIN SERPL-MCNC: 4.4 G/DL (ref 3.4–5)
ALP BLD-CCNC: 75 U/L (ref 40–129)
ALT SERPL-CCNC: 18 U/L (ref 10–40)
ANION GAP SERPL CALCULATED.3IONS-SCNC: 10 MMOL/L (ref 3–16)
APTT: 27.5 SEC (ref 24.2–36.2)
AST SERPL-CCNC: 12 U/L (ref 15–37)
BASOPHILS ABSOLUTE: 0 K/UL (ref 0–0.2)
BASOPHILS RELATIVE PERCENT: 0.4 %
BILIRUB SERPL-MCNC: 0.7 MG/DL (ref 0–1)
BUN BLDV-MCNC: 19 MG/DL (ref 7–20)
CALCIUM SERPL-MCNC: 10.5 MG/DL (ref 8.3–10.6)
CHLORIDE BLD-SCNC: 97 MMOL/L (ref 99–110)
CO2: 30 MMOL/L (ref 21–32)
CREAT SERPL-MCNC: 0.8 MG/DL (ref 0.6–1.2)
EKG ATRIAL RATE: 99 BPM
EKG DIAGNOSIS: NORMAL
EKG P AXIS: 62 DEGREES
EKG P-R INTERVAL: 146 MS
EKG Q-T INTERVAL: 342 MS
EKG QRS DURATION: 80 MS
EKG QTC CALCULATION (BAZETT): 438 MS
EKG R AXIS: 65 DEGREES
EKG T AXIS: 37 DEGREES
EKG VENTRICULAR RATE: 99 BPM
EOSINOPHILS ABSOLUTE: 0.1 K/UL (ref 0–0.6)
EOSINOPHILS RELATIVE PERCENT: 0.4 %
GFR AFRICAN AMERICAN: >60
GFR NON-AFRICAN AMERICAN: >60
GLOBULIN: 2.6 G/DL
GLUCOSE BLD-MCNC: 137 MG/DL (ref 70–99)
HCT VFR BLD CALC: 42 % (ref 36–48)
HEMOGLOBIN: 14.2 G/DL (ref 12–16)
INR BLD: 1.03 (ref 0.86–1.14)
LYMPHOCYTES ABSOLUTE: 1.2 K/UL (ref 1–5.1)
LYMPHOCYTES RELATIVE PERCENT: 9.6 %
MAGNESIUM: 1.8 MG/DL (ref 1.8–2.4)
MCH RBC QN AUTO: 31.1 PG (ref 26–34)
MCHC RBC AUTO-ENTMCNC: 33.8 G/DL (ref 31–36)
MCV RBC AUTO: 91.9 FL (ref 80–100)
MONOCYTES ABSOLUTE: 1 K/UL (ref 0–1.3)
MONOCYTES RELATIVE PERCENT: 8.1 %
NEUTROPHILS ABSOLUTE: 10.5 K/UL (ref 1.7–7.7)
NEUTROPHILS RELATIVE PERCENT: 81.5 %
PDW BLD-RTO: 13.5 % (ref 12.4–15.4)
PLATELET # BLD: 295 K/UL (ref 135–450)
PMV BLD AUTO: 7.9 FL (ref 5–10.5)
POTASSIUM REFLEX MAGNESIUM: 3.1 MMOL/L (ref 3.5–5.1)
PRO-BNP: 228 PG/ML (ref 0–124)
PROCALCITONIN: 0.06 NG/ML (ref 0–0.15)
PROTHROMBIN TIME: 12 SEC (ref 10–13.2)
RBC # BLD: 4.56 M/UL (ref 4–5.2)
SODIUM BLD-SCNC: 137 MMOL/L (ref 136–145)
TOTAL PROTEIN: 7 G/DL (ref 6.4–8.2)
TROPONIN: <0.01 NG/ML
TROPONIN: <0.01 NG/ML
WBC # BLD: 12.9 K/UL (ref 4–11)

## 2020-10-20 PROCEDURE — 6370000000 HC RX 637 (ALT 250 FOR IP): Performed by: EMERGENCY MEDICINE

## 2020-10-20 PROCEDURE — 83880 ASSAY OF NATRIURETIC PEPTIDE: CPT

## 2020-10-20 PROCEDURE — 6360000004 HC RX CONTRAST MEDICATION: Performed by: EMERGENCY MEDICINE

## 2020-10-20 PROCEDURE — 84145 PROCALCITONIN (PCT): CPT

## 2020-10-20 PROCEDURE — 71260 CT THORAX DX C+: CPT

## 2020-10-20 PROCEDURE — 85025 COMPLETE CBC W/AUTO DIFF WBC: CPT

## 2020-10-20 PROCEDURE — 6370000000 HC RX 637 (ALT 250 FOR IP): Performed by: NURSE PRACTITIONER

## 2020-10-20 PROCEDURE — G0378 HOSPITAL OBSERVATION PER HR: HCPCS

## 2020-10-20 PROCEDURE — 84484 ASSAY OF TROPONIN QUANT: CPT

## 2020-10-20 PROCEDURE — 99285 EMERGENCY DEPT VISIT HI MDM: CPT

## 2020-10-20 PROCEDURE — 36415 COLL VENOUS BLD VENIPUNCTURE: CPT

## 2020-10-20 PROCEDURE — 93010 ELECTROCARDIOGRAM REPORT: CPT | Performed by: INTERNAL MEDICINE

## 2020-10-20 PROCEDURE — 2580000003 HC RX 258: Performed by: NURSE PRACTITIONER

## 2020-10-20 PROCEDURE — 83735 ASSAY OF MAGNESIUM: CPT

## 2020-10-20 PROCEDURE — 93005 ELECTROCARDIOGRAM TRACING: CPT | Performed by: EMERGENCY MEDICINE

## 2020-10-20 PROCEDURE — 71045 X-RAY EXAM CHEST 1 VIEW: CPT

## 2020-10-20 PROCEDURE — 85610 PROTHROMBIN TIME: CPT

## 2020-10-20 PROCEDURE — 85730 THROMBOPLASTIN TIME PARTIAL: CPT

## 2020-10-20 PROCEDURE — 99232 SBSQ HOSP IP/OBS MODERATE 35: CPT | Performed by: NURSE PRACTITIONER

## 2020-10-20 PROCEDURE — 80053 COMPREHEN METABOLIC PANEL: CPT

## 2020-10-20 PROCEDURE — 6360000002 HC RX W HCPCS: Performed by: EMERGENCY MEDICINE

## 2020-10-20 RX ORDER — SODIUM CHLORIDE 0.9 % (FLUSH) 0.9 %
10 SYRINGE (ML) INJECTION EVERY 12 HOURS SCHEDULED
Status: DISCONTINUED | OUTPATIENT
Start: 2020-10-20 | End: 2020-10-22 | Stop reason: HOSPADM

## 2020-10-20 RX ORDER — LEVOFLOXACIN 500 MG/1
500 TABLET, FILM COATED ORAL DAILY
Status: DISCONTINUED | OUTPATIENT
Start: 2020-10-20 | End: 2020-10-22 | Stop reason: HOSPADM

## 2020-10-20 RX ORDER — TRAMADOL HYDROCHLORIDE 50 MG/1
50 TABLET ORAL ONCE
Status: COMPLETED | OUTPATIENT
Start: 2020-10-20 | End: 2020-10-20

## 2020-10-20 RX ORDER — ATORVASTATIN CALCIUM 10 MG/1
10 TABLET, FILM COATED ORAL DAILY
Status: DISCONTINUED | OUTPATIENT
Start: 2020-10-20 | End: 2020-10-21

## 2020-10-20 RX ORDER — ASPIRIN 81 MG/1
81 TABLET, CHEWABLE ORAL DAILY
Status: DISCONTINUED | OUTPATIENT
Start: 2020-10-20 | End: 2020-10-21

## 2020-10-20 RX ORDER — ASPIRIN 325 MG
325 TABLET ORAL ONCE
Status: COMPLETED | OUTPATIENT
Start: 2020-10-20 | End: 2020-10-20

## 2020-10-20 RX ORDER — ACETAMINOPHEN 650 MG/1
650 SUPPOSITORY RECTAL EVERY 6 HOURS PRN
Status: DISCONTINUED | OUTPATIENT
Start: 2020-10-20 | End: 2020-10-22 | Stop reason: HOSPADM

## 2020-10-20 RX ORDER — MORPHINE SULFATE 4 MG/ML
4 INJECTION, SOLUTION INTRAMUSCULAR; INTRAVENOUS ONCE
Status: DISCONTINUED | OUTPATIENT
Start: 2020-10-20 | End: 2020-10-20

## 2020-10-20 RX ORDER — PANTOPRAZOLE SODIUM 40 MG/1
40 TABLET, DELAYED RELEASE ORAL
Status: DISCONTINUED | OUTPATIENT
Start: 2020-10-21 | End: 2020-10-22 | Stop reason: HOSPADM

## 2020-10-20 RX ORDER — SODIUM CHLORIDE 0.9 % (FLUSH) 0.9 %
10 SYRINGE (ML) INJECTION PRN
Status: DISCONTINUED | OUTPATIENT
Start: 2020-10-20 | End: 2020-10-22 | Stop reason: HOSPADM

## 2020-10-20 RX ORDER — HYDROCHLOROTHIAZIDE 25 MG/1
25 TABLET ORAL DAILY
Status: DISCONTINUED | OUTPATIENT
Start: 2020-10-20 | End: 2020-10-21

## 2020-10-20 RX ORDER — POTASSIUM CHLORIDE 7.45 MG/ML
10 INJECTION INTRAVENOUS PRN
Status: DISCONTINUED | OUTPATIENT
Start: 2020-10-20 | End: 2020-10-22 | Stop reason: HOSPADM

## 2020-10-20 RX ORDER — PROMETHAZINE HYDROCHLORIDE 25 MG/1
12.5 TABLET ORAL EVERY 6 HOURS PRN
Status: DISCONTINUED | OUTPATIENT
Start: 2020-10-20 | End: 2020-10-22 | Stop reason: HOSPADM

## 2020-10-20 RX ORDER — LOSARTAN POTASSIUM 100 MG/1
100 TABLET ORAL DAILY
Status: DISCONTINUED | OUTPATIENT
Start: 2020-10-20 | End: 2020-10-22 | Stop reason: HOSPADM

## 2020-10-20 RX ORDER — AMLODIPINE BESYLATE 5 MG/1
10 TABLET ORAL DAILY
Status: DISCONTINUED | OUTPATIENT
Start: 2020-10-20 | End: 2020-10-21

## 2020-10-20 RX ORDER — POTASSIUM CHLORIDE 20 MEQ/1
40 TABLET, EXTENDED RELEASE ORAL PRN
Status: DISCONTINUED | OUTPATIENT
Start: 2020-10-20 | End: 2020-10-22 | Stop reason: HOSPADM

## 2020-10-20 RX ORDER — POTASSIUM CHLORIDE 20 MEQ/1
40 TABLET, EXTENDED RELEASE ORAL ONCE
Status: COMPLETED | OUTPATIENT
Start: 2020-10-20 | End: 2020-10-20

## 2020-10-20 RX ORDER — ACETAMINOPHEN 325 MG/1
650 TABLET ORAL EVERY 6 HOURS PRN
Status: DISCONTINUED | OUTPATIENT
Start: 2020-10-20 | End: 2020-10-22 | Stop reason: HOSPADM

## 2020-10-20 RX ORDER — ONDANSETRON 2 MG/ML
4 INJECTION INTRAMUSCULAR; INTRAVENOUS EVERY 6 HOURS PRN
Status: DISCONTINUED | OUTPATIENT
Start: 2020-10-20 | End: 2020-10-22 | Stop reason: HOSPADM

## 2020-10-20 RX ORDER — POLYETHYLENE GLYCOL 3350 17 G/17G
17 POWDER, FOR SOLUTION ORAL DAILY PRN
Status: DISCONTINUED | OUTPATIENT
Start: 2020-10-20 | End: 2020-10-22 | Stop reason: HOSPADM

## 2020-10-20 RX ADMIN — LEVOFLOXACIN 500 MG: 500 TABLET, FILM COATED ORAL at 20:28

## 2020-10-20 RX ADMIN — OYSTER SHELL CALCIUM WITH VITAMIN D 1 TABLET: 500; 200 TABLET, FILM COATED ORAL at 20:28

## 2020-10-20 RX ADMIN — IOPAMIDOL 85 ML: 755 INJECTION, SOLUTION INTRAVENOUS at 15:02

## 2020-10-20 RX ADMIN — POTASSIUM CHLORIDE 40 MEQ: 1500 TABLET, EXTENDED RELEASE ORAL at 15:26

## 2020-10-20 RX ADMIN — ASPIRIN 325 MG: 325 TABLET, FILM COATED ORAL at 16:40

## 2020-10-20 RX ADMIN — TRAMADOL HYDROCHLORIDE 50 MG: 50 TABLET, FILM COATED ORAL at 15:39

## 2020-10-20 RX ADMIN — Medication 10 ML: at 20:28

## 2020-10-20 ASSESSMENT — PAIN SCALES - GENERAL
PAINLEVEL_OUTOF10: 8
PAINLEVEL_OUTOF10: 8

## 2020-10-20 NOTE — PROGRESS NOTES
Admission assessment was completed (see flow sheet). Pt is A/O, complains of chest pain and R shoulder pain- worse with movement/ exertion. Respirations are even, unlabored/ dyspnea with exertion, with clear sounds. Scheduled medications to follow-  Call light within reach. Bed in lowest position. Bed alarm on. Will continue to monitor.

## 2020-10-20 NOTE — PROGRESS NOTES
4 Eyes Skin Assessment     The patient is being assess for   Admission    I agree that 2 RN's have performed a thorough Head to Toe Skin Assessment on the patient. ALL assessment sites listed below have been assessed. Areas assessed by both nurses:   [x]   Head, Face, and Ears   [x]   Shoulders, Back, and Chest, Abdomen  [x]   Arms, Elbows, and Hands   [x]   Coccyx, Sacrum, and Ischium  [x]   Legs, Feet, and Heels        Scattered redness/ abrasions. **SHARE this note so that the co-signing nurse is able to place an eSignature**    Co-signer eSignature: Electronically signed by Alexandra Jaimes. Fermin Rod RN on 10/20/20 at 7:17 PM EDT    Does the Patient have Skin Breakdown?   No          [place] Prevention initiated:  Yes   Wound Care Orders initiated:  No      [facility] nurse consulted for Pressure Injury (Stage 3,4, Unstageable, DTI, NWPT, Complex wounds)and New or Established Ostomies:  No      Primary Nurse eSignature: Electronically signed by Carl Hendricks RN on 10/20/20 at [address] PM EDT

## 2020-10-20 NOTE — PROGRESS NOTES
Shift handoff report given to Thai Rivera at bedside. Pt is  Awake and alert denies any current needs  Call light within reach, bed in lowest position, End of shift checks completed. Pt has been free of falls for duration of shift. Jermaine Rocha

## 2020-10-20 NOTE — TELEPHONE ENCOUNTER
Office cancelled appointment on 1/5/20 with Dr. Britany Wyatt for 3 mo fu w/CT. Reason: Dr. Britany Wyatt is in ICU    Patient did not reschedule appointment. Appointment rescheduled for (Pt is leaving for [place] right after CT scan. She wants to be called with results. Pt won't return to March/April). Emily Han

## 2020-10-20 NOTE — H&P
Hospital Medicine History & Physical      PCP: Mel Moya MD    Date of Admission: 10/20/2020    Date of Service: Pt seen/examined on 10/20/2020     Chief Complaint:    Chief Complaint   Patient presents with    Chest Pain     starting last night. right sided chest pain with constant 8/10 pain. pt reports the pain radiates to neck and pain is worse with inspiration       History Of Present Illness: The patient is a 70 y.o. female with hypertension, hyperlipidemia, RLS, OA, GERD who presented to [facility] ED with complaint of mid-sternal chest pain that radiated to her neck and back that started last night while laying in bed. Patient describes the pain as a sharp pain. It was associated with shortness of breath. She states she normally walks 2 miles, but is unable to do that now. She feels very short of breath with exertion. Aggravated by exertion. Alleviated by rest, Tramadol. Denies any URI symptoms, cough, pain with inspiration, abd pain, n/v/d. She reports she picked up 2 pumpkins and put them in her car last night. Recent shoulder surgery. Cardiac risk factors:   - HTN yes  - HLD yes  - DM denies  - Tobacco abuse fomer  - FHx of CAD possible family Hx. Sister has had h/o MI    She has had recent shoulder surgery.      Past Medical History:        Diagnosis Date    Diverticulitis     GERD (gastroesophageal reflux disease)     Hyperlipidemia     Hypertension     Osteoarthritis     Osteopenia     PONV (postoperative nausea and vomiting)     Prolonged emergence from general anesthesia     Restless legs syndrome        Past Surgical History:        Procedure Laterality Date    BREAST REDUCTION SURGERY  10/15/15    COLONOSCOPY  11/15/2016    colon polyps, diverticulosis    OVARIAN CYST REMOVAL      MA EXCISION TUMOR SOFT TISSUE BACK/FLANK SUBQ <3CM N/A 8/23/2019    EXCISION BACK CYST TIMES TWO performed by Nael Sabillon MD at [place] [address] ARTHROSCOPY Right 8/25/2020 VIDEO ARTHROSCOPY RIGHT SHOULDER, ROTATOR CUFF REPAIR, DECOMPRESSION, EXTENSIVE DEBRIDEMENT performed by Ellie Watkins MD at [address] [place] [address]         Medications Prior to Admission:    Prior to Admission medications    Medication Sig Start Date End Date Taking? Authorizing Provider   losartan (COZAAR) 100 MG tablet TAKE 1 TABLET BY MOUTH  DAILY 7/30/20  Yes Alfredo Santos MD   hydroCHLOROthiazide (HYDRODIURIL) 25 MG tablet TAKE 1 TABLET BY MOUTH  DAILY 7/30/20  Yes Alfredo Santos MD   simvastatin (ZOCOR) 20 MG tablet TAKE 1 TABLET BY MOUTH  NIGHTLY 7/30/20  Yes Alfredo Santos MD   omeprazole (PRILOSEC) 10 MG delayed release capsule TAKE 1 CAPSULE BY MOUTH  DAILY  Patient taking differently: Take 10 mg by mouth daily as needed  7/30/20  Yes Alfredo Santos MD   amLODIPine (NORVASC) 10 MG tablet TAKE 1 TABLET BY MOUTH  DAILY 7/30/20  Yes Alfredo Santos MD   alendronate (FOSAMAX) 70 MG tablet TAKE 1 TABLET BY MOUTH  EVERY 7 DAYS  Patient taking differently: Take 70 mg by mouth every 7 days Takes weekly on Mondays 7/24/20  Yes JEANNA Colbert - CNP   aspirin 81 MG tablet Take 81 mg by mouth daily   Yes Historical Provider, MD   Calcium Carb-Cholecalciferol (CALCIUM + D3) 600-200 MG-UNIT TABS Take 1 tablet by mouth 2 times daily    Yes Historical Provider, MD       Allergies:  Naproxen and Percocet [oxycodone-acetaminophen]    Social History:     TOBACCO:   reports that she quit smoking about 16 years ago. Her smoking use included cigarettes. She has a 60.00 pack-year smoking history. She has never used smokeless tobacco.  ETOH:   reports current alcohol use.       Family History:   Positive as follows:        Problem Relation Age of Onset    Heart Disease Mother     High Blood Pressure Mother     Alzheimer's Disease Mother     Heart Disease Father     Heart Disease Sister     Heart Attack Sister     Heart Disease Brother        REVIEW OF SYSTEMS:       Constitutional: Negative for fever   Respiratory: Negative for cough + dyspnea  Cardiovascular: + chest pain   Gastrointestinal: Negative for vomiting, diarrhea   Genitourinary: Negative for hematuria   Musculoskeletal: Negative for arthralgias   Skin: Negative for rash   Neurological: Negative for syncope   Hematological: Negative for adenopathy   Psychiatric/Behavorial: Negative for anxiety    PHYSICAL EXAM:    /68   Pulse 105   Temp 98.2 °F (36.8 °C) (Oral)   Resp 18   Ht 4' 11\" (1.499 m)   Wt 126 lb (57.2 kg)   LMP  (LMP Unknown)   SpO2 94%   BMI 25.45 kg/m²     Gen: No distress. Alert. Eyes: PERRL. No sclera icterus. No conjunctival injection. ENT: No discharge. Pharynx clear. Neck:  Trachea midline. Resp: No accessory muscle use. No crackles. No wheezes. No rhonchi. CV: Regular rate. Regular rhythm. No murmur. No rub. No edema. GI: Non-tender. Non-distended. Normal bowel sounds. No hernia. Skin: Warm and dry. No nodule on exposed extremities. No rash on exposed extremities. M/S: No cyanosis. No joint deformity. No clubbing. Neuro: Awake. Grossly nonfocal    Psych: Oriented x 3. No anxiety or agitation. CARDIAC ENZYMES  Recent Labs     10/20/20  1400   TROPONINI <0.01       CULTURES  N/A    EKG:  I have reviewed the EKG with the following interpretation:   Sinus rhythm with Premature atrial complexes, Nonspecific ST and T wave abnormality    RADIOLOGY  CT CHEST PULMONARY EMBOLISM W CONTRAST   Final Result   No evidence of pulmonary embolism      Interval increase in size of right peripheral densities interval increase in   adenopathy in the right hilum interval development of Sigrid bronchial   thickening findings are non-specific could represent infectious process with   associated hilar adenopathy autoimmune or inflammatory disorder is included   in the differential correlation clinical data base is required. Findings are nonspecific.   The possibility of infectious pneumonic process and associated hilar adenopathy is possibility. Inflammatory or autoimmune   process would also be in the differential.      Patient had a recent PET scan which demonstrated peripheral nodules to be not   significantly hypermetabolic. XR CHEST PORTABLE   Final Result   No acute process. Stable compared to prior study         NM Cardiac Stress Test Nuclear Imaging    (Results Pending)     Pathology  FINAL DIAGNOSIS:     Parotid, Right Mass Fine Needle Aspiration:   - Benign neoplasm.   - Warthin's tumor. ASSESSMENT/PLAN:    Chest pain  - Admitted for cardiac evaluation to PCU  - monitor on tele  - Serial troponin negative x1, EKG as above. - on aspirin, Lipitor  - NM Stress tomorrow  - possible musculoskeletal vs GERD    Hypokalemia  - replaced in ED. Monitor labs. Hypertension  - BP stable. Continue Losartan, HCTZ, Amlodipine    Hyperlipidemia  - on Atorvastatin. 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   - infectious process vs autoimmune process.    - PET CT recently, nodules were not hypermetabolic.   - add Levaquin for now  - check Procalcitonin    DVT Prophylaxis: Lovenox  Diet: DIET GENERAL; No Caffeine  Diet NPO, After Midnight  Code Status: Full Code    Myranda American FNP-C  10/20/2020

## 2020-10-20 NOTE — PROGRESS NOTES
Patient admitted to room 308-1 from ER 3. Patient oriented to room, call light, bed rails, phone, lights and bathroom. Patient instructed about the schedule of the day including: vital sign frequency, lab draws, possible tests, frequency of MD and staff rounds, daily weights, I &O's and prescribed diet. Telemetry box 8 in place, patient aware of placement and reason. Bed locked, in lowest position, side rails up 2/4, call light within reach. Recliner Assessment  Patient is able to demonstrated the ability to move from a reclining position to an upright position within the recliner. Argenis Sunshine

## 2020-10-20 NOTE — ED NOTES
Perfect serve message to Dr. Rosalie Christian @ [facility]  10/20/20 1611    Call returned to Dr. Binh Espinoza @ [facility]  10/20/20 4274

## 2020-10-20 NOTE — ED PROVIDER NOTES
[address]      Pt Name: Raul Iverson  MRN: [id number]  [place] 1945  Date of evaluation: 10/20/2020  Provider: Natanael Montague MD    CHIEF COMPLAINT       Chief Complaint   Patient presents with    Chest Pain     starting last night. right sided chest pain with constant 8/10 pain. pt reports the pain radiates to neck and pain is worse with inspiration         HISTORY OF PRESENT ILLNESS   (Location/Symptom, Timing/Onset, Context/Setting, Quality, Duration, Modifying Factors, Severity)  Note limiting factors. Raul Iverson is a 70 y.o. female with past medical history of hypertension, hyperlipidemia and right lower lobe spiculated mass currently undergoing outpatient work-up for possible malignancy here with chest pain    Patient is complaining of an aching heaviness in her substernal and right chest.  Worse with taking a deep breath associated with shortness of breath. No significant cough or hemoptysis. No fevers or chills. No lower extremity swelling, redness or pain. Pain just began last night. She is never had this in the past.  Admits that she has been undergoing an outpatient work-up for possible pulmonary malignancy but supposedly had a negative PET scan of a right lower lobe lung mass    [facility] the [place]    Nursing Notes were reviewed. REVIEW OF SYSTEMS    (2-9 systems for level 4, 10 or more for level 5)     Review of Systems    Please see HPI for pertinent positive and negative review of system findings. A full 10 system ROS was performed and otherwise negative.         PAST MEDICAL HISTORY     Past Medical History:   Diagnosis Date    Diverticulitis     GERD (gastroesophageal reflux disease)     Hyperlipidemia     Hypertension     Osteoarthritis     Osteopenia     PONV (postoperative nausea and vomiting)     Prolonged emergence from general anesthesia     Restless legs syndrome          SURGICAL HISTORY       Past Surgical History:   Procedure Laterality Date    BREAST REDUCTION SURGERY  10/15/15    COLONOSCOPY  11/15/2016    colon polyps, diverticulosis    OVARIAN CYST REMOVAL      SD EXCISION TUMOR SOFT TISSUE BACK/FLANK SUBQ <3CM N/A 8/23/2019    EXCISION BACK CYST TIMES TWO performed by Janee Menendez MD at [place] [address] ARTHROSCOPY Right 8/25/2020    VIDEO ARTHROSCOPY RIGHT SHOULDER, ROTATOR CUFF REPAIR, DECOMPRESSION, EXTENSIVE DEBRIDEMENT performed by uYki Orozco MD at [facility]       Current Discharge Medication List      CONTINUE these medications which have NOT CHANGED    Details   losartan (COZAAR) 100 MG tablet TAKE 1 TABLET BY MOUTH  DAILY  Qty: 90 tablet, Refills: 3    Comments: Requesting 1 year supply      hydroCHLOROthiazide (HYDRODIURIL) 25 MG tablet TAKE 1 TABLET BY MOUTH  DAILY  Qty: 90 tablet, Refills: 3    Comments: Requesting 1 year supply      simvastatin (ZOCOR) 20 MG tablet TAKE 1 TABLET BY MOUTH  NIGHTLY  Qty: 90 tablet, Refills: 3    Comments: Requesting 1 year supply      omeprazole (PRILOSEC) 10 MG delayed release capsule TAKE 1 CAPSULE BY MOUTH  DAILY  Qty: 90 capsule, Refills: 3    Comments: Requesting 1 year supply  Associated Diagnoses: Gastroesophageal reflux disease, esophagitis presence not specified      amLODIPine (NORVASC) 10 MG tablet TAKE 1 TABLET BY MOUTH  DAILY  Qty: 90 tablet, Refills: 3    Comments: Requesting 1 year supply      alendronate (FOSAMAX) 70 MG tablet TAKE 1 TABLET BY MOUTH  EVERY 7 DAYS  Qty: 12 tablet, Refills: 3    Comments: Requesting 1 year supply      aspirin 81 MG tablet Take 81 mg by mouth daily      Calcium Carb-Cholecalciferol (CALCIUM + D3) 600-200 MG-UNIT TABS Take 1 tablet by mouth 2 times daily              ALLERGIES     Naproxen and Percocet [oxycodone-acetaminophen]    FAMILY HISTORY       Family History   Problem Relation Age of Onset    Heart Disease Mother     High Blood Pressure Mother    Farideh Awadl appearance:  Cooperative. No acute distress. Skin:  Warm. Dry. Eye:  Extraocular movements intact. Ears, nose, mouth and throat:  Oral mucosa moist,  Neck:  Trachea midline. Heart:  Regular rate and rhythm  Perfusion:  intact  Respiratory:  Lungs clear to auscultation bilaterally. Respirations nonlabored. Abdominal:   Non distended. Nontender  Neurological:  Alert and oriented x 3.   Moves all extremities spontaneously  Musculoskeletal:   Normal ROM, no deformities          Psychiatric:  Normal mood      DIAGNOSTIC RESULTS       Labs Reviewed   CBC WITH AUTO DIFFERENTIAL - Abnormal; Notable for the following components:       Result Value    WBC 12.9 (*)     Neutrophils Absolute 10.5 (*)     All other components within normal limits    Narrative:     Performed at:  [facility],  [employer]   Phone [phone]   COMPREHENSIVE METABOLIC PANEL W/ REFLEX TO MG FOR LOW K - Abnormal; Notable for the following components:    Potassium reflex Magnesium 3.1 (*)     Chloride 97 (*)     Glucose 137 (*)     AST 12 (*)     All other components within normal limits    Narrative:     Performed at:  [facility],  [employer]   Phone [phone]   BRAIN NATRIURETIC PEPTIDE - Abnormal; Notable for the following components:    Pro- (*)     All other components within normal limits    Narrative:     Performed at:  [facility] [address],  [employer]   Phone [phone]   COMPREHENSIVE METABOLIC PANEL W/ REFLEX TO MG FOR LOW K - Abnormal; Notable for the following components:    Glucose 109 (*)     Total Protein 6.1 (*)     AST 14 (*)     All other components within normal limits    Narrative:     Performed at:  [facility] [address],  [employer]   Phone [phone]   RENAL FUNCTION PANEL - Abnormal; Notable for the following components:    Glucose 113 (*)     All other components within normal limits    Narrative:     Performed at:  [facility] [address],  [place], [employer]   Phone [phone]   MAGNESIUM - Abnormal; Notable for the following components:    Magnesium 1.60 (*)     All other components within normal limits    Narrative:     Performed at:  [facility] [address],  [place], [employer]   Phone [phone]   MAGNESIUM - Abnormal; Notable for the following components:    Magnesium 1.70 (*)     All other components within normal limits    Narrative:     Performed at:  [place] [address],  [place], [employer]   Phone [phone]   TROPONIN    Narrative:     Performed at:  [facility] [address],  [place], [facility]   Phone [phone]   PROTIME-INR    Narrative:     Performed at:  [place] [address],  [place], [facility]   Phone [phone]   APTT    Narrative:     Performed at:  [place] [address],  [place], [employer]   Phone [phone]   MAGNESIUM    Narrative:     Performed at:  [place] [address],  [place], [employer]   Phone [phone]   TROPONIN    Narrative:     Performed at:  [facility] [address],  [place], [facility]   Phone [phone]   TROPONIN    Narrative:     Performed at:  [place] [address],  [place], [employer]   Phone [phone]   CBC    Narrative:     Performed at:  [place] [address],  [place], [employer]   Phone [phone]   PROCALCITONIN    Narrative:     Performed at:  [facility]  [address],  [place], [facility]   Phone [phone]   TSH WITH REFLEX TO FT4    Narrative:     Performed at:  [facility]  [address],  [place], [facility]   Phone [phone]   LIPID PANEL       Interpretation per the Radiologist below, if obtained/available at the time of this note:    CT CHEST PULMONARY EMBOLISM W CONTRAST   Final Result   No evidence of pulmonary embolism      Interval increase in size of right peripheral densities interval increase in   adenopathy in the right hilum interval development of Sigrid bronchial   thickening findings are non-specific could represent infectious process with   associated hilar adenopathy autoimmune or inflammatory disorder is included   in the differential correlation clinical data base is required. Findings are nonspecific. The possibility of infectious pneumonic process   and associated hilar adenopathy is possibility. Inflammatory or autoimmune   process would also be in the differential.      Patient had a recent PET scan which demonstrated peripheral nodules to be not   significantly hypermetabolic. XR CHEST PORTABLE   Final Result   No acute process. Stable compared to prior study         NM Cardiac Stress Test Nuclear Imaging    (Results Pending)       All other labs/imaging were within normal range or not returned as of this dictation. EMERGENCY DEPARTMENT COURSE and DIFFERENTIAL DIAGNOSIS/MDM:   Vitals:    Vitals:    10/21/20 0016 10/21/20 0302 10/21/20 0307 10/21/20 0538   BP: (!) 141/73  (!) 106/57 (!) 111/59   Pulse: 144  160 57   Resp: 17   16   Temp:    97.8 °F (36.6 °C)   TempSrc:    Oral   SpO2: 94%   91%   Weight:  126 lb 12.8 oz (57.5 kg)     Height:           EKG: Sinus rhythm PACs rate of 99 bpm.  No ST elevation. Nonspecific inferior ST segment changes. No ST elevation.   Overall fairly similar from prior from 9/13/2020    Patient presents emergency department

## 2020-10-21 ENCOUNTER — ANESTHESIA EVENT (OUTPATIENT)
Dept: ENDOSCOPY | Age: 75
DRG: 264 | End: 2020-10-21
Payer: MEDICARE

## 2020-10-21 PROBLEM — I48.91 ATRIAL FIBRILLATION (HCC): Status: ACTIVE | Noted: 2020-10-21

## 2020-10-21 LAB
A/G RATIO: 1.3 (ref 1.1–2.2)
ALBUMIN SERPL-MCNC: 3.4 G/DL (ref 3.4–5)
ALBUMIN SERPL-MCNC: 3.5 G/DL (ref 3.4–5)
ALP BLD-CCNC: 74 U/L (ref 40–129)
ALT SERPL-CCNC: 20 U/L (ref 10–40)
ANION GAP SERPL CALCULATED.3IONS-SCNC: 10 MMOL/L (ref 3–16)
ANION GAP SERPL CALCULATED.3IONS-SCNC: 11 MMOL/L (ref 3–16)
AST SERPL-CCNC: 14 U/L (ref 15–37)
BILIRUB SERPL-MCNC: 0.5 MG/DL (ref 0–1)
BUN BLDV-MCNC: 15 MG/DL (ref 7–20)
BUN BLDV-MCNC: 15 MG/DL (ref 7–20)
CALCIUM SERPL-MCNC: 9.5 MG/DL (ref 8.3–10.6)
CALCIUM SERPL-MCNC: 9.5 MG/DL (ref 8.3–10.6)
CHLORIDE BLD-SCNC: 102 MMOL/L (ref 99–110)
CHLORIDE BLD-SCNC: 102 MMOL/L (ref 99–110)
CHOLESTEROL, TOTAL: 134 MG/DL (ref 0–199)
CO2: 24 MMOL/L (ref 21–32)
CO2: 25 MMOL/L (ref 21–32)
CREAT SERPL-MCNC: 0.7 MG/DL (ref 0.6–1.2)
CREAT SERPL-MCNC: 0.7 MG/DL (ref 0.6–1.2)
EKG ATRIAL RATE: 133 BPM
EKG ATRIAL RATE: 288 BPM
EKG DIAGNOSIS: NORMAL
EKG DIAGNOSIS: NORMAL
EKG Q-T INTERVAL: 298 MS
EKG Q-T INTERVAL: 322 MS
EKG QRS DURATION: 70 MS
EKG QRS DURATION: 86 MS
EKG QTC CALCULATION (BAZETT): 455 MS
EKG QTC CALCULATION (BAZETT): 478 MS
EKG R AXIS: 73 DEGREES
EKG R AXIS: 74 DEGREES
EKG T AXIS: -8 DEGREES
EKG T AXIS: 68 DEGREES
EKG VENTRICULAR RATE: 120 BPM
EKG VENTRICULAR RATE: 155 BPM
GFR AFRICAN AMERICAN: >60
GFR AFRICAN AMERICAN: >60
GFR NON-AFRICAN AMERICAN: >60
GFR NON-AFRICAN AMERICAN: >60
GLOBULIN: 2.7 G/DL
GLUCOSE BLD-MCNC: 109 MG/DL (ref 70–99)
GLUCOSE BLD-MCNC: 113 MG/DL (ref 70–99)
HCT VFR BLD CALC: 39.6 % (ref 36–48)
HDLC SERPL-MCNC: 44 MG/DL (ref 40–60)
HEMOGLOBIN: 13.3 G/DL (ref 12–16)
LDL CHOLESTEROL CALCULATED: 69 MG/DL
MAGNESIUM: 1.6 MG/DL (ref 1.8–2.4)
MAGNESIUM: 1.7 MG/DL (ref 1.8–2.4)
MCH RBC QN AUTO: 31.6 PG (ref 26–34)
MCHC RBC AUTO-ENTMCNC: 33.6 G/DL (ref 31–36)
MCV RBC AUTO: 94.1 FL (ref 80–100)
PDW BLD-RTO: 13.4 % (ref 12.4–15.4)
PHOSPHORUS: 3.3 MG/DL (ref 2.5–4.9)
PLATELET # BLD: 204 K/UL (ref 135–450)
PMV BLD AUTO: 9.5 FL (ref 5–10.5)
POTASSIUM REFLEX MAGNESIUM: 3.5 MMOL/L (ref 3.5–5.1)
POTASSIUM SERPL-SCNC: 3.5 MMOL/L (ref 3.5–5.1)
RBC # BLD: 4.21 M/UL (ref 4–5.2)
SODIUM BLD-SCNC: 137 MMOL/L (ref 136–145)
SODIUM BLD-SCNC: 137 MMOL/L (ref 136–145)
TOTAL PROTEIN: 6.1 G/DL (ref 6.4–8.2)
TRIGL SERPL-MCNC: 106 MG/DL (ref 0–150)
TROPONIN: <0.01 NG/ML
TSH REFLEX FT4: 3.43 UIU/ML (ref 0.27–4.2)
VLDLC SERPL CALC-MCNC: 21 MG/DL
WBC # BLD: 10.8 K/UL (ref 4–11)

## 2020-10-21 PROCEDURE — 2060000000 HC ICU INTERMEDIATE R&B

## 2020-10-21 PROCEDURE — 6370000000 HC RX 637 (ALT 250 FOR IP): Performed by: HOSPITALIST

## 2020-10-21 PROCEDURE — 84443 ASSAY THYROID STIM HORMONE: CPT

## 2020-10-21 PROCEDURE — 83735 ASSAY OF MAGNESIUM: CPT

## 2020-10-21 PROCEDURE — 93010 ELECTROCARDIOGRAM REPORT: CPT | Performed by: INTERNAL MEDICINE

## 2020-10-21 PROCEDURE — 6370000000 HC RX 637 (ALT 250 FOR IP): Performed by: PHYSICIAN ASSISTANT

## 2020-10-21 PROCEDURE — 80061 LIPID PANEL: CPT

## 2020-10-21 PROCEDURE — 80053 COMPREHEN METABOLIC PANEL: CPT

## 2020-10-21 PROCEDURE — 2580000003 HC RX 258

## 2020-10-21 PROCEDURE — 93005 ELECTROCARDIOGRAM TRACING: CPT | Performed by: HOSPITALIST

## 2020-10-21 PROCEDURE — 2580000003 HC RX 258: Performed by: NURSE PRACTITIONER

## 2020-10-21 PROCEDURE — 96365 THER/PROPH/DIAG IV INF INIT: CPT

## 2020-10-21 PROCEDURE — 6360000002 HC RX W HCPCS: Performed by: PHYSICIAN ASSISTANT

## 2020-10-21 PROCEDURE — 2580000003 HC RX 258: Performed by: HOSPITALIST

## 2020-10-21 PROCEDURE — 6370000000 HC RX 637 (ALT 250 FOR IP): Performed by: NURSE PRACTITIONER

## 2020-10-21 PROCEDURE — 36415 COLL VENOUS BLD VENIPUNCTURE: CPT

## 2020-10-21 PROCEDURE — 96366 THER/PROPH/DIAG IV INF ADDON: CPT

## 2020-10-21 PROCEDURE — 93005 ELECTROCARDIOGRAM TRACING: CPT | Performed by: PHYSICIAN ASSISTANT

## 2020-10-21 PROCEDURE — 99222 1ST HOSP IP/OBS MODERATE 55: CPT | Performed by: INTERNAL MEDICINE

## 2020-10-21 PROCEDURE — 99223 1ST HOSP IP/OBS HIGH 75: CPT | Performed by: INTERNAL MEDICINE

## 2020-10-21 PROCEDURE — 84484 ASSAY OF TROPONIN QUANT: CPT

## 2020-10-21 PROCEDURE — 85027 COMPLETE CBC AUTOMATED: CPT

## 2020-10-21 PROCEDURE — 6360000002 HC RX W HCPCS: Performed by: HOSPITALIST

## 2020-10-21 PROCEDURE — 2500000003 HC RX 250 WO HCPCS: Performed by: HOSPITALIST

## 2020-10-21 PROCEDURE — 99233 SBSQ HOSP IP/OBS HIGH 50: CPT | Performed by: INTERNAL MEDICINE

## 2020-10-21 RX ORDER — DILTIAZEM HYDROCHLORIDE 5 MG/ML
5 INJECTION INTRAVENOUS ONCE
Status: COMPLETED | OUTPATIENT
Start: 2020-10-21 | End: 2020-10-21

## 2020-10-21 RX ORDER — SODIUM CHLORIDE 9 MG/ML
INJECTION, SOLUTION INTRAVENOUS ONCE
Status: COMPLETED | OUTPATIENT
Start: 2020-10-21 | End: 2020-10-21

## 2020-10-21 RX ORDER — DILTIAZEM HYDROCHLORIDE 5 MG/ML
10 INJECTION INTRAVENOUS ONCE
Status: DISCONTINUED | OUTPATIENT
Start: 2020-10-21 | End: 2020-10-21

## 2020-10-21 RX ORDER — POTASSIUM CHLORIDE 20 MEQ/1
40 TABLET, EXTENDED RELEASE ORAL ONCE
Status: COMPLETED | OUTPATIENT
Start: 2020-10-21 | End: 2020-10-21

## 2020-10-21 RX ORDER — AMLODIPINE BESYLATE 5 MG/1
10 TABLET ORAL DAILY
Status: DISCONTINUED | OUTPATIENT
Start: 2020-10-22 | End: 2020-10-22 | Stop reason: HOSPADM

## 2020-10-21 RX ORDER — MAGNESIUM SULFATE IN WATER 40 MG/ML
2 INJECTION, SOLUTION INTRAVENOUS ONCE
Status: COMPLETED | OUTPATIENT
Start: 2020-10-21 | End: 2020-10-21

## 2020-10-21 RX ORDER — ATORVASTATIN CALCIUM 10 MG/1
10 TABLET, FILM COATED ORAL NIGHTLY
Status: DISCONTINUED | OUTPATIENT
Start: 2020-10-21 | End: 2020-10-22 | Stop reason: HOSPADM

## 2020-10-21 RX ORDER — SODIUM CHLORIDE 9 MG/ML
INJECTION, SOLUTION INTRAVENOUS
Status: COMPLETED
Start: 2020-10-21 | End: 2020-10-21

## 2020-10-21 RX ORDER — ASPIRIN 81 MG/1
81 TABLET, CHEWABLE ORAL DAILY
Status: DISCONTINUED | OUTPATIENT
Start: 2020-10-22 | End: 2020-10-22

## 2020-10-21 RX ADMIN — MAGNESIUM SULFATE HEPTAHYDRATE 2 G: 40 INJECTION, SOLUTION INTRAVENOUS at 12:20

## 2020-10-21 RX ADMIN — AMLODIPINE BESYLATE 10 MG: 5 TABLET ORAL at 11:00

## 2020-10-21 RX ADMIN — POTASSIUM CHLORIDE 40 MEQ: 1500 TABLET, EXTENDED RELEASE ORAL at 12:20

## 2020-10-21 RX ADMIN — Medication 10 ML: at 01:11

## 2020-10-21 RX ADMIN — SODIUM CHLORIDE: 9 INJECTION, SOLUTION INTRAVENOUS at 03:50

## 2020-10-21 RX ADMIN — DILTIAZEM HYDROCHLORIDE 5 MG: 5 INJECTION INTRAVENOUS at 01:10

## 2020-10-21 RX ADMIN — Medication 10 ML: at 11:01

## 2020-10-21 RX ADMIN — Medication 10 ML: at 21:01

## 2020-10-21 RX ADMIN — ATORVASTATIN CALCIUM 10 MG: 10 TABLET, FILM COATED ORAL at 21:33

## 2020-10-21 RX ADMIN — LEVOFLOXACIN 500 MG: 500 TABLET, FILM COATED ORAL at 10:59

## 2020-10-21 RX ADMIN — LOSARTAN POTASSIUM 100 MG: 100 TABLET, FILM COATED ORAL at 10:58

## 2020-10-21 RX ADMIN — ENOXAPARIN SODIUM 60 MG: 60 INJECTION SUBCUTANEOUS at 10:57

## 2020-10-21 RX ADMIN — OYSTER SHELL CALCIUM WITH VITAMIN D 1 TABLET: 500; 200 TABLET, FILM COATED ORAL at 10:57

## 2020-10-21 RX ADMIN — PANTOPRAZOLE SODIUM 40 MG: 40 TABLET, DELAYED RELEASE ORAL at 06:24

## 2020-10-21 RX ADMIN — DILTIAZEM HYDROCHLORIDE 5 MG/HR: 5 INJECTION, SOLUTION INTRAVENOUS at 01:13

## 2020-10-21 RX ADMIN — OYSTER SHELL CALCIUM WITH VITAMIN D 1 TABLET: 500; 200 TABLET, FILM COATED ORAL at 21:00

## 2020-10-21 RX ADMIN — ENOXAPARIN SODIUM 60 MG: 60 INJECTION SUBCUTANEOUS at 01:17

## 2020-10-21 RX ADMIN — SODIUM CHLORIDE 250 ML: 9 INJECTION, SOLUTION INTRAVENOUS at 12:20

## 2020-10-21 NOTE — CONSULTS
Patient is being seen at the request of Gertrude Dove for a consultation for abnormal CT chest   CC: right sided chest pain     HISTORY OF PRESENT ILLNESS: Admitted 10/20/20 with 1 day h/o right sided chest pain that radiated to right shoulder blade and across to left shoulder blade, resolved with ultram.  Evaluation in the emergency department included CTPA on 10/20/20 @ [facility] that showed right hilar lymphadenopathy and a growing right pulmonary nodule. I was asked to see for potential biopsy. Of note, recent CT PET did not show significant hypermetabolism in lymph nodes or Pulmonary Nodules but did show hypermetabolic parotid gland which was subsequently biopsied. PAST MEDICAL HISTORY:  Past Medical History:   Diagnosis Date    Atrial fibrillation ([address] 10/21/2020    Diverticulitis     GERD (gastroesophageal reflux disease)     Hyperlipidemia     Hypertension     Osteoarthritis     Osteopenia     PONV (postoperative nausea and vomiting)     Prolonged emergence from general anesthesia     Restless legs syndrome      PAST SURGICAL HISTORY:  Past Surgical History:   Procedure Laterality Date    BREAST REDUCTION SURGERY  10/15/15    COLONOSCOPY  11/15/2016    colon polyps, diverticulosis    OVARIAN CYST REMOVAL      AR EXCISION TUMOR SOFT TISSUE BACK/FLANK SUBQ <3CM N/A 8/23/2019    EXCISION BACK CYST TIMES TWO performed by Lakia Lauren MD at [place] [address] ARTHROSCOPY Right 8/25/2020    VIDEO ARTHROSCOPY RIGHT SHOULDER, ROTATOR CUFF REPAIR, DECOMPRESSION, EXTENSIVE DEBRIDEMENT performed by Jose Mccann MD at [address] [place] [address],[address]:  family history includes Alzheimer's Disease in her mother; Heart Attack in her sister; Heart Disease in her brother, father, mother, and sister; High Blood Pressure in her mother. SOCIAL HISTORY:   reports that she quit smoking about 16 years ago. Her smoking use included cigarettes.  She has a 60.00 pack-year smoking history. She has never used smokeless tobacco.    Scheduled Meds:   enoxaparin  1 mg/kg Subcutaneous BID    magnesium sulfate  2 g Intravenous Once    [START ON 10/22/2020] amLODIPine  10 mg Oral Daily    aspirin  81 mg Oral Daily    calcium-vitamin D  1 tablet Oral BID    losartan  100 mg Oral Daily    pantoprazole  40 mg Oral QAM AC    atorvastatin  10 mg Oral Daily    sodium chloride flush  10 mL Intravenous 2 times per day    levoFLOXacin  500 mg Oral Daily     Continuous Infusions:   dilTIAZem (CARDIZEM) 125 mg in dextrose 5% 125 mL infusion 10 mg/hr (10/21/20 0311)     PRN Meds:  perflutren lipid microspheres, sodium chloride flush, potassium chloride **OR** potassium alternative oral replacement **OR** potassium chloride, acetaminophen **OR** acetaminophen, polyethylene glycol, promethazine **OR** ondansetron, regadenoson    ALLERGIES:  Patient is allergic to naproxen and percocet [oxycodone-acetaminophen]. REVIEW OF SYSTEMS:  Constitutional: Negative for fever  HENT: Negative for sore throat  Eyes: Negative for redness   Respiratory: Negative for dyspnea, cough  Cardiovascular: + for chest pain  Gastrointestinal: Negative for vomiting, diarrhea   Genitourinary: Negative for hematuria   Musculoskeletal: + for arthralgias; recent R shoulder surgery   Skin: Negative for rash  Neurological: Negative for syncope  Hematological: Negative for adenopathy  Psychiatric/Behavorial: Negative for anxiety    PHYSICAL EXAM:  Blood pressure (!) 116/58, pulse 116, temperature 99.2 °F (37.3 °C), temperature source Oral, resp. rate 16, height 4' 11\" (1.499 m), weight 126 lb 12.8 oz (57.5 kg), SpO2 90 %, not currently breastfeeding.' on RA  Gen: No distress. Eyes: PERRL. No sclera icterus. No conjunctival injection. ENT: No discharge. Pharynx clear. Neck: Trachea midline. No obvious mass. Resp: No accessory muscle use. No crackles. No wheezes. No rhonchi. No dullness on percussion. CV: Regular rate. Regular rhythm. No murmur or rub. No edema. Peripheral pulses are 2+. Capillary refill is less than 3 seconds. GI: Non-tender. Non-distended. No hernia. Skin: Warm and dry. No nodule on exposed extremities. Lymph: No cervical LAD. No supraclavicular LAD. M/S: No cyanosis. No joint deformity. No clubbing. Neuro: Awake. Alert. Moves all four extremities. Psych: Oriented x 3. No anxiety. LABS:  CBC:   Recent Labs     10/20/20  1400 10/21/20  0445   WBC 12.9* 10.8   HGB 14.2 13.3   HCT 42.0 39.6   MCV 91.9 94.1    204     BMP:   Recent Labs     10/20/20  1400 10/21/20  0445    137  137   K 3.1* 3.5  3.5   CL 97* 102  102   CO2 30 25  24   PHOS  --  3.3   BUN 19 15  15   CREATININE 0.8 0.7  0.7     LIVER PROFILE:   Recent Labs     10/20/20  1400 10/21/20  0445   AST 12* 14*   ALT 18 20   BILITOT 0.7 0.5   ALKPHOS 75 74     PT/INR:   Recent Labs     10/20/20  1400   PROTIME 12.0   INR 1.03     APTT:   Recent Labs     10/20/20  1400   APTT 27.5     UA:No results for input(s): NITRITE, COLORU, PHUR, LABCAST, WBCUA, RBCUA, MUCUS, TRICHOMONAS, YEAST, BACTERIA, CLARITYU, SPECGRAV, LEUKOCYTESUR, UROBILINOGEN, BILIRUBINUR, BLOODU, GLUCOSEU, AMORPHOUS in the last 72 hours. Invalid input(s): KETONESU  No results for input(s): PHART, RSK2JOO, PO2ART in the last 72 hours.     ECG was reviewed by me and showed a flutter at 120 without acute ischemic ST segment elevation; subsequently NSR on telemetry    Chest imaging was reviewed by me and showed  CTPA 10/20/20  FINDINGS:    Pulmonary Arteries: Pulmonary arteries are adequately opacified for    evaluation.  No evidence of intraluminal filling defect to suggest pulmonary    embolism.  Main pulmonary artery is normal in caliber.         Mediastinum: No evidence of mediastinal lymphadenopathy.  The heart and    pericardium demonstrate no acute abnormality.  There is no acute abnormality    of the thoracic aorta.         Lungs/pleura: Patchy ground-glass appearance of the lungs with areas of gas    trapping.  This is increased from prior study.  There is thickening of the    fissures bilaterally new from the prior study         interval increase in size of right peripheral densities now measuring 2.2 cm    x 1.9 cm previously measuring 1.8 x 1.3 cm.  There is interval increase in of    soft tissue density in the right hilum which appears represent a significant    adenopathy there is central peribronchial thickening.         Upper Abdomen: Limited images of the upper abdomen are unremarkable.         Soft Tissues/Bones: No acute bone or soft tissue abnormality.  No change in    alignment compared to the prior study              Impression    No evidence of pulmonary embolism         Interval increase in size of right peripheral densities interval increase in    adenopathy in the right hilum interval development of Sigrid bronchial    thickening findings are non-specific could represent infectious process with    associated hilar adenopathy autoimmune or inflammatory disorder is included    in the differential correlation clinical data base is required.         Findings are nonspecific.  The possibility of infectious pneumonic process    and associated hilar adenopathy is possibility.  Inflammatory or autoimmune    process would also be in the differential.         Patient had a recent PET scan which demonstrated peripheral nodules to be not    significantly hypermetabolic. 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   Will also obtain fluid samples, possible brushings and biopsy of

## 2020-10-21 NOTE — PROGRESS NOTES
STAT EKG showed A-fib w/ HR of 155. Pt vitals were 141/73 with 94% O2. [facility] Dr. Kathy Brown.

## 2020-10-21 NOTE — CONSULTS
[address] [facility]  [phone]        Reason for Consultation/Chief Complaint: \"I have been having chest pain. \"    Consulted for chest pain    History of Present Illness:  Aleena Hurt is a 70 y.o. patient who presented to [facility] 10/20/20 with c/o chest pain. No cardiac history prior. She has  PMH of HTN, HLD, RLS, OA, and GERD. Most recent ECHO 04/09/19 EF 60% aortic valve sclerosis, mild AR, TR, MR   Now presents with c/o chest pain. She reports mid-CP and right CP described as sharp sensation occurring while laying in bed 2 nights ago. Pain radiated into back and then up into shoulders from her back. Could not sleep and pain constant. Tried Rolaids and prilosec but did not help. Nothing made worse. In ER she reports pain med tramadol helped. She feels better now but some mild, vague heaviness in chest. Admit EKG Sinus rhythm. Note CXR no acute process. Note CT imaging negative PE but right nonspecific peripheral densities and hilar LAD. After admit noted to be in afib and  was placed on Dilt drip 10mg/hr. Converted to NSR 4 hours later and gtt turned off but recurrent with dilt gtt then turned back on. She reverted back to NSR and remains sinus. I reviewed EKG showing afib RVR 155bpm; ST change inferior leads consider ischemia. Another EKG showed afib RVR 120bpm; nonspecific ST change. Note 3 negative Elizabeth; DPY=609; H/H=13/39.6; K+ 3.5. Note she did NOT feel palps with afib. She does report feeling \"fluttering\" in back in September? Note underwent w/u for RLL opacities on CT imaging but PET scan not hypermetabolic. Right parotid lesion found with increased activity and US guided biopsy 10/9/20 performed. She reports negative pathology. Patient with no complaints of SOB, palpitations, dizziness, edema, or orthopnea/PND. I have been asked to provide consultation regarding further management and testing.       Past Medical History:   has a past medical history of Atrial fibrillation ([address] Diverticulitis, GERD (gastroesophageal reflux disease), Hyperlipidemia, Hypertension, Osteoarthritis, Osteopenia, PONV (postoperative nausea and vomiting), Prolonged emergence from general anesthesia, and Restless legs syndrome. Surgical History:   has a past surgical history that includes ovarian cyst removal; Breast reduction surgery (10/15/15); Colonoscopy (11/15/2016); pr excision tumor soft tissue back/flank subq <3cm (N/A, 2019); Shoulder arthroscopy (Right, 2020); and Tonsillectomy. Social History:   She is , retired, 4 grown children, reports that she quit smoking about 16 years ago. Smoked 30 yrs 1.5 ppd Her smoking use included cigarettes. She has a 60.00 pack-year smoking history. She has never used smokeless tobacco. She reports current alcohol use. She reports that she does not use drugs. Family History:  Brother  MI age 50 Sis MI age 71 stents, mom  age 80 Alzheimer's, Dad heart prob hx unknown  family history includes Alzheimer's Disease in her mother; Heart Attack in her sister; Heart Disease in her brother, father, mother, and sister; High Blood Pressure in her mother. Home Medications:  Were reviewed and are listed in nursing record. and/or listed below  Prior to Admission medications    Medication Sig Start Date End Date Taking?  Authorizing Provider   losartan (COZAAR) 100 MG tablet TAKE 1 TABLET BY MOUTH  DAILY 20  Yes Ed Thakur MD   hydroCHLOROthiazide (HYDRODIURIL) 25 MG tablet TAKE 1 TABLET BY MOUTH  DAILY 20  Yes Ed Thakur MD   simvastatin (ZOCOR) 20 MG tablet TAKE 1 TABLET BY MOUTH  NIGHTLY 20  Yes Ed Thakur MD   omeprazole (PRILOSEC) 10 MG delayed release capsule TAKE 1 CAPSULE BY MOUTH  DAILY  Patient taking differently: Take 10 mg by mouth daily as needed  20  Yes Ed Thakur MD   amLODIPine (NORVASC) 10 MG tablet TAKE 1 TABLET BY MOUTH  DAILY 20  Yes Ed Thakur MD   alendronate (FOSAMAX) 70 MG tablet TAKE 1 TABLET BY MOUTH  EVERY 7 DAYS  Patient taking differently: Take 70 mg by mouth every 7 days Takes weekly on Mondays 7/24/20  Yes JEANNA Tamayo - CNP   aspirin 81 MG tablet Take 81 mg by mouth daily   Yes Historical Provider, MD   Calcium Carb-Cholecalciferol (CALCIUM + D3) 600-200 MG-UNIT TABS Take 1 tablet by mouth 2 times daily    Yes Historical Provider, MD        Allergies:  Naproxen and Percocet [oxycodone-acetaminophen]     Review of Systems:   12 point ROS negative in all areas as listed below except as in [place]  Constitutional, EENT, Cardiovascular, pulmonary, GI, , Musculoskeletal, skin, neurological, hematological, endocrine, Psychiatric    Physical Examination:    Vitals:    10/21/20 0538   BP: (!) 111/59   Pulse: 57   Resp: 16   Temp: 97.8 °F (36.6 °C)   SpO2: 91%    Weight: 126 lb 12.8 oz (57.5 kg)         General Appearance:  Alert, cooperative, no distress, appears stated age   Head:  Normocephalic, without obvious abnormality, atraumatic   Eyes:  PERRL, conjunctiva/corneas clear       Nose: Nares normal, no drainage or sinus tenderness   Throat: Lips, mucosa, and tongue normal   Neck: Supple, symmetrical, trachea midline, no adenopathy, thyroid: not enlarged, symmetric, no tenderness/mass/nodules, no carotid bruit or JVD       Lungs:   +soft crackles bases; respirations unlabored   Chest Wall:  No tenderness or deformity   Heart:  Regular rate and rhythm, S1, S2 normal, no murmur, rub or gallop   Abdomen:   Soft, non-tender, bowel sounds active all four quadrants,  no masses, no organomegaly           Extremities: Extremities normal, atraumatic, no cyanosis or edema   Pulses: 2+ and symmetric   Skin: Skin color, texture, turgor normal, no rashes or lesions   Pysch: Normal mood and affect   Neurologic: Normal gross motor and sensory exam.         Labs  CBC:   Lab Results   Component Value Date    WBC 10.8 10/21/2020    RBC 4.21 10/21/2020    HGB 13.3 10/21/2020    HCT 39.6 10/21/2020    MCV 94.1 10/21/2020    RDW 13.4 10/21/2020     10/21/2020     CMP:    Lab Results   Component Value Date     10/21/2020     10/21/2020    K 3.5 10/21/2020    K 3.5 10/21/2020     10/21/2020     10/21/2020    CO2 24 10/21/2020    CO2 25 10/21/2020    BUN 15 10/21/2020    BUN 15 10/21/2020    CREATININE 0.7 10/21/2020    CREATININE 0.7 10/21/2020    GFRAA >60 10/21/2020    GFRAA >60 10/21/2020    GFRAA >60 10/29/2010    AGRATIO 1.3 10/21/2020    LABGLOM >60 10/21/2020    LABGLOM >60 10/21/2020    GLUCOSE 109 10/21/2020    GLUCOSE 113 10/21/2020    PROT 6.1 10/21/2020    PROT 6.9 10/29/2010    CALCIUM 9.5 10/21/2020    CALCIUM 9.5 10/21/2020    BILITOT 0.5 10/21/2020    ALKPHOS 74 10/21/2020    AST 14 10/21/2020    ALT 20 10/21/2020     PT/INR:  No results found for: PTINR  Lab Results   Component Value Date    CKTOTAL 72 04/27/2010    TROPONINI <0.01 10/21/2020       EKG: 10/20/20 I have reviewed EKG with the following interpretation:  Impression:  See HPI Sinus rhythm with Premature atrial complexesNonspecific ST and T wave abnormalityConfirmed by Tiffanie Plata MD, [address] ([id number]) on 10/20/2020 3:00:06 PM     CXR 10/20/20  FINDINGS: The lungs are without acute focal process. There is no effusion or pneumothorax. The cardiomediastinal silhouette is stable. The osseous structures are stable. CTPA 10/20/20  FINDINGS: Pulmonary Arteries: Pulmonary arteries are adequately opacified for evaluation. No evidence of intraluminal filling defect to suggest pulmonary embolism. Main pulmonary artery is normal in caliber. Mediastinum: No evidence of mediastinal lymphadenopathy. The heart and pericardium demonstrate no acute abnormality. There is no acute abnormality of the thoracic aorta. Lungs/pleura: Patchy ground-glass appearance of the lungs with areas of gas trapping. This is increased from prior study.   There is thickening of the fissures bilaterally new from the prior study interval increase in size of right peripheral densities now measuring 2.2 cm x 1.9 cm previously measuring 1.8 x 1.3 cm. There is interval increase in of soft tissue density in the right hilum which appears represent a significant adenopathy there is central peribronchial thickening. Upper Abdomen: Limited images of the upper abdomen are unremarkable. Soft Tissues/Bones: No acute bone or soft tissue abnormality. No change in alignment compared to the prior study     EKG 9/13/20  Normal sinus rhythmNormal ECGConfirmed by Cornelia Reyna MD, [facility] ([id number]) on 9/13/2020 12:15:44      ECHO 4/9/19      Assessment:  Dillon Pedersen is a 70 y.o. patient who presented to [facility] 10/20/20 with c/o chest pain. No cardiac history prior. She has  PMH of HTN, HLD, RLS, OA, and GERD. Most recent ECHO 04/09/19 EF 60% aortic valve sclerosis, mild AR, TR, MR   Now presents with c/o chest pain. She reports mid-CP and right CP described as sharp sensation occurring while laying in bed 2 nights ago. Pain radiated into back and then up into shoulders from her back. Could not sleep and pain constant. Tried Rolaids and prilosec but did not help. Nothing made worse. In ER she reports pain med tramadol helped. She feels better now but some mild, vague heaviness in chest. Admit EKG Sinus rhythm. Note CXR no acute process. Note CT imaging negative PE but right nonspecific peripheral densities and hilar LAD. After admit noted to be in afib and  was placed on Dilt drip 10mg/hr. Converted to NSR 4 hours later and gtt turned off but recurrent with dilt gtt then turned back on. She reverted back to NSR and remains sinus. I reviewed EKG showing afib RVR 155bpm; ST change inferior leads consider ischemia. Another EKG showed afib RVR 120bpm; nonspecific ST change. Note 3 negative Elizabeth; BOV=674; H/H=13/39.6; K+ 3.5. Note she did NOT feel palps with afib. She does report feeling \"fluttering\" in back in September?  Note underwent w/u for RLL opacities on CT imaging but PET scan not hypermetabolic. Right parotid lesion found with increased activity and US guided biopsy 10/9/20 performed. She reports negative pathology. Diagnosis of unspecified chest pain and newly diagnosed paroxysmal atrial fibrillation in elderly female with HTN and HLD. Recs:  1. Cancel nuclear stress study. Want to check ECHO first along with pulm consult before making further decisions. 2. Pending ECHO to evaluate LV function and size, wall motion, and valves for any structural abnormalities. 3. Continue lovenox 60mg SQ BID for AC; CHADs-vasc=2.  4. Continue norvasc 10mg qd, baby asa qd, lipitor 10mg qd, losartan 100mg qd. 5. D/C'd diltiazem gtt  6. Follow telemetry  7. Pulm consult regarding CT imaging and right-sided findings. ? Contributing to pain issue. Patient Active Problem List   Diagnosis    Hypertension    Hyperlipidemia    Osteopenia    IFG (impaired fasting glucose)    DDD (degenerative disc disease), cervical    Thoracic sprain    Macromastia    Infected sebaceous cyst    Complete tear of right rotator cuff    Parotid mass    Chest pain    Hilar adenopathy    Hypokalemia    Atrial fibrillation ([address]       Thank you for allowing to us to participate in the care or Cary Nguyen. Further evaluation will be based upon the patient's clinical course and testing results.

## 2020-10-21 NOTE — PROGRESS NOTES
Cardiology consult has been called to Dr. Uriel Grace on 10/21/20 through answering service @ 1944.  Mendel Casper [place]

## 2020-10-21 NOTE — PROGRESS NOTES
Diltiazem drip stopped this morning per Dr. Vivek Stokes. Patient converted to NSR. Patient HR in the 150's within the hour. Steffanie Dupree reordered drip @ 10mL/hr.

## 2020-10-21 NOTE — PROGRESS NOTES
Shift assessment completed. See flow sheet. Medications given. Patient is A&O x4. Patient denies further needs. Call light within reach. Will continue to monitor.

## 2020-10-21 NOTE — PROGRESS NOTES
Shift assessment complete. Scheduled medications given at this time. Pt states she needs the Lipitor at night, however before this writer could message Dr harkins stated to disregard. Call light within reach. Will continue to monitor.

## 2020-10-21 NOTE — CARE COORDINATION
Review of chart screened for potential discharge planning needs. Contact with _patient____(pt/support person)   Role of discharge planner explained with verbalized understanding. No Needs identified by pt/support person for barriers to discharge. Readmission Risk Score:14%  No discharge needs noted not following. MD and bedside RN please notify CM if needs arise for any discharge interventions.

## 2020-10-21 NOTE — PROGRESS NOTES
Progress Note    Admit Date:  10/20/2020    Subjective:  Ms. Otoniel Loaiza seen sitting up in bed. Chest pain resolved. She has been flipping between a fib and NSR overnight     Objective:   Patient Vitals for the past 4 hrs:   BP Temp Temp src Pulse Resp SpO2   10/21/20 1030 (!) 116/58 99.2 °F (37.3 °C) Oral 116 16 90 %            Intake/Output Summary (Last 24 hours) at 10/21/2020 1135  Last data filed at 10/21/2020 0846  Gross per 24 hour   Intake 120 ml   Output 700 ml   Net -580 ml       Physical Exam:    Gen: Middle aged female. No distress. Alert. Eyes: PERRL. No sclera icterus. No conjunctival injection. ENT: No discharge. Pharynx clear. Neck: No JVD. Trachea midline. Resp: No accessory muscle use. No crackles. No wheezes. No rhonchi. CV: Irregularly irregular. No murmur. No rub. No edema. GI: Non-tender. Non-distended. Normal bowel sounds. Skin: Warm and dry. No nodule on exposed extremities. No rash on exposed extremities. M/S: No cyanosis. No joint deformity. No clubbing. Neuro: Awake. Grossly nonfocal    Psych: Oriented x 3. No anxiety or agitation.        Scheduled Meds:   enoxaparin  1 mg/kg Subcutaneous BID    magnesium sulfate  2 g Intravenous Once    potassium chloride  40 mEq Oral Once    amLODIPine  10 mg Oral Daily    aspirin  81 mg Oral Daily    calcium-vitamin D  1 tablet Oral BID    losartan  100 mg Oral Daily    pantoprazole  40 mg Oral QAM AC    atorvastatin  10 mg Oral Daily    sodium chloride flush  10 mL Intravenous 2 times per day    levoFLOXacin  500 mg Oral Daily       Continuous Infusions:   dilTIAZem (CARDIZEM) 125 mg in dextrose 5% 125 mL infusion 10 mg/hr (10/21/20 0311)       PRN Meds:  perflutren lipid microspheres, sodium chloride flush, potassium chloride **OR** potassium alternative oral replacement **OR** potassium chloride, acetaminophen **OR** acetaminophen, polyethylene glycol, promethazine **OR** ondansetron, regadenoson      Data:  CBC: Recent Labs     10/20/20  1400 10/21/20  0445   WBC 12.9* 10.8   HGB 14.2 13.3   HCT 42.0 39.6   MCV 91.9 94.1    204     BMP:   Recent Labs     10/20/20  1400 10/21/20  0445    137  137   K 3.1* 3.5  3.5   CL 97* 102  102   CO2 30 25  24   PHOS  --  3.3   BUN 19 15  15   CREATININE 0.8 0.7  0.7     LIVER PROFILE:   Recent Labs     10/20/20  1400 10/21/20  0445   AST 12* 14*   ALT 18 20   BILITOT 0.7 0.5   ALKPHOS 75 74     PT/INR:   Recent Labs     10/20/20  1400   PROTIME 12.0   INR 1.03       CULTURES  None      RADIOLOGY  CT CHEST PULMONARY EMBOLISM W CONTRAST   Final Result   No evidence of pulmonary embolism      Interval increase in size of right peripheral densities interval increase in   adenopathy in the right hilum interval development of Sigrid bronchial   thickening findings are non-specific could represent infectious process with   associated hilar adenopathy autoimmune or inflammatory disorder is included   in the differential correlation clinical data base is required. Findings are nonspecific. The possibility of infectious pneumonic process   and associated hilar adenopathy is possibility. Inflammatory or autoimmune   process would also be in the differential.      Patient had a recent PET scan which demonstrated peripheral nodules to be not   significantly hypermetabolic. XR CHEST PORTABLE   Final Result   No acute process. Stable compared to prior study               JUDY Castillo have reviewed the chart on [place] and personally interviewed and examined patient, reviewed the data (labs and imaging) and after discussion with my PA formulated the plan. Agree with note with the following edits. HPI:     Patient admitted to hospital with chest pain. Chest pain is resolved. She has been in and out of A. fib. When I saw her she was in sinus rhythm but later on went back into A. fib. She is on IV Cardizem. Cardiology is seeing patient. PM

## 2020-10-21 NOTE — PROGRESS NOTES
Spoke with Dr. Tesha Beckett regarding cardizem gtt. Per Dr. Tesha Beckett, as patient is now in NSR, plan to DC cardizem gtt at this time and see how HR does. If patient does go back into A-fib she may be placed back on the cardizem gtt.

## 2020-10-22 ENCOUNTER — APPOINTMENT (OUTPATIENT)
Dept: PHYSICAL THERAPY | Age: 75
End: 2020-10-22
Payer: MEDICARE

## 2020-10-22 ENCOUNTER — APPOINTMENT (OUTPATIENT)
Dept: GENERAL RADIOLOGY | Age: 75
DRG: 264 | End: 2020-10-22
Payer: MEDICARE

## 2020-10-22 ENCOUNTER — ANESTHESIA (OUTPATIENT)
Dept: ENDOSCOPY | Age: 75
DRG: 264 | End: 2020-10-22
Payer: MEDICARE

## 2020-10-22 ENCOUNTER — TELEPHONE (OUTPATIENT)
Dept: PULMONOLOGY | Age: 75
End: 2020-10-22

## 2020-10-22 VITALS
DIASTOLIC BLOOD PRESSURE: 60 MMHG | OXYGEN SATURATION: 90 % | BODY MASS INDEX: 25.46 KG/M2 | WEIGHT: 126.3 LBS | HEART RATE: 92 BPM | SYSTOLIC BLOOD PRESSURE: 115 MMHG | HEIGHT: 59 IN | RESPIRATION RATE: 18 BRPM | TEMPERATURE: 97.3 F

## 2020-10-22 VITALS
OXYGEN SATURATION: 95 % | RESPIRATION RATE: 1 BRPM | DIASTOLIC BLOOD PRESSURE: 78 MMHG | SYSTOLIC BLOOD PRESSURE: 149 MMHG

## 2020-10-22 LAB
ANION GAP SERPL CALCULATED.3IONS-SCNC: 10 MMOL/L (ref 3–16)
APPEARANCE BAL (LAVAGE): ABNORMAL
BASOPHILS ABSOLUTE: 0.1 K/UL (ref 0–0.2)
BASOPHILS RELATIVE PERCENT: 0.7 %
BUN BLDV-MCNC: 20 MG/DL (ref 7–20)
CALCIUM SERPL-MCNC: 8.9 MG/DL (ref 8.3–10.6)
CHLORIDE BLD-SCNC: 103 MMOL/L (ref 99–110)
CLOT EVALUATION BAL: ABNORMAL
CO2: 24 MMOL/L (ref 21–32)
COLOR LAVAGE: ABNORMAL
CREAT SERPL-MCNC: 0.7 MG/DL (ref 0.6–1.2)
EOSINOPHILS ABSOLUTE: 0.1 K/UL (ref 0–0.6)
EOSINOPHILS RELATIVE PERCENT: 1.7 %
GFR AFRICAN AMERICAN: >60
GFR NON-AFRICAN AMERICAN: >60
GLUCOSE BLD-MCNC: 115 MG/DL (ref 70–99)
HCT VFR BLD CALC: 39 % (ref 36–48)
HEMOGLOBIN: 13.1 G/DL (ref 12–16)
LV EF: 65 %
LVEF MODALITY: NORMAL
LYMPHOCYTES ABSOLUTE: 1.5 K/UL (ref 1–5.1)
LYMPHOCYTES RELATIVE PERCENT: 17.7 %
LYMPHOCYTES, BAL: 16 % (ref 5–10)
MACROPHAGES, BAL: 2 % (ref 90–95)
MCH RBC QN AUTO: 31.6 PG (ref 26–34)
MCHC RBC AUTO-ENTMCNC: 33.7 G/DL (ref 31–36)
MCV RBC AUTO: 93.8 FL (ref 80–100)
MONOCYTES ABSOLUTE: 0.7 K/UL (ref 0–1.3)
MONOCYTES RELATIVE PERCENT: 8.4 %
MONOCYTES, BAL: 2 %
NEUTROPHILS ABSOLUTE: 6.2 K/UL (ref 1.7–7.7)
NEUTROPHILS RELATIVE PERCENT: 71.5 %
NUMBER OF CELLS COUNTED BAL (LAVAGE): 50
PDW BLD-RTO: 13.3 % (ref 12.4–15.4)
PLATELET # BLD: 242 K/UL (ref 135–450)
PMV BLD AUTO: 8.5 FL (ref 5–10.5)
POTASSIUM REFLEX MAGNESIUM: 3.9 MMOL/L (ref 3.5–5.1)
RBC # BLD: 4.16 M/UL (ref 4–5.2)
RBC, BAL: ABNORMAL /CUMM
SARS-COV-2, NAAT: NOT DETECTED
SEGMENTED NEUTROPHILS, BAL: 80 % (ref 5–10)
SODIUM BLD-SCNC: 137 MMOL/L (ref 136–145)
WBC # BLD: 8.7 K/UL (ref 4–11)
WBC/EPI CELLS BAL: 168 /CUMM

## 2020-10-22 PROCEDURE — 6360000002 HC RX W HCPCS: Performed by: ANESTHESIOLOGY

## 2020-10-22 PROCEDURE — 31652 BRONCH EBUS SAMPLNG 1/2 NODE: CPT | Performed by: INTERNAL MEDICINE

## 2020-10-22 PROCEDURE — 88312 SPECIAL STAINS GROUP 1: CPT

## 2020-10-22 PROCEDURE — 88172 CYTP DX EVAL FNA 1ST EA SITE: CPT

## 2020-10-22 PROCEDURE — 6360000002 HC RX W HCPCS: Performed by: NURSE ANESTHETIST, CERTIFIED REGISTERED

## 2020-10-22 PROCEDURE — 2500000003 HC RX 250 WO HCPCS: Performed by: ANESTHESIOLOGY

## 2020-10-22 PROCEDURE — 88184 FLOWCYTOMETRY/ TC 1 MARKER: CPT

## 2020-10-22 PROCEDURE — 99233 SBSQ HOSP IP/OBS HIGH 50: CPT | Performed by: INTERNAL MEDICINE

## 2020-10-22 PROCEDURE — 31623 DX BRONCHOSCOPE/BRUSH: CPT | Performed by: INTERNAL MEDICINE

## 2020-10-22 PROCEDURE — 87116 MYCOBACTERIA CULTURE: CPT

## 2020-10-22 PROCEDURE — 80048 BASIC METABOLIC PNL TOTAL CA: CPT

## 2020-10-22 PROCEDURE — 6370000000 HC RX 637 (ALT 250 FOR IP): Performed by: NURSE ANESTHETIST, CERTIFIED REGISTERED

## 2020-10-22 PROCEDURE — 3609011200 HC BRONCHOSCOPY W/TRANSBRONCL NDL ASPIR BX EA ADDL LOBE: Performed by: INTERNAL MEDICINE

## 2020-10-22 PROCEDURE — 07B74ZX EXCISION OF THORAX LYMPHATIC, PERCUTANEOUS ENDOSCOPIC APPROACH, DIAGNOSTIC: ICD-10-PCS | Performed by: INTERNAL MEDICINE

## 2020-10-22 PROCEDURE — 88112 CYTOPATH CELL ENHANCE TECH: CPT

## 2020-10-22 PROCEDURE — 88173 CYTOPATH EVAL FNA REPORT: CPT

## 2020-10-22 PROCEDURE — 7100000000 HC PACU RECOVERY - FIRST 15 MIN: Performed by: INTERNAL MEDICINE

## 2020-10-22 PROCEDURE — 0B9F8ZX DRAINAGE OF RIGHT LOWER LUNG LOBE, VIA NATURAL OR ARTIFICIAL OPENING ENDOSCOPIC, DIAGNOSTIC: ICD-10-PCS | Performed by: INTERNAL MEDICINE

## 2020-10-22 PROCEDURE — 3609011900 HC BRONCHOSCOPY NEEDLE BX TRACHEA MAIN STEM&/BRON: Performed by: INTERNAL MEDICINE

## 2020-10-22 PROCEDURE — 6370000000 HC RX 637 (ALT 250 FOR IP): Performed by: PHYSICIAN ASSISTANT

## 2020-10-22 PROCEDURE — 2709999900 HC NON-CHARGEABLE SUPPLY: Performed by: INTERNAL MEDICINE

## 2020-10-22 PROCEDURE — 3700000000 HC ANESTHESIA ATTENDED CARE: Performed by: INTERNAL MEDICINE

## 2020-10-22 PROCEDURE — 87015 SPECIMEN INFECT AGNT CONCNTJ: CPT

## 2020-10-22 PROCEDURE — 2500000003 HC RX 250 WO HCPCS: Performed by: NURSE ANESTHETIST, CERTIFIED REGISTERED

## 2020-10-22 PROCEDURE — 7100000001 HC PACU RECOVERY - ADDTL 15 MIN: Performed by: INTERNAL MEDICINE

## 2020-10-22 PROCEDURE — 87206 SMEAR FLUORESCENT/ACID STAI: CPT

## 2020-10-22 PROCEDURE — 2580000003 HC RX 258: Performed by: NURSE PRACTITIONER

## 2020-10-22 PROCEDURE — 93306 TTE W/DOPPLER COMPLETE: CPT

## 2020-10-22 PROCEDURE — 3700000001 HC ADD 15 MINUTES (ANESTHESIA): Performed by: INTERNAL MEDICINE

## 2020-10-22 PROCEDURE — 3603165200 HC BRNCHSC EBUS GUIDED SAMPL 1/2 NODE STATION/STRUX: Performed by: INTERNAL MEDICINE

## 2020-10-22 PROCEDURE — 2720000010 HC SURG SUPPLY STERILE: Performed by: INTERNAL MEDICINE

## 2020-10-22 PROCEDURE — 0BD78ZX EXTRACTION OF LEFT MAIN BRONCHUS, VIA NATURAL OR ARTIFICIAL OPENING ENDOSCOPIC, DIAGNOSTIC: ICD-10-PCS | Performed by: INTERNAL MEDICINE

## 2020-10-22 PROCEDURE — 88185 FLOWCYTOMETRY/TC ADD-ON: CPT

## 2020-10-22 PROCEDURE — 3609010800 HC BRONCHOSCOPY ALVEOLAR LAVAGE: Performed by: INTERNAL MEDICINE

## 2020-10-22 PROCEDURE — 0BD38ZX EXTRACTION OF RIGHT MAIN BRONCHUS, VIA NATURAL OR ARTIFICIAL OPENING ENDOSCOPIC, DIAGNOSTIC: ICD-10-PCS | Performed by: INTERNAL MEDICINE

## 2020-10-22 PROCEDURE — 88305 TISSUE EXAM BY PATHOLOGIST: CPT

## 2020-10-22 PROCEDURE — U0002 COVID-19 LAB TEST NON-CDC: HCPCS

## 2020-10-22 PROCEDURE — 87102 FUNGUS ISOLATION CULTURE: CPT

## 2020-10-22 PROCEDURE — 88177 CYTP FNA EVAL EA ADDL: CPT

## 2020-10-22 PROCEDURE — 76000 FLUOROSCOPY <1 HR PHYS/QHP: CPT

## 2020-10-22 PROCEDURE — 87070 CULTURE OTHR SPECIMN AEROBIC: CPT

## 2020-10-22 PROCEDURE — 2580000003 HC RX 258: Performed by: ANESTHESIOLOGY

## 2020-10-22 PROCEDURE — 3609011100 HC BRONCHOSCOPY BRUSHINGS: Performed by: INTERNAL MEDICINE

## 2020-10-22 PROCEDURE — 88342 IMHCHEM/IMCYTCHM 1ST ANTB: CPT

## 2020-10-22 PROCEDURE — 0BDF8ZX EXTRACTION OF RIGHT LOWER LUNG LOBE, VIA NATURAL OR ARTIFICIAL OPENING ENDOSCOPIC, DIAGNOSTIC: ICD-10-PCS | Performed by: INTERNAL MEDICINE

## 2020-10-22 PROCEDURE — 99238 HOSP IP/OBS DSCHRG MGMT 30/<: CPT | Performed by: INTERNAL MEDICINE

## 2020-10-22 PROCEDURE — 36415 COLL VENOUS BLD VENIPUNCTURE: CPT

## 2020-10-22 PROCEDURE — 87205 SMEAR GRAM STAIN: CPT

## 2020-10-22 PROCEDURE — 85025 COMPLETE CBC W/AUTO DIFF WBC: CPT

## 2020-10-22 PROCEDURE — 31624 DX BRONCHOSCOPE/LAVAGE: CPT | Performed by: INTERNAL MEDICINE

## 2020-10-22 PROCEDURE — 6370000000 HC RX 637 (ALT 250 FOR IP): Performed by: NURSE PRACTITIONER

## 2020-10-22 PROCEDURE — 89051 BODY FLUID CELL COUNT: CPT

## 2020-10-22 RX ORDER — LEVOFLOXACIN 500 MG/1
500 TABLET, FILM COATED ORAL DAILY
Qty: 3 TABLET | Refills: 0 | Status: SHIPPED | OUTPATIENT
Start: 2020-10-23 | End: 2020-10-26

## 2020-10-22 RX ORDER — OXYCODONE HYDROCHLORIDE AND ACETAMINOPHEN 5; 325 MG/1; MG/1
2 TABLET ORAL PRN
Status: DISCONTINUED | OUTPATIENT
Start: 2020-10-22 | End: 2020-10-22 | Stop reason: HOSPADM

## 2020-10-22 RX ORDER — MORPHINE SULFATE 10 MG/ML
2 INJECTION, SOLUTION INTRAMUSCULAR; INTRAVENOUS EVERY 5 MIN PRN
Status: DISCONTINUED | OUTPATIENT
Start: 2020-10-22 | End: 2020-10-22 | Stop reason: HOSPADM

## 2020-10-22 RX ORDER — ONDANSETRON 2 MG/ML
4 INJECTION INTRAMUSCULAR; INTRAVENOUS
Status: DISCONTINUED | OUTPATIENT
Start: 2020-10-22 | End: 2020-10-22 | Stop reason: HOSPADM

## 2020-10-22 RX ORDER — MORPHINE SULFATE 10 MG/ML
1 INJECTION, SOLUTION INTRAMUSCULAR; INTRAVENOUS EVERY 5 MIN PRN
Status: DISCONTINUED | OUTPATIENT
Start: 2020-10-22 | End: 2020-10-22 | Stop reason: HOSPADM

## 2020-10-22 RX ORDER — ROCURONIUM BROMIDE 10 MG/ML
INJECTION, SOLUTION INTRAVENOUS PRN
Status: DISCONTINUED | OUTPATIENT
Start: 2020-10-22 | End: 2020-10-22 | Stop reason: SDUPTHER

## 2020-10-22 RX ORDER — LIDOCAINE HYDROCHLORIDE 40 MG/ML
SOLUTION TOPICAL PRN
Status: DISCONTINUED | OUTPATIENT
Start: 2020-10-22 | End: 2020-10-22 | Stop reason: SDUPTHER

## 2020-10-22 RX ORDER — LIDOCAINE HYDROCHLORIDE 20 MG/ML
INJECTION, SOLUTION INFILTRATION; PERINEURAL PRN
Status: DISCONTINUED | OUTPATIENT
Start: 2020-10-22 | End: 2020-10-22 | Stop reason: SDUPTHER

## 2020-10-22 RX ORDER — PROPOFOL 10 MG/ML
INJECTION, EMULSION INTRAVENOUS PRN
Status: DISCONTINUED | OUTPATIENT
Start: 2020-10-22 | End: 2020-10-22 | Stop reason: SDUPTHER

## 2020-10-22 RX ORDER — DEXAMETHASONE SODIUM PHOSPHATE 4 MG/ML
INJECTION, SOLUTION INTRA-ARTICULAR; INTRALESIONAL; INTRAMUSCULAR; INTRAVENOUS; SOFT TISSUE PRN
Status: DISCONTINUED | OUTPATIENT
Start: 2020-10-22 | End: 2020-10-22 | Stop reason: SDUPTHER

## 2020-10-22 RX ORDER — ONDANSETRON 2 MG/ML
INJECTION INTRAMUSCULAR; INTRAVENOUS PRN
Status: DISCONTINUED | OUTPATIENT
Start: 2020-10-22 | End: 2020-10-22 | Stop reason: SDUPTHER

## 2020-10-22 RX ORDER — SODIUM CHLORIDE 0.9 % (FLUSH) 0.9 %
10 SYRINGE (ML) INJECTION EVERY 12 HOURS SCHEDULED
Status: DISCONTINUED | OUTPATIENT
Start: 2020-10-22 | End: 2020-10-22

## 2020-10-22 RX ORDER — APREPITANT 40 MG/1
40 CAPSULE ORAL ONCE
Status: COMPLETED | OUTPATIENT
Start: 2020-10-22 | End: 2020-10-22

## 2020-10-22 RX ORDER — SODIUM CHLORIDE 0.9 % (FLUSH) 0.9 %
10 SYRINGE (ML) INJECTION PRN
Status: DISCONTINUED | OUTPATIENT
Start: 2020-10-22 | End: 2020-10-22

## 2020-10-22 RX ORDER — OXYCODONE HYDROCHLORIDE AND ACETAMINOPHEN 5; 325 MG/1; MG/1
1 TABLET ORAL PRN
Status: DISCONTINUED | OUTPATIENT
Start: 2020-10-22 | End: 2020-10-22 | Stop reason: HOSPADM

## 2020-10-22 RX ORDER — SODIUM CHLORIDE, SODIUM LACTATE, POTASSIUM CHLORIDE, CALCIUM CHLORIDE 600; 310; 30; 20 MG/100ML; MG/100ML; MG/100ML; MG/100ML
INJECTION, SOLUTION INTRAVENOUS CONTINUOUS
Status: DISCONTINUED | OUTPATIENT
Start: 2020-10-22 | End: 2020-10-22

## 2020-10-22 RX ORDER — MEPERIDINE HYDROCHLORIDE 25 MG/ML
12.5 INJECTION INTRAMUSCULAR; INTRAVENOUS; SUBCUTANEOUS EVERY 5 MIN PRN
Status: DISCONTINUED | OUTPATIENT
Start: 2020-10-22 | End: 2020-10-22 | Stop reason: HOSPADM

## 2020-10-22 RX ORDER — ASPIRIN 81 MG/1
81 TABLET, CHEWABLE ORAL NIGHTLY
Status: DISCONTINUED | OUTPATIENT
Start: 2020-10-22 | End: 2020-10-22 | Stop reason: HOSPADM

## 2020-10-22 RX ADMIN — PROPOFOL 150 MG: 10 INJECTION, EMULSION INTRAVENOUS at 07:16

## 2020-10-22 RX ADMIN — PHENYLEPHRINE HYDROCHLORIDE 100 MCG: 10 INJECTION INTRAVENOUS at 07:31

## 2020-10-22 RX ADMIN — LOSARTAN POTASSIUM 100 MG: 100 TABLET, FILM COATED ORAL at 11:29

## 2020-10-22 RX ADMIN — Medication 10 ML: at 11:31

## 2020-10-22 RX ADMIN — LIDOCAINE HYDROCHLORIDE 4 ML: 40 SPRAY LARYNGEAL; TRANSTRACHEAL at 07:17

## 2020-10-22 RX ADMIN — ONDANSETRON 4 MG: 2 INJECTION, SOLUTION INTRAMUSCULAR; INTRAVENOUS at 08:02

## 2020-10-22 RX ADMIN — PHENYLEPHRINE HYDROCHLORIDE 100 MCG: 10 INJECTION INTRAVENOUS at 07:25

## 2020-10-22 RX ADMIN — SODIUM CHLORIDE, POTASSIUM CHLORIDE, SODIUM LACTATE AND CALCIUM CHLORIDE: 600; 310; 30; 20 INJECTION, SOLUTION INTRAVENOUS at 07:11

## 2020-10-22 RX ADMIN — APREPITANT 40 MG: 40 CAPSULE ORAL at 07:07

## 2020-10-22 RX ADMIN — OYSTER SHELL CALCIUM WITH VITAMIN D 1 TABLET: 500; 200 TABLET, FILM COATED ORAL at 11:30

## 2020-10-22 RX ADMIN — LEVOFLOXACIN 500 MG: 500 TABLET, FILM COATED ORAL at 11:29

## 2020-10-22 RX ADMIN — ROCURONIUM BROMIDE 25 MG: 10 INJECTION, SOLUTION INTRAVENOUS at 07:16

## 2020-10-22 RX ADMIN — SUGAMMADEX 200 MG: 100 INJECTION, SOLUTION INTRAVENOUS at 08:17

## 2020-10-22 RX ADMIN — PHENYLEPHRINE HYDROCHLORIDE 200 MCG: 10 INJECTION INTRAVENOUS at 07:56

## 2020-10-22 RX ADMIN — PHENYLEPHRINE HYDROCHLORIDE 100 MCG: 10 INJECTION INTRAVENOUS at 07:39

## 2020-10-22 RX ADMIN — LIDOCAINE HYDROCHLORIDE 50 MG: 20 INJECTION, SOLUTION INFILTRATION; PERINEURAL at 07:16

## 2020-10-22 RX ADMIN — AMLODIPINE BESYLATE 10 MG: 5 TABLET ORAL at 11:30

## 2020-10-22 RX ADMIN — ONDANSETRON 4 MG: 2 INJECTION, SOLUTION INTRAMUSCULAR; INTRAVENOUS at 07:30

## 2020-10-22 RX ADMIN — PHENYLEPHRINE HYDROCHLORIDE 100 MCG: 10 INJECTION INTRAVENOUS at 07:20

## 2020-10-22 RX ADMIN — FAMOTIDINE 20 MG: 10 INJECTION, SOLUTION INTRAVENOUS at 07:10

## 2020-10-22 RX ADMIN — DEXAMETHASONE SODIUM PHOSPHATE 12 MG: 4 INJECTION, SOLUTION INTRAMUSCULAR; INTRAVENOUS at 07:30

## 2020-10-22 RX ADMIN — PHENYLEPHRINE HYDROCHLORIDE 100 MCG: 10 INJECTION INTRAVENOUS at 07:43

## 2020-10-22 ASSESSMENT — PULMONARY FUNCTION TESTS
PIF_VALUE: 1
PIF_VALUE: 4
PIF_VALUE: 28
PIF_VALUE: 25
PIF_VALUE: 28
PIF_VALUE: 2
PIF_VALUE: 23
PIF_VALUE: 23
PIF_VALUE: 1
PIF_VALUE: 28
PIF_VALUE: 19
PIF_VALUE: 25
PIF_VALUE: 19
PIF_VALUE: 1
PIF_VALUE: 28
PIF_VALUE: 24
PIF_VALUE: 28
PIF_VALUE: 1
PIF_VALUE: 23
PIF_VALUE: 23
PIF_VALUE: 19
PIF_VALUE: 28
PIF_VALUE: 1
PIF_VALUE: 25
PIF_VALUE: 18
PIF_VALUE: 19
PIF_VALUE: 23
PIF_VALUE: 23
PIF_VALUE: 21
PIF_VALUE: 23
PIF_VALUE: 17
PIF_VALUE: 28
PIF_VALUE: 19
PIF_VALUE: 19
PIF_VALUE: 23
PIF_VALUE: 28
PIF_VALUE: 21
PIF_VALUE: 28
PIF_VALUE: 0
PIF_VALUE: 19
PIF_VALUE: 23
PIF_VALUE: 28
PIF_VALUE: 25
PIF_VALUE: 19
PIF_VALUE: 23
PIF_VALUE: 21
PIF_VALUE: 4
PIF_VALUE: 28
PIF_VALUE: 23
PIF_VALUE: 25
PIF_VALUE: 28
PIF_VALUE: 5
PIF_VALUE: 2
PIF_VALUE: 28
PIF_VALUE: 28
PIF_VALUE: 2
PIF_VALUE: 25
PIF_VALUE: 3
PIF_VALUE: 19
PIF_VALUE: 23
PIF_VALUE: 28
PIF_VALUE: 28
PIF_VALUE: 13
PIF_VALUE: 23
PIF_VALUE: 25
PIF_VALUE: 28
PIF_VALUE: 4
PIF_VALUE: 23
PIF_VALUE: 28
PIF_VALUE: 28

## 2020-10-22 ASSESSMENT — LIFESTYLE VARIABLES: SMOKING_STATUS: 0

## 2020-10-22 ASSESSMENT — ENCOUNTER SYMPTOMS: SHORTNESS OF BREATH: 0

## 2020-10-22 NOTE — H&P
Fiberoptic bronchoscopy history:     Patient with Pulmonary Nodules and hilar lymphadenopathy; requires fiberoptic bronchoscopy with biopsy. Patient is allergic to naproxen and percocet [oxycodone-acetaminophen]. Past Medical History:   Diagnosis Date    Atrial fibrillation ([address] 10/21/2020    Diverticulitis     GERD (gastroesophageal reflux disease)     Hyperlipidemia     Hypertension     Osteoarthritis     Osteopenia     PONV (postoperative nausea and vomiting)     Prolonged emergence from general anesthesia     Restless legs syndrome        Past Surgical History:   Procedure Laterality Date    BREAST REDUCTION SURGERY  10/15/15    COLONOSCOPY  11/15/2016    colon polyps, diverticulosis    OVARIAN CYST REMOVAL      NV EXCISION TUMOR SOFT TISSUE BACK/FLANK SUBQ <3CM N/A 8/23/2019    EXCISION BACK CYST TIMES TWO performed by Angelica Harris MD at [place] [address] ARTHROSCOPY Right 8/25/2020    VIDEO ARTHROSCOPY RIGHT SHOULDER, ROTATOR CUFF REPAIR, DECOMPRESSION, EXTENSIVE DEBRIDEMENT performed by Aaron Koehler MD at [facility]   Allergen Reactions    Naproxen Anaphylaxis    Percocet [Oxycodone-Acetaminophen] Nausea Only       No current facility-administered medications on file prior to encounter.       Current Outpatient Medications on File Prior to Encounter   Medication Sig Dispense Refill    losartan (COZAAR) 100 MG tablet TAKE 1 TABLET BY MOUTH  DAILY 90 tablet 3    hydroCHLOROthiazide (HYDRODIURIL) 25 MG tablet TAKE 1 TABLET BY MOUTH  DAILY 90 tablet 3    simvastatin (ZOCOR) 20 MG tablet TAKE 1 TABLET BY MOUTH  NIGHTLY 90 tablet 3    omeprazole (PRILOSEC) 10 MG delayed release capsule TAKE 1 CAPSULE BY MOUTH  DAILY (Patient taking differently: Take 10 mg by mouth daily as needed ) 90 capsule 3    amLODIPine (NORVASC) 10 MG tablet TAKE 1 TABLET BY MOUTH  DAILY 90 tablet 3    alendronate (FOSAMAX) 70 MG tablet TAKE 1 TABLET BY MOUTH  EVERY 7 DAYS (Patient taking differently: Take 70 mg by mouth every 7 days Takes weekly on Mondays) 12 tablet 3    aspirin 81 MG tablet Take 81 mg by mouth daily      Calcium Carb-Cholecalciferol (CALCIUM + D3) 600-200 MG-UNIT TABS Take 1 tablet by mouth 2 times daily           reports that she quit smoking about 16 years ago. Her smoking use included cigarettes. She has a 60.00 pack-year smoking history. She has never used smokeless tobacco.    family history includes Alzheimer's Disease in her mother; Heart Attack in her sister; Heart Disease in her brother, father, mother, and sister; High Blood Pressure in her mother. Blood pressure 135/62, pulse 97, temperature 97.7 °F (36.5 °C), temperature source Temporal, resp. rate 16, height 4' 11\" (1.499 m), weight 126 lb 4.8 oz (57.3 kg), SpO2 94 %, not currently breastfeeding. HENT: Airway patent and reviewed. Mallampati 2  Cardiovascular: Normal rate, regular rhythm, normal heart sounds. Pulmonary/Chest: No increased work of breathing. CTA bilaterally  Abdominal: Soft. No distension. ASA CLASS    II. Mild systemic disease       Sedation plan: Anesthesia      Post Procedure Plan   Return to same level of care   ______________________     The risks and benefits of fiberoptic bronchoscopy were specifically discussed, including the goal of obtaining a diagnosis, the risks of bleeding, infection, pneumothorax, lung collapse, hospitalization and death. We specifically discussed the potential for biopsy and complications related to biopsy. We also discussed the risks of sedation/anesthesia. It is my assessment that the risk of the invasive procedure is outweighed by the benefit. Patient was counseled regarding the recommended procedure, alternatives to the procedure, and possible consequences of not having any evaluation performed.

## 2020-10-22 NOTE — PROGRESS NOTES
[facility]   Progress Note  Cardiology      HPI: Seeing Ms. Pritchett Splinter today for f/u PAF and CP. She denies any further CP but is mildly SOB after bronchoscopy. Denies other complaints. Tele shows NSR. Physical Examination:    Vitals:    10/22/20 0633   BP: 135/62   Pulse: 97   Resp: 16   Temp: 97.7 °F (36.5 °C)   SpO2: 94%          Constitutional and General Appearance: NAD   Respiratory:  · Normal excursion and expansion without use of accessory muscles  · Resp Auscultation: + scattered rhoncous bs and crackles bases  Cardiovascular:  · The apical impulses not displaced  · Heart tones are crisp and normal  · Cervical veins are not engorged  · The carotid upstroke is normal in amplitude and contour without delay or bruit  · Normal S1S2, No S3, No Murmur  · Peripheral pulses are symmetrical and full  · There is no clubbing, cyanosis of the extremities.   · No edema  · Pedal Pulses: 2+ and equal   Abdomen:  · No masses or tenderness  · Liver/Spleen: No Abnormalities Noted  Neurological/Psychiatric:  · Alert and oriented in all spheres  · Moves all extremities well  · No abnormalities of mood, affect, memory, mentation, or behavior are noted  Skin: warm and dry      Lab Results   Component Value Date    WBC 8.7 10/22/2020    HGB 13.1 10/22/2020    HCT 39.0 10/22/2020    MCV 93.8 10/22/2020     10/22/2020     Lab Results   Component Value Date    CREATININE 0.7 10/22/2020    BUN 20 10/22/2020     10/22/2020    K 3.9 10/22/2020     10/22/2020    CO2 24 10/22/2020     Lab Results   Component Value Date    INR 1.03 10/20/2020    PROTIME 12.0 10/20/2020        EKG: 10/20/20 I have reviewed EKG with the following interpretation:  Impression:  See HPI Sinus rhythm with Premature atrial complexesNonspecific ST and T wave abnormalityConfirmed by Tiffanie Plata MD, [address] ([id number]) on 10/20/2020 3:00:06 PM      CXR 10/20/20  FINDINGS: The lungs are without acute focal process.  There is no effusion or pneumothorax. The cardiomediastinal silhouette is stable. The osseous structures are stable.      CTPA 10/20/20  FINDINGS: Pulmonary Arteries: Pulmonary arteries are adequately opacified for evaluation.  No evidence of intraluminal filling defect to suggest pulmonary embolism.  Main pulmonary artery is normal in caliber.   Mediastinum: No evidence of mediastinal lymphadenopathy.  The heart and pericardium demonstrate no acute abnormality.  There is no acute abnormality of the thoracic aorta.   Lungs/pleura: Patchy ground-glass appearance of the lungs with areas of gas trapping.  This is increased from prior study.  There is thickening of the fissures bilaterally new from the prior study   interval increase in size of right peripheral densities now measuring 2.2 cm x 1.9 cm previously measuring 1.8 x 1.3 cm.  There is interval increase in of soft tissue density in the right hilum which appears represent a significant adenopathy there is central peribronchial thickening.   Upper Abdomen: Limited images of the upper abdomen are unremarkable.   Soft Tissues/Bones: No acute bone or soft tissue abnormality.  No change in alignment compared to the prior study      EKG 9/13/20  Normal sinus rhythmNormal ECGConfirmed by Eufemia Ballard MD, [facility] ([id number]) on 9/13/2020 12:15:44      ECHO 4/9/19       Assessment:  Ainsley Lima is a 70 y.o. patient who presented to [facility] 10/20/20 with c/o chest pain. No cardiac history prior. She has  PMH of HTN, HLD, RLS, OA, and GERD. Most recent ECHO 04/09/19 EF 60% aortic valve sclerosis, mild AR, TR, MR              Now presents with c/o chest pain. She reports mid-CP and right CP described as sharp sensation occurring while laying in bed 2 nights ago. Pain radiated into back and then up into shoulders from her back. Could not sleep and pain constant. Tried Rolaids and prilosec but did not help. Nothing made worse. In ER she reports pain med tramadol helped.  She feels better now but some mild, vague heaviness in chest. Admit EKG Sinus rhythm. Note CXR no acute process. Note CT imaging negative PE but right nonspecific peripheral densities and hilar LAD. After admit noted to be in afib and  was placed on Dilt drip 10mg/hr. Converted to NSR 4 hours later and gtt turned off but recurrent with dilt gtt then turned back on. She reverted back to NSR and remains sinus. I reviewed EKG showing afib RVR 155bpm; ST change inferior leads consider ischemia. Another EKG showed afib RVR 120bpm; nonspecific ST change. Note 3 negative Elizabeth; TVF=411; H/H=13/39.6; K+ 3.5. Note she did NOT feel palps with afib. She does report feeling \"fluttering\" in back in September? Note underwent w/u for RLL opacities on CT imaging but PET scan not hypermetabolic. Right parotid lesion found with increased activity and US guided biopsy 10/9/20 performed. She reports negative pathology. Diagnosis of unspecified chest pain and newly diagnosed paroxysmal atrial fibrillation in elderly female with HTN and HLD.     Recs:  1. S/p  fiberoptic bronchoscopy with endobronchial US-guided biopsy of lymph nodes per Dr. Tayo Wyatt for right pulmonary nodules and lymphadenopathy. Dr. Tayo Wyatt to f/u as outpatient. 2. Pending ECHO to evaluate LV function and size, wall motion, and valves for any structural abnormalities. 3. Continue lovenox 60mg SQ BID for AC; CHADs-vasc=2.  4. Continue norvasc 10mg qd, baby asa qd, lipitor 10mg qd, losartan 100mg qd.  5. Follow telemetry  6. If ECHO normal EF and no wall motion abnormality she would be OK for d/c from cardiac standpoint and I will f/u as outpatient. No further CP and atypical symptoms with rapid afib and unknown pulm issue right lung. Lower clinical suspicion for ACS. I will consider ischemia testing as outpatient if recurrent CP issues.      Patient Active Problem List   Diagnosis    Hypertension    Hyperlipidemia    Osteopenia    IFG (impaired fasting glucose)    DDD (degenerative disc disease), cervical    Thoracic sprain    Macromastia    Infected sebaceous cyst    Complete tear of right rotator cuff    Parotid mass    Chest pain    Hilar adenopathy    Hypokalemia    Atrial fibrillation ([address]

## 2020-10-22 NOTE — PROGRESS NOTES
Patient returned from [facility]. Shift assessment completed. See flow sheet. Medications given. Patient is drowsy but easily awakens. Patient denies further needs. . Call light within reach. Will continue to monitor.

## 2020-10-22 NOTE — PROGRESS NOTES
End of shift report given to [place]. Pt in stable condition, care transferred at this time. Pt is currently down for her Evus procedure.  Electronically signed by Martin Jones RN on 10/22/2020 at 7:47 AM

## 2020-10-22 NOTE — PROGRESS NOTES
Arrived from OR awakens easily and respirations unlabored. Patient coughing with small amount blood tinged mucous. Report received from NATTY Titus RN.

## 2020-10-22 NOTE — TELEPHONE ENCOUNTER
Patient is a established with Dr Mariposa Byrd. Will route message to [employer]      MD Allan Nguyen MA               VV Tuesday for bronch results

## 2020-10-22 NOTE — PROGRESS NOTES
[facility] is currently closed and patient will not be able to get prescription tonight.

## 2020-10-22 NOTE — ANESTHESIA PRE PROCEDURE
Department of Anesthesiology  Preprocedure Note       Name:  Kathy Syed   Age:  70 y.o.  :  1945                                          MRN:  [id number]         Date:  10/22/2020      Surgeon: Fabio Madden):  Lainey Kaplan MD    Procedure: Procedure(s):  EBUS W/ANESTHESIA    Medications prior to admission:   Prior to Admission medications    Medication Sig Start Date End Date Taking?  Authorizing Provider   losartan (COZAAR) 100 MG tablet TAKE 1 TABLET BY MOUTH  DAILY 20  Yes Willi Carbone MD   hydroCHLOROthiazide (HYDRODIURIL) 25 MG tablet TAKE 1 TABLET BY MOUTH  DAILY 20  Yes Willi Carbone MD   simvastatin (ZOCOR) 20 MG tablet TAKE 1 TABLET BY MOUTH  NIGHTLY 20  Yes Willi Carbone MD   omeprazole (PRILOSEC) 10 MG delayed release capsule TAKE 1 CAPSULE BY MOUTH  DAILY  Patient taking differently: Take 10 mg by mouth daily as needed  20  Yes Willi Carbone MD   amLODIPine (NORVASC) 10 MG tablet TAKE 1 TABLET BY MOUTH  DAILY 20  Yes Willi Carbone MD   alendronate (FOSAMAX) 70 MG tablet TAKE 1 TABLET BY MOUTH  EVERY 7 DAYS  Patient taking differently: Take 70 mg by mouth every 7 days Takes weekly on 20  Yes JEANNA Oro - CNP   aspirin 81 MG tablet Take 81 mg by mouth daily   Yes Historical Provider, MD   Calcium Carb-Cholecalciferol (CALCIUM + D3) 600-200 MG-UNIT TABS Take 1 tablet by mouth 2 times daily    Yes Historical Provider, MD       Current medications:    Current Facility-Administered Medications   Medication Dose Route Frequency Provider Last Rate Last Dose    perflutren lipid microspheres (DEFINITY) injection 1.65 mg  1.5 mL Intravenous ONCE PRN Radha Mayfield MD        dilTIAZem 125 mg in dextrose 5 % 125 mL infusion  5 mg/hr Intravenous Continuous Radha Mayfield MD   Stopped at 10/21/20 1220    amLODIPine (NORVASC) tablet 10 mg  10 mg Oral Daily Gabriel Madrigal PA-C        aspirin chewable tablet 81 mg  81 mg Oral Daily Davonte Randhawa Becki Farias MD        atorvastatin (LIPITOR) tablet 10 mg  10 mg Oral Nightly Azzie Epley, MD   10 mg at 10/21/20 2133    calcium-vitamin D 500-200 MG-UNIT per tablet 1 tablet  1 tablet Oral BID Neo Cele, APRN - CNP   1 tablet at 10/21/20 2100    losartan (COZAAR) tablet 100 mg  100 mg Oral Daily Michae Cele, APRN - CNP   100 mg at 10/21/20 1058    pantoprazole (PROTONIX) tablet 40 mg  40 mg Oral QAM AC Jayae Cele, APRN - CNP   40 mg at 10/21/20 2435    sodium chloride flush 0.9 % injection 10 mL  10 mL Intravenous 2 times per day Michae Cele, APRN - CNP   10 mL at 10/21/20 2101    sodium chloride flush 0.9 % injection 10 mL  10 mL Intravenous PRN Neo Cele, APRN - CNP   10 mL at 10/21/20 0111    potassium chloride (KLOR-CON M) extended release tablet 40 mEq  40 mEq Oral PRN Neo Cele, APRN - CNP        Or    potassium bicarb-citric acid (EFFER-K) effervescent tablet 40 mEq  40 mEq Oral PRN Neo Cele, APRN - CNP        Or    potassium chloride 10 mEq/100 mL IVPB (Peripheral Line)  10 mEq Intravenous PRN Neo Cele, APRN - CNP        acetaminophen (TYLENOL) tablet 650 mg  650 mg Oral Q6H PRN Neo Cele, APRN - CNP        Or    acetaminophen (TYLENOL) suppository 650 mg  650 mg Rectal Q6H PRN Neo Cele, APRN - CNP        polyethylene glycol (GLYCOLAX) packet 17 g  17 g Oral Daily PRN Neo Cele, APRN - CNP        promethazine (PHENERGAN) tablet 12.5 mg  12.5 mg Oral Q6H PRN Neo Cele, APRN - CNP        Or    ondansetron (ZOFRAN) injection 4 mg  4 mg Intravenous Q6H PRN Neo Cele, APRN - CNP        regadenoson (LEXISCAN) injection 0.4 mg  0.4 mg Intravenous ONCE PRN Neo Cele, APRN - CNP        levoFLOXacin (LEVAQUIN) tablet 500 mg  500 mg Oral Daily Neo Cele, APRN - CNP   500 mg at 10/21/20 1059       Allergies:     Allergies   Allergen Reactions    Naproxen Anaphylaxis    Percocet [Oxycodone-Acetaminophen] Nausea Only Pulse: 116 98 92 95   Resp: 16 16 16 16   Temp: 99.2 °F (37.3 °C) 98 °F (36.7 °C) 97.8 °F (36.6 °C) 98.1 °F (36.7 °C)   TempSrc: Oral Oral Oral Oral   SpO2: 90% 90% 91% 93%   Weight:    126 lb 4.8 oz (57.3 kg)   Height:                                                  BP Readings from Last 3 Encounters:   10/22/20 125/67   10/06/20 134/66   09/13/20 (!) 142/72       NPO Status:  mn+, see mar                                                                               BMI:   Wt Readings from Last 3 Encounters:   10/22/20 126 lb 4.8 oz (57.3 kg)   10/06/20 132 lb 3.2 oz (60 kg)   10/05/20 130 lb (59 kg)     Body mass index is 25.51 kg/m². CBC:   Lab Results   Component Value Date    WBC 8.7 10/22/2020    RBC 4.16 10/22/2020    HGB 13.1 10/22/2020    HCT 39.0 10/22/2020    MCV 93.8 10/22/2020    RDW 13.3 10/22/2020     10/22/2020       CMP:   Lab Results   Component Value Date     10/22/2020    K 3.9 10/22/2020     10/22/2020    CO2 24 10/22/2020    BUN 20 10/22/2020    CREATININE 0.7 10/22/2020    GFRAA >60 10/22/2020    GFRAA >60 10/29/2010    AGRATIO 1.3 10/21/2020    LABGLOM >60 10/22/2020    GLUCOSE 115 10/22/2020    PROT 6.1 10/21/2020    PROT 6.9 10/29/2010    CALCIUM 8.9 10/22/2020    BILITOT 0.5 10/21/2020    ALKPHOS 74 10/21/2020    AST 14 10/21/2020    ALT 20 10/21/2020       POC Tests: No results for input(s): POCGLU, POCNA, POCK, POCCL, POCBUN, POCHEMO, POCHCT in the last 72 hours.     Coags:   Lab Results   Component Value Date    PROTIME 12.0 10/20/2020    INR 1.03 10/20/2020    APTT 27.5 10/20/2020       HCG (If Applicable): No results found for: PREGTESTUR, PREGSERUM, HCG, HCGQUANT     ABGs: No results found for: PHART, PO2ART, CBC8ISC, JKI3FYN, BEART, N4VHDRVH     Type & Screen (If Applicable):  No results found for: LABABO, LABRH    Drug/Infectious Status (If Applicable):  No results found for: HIV, HEPCAB    COVID-19 Screening (If Applicable):   Lab Results   Component Value Date    COVID19 NOT DETECTED 08/19/2020         Anesthesia Evaluation  Patient summary reviewed and Nursing notes reviewed   history of anesthetic complications (salow emerge): PONV. Airway: Mallampati: II  TM distance: <3 FB   Neck ROM: full  Mouth opening: < 3 FB Dental:          Pulmonary:Negative Pulmonary ROS breath sounds clear to auscultation      (-) COPD, asthma, shortness of breath, sleep apnea and not a current smoker          Patient did not smoke on day of surgery. Cardiovascular:    (+) hypertension:, dysrhythmias: atrial fibrillation, hyperlipidemia    (-) pacemaker, past MI, CAD, CABG/stent,  angina and  CHF    ECG reviewed  Rhythm: regular  Rate: normal           Beta Blocker:  Not on Beta Blocker         Neuro/Psych:   Negative Neuro/Psych ROS     (-) seizures, TIA and CVA           GI/Hepatic/Renal:   (+) GERD (pr5n meds ): well controlled,      (-) liver disease, no renal disease, bowel prep and no morbid obesity       Endo/Other:    (+) : arthritis:., no malignancy/cancer. (-) diabetes mellitus, hypothyroidism, hyperthyroidism, blood dyscrasia, no malignancy/cancer               Abdominal:           Vascular: negative vascular ROS. Anesthesia Plan      general     ASA 3       Induction: intravenous. MIPS: Postoperative opioids intended and Prophylactic antiemetics administered. Anesthetic plan and risks discussed with patient and spouse. Plan discussed with CRNA. This pre-anesthesia assessment may be used as a history and physical.    DOS STAFF ADDENDUM:    Pt seen and examined, chart reviewed (including anesthesia, drug and allergy history). No interval changes to history and physical examination. Anesthetic plan, risks, benefits, alternatives, and personnel involved discussed with patient. Patient verbalized an understanding and agrees to proceed.       Gabo Guerra MD  October 22, 2020  6:34 AM          Jeramie Murdock MD   10/22/2020

## 2020-10-22 NOTE — PROGRESS NOTES
Pt requesting her lipitor at night, it was ordered daily. Pt did not take it today.  ok with changing it to nightly. Shift assessment complete. Call light and bedside table within reach.  Electronically signed by Beka Faith RN on 10/21/2020 at 9:28 PM

## 2020-10-22 NOTE — TELEPHONE ENCOUNTER
I sent Dr. Mitchell Romero a note letting him know I'd f/u with patient for results. If he prefers to, that is fine. I wanted the  as a placeholder, but I will likely just call her once we have results.

## 2020-10-22 NOTE — PROGRESS NOTES
Physician Progress Note      PATIENT:               Alpa Winn  CSN #:                  [id number]  :                       1945  ADMIT DATE:       10/20/2020 1:41 PM  [address] [place] DATE:  RESPONDING  PROVIDER #:        [id number] MD          QUERY TEXT:    Dear Attending Provider,  Pt admitted with chest pain. Pt noted to have Right hilar lymphadenopathy,   enlarging right peripheral nodule and found to be in new onset afib. If   possible, please document in progress notes and discharge summary if you are   evaluating and/or treating any of the following: The medical record reflects the following:  Risk Factors: hilar lymphadenopathy and enlarging peripheral node, new onset   afib, gerd, hypertension  Clinical Indicators: Initial EKG was NSR without ischemia. Not ACS. Now in   A fib  Treatment: tele, ekg, cardiology consult, bronchoscopy with bx, lovenox, PPI    Thank you for your assistance,  Sona Mckeon RN,BSN,CCDS,CRCR  Options provided:  -- Chest pain due to new onset afib  -- Chest pain due to right hilar lymphadenopathy and enlarging peripheral node  -- Chest pain due to gerd  -- Chest pain due to, please specify cause.   -- Other - I will add my own diagnosis  -- Disagree - Not applicable / Not valid  -- Disagree - Clinically unable to determine / Unknown  -- Refer to Clinical Documentation Reviewer    PROVIDER RESPONSE TEXT:    This patient chest pain due to unspecified    Query created by: Emily Edwards on 10/22/2020 3:04 PM      Electronically signed by:  Poli Mejias MD 10/22/2020 5:49 PM

## 2020-10-22 NOTE — ANESTHESIA POSTPROCEDURE EVALUATION
Department of Anesthesiology  Postprocedure Note    Patient: Moise Williamson  MRN: [id number]  YOB: 1945  Date of evaluation: 10/22/2020  Time:  9:39 AM     Procedure Summary     Date:  10/22/20 Room / Location:  [place] [address] / [facility]    Anesthesia Start:  1107 Anesthesia Stop:  0472    Procedures:       EBUS W/ANESTHESIA (N/A )      BRONCHOSCOPY ALVEOLAR LAVAGE      BRONCHOSCOPY BRUSHINGS Diagnosis:  (X)    Surgeon:  Pranay Rosales MD Responsible Provider:  Lorraine Tavares MD    Anesthesia Type:  general ASA Status:  3          Anesthesia Type: general    Cheng Phase I: Cheng Score: 10    Cheng Phase II:      Last vitals: Reviewed and per EMR flowsheets.    Vitals:    10/22/20 0855 10/22/20 0910 10/22/20 0921 10/22/20 0925   BP: (!) 147/66 (!) 142/54 (!) 127/52    Pulse: 94 97 100    Resp: 17 20 21    Temp:    97.2 °F (36.2 °C)   TempSrc:    Temporal   SpO2: 94% 92% 92%    Weight:       Height:           Anesthesia Post Evaluation    Patient location during evaluation: bedside  Patient participation: complete - patient participated  Level of consciousness: awake and alert  Airway patency: patent  Nausea & Vomiting: no nausea  Complications: no  Cardiovascular status: hemodynamically stable  Respiratory status: acceptable and nasal cannula (cough improving)  Hydration status: euvolemic

## 2020-10-23 ENCOUNTER — CARE COORDINATION (OUTPATIENT)
Dept: CASE MANAGEMENT | Age: 75
End: 2020-10-23

## 2020-10-23 PROCEDURE — 1111F DSCHRG MED/CURRENT MED MERGE: CPT | Performed by: INTERNAL MEDICINE

## 2020-10-23 NOTE — TELEPHONE ENCOUNTER
There was a miscommunication. I do not wish for the patient to stop ASA. I have no recommendation one way or the other. She should follow the guidance of the cardiologist only. It is possible that cardiology wants only DOAC, it is possible they want DOAC plus ASA. I am not certain and she will need to clarify with them if there is any doubt. I will contact the patient with her results when I have them. Her routine pulmonary f/u will be with Dr. Asuncion Michael, as we do not switch patients who have an established outpatient physician.

## 2020-10-23 NOTE — CARE COORDINATION
[facility] Transitions Initial Follow Up Call    Call within 2 business days of discharge: Yes    Patient: Dillon Pedersen Patient : 1945   MRN: [id number]  Discharge Date: 10/22/20   RARS: Readmission Risk Score: 14      Last Discharge [facility]       Complaint Diagnosis Description Type Department Provider    10/20/20 Chest Pain Chest pain, unspecified type ED to Hosp-Admission (Discharged) (ADMITTED) [facility] PCU Damon Casiano MD; Russell Raymundo. Spoke with: Dillon Pedersen (patient)    Non-face-to-face services provided:  Obtained and reviewed discharge summary and/or continuity of care documents  Education of patient/family/caregiver/guardian to support self-management-s/s to report. reviewed cardiac diet. Assessment and support for treatment adherence and medication management-[id number] completed    Challenges to be reviewed by the provider   Additional needs identified to be addressed with provider yes - continue ASA 81mg or not? COVID-19 Not Detected on 10/22/2020. Patient informed of results, if available? No       Method of communication with provider : chart routing to Dr Clark Posey:   Does patient have an Advance Directive:  decision maker updated. Was this a readmission? No  Patient stated reason for admission: cp  Patients top risk factors for readmission: medical condition    Care Transition Nurse (CTN) contacted the patient by telephone to perform post hospital discharge assessment. Provided introduction to self, and explanation of the CTN role. CTN reviewed discharge instructions, medical action plan and red flags with patient who verbalized understanding. Patient given an opportunity to ask questions and does not have any further questions or concerns at this time. Were discharge instructions available to patient? Yes. Reviewed appropriate site of care based on symptoms and resources available to patient including: PCP, Specialist and CTN. Up  Future Appointments   Date Time Provider Clayton Leonardi   11/23/2020  7:45 AM MD Meka Daniels   11/23/2020  8:40 AM [place] Adonna Embs, MD [facility]   12/1/2020  8:00 AM JEANNA [place] - REINA [facility]   1/5/2021 11:00 AM [facility]       Farzaneh Osorio RN

## 2020-10-23 NOTE — DISCHARGE SUMMARY
nodules that were not hypermetabolic.    - Pulm plan was to wait and follow up with Chest CT in a few months to determine need for biopsy  - now with worsening appearance on CT chest and right sided CP, will ask pulm to evaluate   - Radha D#3--> d/c home on the same   - S/p EBUS with Bx of R hilar LN/mass     Hypokalemia  Hypomagnesemia   - replaced      Hypertension  - BP stable. Continue Losartan, HCTZ , norvasc     Hyperlipidemia  - on Atorvastatin. 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 No distress. Alert. Eyes: PERRL. No sclera icterus. No conjunctival injection. ENT: No discharge. Pharynx clear. Neck: No JVD. Trachea midline. Resp: No accessory muscle use. No crackles. No wheezes. No rhonchi. CV: Irregularly irregular. No murmur. No rub. No edema. GI: Non-tender. Non-distended. Normal bowel sounds. Skin: Warm and dry. No nodule on exposed extremities. No rash on exposed extremities. M/S: No cyanosis. No joint deformity. No clubbing. Neuro: Awake. Grossly nonfocal    Psych: Oriented x 3. No anxiety or agitation.      CBC:   Recent Labs     10/21/20  0445 10/22/20  0442   WBC 10.8 8.7   HGB 13.3 13.1   HCT 39.6 39.0   MCV 94.1 93.8    242     BMP:   Recent Labs     10/21/20  0445 10/22/20  0441     137 137   K 3.5  3.5 3.9     102 103   CO2 25  24 24   PHOS 3.3  --    BUN 15  15 20   CREATININE 0.7  0.7 0.7     LIVER PROFILE:   Recent Labs     10/21/20  0445   AST 14*   ALT 20   BILITOT 0.5   ALKPHOS 74     CULTURES  Fungal Cx: none   Resp Cx: pending   AFB: none  Body Fluid Cx: NGTD  SARS-CoV-2, NAAT  Not Detected      Cytology 10/22/2020  FINAL DIAGNOSIS:     A.  Lymph Node, Subcarinal Transbronchial Needle Aspiration:       -  No malignant cells identified. B.  Lymph Node, 11R Transbronchial Needle Aspiration:       -  No malignant cells identified. C.  Lung, Right Lower Lobe Bronchial Brushing and Tip:       -  No malignant cells identified. D.  Lung, Right Lower Lobe Bronchioalveolar Lavage:       -  No malignant cells identified.       -  GMS stain is negative for fungal and pneumocystis jirovecii   organisms. E.  Lung, Bronchial Washing:   -  No malignant cells identified.   -  GMS stain is negative for fungal and pneumocystis jirovecii organisms. RADIOLOGY  FL LESS THAN 1 HOUR   Final Result   Intraprocedural fluoroscopic spot images as above. See separate procedure   report for more information. CT CHEST PULMONARY EMBOLISM W CONTRAST   Final Result   No evidence of pulmonary embolism      Interval increase in size of right peripheral densities interval increase in   adenopathy in the right hilum interval development of Sigrid bronchial   thickening findings are non-specific could represent infectious process with   associated hilar adenopathy autoimmune or inflammatory disorder is included   in the differential correlation clinical data base is required. Findings are nonspecific. The possibility of infectious pneumonic process   and associated hilar adenopathy is possibility. Inflammatory or autoimmune   process would also be in the differential.      Patient had a recent PET scan which demonstrated peripheral nodules to be not   significantly hypermetabolic. XR CHEST PORTABLE   Final Result   No acute process. Stable compared to prior study           Echo  Summary   LV systolic function is hyperdynamic with EF estimated >65%. No regional wall motion abnormalities.    Grade I diastolic dysfunction with normal left ventricular filling pressure. Trivial tricuspid and pulmonic regurgitation. Inadequate tricuspid regurgitation jet to estimate systolic artery pressure   (SPAP). Discharge Medications     Medication List      START taking these medications    apixaban 5 MG Tabs tablet  Commonly known as:  Eliquis  Take 1 tablet by mouth 2 times daily  Notes to patient:  Apixaban (Eliquis®)  Use: prevents the blood from clotting, to prevent a stroke. Side effects: bleeding or bruising more easily, headache, and stomach upset.      levoFLOXacin 500 MG tablet  Commonly known as:  LEVAQUIN  Take 1 tablet by mouth daily for 3 days  Notes to patient:  Levofloxacin (Levaquin)  -Use: Treat infection  -Side effects: Headache, diarrhea, nausea and vomiting        CHANGE how you take these medications    alendronate 70 MG tablet  Commonly known as:  FOSAMAX  TAKE 1 TABLET BY MOUTH  EVERY 7 DAYS  What changed:  additional instructions     omeprazole 10 MG delayed release capsule  Commonly known as:  PRILOSEC  TAKE 1 CAPSULE BY MOUTH  DAILY  What changed:    · when to take this  · reasons to take this        CONTINUE taking these medications    amLODIPine 10 MG tablet  Commonly known as:  NORVASC  TAKE 1 TABLET BY MOUTH  DAILY     aspirin 81 MG tablet     Calcium + D3 600-200 MG-UNIT Tabs tablet     losartan 100 MG tablet  Commonly known as:  COZAAR  TAKE 1 TABLET BY MOUTH  DAILY     simvastatin 20 MG tablet  Commonly known as:  ZOCOR  TAKE 1 TABLET BY MOUTH  NIGHTLY        STOP taking these medications    hydroCHLOROthiazide 25 MG tablet  Commonly known as:  HYDRODIURIL           Where to Get Your Medications      [address] [place] [address]    Phone:  [phone]   · apixaban 5 MG Tabs tablet     These medications were sent to [address] [place] [address] [address]    Phone:  [phone]   · levoFLOXacin 500 MG tablet       Discharged in stable condition to home    Follow Up:   Follow up with PCP, cardiology OP, pulmonology OP           Conor Guadalupe 10/24/2020 5:41 PM

## 2020-10-24 LAB
CULTURE, RESPIRATORY: NORMAL
CULTURE, RESPIRATORY: NORMAL
GRAM STAIN RESULT: NORMAL
GRAM STAIN RESULT: NORMAL

## 2020-10-25 LAB
BODY FLUID CULTURE, STERILE: NORMAL
GRAM STAIN RESULT: NORMAL

## 2020-10-26 ENCOUNTER — APPOINTMENT (OUTPATIENT)
Dept: PHYSICAL THERAPY | Age: 75
End: 2020-10-26
Payer: MEDICARE

## 2020-10-27 ENCOUNTER — VIRTUAL VISIT (OUTPATIENT)
Dept: PULMONOLOGY | Age: 75
End: 2020-10-27
Payer: MEDICARE

## 2020-10-27 PROCEDURE — 4040F PNEUMOC VAC/ADMIN/RCVD: CPT | Performed by: INTERNAL MEDICINE

## 2020-10-27 PROCEDURE — 1111F DSCHRG MED/CURRENT MED MERGE: CPT | Performed by: INTERNAL MEDICINE

## 2020-10-27 PROCEDURE — 1123F ACP DISCUSS/DSCN MKR DOCD: CPT | Performed by: INTERNAL MEDICINE

## 2020-10-27 PROCEDURE — 99213 OFFICE O/P EST LOW 20 MIN: CPT | Performed by: INTERNAL MEDICINE

## 2020-10-27 PROCEDURE — 1090F PRES/ABSN URINE INCON ASSESS: CPT | Performed by: INTERNAL MEDICINE

## 2020-10-27 PROCEDURE — 3017F COLORECTAL CA SCREEN DOC REV: CPT | Performed by: INTERNAL MEDICINE

## 2020-10-27 PROCEDURE — G8399 PT W/DXA RESULTS DOCUMENT: HCPCS | Performed by: INTERNAL MEDICINE

## 2020-10-27 PROCEDURE — G8427 DOCREV CUR MEDS BY ELIG CLIN: HCPCS | Performed by: INTERNAL MEDICINE

## 2020-10-27 ASSESSMENT — ENCOUNTER SYMPTOMS
EYE ITCHING: 0
STRIDOR: 0
CONSTIPATION: 0
EYE DISCHARGE: 0
EYE PAIN: 0
DIARRHEA: 0
VOICE CHANGE: 0
SORE THROAT: 0
CHOKING: 0
ABDOMINAL PAIN: 0
CHEST TIGHTNESS: 0

## 2020-10-27 NOTE — PROGRESS NOTES
Pulmonary Outpatient Note   Jermaine Kamara MD       10/27/2020    Chief Complaint:  Results (Bronch)     HPI:   70y.o. year old female here for further eval of lymphadenopathy, abnormal CT. Virtual visit through [facility]. me    Was seen last month for abnormal CT findings of a pulmonary nodule and adenopathy. Offered biopsy at that time but she declined  PET-CT done which was PET negative  Follow up imaging was set up. She was admitted last week at [facility]. Had a CT chest showing enlarged hilar/mediastinal lymph nodes  A bronch with EBUS was done and nondiagnostic  She states she had chest pain at that time which is better.    Treated for afib/rvr and is on Eliquis     Past Medical History:   Diagnosis Date    Atrial fibrillation ([address] 10/21/2020    Diverticulitis     GERD (gastroesophageal reflux disease)     Hyperlipidemia     Hypertension     Osteoarthritis     Osteopenia     PONV (postoperative nausea and vomiting)     Prolonged emergence from general anesthesia     Restless legs syndrome        Past Surgical History:   Procedure Laterality Date    BREAST REDUCTION SURGERY  10/15/15    BRONCHOSCOPY N/A 10/22/2020    EBUS W/ANESTHESIA performed by Irma Brooks MD at [address]  10/22/2020    BRONCHOSCOPY ALVEOLAR LAVAGE performed by Irma Brooks MD at [address]  10/22/2020    BRONCHOSCOPY BRUSHINGS performed by Irma Brooks MD at [address]  10/22/2020    BRONCHOSCOPY/TRANSBRONCHIAL NEEDLE BIOPSY performed by Irma Brooks MD at [address]  10/22/2020    BRONCHOSCOPY/TRANSBRONCHIAL NEEDLE BIOPSY ADDL LOBE performed by Irma Brooks MD at [address] [facility]  11/15/2016    colon polyps, diverticulosis    OTHER SURGICAL HISTORY  10/22/2020    EBUS w/anesthesia bronchoscopy alveilar lavage bronchoscopy brushings    OVARIAN CYST REMOVAL      WA EXCISION TUMOR SOFT TISSUE BACK/FLANK SUBQ <3CM N/A 2019    EXCISION BACK CYST TIMES TWO performed by Nael Sabillon MD at [place] [address] ARTHROSCOPY Right 2020    VIDEO ARTHROSCOPY RIGHT SHOULDER, ROTATOR CUFF REPAIR, DECOMPRESSION, EXTENSIVE DEBRIDEMENT performed by Marce Rome MD at [address] [facility] [address] History     Tobacco Use    Smoking status: Former Smoker     Packs/day: 2.00     Years: 30.00     Pack years: 60.00     Types: Cigarettes     Start date: 1969     Last attempt to quit: 2004     Years since quittin.8    Smokeless tobacco: Never Used   Substance Use Topics    Alcohol use: Yes     Comment: very rare - one a year       Family History   Problem Relation Age of Onset    Heart Disease Mother     High Blood Pressure Mother     Alzheimer's Disease Mother     Heart Disease Father     Heart Disease Sister     Heart Attack Sister     Heart Disease Brother          Current Outpatient Medications:     apixaban (ELIQUIS) 5 MG TABS tablet, Take 1 tablet by mouth 2 times daily, Disp: 60 tablet, Rfl: 0    losartan (COZAAR) 100 MG tablet, TAKE 1 TABLET BY MOUTH  DAILY, Disp: 90 tablet, Rfl: 3    simvastatin (ZOCOR) 20 MG tablet, TAKE 1 TABLET BY MOUTH  NIGHTLY, Disp: 90 tablet, Rfl: 3    omeprazole (PRILOSEC) 10 MG delayed release capsule, TAKE 1 CAPSULE BY MOUTH  DAILY (Patient taking differently: Take 10 mg by mouth daily as needed ), Disp: 90 capsule, Rfl: 3    amLODIPine (NORVASC) 10 MG tablet, TAKE 1 TABLET BY MOUTH  DAILY, Disp: 90 tablet, Rfl: 3    alendronate (FOSAMAX) 70 MG tablet, TAKE 1 TABLET BY MOUTH  EVERY 7 DAYS (Patient taking differently: Take 70 mg by mouth every 7 days Takes weekly on ), Disp: 12 tablet, Rfl: 3    Calcium Carb-Cholecalciferol (CALCIUM + D3) 600-200 MG-UNIT TABS, Take 1 tablet by mouth 2 times daily , Disp: , Rfl:     Naproxen and Percocet [oxycodone-acetaminophen]    There were no vitals filed for this visit. Review of Systems   Constitutional: Negative for chills, fever and unexpected weight change. HENT: Negative for mouth sores, sore throat and voice change. Eyes: Negative for pain, discharge and itching. Respiratory: Negative for choking, chest tightness and stridor. Cardiovascular: Negative for chest pain, palpitations and leg swelling. Gastrointestinal: Negative for abdominal pain, constipation and diarrhea. Endocrine: Negative for cold intolerance, heat intolerance and polydipsia. Genitourinary: Negative for dysuria, frequency and hematuria. Musculoskeletal: Negative for gait problem, joint swelling and neck stiffness. Neurological: Negative for dizziness, numbness and headaches. Psychiatric/Behavioral: Negative for agitation, confusion and hallucinations. ASSESSMENT:    1. Hilar adenopathy      PLAN:    -Discussed biopsy results and options including image surveillance or mediastinoscopy and the associated risks of each. They wished to proceed with the latter, will refer to thoracic surgery  -Follow up after    No orders of the defined types were placed in this encounter. Koko Doctor MD Nataliia Reynoso is a 70 y.o. female being evaluated by a Virtual Visit (video visit) encounter to address concerns as mentioned above. A caregiver was present when appropriate. Due to this being a TeleHealth encounter (During FZL-04 public health emergency), evaluation of the following organ systems was limited: Vitals/Constitutional/EENT/Resp/CV/GI//MS/Neuro/Skin/Heme-Lymph-Imm. Pursuant to the emergency declaration under the [address] [place] [address], [address] [place] [address] and the [employer] Act, this Virtual Visit was conducted with patient's (and/or legal guardian's) consent, to reduce the patient's risk of exposure to COVID-19 and provide necessary medical care.   The patient (and/or legal guardian) has also been advised to contact this office for worsening conditions or problems, and seek emergency medical treatment and/or call 911 if deemed necessary. Patient identification was verified at the start of the visit: Yes    Total time spent for this encounter: Not billed by time    Services were provided through a video synchronous discussion virtually to substitute for in-person clinic visit. Patient and provider were located at their individual homes. --Elisabeth Thurston MD on 10/27/2020 at 1:21 PM    An electronic signature was used to authenticate this note.

## 2020-10-27 NOTE — PROGRESS NOTES
MA Communication: The following orders are received by verbal communication from Dr. Dilip Story. Orders include: referral to thro.  Surgery       FU after

## 2020-10-27 NOTE — PATIENT INSTRUCTIONS
Remember to bring a list of pulmonary medications and any CPAP or BiPAP machines to your next appointment with the office. Please keep all of your future appointments scheduled by [address], [facility]. Out of respect for other patients and providers, you may be asked to reschedule your appointment if you arrive later than your scheduled appointment time. Appointments cancelled less than 24hrs in advance will be considered a no show. Patients with three missed appointments within 1 year or four missed appointments within 2 years can be dismissed from the practice. You may receive a survey regarding the care you received during your visit. Your input is valuable to us. We encourage you to complete and return your survey. We hope you will choose us in the future for your healthcare needs. Pt instructed of all future appointment dates & times, including radiology, labs, procedures & referrals. If procedures were scheduled preparation instructions provided. Instructions on future appointments with [facility] were given.

## 2020-10-29 ENCOUNTER — CARE COORDINATION (OUTPATIENT)
Dept: CASE MANAGEMENT | Age: 75
End: 2020-10-29

## 2020-10-29 ENCOUNTER — APPOINTMENT (OUTPATIENT)
Dept: PHYSICAL THERAPY | Age: 75
End: 2020-10-29
Payer: MEDICARE

## 2020-10-29 NOTE — CARE COORDINATION
[facility] Transitions Follow Up Call    10/29/2020    Patient: Tracee Carrera  Patient : 1945   MRN: [id number]  Reason for Admission: cp  Discharge Date: 10/22/20 RARS: Readmission Risk Score: 14         Spoke with: Tracee Carrera (patient)    States she has UTI but has not reported to her doctor yet. Her symptoms include bladder spasms, burning, urgency but only dribbles of urine. Denies fever, chills, flank pain. she did take some OTC AZO with only slight relief of burning. Discussed that she will need to be seen in person or virtually. She is aware but states she has had difficulty scheduling appointments with her PCP office since Brittny started. She is willing/able to attend an appt but requesting abx for relief of her UTI. She will monitor for fever, increase fluids and report worsening to PCP. Preferred pharmacy is [facility]. Routing this note to PCP for recommendation. Care Transitions Subsequent and Final Call    Subsequent and Final Calls  Do you have any ongoing symptoms?:  Yes  Onset of Patient-reported symptoms:  Other  Patient-reported symptoms:  Other  Interventions for patient-reported symptoms:  Notified PCP/Physician  Have your medications changed?:  No  Do you have any questions related to your medications?:  No  Do you currently have any active services?:  No  Do you have any needs or concerns that I can assist you with?:  No  Identified Barriers:  None  Care Transitions Interventions  No Identified Needs  Other Interventions:             Follow Up  Future Appointments   Date Time Provider Clayton Cai   11/3/2020  9:00 AM Althea Andres MD  Dmitriy CUI S [facility]   2020  7:45 AM MD Lilliam Blanca [facility]   2020  8:40 AM Denver Francoise Marina, MD [facility]   2020  8:00 AM Marycarmen Oglesby, APRN - CNP [facility]   2021 11:00 AM [facility] CT [facility] [address] RN

## 2020-10-29 NOTE — CARE COORDINATION
Care Transition Follow Up:    Outreach to Amelie Carrion. Recommended 3-way to PCP office to report UTI symptoms for rec. Per Donna Neil, PCP Dr Paul Mcfarlane is out of office. They have Arlen Campbell set up to visit with MA tomorrow to provide urine sample. She was scheduled for 0930 and instructed to ask for Cristy or Anayeli Stage.       Kamaljit Agee, RN  Care Transition Nurse  [phone] mobile    Future Appointments   Date Time Provider Clayton Cai   11/3/2020  9:00 AM MD Roma Moreland [facility]   11/23/2020  7:45 AM MD Segun Hurtado [facility]   11/23/2020  8:40 AM Denver Zelia Parent, MD [facility]   12/1/2020  8:00 AM JEANNA Bo - REINA [facility]   1/5/2021 11:00 AM [facility] CT  [facility] [place] [facility]

## 2020-10-30 ENCOUNTER — NURSE ONLY (OUTPATIENT)
Dept: INTERNAL MEDICINE CLINIC | Age: 75
End: 2020-10-30
Payer: MEDICARE

## 2020-10-30 LAB
BILIRUBIN, POC: NORMAL
BLOOD URINE, POC: NORMAL
CLARITY, POC: NORMAL
COLOR, POC: YELLOW
GLUCOSE URINE, POC: NORMAL
KETONES, POC: NORMAL
LEUKOCYTE EST, POC: NORMAL
NITRITE, POC: NORMAL
PH, POC: 7
PROTEIN, POC: NORMAL
SPECIFIC GRAVITY, POC: 1.02
UROBILINOGEN, POC: 0.2

## 2020-10-30 PROCEDURE — 81002 URINALYSIS NONAUTO W/O SCOPE: CPT | Performed by: INTERNAL MEDICINE

## 2020-10-30 RX ORDER — SULFAMETHOXAZOLE AND TRIMETHOPRIM 800; 160 MG/1; MG/1
1 TABLET ORAL 2 TIMES DAILY
Qty: 6 TABLET | Refills: 0 | Status: SHIPPED | OUTPATIENT
Start: 2020-10-30 | End: 2020-11-02

## 2020-10-30 NOTE — PROGRESS NOTES
Patient calling with possible symptoms of urinary track infection. Symptoms for 2-3 days. POS for pain and burning with urination. POS for frequent urination. POS for difficulty with urination. NEG for fever  NEG for low back pain. Please advise    Culture drawn and sent . Results sent to [facility]        [address] [facility] [address] [facility], [address] [facility],[address]  [address] [facility]  Phone: [phone] Fax: [phone]

## 2020-11-02 LAB
ORGANISM: ABNORMAL
URINE CULTURE, ROUTINE: ABNORMAL
URINE CULTURE, ROUTINE: ABNORMAL

## 2020-11-02 RX ORDER — NITROFURANTOIN 25; 75 MG/1; MG/1
100 CAPSULE ORAL 2 TIMES DAILY
Qty: 20 CAPSULE | Refills: 0 | Status: ON HOLD | OUTPATIENT
Start: 2020-11-02 | End: 2020-11-16 | Stop reason: HOSPADM

## 2020-11-02 RX ORDER — FLUCONAZOLE 150 MG/1
150 TABLET ORAL ONCE
Qty: 1 TABLET | Refills: 0 | Status: SHIPPED | OUTPATIENT
Start: 2020-11-02 | End: 2020-11-02

## 2020-11-03 ENCOUNTER — TELEPHONE (OUTPATIENT)
Dept: CARDIOTHORACIC SURGERY | Age: 75
End: 2020-11-03

## 2020-11-03 ENCOUNTER — OFFICE VISIT (OUTPATIENT)
Dept: CARDIOTHORACIC SURGERY | Age: 75
End: 2020-11-03
Payer: MEDICARE

## 2020-11-03 VITALS
HEIGHT: 59 IN | OXYGEN SATURATION: 96 % | HEART RATE: 81 BPM | RESPIRATION RATE: 14 BRPM | BODY MASS INDEX: 25.8 KG/M2 | TEMPERATURE: 97.3 F | WEIGHT: 128 LBS | SYSTOLIC BLOOD PRESSURE: 112 MMHG | DIASTOLIC BLOOD PRESSURE: 72 MMHG

## 2020-11-03 DIAGNOSIS — R59.0 ADENOPATHY, HILAR: Primary | ICD-10-CM

## 2020-11-03 DIAGNOSIS — Z01.818 PRE-OP EVALUATION: ICD-10-CM

## 2020-11-03 PROCEDURE — 1090F PRES/ABSN URINE INCON ASSESS: CPT | Performed by: THORACIC SURGERY (CARDIOTHORACIC VASCULAR SURGERY)

## 2020-11-03 PROCEDURE — 1123F ACP DISCUSS/DSCN MKR DOCD: CPT | Performed by: THORACIC SURGERY (CARDIOTHORACIC VASCULAR SURGERY)

## 2020-11-03 PROCEDURE — 99204 OFFICE O/P NEW MOD 45 MIN: CPT | Performed by: THORACIC SURGERY (CARDIOTHORACIC VASCULAR SURGERY)

## 2020-11-03 PROCEDURE — 3017F COLORECTAL CA SCREEN DOC REV: CPT | Performed by: THORACIC SURGERY (CARDIOTHORACIC VASCULAR SURGERY)

## 2020-11-03 PROCEDURE — 4040F PNEUMOC VAC/ADMIN/RCVD: CPT | Performed by: THORACIC SURGERY (CARDIOTHORACIC VASCULAR SURGERY)

## 2020-11-03 PROCEDURE — 1111F DSCHRG MED/CURRENT MED MERGE: CPT | Performed by: THORACIC SURGERY (CARDIOTHORACIC VASCULAR SURGERY)

## 2020-11-03 PROCEDURE — 1036F TOBACCO NON-USER: CPT | Performed by: THORACIC SURGERY (CARDIOTHORACIC VASCULAR SURGERY)

## 2020-11-03 PROCEDURE — G8427 DOCREV CUR MEDS BY ELIG CLIN: HCPCS | Performed by: THORACIC SURGERY (CARDIOTHORACIC VASCULAR SURGERY)

## 2020-11-03 PROCEDURE — G8484 FLU IMMUNIZE NO ADMIN: HCPCS | Performed by: THORACIC SURGERY (CARDIOTHORACIC VASCULAR SURGERY)

## 2020-11-03 PROCEDURE — G8399 PT W/DXA RESULTS DOCUMENT: HCPCS | Performed by: THORACIC SURGERY (CARDIOTHORACIC VASCULAR SURGERY)

## 2020-11-03 PROCEDURE — G8417 CALC BMI ABV UP PARAM F/U: HCPCS | Performed by: THORACIC SURGERY (CARDIOTHORACIC VASCULAR SURGERY)

## 2020-11-03 RX ORDER — ACETAMINOPHEN 500 MG
500 TABLET ORAL EVERY 6 HOURS PRN
COMMUNITY

## 2020-11-03 NOTE — TELEPHONE ENCOUNTER
There's a follow up encounter in which the care coordinator conducted a three way phone call with patient and the office and spoke with Sarina Tolbert. Patient came in the following morning and gave a urine sample and was treated.

## 2020-11-03 NOTE — PROGRESS NOTES
Consultation H&P    Date of Admission:  No admission date for patient encounter. Date of Consultation:  1/27/2021    PCP:  Kasandra Goldmann, MD      Cards:     Chief Complaint: Chest pain    History of Present Illness: We are asked to see this patient in consultation by Dr. Adele Beckford regarding pulmonary nodule  Cristobal Tejada is a 70 y.o. female who   came to my attention with an enlarging right lower lobe dominant pulmonary nodule. This was purely discovered during emergency room visit for chest pain in which she was found to have new onset atrial fibrillation was rate controlled and sent home on Eliquis. CT chest imaging revealed this right lower lobe pulmonary nodule. On an outpatient basis she had a PET CT scan which was no suspicion for any clinical activity of malignancy.       Past Medical History:  Past Medical History:   Diagnosis Date    Atrial fibrillation ([address] 10/21/2020    COPD (chronic obstructive pulmonary disease) (HCC)     history 30 years of chronic smoking, DLCO 73%    Difficult intravenous access     Diverticulitis     GERD (gastroesophageal reflux disease)     Hyperlipidemia     Hypertension     Osteoarthritis     Osteopenia     PONV (postoperative nausea and vomiting)     Prolonged emergence from general anesthesia     Restless legs syndrome     S/P shoulder surgery 08/25/2020    RIGHT - still healing       Past Surgical History:  Past Surgical History:   Procedure Laterality Date    BREAST REDUCTION SURGERY  10/15/15    BRONCHOSCOPY N/A 10/22/2020    EBUS W/ANESTHESIA performed by Anais Rich MD at [address]  10/22/2020    BRONCHOSCOPY ALVEOLAR LAVAGE performed by Anais Rich MD at [address]  10/22/2020    BRONCHOSCOPY BRUSHINGS performed by Anasi Rich MD at [address]  10/22/2020    BRONCHOSCOPY/TRANSBRONCHIAL NEEDLE BIOPSY performed by Anais Rich MD at [id number] [place] [address]  BRONCHOSCOPY  10/22/2020    BRONCHOSCOPY/TRANSBRONCHIAL NEEDLE BIOPSY ADDL LOBE performed by Mandy Ochoa MD at [facility]  11/15/2016    colon polyps, diverticulosis    OTHER SURGICAL HISTORY  10/22/2020    EBUS w/anesthesia bronchoscopy alveilar lavage bronchoscopy brushings    OVARIAN CYST REMOVAL      TN EXCISION TUMOR SOFT TISSUE BACK/FLANK SUBQ <3CM N/A 8/23/2019    EXCISION BACK CYST TIMES TWO performed by Andriy Max MD at [place] [address] ARTHROSCOPY Right 8/25/2020    VIDEO ARTHROSCOPY RIGHT SHOULDER, ROTATOR CUFF REPAIR, DECOMPRESSION, EXTENSIVE DEBRIDEMENT performed by Nilay Henson MD at [address] Right 11/12/2020    RIGHT VIDEO THORACOSCOPY, WEDGE LUNG BIOPSY, MEDIASTINAL LYMPH NODE BIOSPIES WITH CRYOABLATION NERVE BLOCKS  CPT CODE - 32555 performed by Laurie Steward MD at [facility] Medications:   Prior to Admission medications    Medication Sig Start Date End Date Taking? Authorizing Provider   acetaminophen (TYLENOL) 500 MG tablet Take 500 mg by mouth every 6 hours as needed for Pain   Yes Historical Provider, MD   simvastatin (ZOCOR) 20 MG tablet TAKE 1 TABLET BY MOUTH  NIGHTLY 7/30/20  Yes Melva Ledezma MD   omeprazole (PRILOSEC) 10 MG delayed release capsule TAKE 1 CAPSULE BY MOUTH  DAILY  Patient taking differently: Take 10 mg by mouth daily as needed  7/30/20  Yes Melva Ledezma MD   alendronate (FOSAMAX) 70 MG tablet TAKE 1 TABLET BY MOUTH  EVERY 7 DAYS  Patient taking differently: Take 70 mg by mouth every 7 days Takes weekly on Mondays 7/24/20  Yes JEANNA Alvarez - CNP   Calcium Carb-Cholecalciferol (CALCIUM + D3) 600-200 MG-UNIT TABS Take 1 tablet by mouth 2 times daily    Yes Historical Provider, MD   gabapentin (NEURONTIN) 300 MG capsule Take 1 capsule by mouth 3 times daily for 30 days.  Intended supply: 30 days 1/25/21 2/24/21  Ernestine Raza MD   amLODIPine (NORVASC) 5 MG tablet Take 1 tablet by mouth daily 20   Ben Escamilla MD   oxyCODONE (ROXICODONE) 5 MG immediate release tablet Take 5 mg by mouth nightly. Historical Provider, MD   apixaban (ELIQUIS) 5 MG TABS tablet Take 5 mg by mouth 2 times daily    Historical Provider, MD   hydroCHLOROthiazide (HYDRODIURIL) 25 MG tablet Take 1 tablet by mouth every morning 20   Muriel Baron MD        Facility Administered Medications: Allergies: Allergies   Allergen Reactions    Naproxen Anaphylaxis    Percocet [Oxycodone-Acetaminophen] Nausea Only        Social History:    Working:   Caffeine:   Lifestyle:    Social History     Socioeconomic History    Marital status:       Spouse name: Not on file    Number of children: Not on file    Years of education: Not on file    Highest education level: Not on file   Occupational History    Not on file   Social Needs    Financial resource strain: Not on file    Food insecurity     Worry: Not on file     Inability: Not on file    Transportation needs     Medical: Not on file     Non-medical: Not on file   Tobacco Use    Smoking status: Former Smoker     Packs/day: 2.00     Years: 30.00     Pack years: 60.00     Types: Cigarettes     Start date: 1969     Quit date: 2004     Years since quittin.0    Smokeless tobacco: Never Used   Substance and Sexual Activity    Alcohol use: Yes     Comment: very rare - one a year    Drug use: No    Sexual activity: Not Currently   Lifestyle    Physical activity     Days per week: Not on file     Minutes per session: Not on file    Stress: Not on file   Relationships    Social connections     Talks on phone: Not on file     Gets together: Not on file     Attends Islam service: Not on file     Active member of club or organization: Not on file     Attends meetings of clubs or organizations: Not on file     Relationship status: Not on file    Intimate partner violence     Fear of current or ex partner: Not on file     Emotionally abused: Not on file     Physically abused: Not on file     Forced sexual activity: Not on file   Other Topics Concern    Not on file   Social History Narrative    Not on file       Family History:      Problem Relation Age of Onset    Heart Disease Mother     High Blood Pressure Mother     Alzheimer's Disease Mother     Heart Disease Father     Heart Disease Sister     Heart Attack Sister     Heart Disease Brother      Review of Systems:  Constitutional:  No night sweats,  headaches, no weight loss. Eyes:  No glaucoma, OU cataracts- removed Dec/2019. ENMT:  No nosebleeds, no deviated septum. + Parotid bx; warthins tumor. Cardiac:  Recent dx afib - arrhythmias. + HTN   Vascular:  No claudication, no varicosities. GI:  No PUD, + heartburn. :  No kidney stones,current UTI; hx every 3-4 mos UTI. Musculoskeletal:  + Osteoarthritis, no gout. Respiratory:  No SOB, no emphysema, no asthma. Integumentary:  No dermatitis, no itching, no rash. Neurological:  No stroke, no TIAs, no seizures. Psychiatric:  No depression,no  anxiety. Endocrine: No diabetes, no thyroid issues. Hematologic:  No bleeding,no  easy bruising. + hilar adenopathy. Immunologic:  No known cancer, no steroid therapies. + aktintic keratoses- bridge of nose. Physical Examination:    /72 (Site: Left Upper Arm, Position: Sitting)   Pulse 81   Temp 97.3 °F (36.3 °C)   Resp 14   Ht 4' 11\" (1.499 m)   Wt 128 lb (58.1 kg)   LMP  (LMP Unknown)   SpO2 96%   BMI 25.85 kg/m²      BP RUE:  BP LUE:   Admission Weight: 128 lb (58.1 kg)   Hand dominance:    General appearance: NAD, well nourished  Eyes: anicteric, PERRLA  ENMT: no scars or lesions, no nasal deformity, normal dentition, no cyanosis of oral mucosa  Neck: no masses, no thyroid enlargement, no JVD. Respiratory: effort is unlabored, symmetric, no crackles, wheezes or rubs. No palpable/percussable abnormalities.   Cardiovascular: regular, no murmur. PMI normal, no thrill. No carotid bruits. No edema or varicosities. Abdominal aorta cannot be appreciated given body habitus. GI: abdomen soft, nondistended, no organomegaly. No masses. Lymphatic: no cervical/supraclavicular adenopathy  Musculoskeletal: strength and tone normal. Full ROM. No scoliosis. Extremities: warm and pink. No clubbing or petechiae. Skin: no dermatitis or ulceration. No nodularity or induration. Neuro: CN grossly intact. Sensation and motor function grossly intact. Psychiatric: oriented, appropriate mood/affect. MEDICAL DECISION MAKING/TESTING  Studies personally reviewed. Cath:     Echo:   Transthoracic Echocardiography Report (TTE)      Demographics      Patient Name       Connie Dior      Date of Study      10/22/2020         Gender              Female      Patient Number     [id number]         Date of Birth       1945      Visit Number       [id number]          Age                 70 year(s)      Accession Number   [id number]         Room Number               Corporate ID       [id number]           Zena Gitelman      Ordering Physician Zheng Gonzalez,     Mary Temple M.D. Physician           MD, [address] [place] [address].     Procedure     Type of Study      TTE procedure:ECHOCARDIOGRAM COMPLETE 2D W DOPPLER W COLOR. Procedure Date  Date: 10/22/2020 Start: 02:38 PM     Study Location: [address] [place] [address] - Echo Lab  Technical Quality: Adequate visualization     Indications:Atrial fibrillation.     Patient Status: Routine     Height: 59 inches Weight: 126 pounds BSA: 1.52 m2 BMI: 25.45 kg/m2     BP: 135/62 mmHg      Conclusions      Summary   LV systolic function is hyperdynamic with EF estimated >65%.    No regional wall motion abnormalities. Grade I diastolic dysfunction with normal left ventricular filling pressure. Trivial tricuspid and pulmonic regurgitation. Inadequate tricuspid regurgitation jet to estimate systolic artery pressure   (SPAP). Signature      ------------------------------------------------------------------   Electronically signed by Yuki Orona MD, [facility]   (Interpreting physician) on 10/22/2020 at 05:01 PM   ------------------------------------------------------------------      Findings      Left Ventricle   LV systolic function is hyperdynamic with ejection fraction estimated >65%. No regional wall motion abnormalities. Grade I diastolic dysfunction with normal left ventricular filling pressure. Normal left ventricular size and wall thickness. Mitral Valve   Mitral valve is structurally normal.   Trivial mitral regurgitation. Left Atrium   The left atrium is normal in size. Aortic Valve   Aortic valve is structurally normal.   No aortic regurgitation. Aorta   The aortic root is normal in size. Right Ventricle   Normal right ventricular size and function. Right ventricular systolic function is normal .   TAPSE = 18 mm. S' prime velocity is measured at 10.7 cm/sec. Tricuspid Valve   Tricuspid valve is structurally normal.   Trivial tricuspid regurgitation. Inadequate tricuspid regurgitation jet to estimate systolic artery pressure   (SPAP). Right Atrium   The right atrial size is normal.   Right atrial area is 8.57 cm2. Pulmonic Valve   The pulmonic valve is structurally normal.   Trivial pulmonic regurgitation. Pericardial Effusion   No pericardial effusion noted. Pleural Effusion   No pleural effusion. Miscellaneous   IVC size is normal (<2.1cm) and collapses > 50% with respiration consistent   with normal RA pressure (3mmHg).      M-Mode/2D Measurements (cm)      LV Diastolic Dimension: 9.87 cm LV Systolic Dimension: 5.76 cm   LV Septum Diastolic: 0.7 cm   LV PW Diastolic: 0.8 cm         AO Root Dimension: 3 cm                                   AV Cusp Separation: 2 cm                                   LA Dimension: 3 cm                                   LA Area: 11.6 cm2                                   LA volume/Index: 26 ml /17 ml/m2     Doppler Measurements      AV Peak Velocity: 110 cm/s     MV Peak E-Wave: 65.2 cm/s   AV Peak Gradient: 4.84 mmHg    MV Peak A-Wave: 81.9 cm/s                                  MV E/A Ratio: 0.8      E' Septal Velocity: 5.75 cm/s   E' Lateral Velocity: 6.92 cm/s   E/E' ratio: 10.4   PV Peak Velocity: 105 cm/s   PV Peak Gradient: 4.41 mmHg      Aortic Valve      Peak Velocity: 110 cm/s   Peak Gradient: 4.84 mmHg      Cusp Separation: 2 cm     Aorta      Aortic Root: 3 cm         Component 3mo ago         CXR:   Narrative   EXAMINATION:   ONE XRAY VIEW OF THE CHEST       10/20/2020 1:50 pm       COMPARISON:   August 10, 2019       HISTORY:   ORDERING SYSTEM PROVIDED HISTORY: chest pain   TECHNOLOGIST PROVIDED HISTORY:   Reason for exam:->chest pain   Reason for Exam: cough sob       FINDINGS:   The lungs are without acute focal process.  There is no effusion or   pneumothorax. The cardiomediastinal silhouette is stable. The osseous   structures are stable.           Impression   No acute process.       Stable compared to prior study            CT:   Narrative   EXAMINATION:   CTA OF THE CHEST 10/20/2020 3:02 pm       TECHNIQUE:   CTA of the chest was performed after the administration of intravenous   contrast.  Multiplanar reformatted images are provided for review.  MIP   images are provided for review.  Dose modulation, iterative reconstruction,   and/or weight based adjustment of the mA/kV was utilized to reduce the   radiation dose to as low as reasonably achievable.       COMPARISON:   September 13, 2020 PET scan September 25, 2020       HISTORY:   ORDERING SYSTEM PROVIDED HISTORY: pleuritic chest pain TECHNOLOGIST PROVIDED HISTORY:   Reason for exam:->pleuritic chest pain   Reason for Exam: Pt c/o chest pains   Acuity: Acute   Type of Exam: Initial       FINDINGS:   Pulmonary Arteries: Pulmonary arteries are adequately opacified for   evaluation.  No evidence of intraluminal filling defect to suggest pulmonary   embolism.  Main pulmonary artery is normal in caliber.       Mediastinum: No evidence of mediastinal lymphadenopathy.  The heart and   pericardium demonstrate no acute abnormality.  There is no acute abnormality   of the thoracic aorta.       Lungs/pleura: Patchy ground-glass appearance of the lungs with areas of gas   trapping.  This is increased from prior study.  There is thickening of the   fissures bilaterally new from the prior study       interval increase in size of right peripheral densities now measuring 2.2 cm   x 1.9 cm previously measuring 1.8 x 1.3 cm.  There is interval increase in of   soft tissue density in the right hilum which appears represent a significant   adenopathy there is central peribronchial thickening.       Upper Abdomen: Limited images of the upper abdomen are unremarkable.       Soft Tissues/Bones: No acute bone or soft tissue abnormality.  No change in   alignment compared to the prior study           Impression   No evidence of pulmonary embolism       Interval increase in size of right peripheral densities interval increase in   adenopathy in the right hilum interval development of Sigrid bronchial   thickening findings are non-specific could represent infectious process with   associated hilar adenopathy autoimmune or inflammatory disorder is included   in the differential correlation clinical data base is required.       Findings are nonspecific.  The possibility of infectious pneumonic process   and associated hilar adenopathy is possibility.  Inflammatory or autoimmune   process would also be in the differential.       Patient had a recent PET scan which demonstrated peripheral nodules to be not   significantly hypermetabolic. Carotid duplex:     PFTs:  DLCO 73%. FEV1 Pred post 108        Labs:   CBC: No results for input(s): WBC, HGB, HCT, MCV, PLT in the last 72 hours. BMP: No results for input(s): NA, K, CL, CO2, PHOS, BUN, CREATININE, CALCIUM, MG in the last 72 hours. Invalid input(s): KRFLXMG  Cardiac Enzymes: No results for input(s): CKTOTAL, CKMB, CKMBINDEX, TROPONINI in the last 72 hours. PT/INR: No results for input(s): PROTIME, INR in the last 72 hours. APTT: No results for input(s): APTT in the last 72 hours. Liver Profile:  Lab Results   Component Value Date    AST 14 10/21/2020    ALT 20 10/21/2020    BILIDIR 0.25 10/29/2010    BILITOT 0.5 10/21/2020    ALKPHOS 74 10/21/2020    LABALBU 3.4 10/21/2020    LABALBU 3.5 10/21/2020     Lab Results   Component Value Date    CHOL 134 10/21/2020    HDL 44 10/21/2020    HDL 43 10/29/2010    TRIG 106 10/21/2020     HgbA1c:  Lab Results   Component Value Date    LABA1C 5.5 06/09/2020     UA:   Lab Results   Component Value Date    NITRITE trace 10/30/2020    COLORU Yellow 11/06/2020    PHUR 6.5 11/06/2020    LABCAST 0-1 Hyaline 10/17/2016    WBCUA 6-9 11/06/2020    RBCUA 3-4 11/06/2020    MUCUS 1+ 11/10/2017    BACTERIA 1+ 11/06/2020    CLARITYU Clear 11/06/2020    SPECGRAV 1.010 11/06/2020    LEUKOCYTESUR TRACE 11/06/2020    UROBILINOGEN 0.2 11/06/2020    BILIRUBINUR Negative 11/06/2020    BILIRUBINUR neg 10/30/2020    BLOODU SMALL 11/06/2020    GLUCOSEU Negative 11/06/2020       Assessment and Plan:   Ms. Natalie Galindo is a 79-year-old former smoker with enlarging right lower lobe pulmonary nodule. I have reviewed all images and discussed my surgical plan with Ms. Tung Mes[place]. We will plan for right VATS lower lobe wedge and sternal lymph node harvesting. Pulmonary function test will be ordered.     Risks, benefits and postoperative complications discussed including bleeding with need for reoperation and/or transfusions, infection, myocardial infarction, graft failure, stroke, multi-organ failures, prolonged intubation, death. Patient has expressed understanding of risks of surgery and agrees to proceed with surgery. Typical periop/postop course reviewed including initial limitations on driving/heavy lifting.         Roni Macias MD

## 2020-11-03 NOTE — TELEPHONE ENCOUNTER
I spoke with Mrs. Tom Jensen regarding her upcoming surgery with Dr. Brittnee Garcia at [facility].  She is to have her COVID test at [facility] on 11/6/20 at 8:35 a.m. She is scheduled for PFT's at [facility] on Wednesday, 11/11/20 at 9:00 am, report at 8:45 am, NPO for one hour prior. Her surgery is scheduled at [facility] on Thursday, 11/12/20 at 7:30 am, report at 5:30 am, NPO after midnight. I told her the PreAdmission Testing Department from [facility] would call her regarding preoperative testing. All the instructions were written down along with our office telephone number. All questions were answered.

## 2020-11-04 ENCOUNTER — CARE COORDINATION (OUTPATIENT)
Dept: CASE MANAGEMENT | Age: 75
End: 2020-11-04

## 2020-11-04 NOTE — PROGRESS NOTES
Obstructive Sleep Apnea (SERG) Screening     Patient:  Yolie Azevedo    YOB: 1945      Medical Record #:  [id number]                     Date:  11/4/2020     1. Are you a loud and/or regular snorer? [x]  Yes       [] No    2. Have you been observed to gasp or stop breathing during sleep? [x]  Yes       [] No    3. Do you feel tired or groggy upon awakening or do you awaken with a headache?           []  Yes       [x] No    4. Are you often tired or fatigued during the wake time hours? [x]  Yes       [] No    5. Do you fall asleep sitting, reading, watching TV or driving? []  Yes       [x] No    6. Do you often have problems with memory or concentration? []  Yes       [x] No    **If patient's score is ? 3 they are considered high risk for SERG. An Anesthesia provider will evaluate the patient and develop a plan of care the day of surgery. Note:  If the patient's BMI is more than 35 kg m¯² , has neck circumference > 40 cm, and/or high blood pressure the risk is greater (© American Sleep Apnea Association, 2006).

## 2020-11-04 NOTE — PROGRESS NOTES
Preoperative Screening for Elective Surgery/Invasive Procedures While COVID-19 present in the community     Have you had any of the following symptoms? NONE  o Fever, chills  o Cough  o Shortness of breath  o Muscle aches/pain  o Diarrhea  o Abdominal pain, nausea, vomiting  o Loss or decrease in taste and / or smell   Risk of Exposure  o Have you recently been hospitalized for COVID-19 or flu-like illness, if so when? NO  o Recently diagnosed with COVID-19, if so when? NO  o Recently tested for COVID-19, if so when? YES, FOR PRE PROCEDURE SCREENING  o Have you been in close contact with a person or family member who currently has or recently had COVID-23? If yes, when and in what context? NO  o Do you live with anybody who in the last 14 days has had fever, chills, shortness of breath, muscle aches, flu-like illness? NO  o Do you have any close contacts or family members who are currently in the hospital for COVID-19 or flu-like illness? If yes, assess recent close contact with this person. NO    Indicate if the patient has a positive screen by answering yes to one or more of the above questions. Patients who test positive or screen positive prior to surgery or on the day of surgery should be evaluated in conjunction with the surgeon/proceduralist/anesthesiologist to determine the urgency of the procedure. [Treatment Education] : treatment education [Medical Care Issues] : medical care issues [Anticipatory Guidance] : anticipatory guidance [FreeTextEntry1] : *Diarrhea \par -Labs: GI PCR, O&P, Giardia, Rotavirus, Lynn stain\par -Advised to stop Imodium\par -Discussed SHANNAN diet in detail\par -Stay hydrated\par -Rest\par -Monitor for worsening symptoms i.e. fever, bloody stools, increase pain, dehydration\par -If worsening symptoms occur refer to ER, pt understood and agreed\par \par *Follow up when results are available, sooner if needed

## 2020-11-04 NOTE — CARE COORDINATION
[facility] 45 Transitions Follow Up Call    2020    Patient: Mirian Ceballos  Patient : 1945   MRN: [id number]  Reason for Admission: cp  Discharge Date: 10/22/20 RARS: Readmission Risk Score: 14         Spoke with: Mirian Ceballos (patient)    Feeling improved. Taking abx for UTI. Has her plans for follow up as she is having wedge resection with possible lobectomy with Dr Jen Milian at [facility] next week and will be admitted. States she does not think her Eliquis rx had 60# in it because it is not going to last the month. She has enough to get her to the date she has to stop for surgery. She plans to call [facility] who filled it to see if it was a partial script and then call Dr Carlos Martini to request new script to her [facility] pharmacy for refill so she has what she needs when this med is restarted after her discharge from surgery. CTN offered assistance if needed. Denies needs today. Care Transitions Subsequent and Final Call    Schedule Follow Up Appointment with PCP:  Completed  Subsequent and Final Calls  Do you have any ongoing symptoms?:  No  Have your medications changed?:  Yes  Do you have any questions related to your medications?:  Yes  Do you currently have any active services?:  No  Identified Barriers:  None  Care Transitions Interventions  Other Interventions:             Follow Up  Future Appointments   Date Time Provider Clatyon Cai   2020  8:35 AM SCHEDULE, [facility] AND FLU CLINIC AND FLU [facility]   2020  9:00 AM [facility]UL[facility] FUNCTION [facility] [place] [facility]   2020  7:45 AM MD Silvia Herndon [facility]   2020  8:40 AM [place] Ellena Plowman, MD [facility]   2021 11:00 AM [facility] VCT [address]       Marlene Cheek, ZHAO

## 2020-11-06 ENCOUNTER — HOSPITAL ENCOUNTER (OUTPATIENT)
Dept: PREADMISSION TESTING | Age: 75
Discharge: HOME | End: 2020-11-10
Payer: MEDICARE

## 2020-11-06 ENCOUNTER — OFFICE VISIT (OUTPATIENT)
Dept: PRIMARY CARE CLINIC | Age: 75
End: 2020-11-06
Payer: MEDICARE

## 2020-11-06 LAB
ABO/RH: NORMAL
ANTIBODY SCREEN: NORMAL
BACTERIA: ABNORMAL /HPF
BILIRUBIN URINE: NEGATIVE
BLOOD, URINE: ABNORMAL
CLARITY: CLEAR
COLOR: YELLOW
EKG ATRIAL RATE: 77 BPM
EKG DIAGNOSIS: NORMAL
EKG P AXIS: 79 DEGREES
EKG P-R INTERVAL: 156 MS
EKG Q-T INTERVAL: 394 MS
EKG QRS DURATION: 78 MS
EKG QTC CALCULATION (BAZETT): 445 MS
EKG R AXIS: 80 DEGREES
EKG T AXIS: 54 DEGREES
EKG VENTRICULAR RATE: 77 BPM
EPITHELIAL CELLS, UA: ABNORMAL /HPF (ref 0–5)
GLUCOSE URINE: NEGATIVE MG/DL
KETONES, URINE: NEGATIVE MG/DL
LEUKOCYTE ESTERASE, URINE: ABNORMAL
MICROSCOPIC EXAMINATION: YES
NITRITE, URINE: NEGATIVE
PH UA: 6.5 (ref 5–8)
PROTEIN UA: NEGATIVE MG/DL
RBC UA: ABNORMAL /HPF (ref 0–4)
SPECIFIC GRAVITY UA: 1.01 (ref 1–1.03)
URINE REFLEX TO CULTURE: ABNORMAL
URINE TYPE: ABNORMAL
UROBILINOGEN, URINE: 0.2 E.U./DL
WBC UA: ABNORMAL /HPF (ref 0–5)

## 2020-11-06 PROCEDURE — 36415 COLL VENOUS BLD VENIPUNCTURE: CPT

## 2020-11-06 PROCEDURE — 93005 ELECTROCARDIOGRAM TRACING: CPT

## 2020-11-06 PROCEDURE — 86850 RBC ANTIBODY SCREEN: CPT

## 2020-11-06 PROCEDURE — 81001 URINALYSIS AUTO W/SCOPE: CPT

## 2020-11-06 PROCEDURE — 86901 BLOOD TYPING SEROLOGIC RH(D): CPT

## 2020-11-06 PROCEDURE — 99211 OFF/OP EST MAY X REQ PHY/QHP: CPT | Performed by: NURSE PRACTITIONER

## 2020-11-06 PROCEDURE — 86900 BLOOD TYPING SEROLOGIC ABO: CPT

## 2020-11-06 NOTE — PATIENT INSTRUCTIONS

## 2020-11-06 NOTE — PROGRESS NOTES
Kostas Sandy received a viral test for COVID-19. They were educated on isolation and quarantine as appropriate. For any symptoms, they were directed to seek care from their PCP, given contact information to establish with a doctor, directed to an urgent care or the emergency room.

## 2020-11-08 LAB — SARS-COV-2: NOT DETECTED

## 2020-11-09 ENCOUNTER — ANESTHESIA EVENT (OUTPATIENT)
Dept: OPERATING ROOM | Age: 75
DRG: 166 | End: 2020-11-09
Payer: MEDICARE

## 2020-11-09 PROBLEM — I48.0 PAF (PAROXYSMAL ATRIAL FIBRILLATION) (HCC): Status: ACTIVE | Noted: 2020-10-21

## 2020-11-09 NOTE — PROGRESS NOTES
[facility]   Cardiac Followup    Referring Provider:  Rashida Hernandez MD     Chief Complaint   Patient presents with    Cardiac Clearance     lung surgery 11/12/20      Subjective: Ms. Gómez Jones is here today for hospital follow up afib, HTN, HLD; no complaints today    History of Present Illness:   Ed Man a 70 y. o. female [address] of HTN, PAF dx 10/20 (on eliquis for [facility]), HLD, RLS, OA, and GERD. Admitted to [facility] 10/20/20  c/o right-sided CP radiating into back and posterior neck described as sharp sensation occurring while laying in bed. Admit EKG Sinus rhythm. Note CT imaging negative PE but right nonspecific peripheral densities and hilar LAD.  After admit noted to be in afib and  was placed on Dilt drip 10mg/hr. Converted to NSR 4 hours later and gtt turned off but recurrent with dilt gtt then turned back on. She reverted back to NSR. I reviewed EKG showing afib RVR 155bpm; ST change inferior leads consider ischemia. Another EKG showed afib RVR 120bpm; nonspecific ST change. Note she did NOT feel palps with afib. She does report feeling \"fluttering\" in back in September? Note underwent w/u for RLL opacities on CT imaging but PET scan not hypermetabolic. Right parotid lesion found with increased activity and US guided biopsy 10/9/20 performed. She reports negative pathology. Most recent ECHO 10/22/20 EF >65%. No regional wall abnls; Grade I DD with normal LV filling pressure. Trivial TR/MS (no change from 4/19 ECHO). On 10/22/20  underwent EBUS with Dr. Kang Trinidad- No malignant cells identified. Inconclusive study. Most recent EKG 11/6/20 NSR 77bpm. Referred to Dr. Jennie Hamm who then referred to [address] [employer] surgeon, Dr. Byron Arboleda. Today she reports being fatigued. She denies any recurrence of CP since being discharged from hospital in October. Denies shortness of breath, edema, dizziness, palpitations and syncope. She has VATS with wedge lung biopsy scheduled 11/12/20 with Dr. Froylan Mercado.         Past Medical History:   has a past medical history of Atrial fibrillation ([facility] [address] Difficult intravenous access, Diverticulitis, GERD (gastroesophageal reflux disease), Hyperlipidemia, Hypertension, Osteoarthritis, Osteopenia, PONV (postoperative nausea and vomiting), Prolonged emergence from general anesthesia, Restless legs syndrome, and S/P shoulder surgery. Surgical History:   has a past surgical history that includes ovarian cyst removal; Breast reduction surgery (10/15/15); Colonoscopy (11/15/2016); pr excision tumor soft tissue back/flank subq <3cm (N/A, 8/23/2019); Shoulder arthroscopy (Right, 8/25/2020); Tonsillectomy; other surgical history (10/22/2020); bronchoscopy (N/A, 10/22/2020); bronchoscopy (10/22/2020); bronchoscopy (10/22/2020); bronchoscopy (10/22/2020); and bronchoscopy (10/22/2020). Social History:   reports that she quit smoking about 16 years ago. Her smoking use included cigarettes. She started smoking about 51 years ago. She has a 60.00 pack-year smoking history. She has never used smokeless tobacco. She reports current alcohol use. She reports that she does not use drugs. Family History:  family history includes Alzheimer's Disease in her mother; Heart Attack in her sister; Heart Disease in her brother, father, mother, and sister; High Blood Pressure in her mother. Home Medications:  Prior to Admission medications    Medication Sig Start Date End Date Taking?  Authorizing Provider   acetaminophen (TYLENOL) 500 MG tablet Take 500 mg by mouth every 6 hours as needed for Pain   Yes Historical Provider, MD   apixaban (ELIQUIS) 5 MG TABS tablet Take 1 tablet by mouth 2 times daily 10/22/20 11/21/20 Yes Zuleima Gramajo PA-C   losartan (COZAAR) 100 MG tablet TAKE 1 TABLET BY MOUTH  DAILY  Patient taking differently: Take 100 mg by mouth daily Take 1 tablet by mouth  daily 7/30/20  Yes Jose Zaman MD   simvastatin (ZOCOR) 20 MG tablet TAKE 1 TABLET BY MOUTH  NIGHTLY 7/30/20  Yes Sirena Sharif Susan Alford MD   omeprazole (PRILOSEC) 10 MG delayed release capsule TAKE 1 CAPSULE BY MOUTH  DAILY  Patient taking differently: Take 10 mg by mouth daily as needed  7/30/20  Yes Holly Sanchez MD   amLODIPine (NORVASC) 10 MG tablet TAKE 1 TABLET BY MOUTH  DAILY  Patient taking differently: Take 10 mg by mouth daily Take 1 tablet by mouth  daily 7/30/20  Yes Holly Sanchez MD   alendronate (FOSAMAX) 70 MG tablet TAKE 1 TABLET BY MOUTH  EVERY 7 DAYS  Patient taking differently: Take 70 mg by mouth every 7 days Takes weekly on Mondays 7/24/20  Yes JEANNA Martin - CNP   Calcium Carb-Cholecalciferol (CALCIUM + D3) 600-200 MG-UNIT TABS Take 1 tablet by mouth 2 times daily    Yes Historical Provider, MD   nitrofurantoin, macrocrystal-monohydrate, (MACROBID) 100 MG capsule Take 1 capsule by mouth 2 times daily for 10 days  Patient not taking: Reported on 11/10/2020 11/2/20 11/12/20  Holly Sanchez MD        Allergies:  Naproxen and Percocet [oxycodone-acetaminophen]     Review of Systems:   · Constitutional: there has been no unanticipated weight loss. There's been no change in energy level, sleep pattern, or activity level. · Eyes: No visual changes or diplopia. No scleral icterus. · ENT: No Headaches, hearing loss or vertigo. No mouth sores or sore throat. · Cardiovascular: Reviewed in HPI  · Respiratory: No cough or wheezing, no sputum production. No hematemesis. · Gastrointestinal: No abdominal pain, appetite loss, blood in stools. No change in bowel or bladder habits. · Genitourinary: No dysuria, trouble voiding, or hematuria. · Musculoskeletal:  No gait disturbance, weakness or joint complaints. · Integumentary: No rash or pruritis. · Neurological: No headache, diplopia, change in muscle strength, numbness or tingling. No change in gait, balance, coordination, mood, affect, memory, mentation, behavior. · Psychiatric: No anxiety, no depression.   · Endocrine: No malaise, fatigue or temperature intolerance. No excessive thirst, fluid intake, or urination. No tremor. · Hematologic/Lymphatic: No abnormal bruising or bleeding, blood clots or swollen lymph nodes. · Allergic/Immunologic: No nasal congestion or hives. Physical Examination:    Vitals:    11/10/20 1344   BP: 130/72   Pulse: 91   SpO2: 96%        Constitutional and General Appearance: NAD   Respiratory:  · Normal excursion and expansion without use of accessory muscles  · Resp Auscultation: Soft crackles left base otherwise Normal breath sounds without dullness  Cardiovascular:  · The apical impulses not displaced  · Heart tones are crisp and normal  · Cervical veins are not engorged  · The carotid upstroke is normal in amplitude and contour without delay or bruit  · Normal S1S2, No S3, No Murmur  · Peripheral pulses are symmetrical and full  · There is no clubbing, cyanosis of the extremities. · No edema  · Femoral Arteries: 2+ and equal  · Pedal Pulses: 2+ and equal   Abdomen:  · No masses or tenderness  · Liver/Spleen: No Abnormalities Noted  Neurological/Psychiatric:  · Alert and oriented in all spheres  · Moves all extremities well  · Exhibits normal gait balance and coordination  · No abnormalities of mood, affect, memory, mentation, or behavior are noted        Skin: warm and dry  Lab Results   Component Value Date    CHOL 134 10/21/2020    CHOL 175 06/09/2020    CHOL 192 06/03/2019     Lab Results   Component Value Date    TRIG 106 10/21/2020    TRIG 141 06/09/2020    TRIG 200 (H) 06/03/2019     Lab Results   Component Value Date    HDL 44 10/21/2020    HDL 54 06/09/2020    HDL 52 06/03/2019     Lab Results   Component Value Date    LDLCALC 69 10/21/2020    LDLCALC 93 06/09/2020    LDLCALC 100 (H) 06/03/2019     Lab Results   Component Value Date    LABVLDL 21 10/21/2020    LABVLDL 28 06/09/2020    LABVLDL 40 06/03/2019     No results found for: CHOLHDLRATIO    Assessment:     1.  PAF (paroxysmal atrial fibrillation) ([facility] [address]): Diagnosed October 2020. Note she did NOT feel palpitations. I reviewed EKG showing afib RVR 155bpm and another EKG showed afib RVR 120bpm.   Most recent ECHO 10/22/20 EF >65%. No regional wall abnls; Grade I DD with normal LV filling pressure. Trivial TR/AL (no change from 4/19 ECHO). Most recent EKG 11/6/20 NSR 77bpm. Note CHADs-vasc=2 and PAF with no symptoms I will continue eliquis 5mg BID. 2. Essential hypertension: Well controlled and will continue current medical regimen. 3. Hyperlipidemia, unspecified hyperlipidemia type: Most recent lipids 10/22/20 see above. I personally reviewed lab results in epic and discussed with patient. 4.      Chest pain: Resolved with no recurrence since admission in October. Given normal ECHO and no further CP I will watch clinically for now. No stress testing will be ordered. She is to call if further CP occurs and will consider nuc stress at that time if needed. Plan:  1. Meds reviewed. Refills as warranted   2. No changes today. Continue current medications   3. Follow up with me in 4-5 months     This note was scribed in the presence of Jeanna Ku MD by Do Gonzales RN    I, Dr. Emmie Smith, personally performed the services described in this documentation, as scribed by the above signed scribe in my presence. It is both accurate and complete to my knowledge. I agree with the details independently gathered by the clinical support staff, while the remaining scribed note accurately describes my personal service to the patient. Cost of prescription medications and patient compliance have been reviewed with patient. All questions answered. Thank you for allowing me to participate in the care of this individual.    Cortney Lisa M.D., [facility] [place]

## 2020-11-10 ENCOUNTER — OFFICE VISIT (OUTPATIENT)
Dept: CARDIOLOGY CLINIC | Age: 75
End: 2020-11-10
Payer: MEDICARE

## 2020-11-10 ENCOUNTER — CARE COORDINATION (OUTPATIENT)
Dept: CASE MANAGEMENT | Age: 75
End: 2020-11-10

## 2020-11-10 VITALS
OXYGEN SATURATION: 96 % | DIASTOLIC BLOOD PRESSURE: 72 MMHG | HEIGHT: 59 IN | HEART RATE: 91 BPM | WEIGHT: 128.5 LBS | BODY MASS INDEX: 25.91 KG/M2 | SYSTOLIC BLOOD PRESSURE: 130 MMHG

## 2020-11-10 PROBLEM — Z87.891 HISTORY OF TOBACCO ABUSE: Status: ACTIVE | Noted: 2020-11-10

## 2020-11-10 PROCEDURE — 4040F PNEUMOC VAC/ADMIN/RCVD: CPT | Performed by: INTERNAL MEDICINE

## 2020-11-10 PROCEDURE — 1036F TOBACCO NON-USER: CPT | Performed by: INTERNAL MEDICINE

## 2020-11-10 PROCEDURE — G8484 FLU IMMUNIZE NO ADMIN: HCPCS | Performed by: INTERNAL MEDICINE

## 2020-11-10 PROCEDURE — G8399 PT W/DXA RESULTS DOCUMENT: HCPCS | Performed by: INTERNAL MEDICINE

## 2020-11-10 PROCEDURE — G8427 DOCREV CUR MEDS BY ELIG CLIN: HCPCS | Performed by: INTERNAL MEDICINE

## 2020-11-10 PROCEDURE — 1123F ACP DISCUSS/DSCN MKR DOCD: CPT | Performed by: INTERNAL MEDICINE

## 2020-11-10 PROCEDURE — 99214 OFFICE O/P EST MOD 30 MIN: CPT | Performed by: INTERNAL MEDICINE

## 2020-11-10 PROCEDURE — 1111F DSCHRG MED/CURRENT MED MERGE: CPT | Performed by: INTERNAL MEDICINE

## 2020-11-10 PROCEDURE — 3017F COLORECTAL CA SCREEN DOC REV: CPT | Performed by: INTERNAL MEDICINE

## 2020-11-10 PROCEDURE — 1090F PRES/ABSN URINE INCON ASSESS: CPT | Performed by: INTERNAL MEDICINE

## 2020-11-10 PROCEDURE — G8417 CALC BMI ABV UP PARAM F/U: HCPCS | Performed by: INTERNAL MEDICINE

## 2020-11-10 NOTE — LETTER
[address]   Cardiac Followup    Referring Provider:  Alber Martinez MD     Chief Complaint   Patient presents with    Cardiac Clearance     lung surgery 11/12/20      Subjective: Ms. Emilia Hidalgo is here today for hospital follow up afib, HTN, HLD; no complaints today    History of Present Illness:   Candelario Echavarria a 70 y. o. female [address] of HTN, PAF dx 10/20 (on eliquis for [facility]), HLD, RLS, OA, and GERD. Admitted to [facility] 10/20/20  c/o right-sided CP radiating into back and posterior neck described as sharp sensation occurring while laying in bed. Admit EKG Sinus rhythm. Note CT imaging negative PE but right nonspecific peripheral densities and hilar LAD.  After admit noted to be in afib and  was placed on Dilt drip 10mg/hr. Converted to NSR 4 hours later and gtt turned off but recurrent with dilt gtt then turned back on. She reverted back to NSR. I reviewed EKG showing afib RVR 155bpm; ST change inferior leads consider ischemia. Another EKG showed afib RVR 120bpm; nonspecific ST change. Note she did NOT feel palps with afib. She does report feeling \"fluttering\" in back in September? Note underwent w/u for RLL opacities on CT imaging but PET scan not hypermetabolic. Right parotid lesion found with increased activity and US guided biopsy 10/9/20 performed. She reports negative pathology. Most recent ECHO 10/22/20 EF >65%. No regional wall abnls; Grade I DD with normal LV filling pressure. Trivial TR/VA (no change from 4/19 ECHO). On 10/22/20  underwent EBUS with Dr. Darci Perry- No malignant cells identified. Inconclusive study. Most recent EKG 11/6/20 NSR 77bpm. Referred to Dr. Britany Wyatt who then referred to [address] [employer] surgeon, Dr. Jayden Diez. Today she reports being fatigued. She denies any recurrence of CP since being discharged from hospital in October. Denies shortness of breath, edema, dizziness, palpitations and syncope. She has VATS with wedge lung biopsy scheduled 11/12/20 with Dr. Hannah Rosales. Past Medical History:   has a past medical history of Atrial fibrillation ([facility] [address] Difficult intravenous access, Diverticulitis, GERD (gastroesophageal reflux disease), Hyperlipidemia, Hypertension, Osteoarthritis, Osteopenia, PONV (postoperative nausea and vomiting), Prolonged emergence from general anesthesia, Restless legs syndrome, and S/P shoulder surgery. Surgical History:   has a past surgical history that includes ovarian cyst removal; Breast reduction surgery (10/15/15); Colonoscopy (11/15/2016); pr excision tumor soft tissue back/flank subq <3cm (N/A, 8/23/2019); Shoulder arthroscopy (Right, 8/25/2020); Tonsillectomy; other surgical history (10/22/2020); bronchoscopy (N/A, 10/22/2020); bronchoscopy (10/22/2020); bronchoscopy (10/22/2020); bronchoscopy (10/22/2020); and bronchoscopy (10/22/2020). Social History:   reports that she quit smoking about 16 years ago. Her smoking use included cigarettes. She started smoking about 51 years ago. She has a 60.00 pack-year smoking history. She has never used smokeless tobacco. She reports current alcohol use. She reports that she does not use drugs. Family History:  family history includes Alzheimer's Disease in her mother; Heart Attack in her sister; Heart Disease in her brother, father, mother, and sister; High Blood Pressure in her mother. Home Medications:  Prior to Admission medications    Medication Sig Start Date End Date Taking?  Authorizing Provider   acetaminophen (TYLENOL) 500 MG tablet Take 500 mg by mouth every 6 hours as needed for Pain   Yes Historical Provider, MD   apixaban (ELIQUIS) 5 MG TABS tablet Take 1 tablet by mouth 2 times daily 10/22/20 11/21/20 Yes Zuleima Gramajo PA-C   losartan (COZAAR) 100 MG tablet TAKE 1 TABLET BY MOUTH  DAILY  Patient taking differently: Take 100 mg by mouth daily Take 1 tablet by mouth  daily 7/30/20  Yes Patrice Greenberg MD   simvastatin (ZOCOR) 20 MG tablet TAKE 1 TABLET BY MOUTH  NIGHTLY 7/30/20 Yes Carlos Eduardo Reardon MD   omeprazole (PRILOSEC) 10 MG delayed release capsule TAKE 1 CAPSULE BY MOUTH  DAILY  Patient taking differently: Take 10 mg by mouth daily as needed  7/30/20  Yes Carlos Eduardo Reardon MD   amLODIPine (NORVASC) 10 MG tablet TAKE 1 TABLET BY MOUTH  DAILY  Patient taking differently: Take 10 mg by mouth daily Take 1 tablet by mouth  daily 7/30/20  Yes Carlos Eduardo Reardon MD   alendronate (FOSAMAX) 70 MG tablet TAKE 1 TABLET BY MOUTH  EVERY 7 DAYS  Patient taking differently: Take 70 mg by mouth every 7 days Takes weekly on Mondays 7/24/20  Yes Romel Lambert, APRN - CNP   Calcium Carb-Cholecalciferol (CALCIUM + D3) 600-200 MG-UNIT TABS Take 1 tablet by mouth 2 times daily    Yes Historical Provider, MD   nitrofurantoin, macrocrystal-monohydrate, (MACROBID) 100 MG capsule Take 1 capsule by mouth 2 times daily for 10 days  Patient not taking: Reported on 11/10/2020 11/2/20 11/12/20  Carlos Eduardo Reardon MD        Allergies:  Naproxen and Percocet [oxycodone-acetaminophen]     Review of Systems:   · Constitutional: there has been no unanticipated weight loss. There's been no change in energy level, sleep pattern, or activity level. · Eyes: No visual changes or diplopia. No scleral icterus. · ENT: No Headaches, hearing loss or vertigo. No mouth sores or sore throat. · Cardiovascular: Reviewed in HPI  · Respiratory: No cough or wheezing, no sputum production. No hematemesis. · Gastrointestinal: No abdominal pain, appetite loss, blood in stools. No change in bowel or bladder habits. · Genitourinary: No dysuria, trouble voiding, or hematuria. · Musculoskeletal:  No gait disturbance, weakness or joint complaints. · Integumentary: No rash or pruritis. · Neurological: No headache, diplopia, change in muscle strength, numbness or tingling. No change in gait, balance, coordination, mood, affect, memory, mentation, behavior. · Psychiatric: No anxiety, no depression. · Endocrine: No malaise, fatigue or temperature intolerance. No excessive thirst, fluid intake, or urination. No tremor. · Hematologic/Lymphatic: No abnormal bruising or bleeding, blood clots or swollen lymph nodes. · Allergic/Immunologic: No nasal congestion or hives. Physical Examination:    Vitals:    11/10/20 1344   BP: 130/72   Pulse: 91   SpO2: 96%        Constitutional and General Appearance: NAD   Respiratory:  · Normal excursion and expansion without use of accessory muscles  · Resp Auscultation: Soft crackles left base otherwise Normal breath sounds without dullness  Cardiovascular:  · The apical impulses not displaced  · Heart tones are crisp and normal  · Cervical veins are not engorged  · The carotid upstroke is normal in amplitude and contour without delay or bruit  · Normal S1S2, No S3, No Murmur  · Peripheral pulses are symmetrical and full  · There is no clubbing, cyanosis of the extremities.   · No edema  · Femoral Arteries: 2+ and equal  · Pedal Pulses: 2+ and equal   Abdomen:  · No masses or tenderness  · Liver/Spleen: No Abnormalities Noted  Neurological/Psychiatric:  · Alert and oriented in all spheres  · Moves all extremities well  · Exhibits normal gait balance and coordination  · No abnormalities of mood, affect, memory, mentation, or behavior are noted        Skin: warm and dry  Lab Results   Component Value Date    CHOL 134 10/21/2020    CHOL 175 06/09/2020    CHOL 192 06/03/2019     Lab Results   Component Value Date    TRIG 106 10/21/2020    TRIG 141 06/09/2020    TRIG 200 (H) 06/03/2019     Lab Results   Component Value Date    HDL 44 10/21/2020    HDL 54 06/09/2020    HDL 52 06/03/2019     Lab Results   Component Value Date    LDLCALC 69 10/21/2020    LDLCALC 93 06/09/2020    LDLCALC 100 (H) 06/03/2019     Lab Results   Component Value Date    LABVLDL 21 10/21/2020    LABVLDL 28 06/09/2020    LABVLDL 40 06/03/2019     No results found for: CHOLHDLRATIO    Assessment: 1. PAF (paroxysmal atrial fibrillation) [facility]): Diagnosed October 2020. Note she did NOT feel palpitations. I reviewed EKG showing afib RVR 155bpm and another EKG showed afib RVR 120bpm.   Most recent ECHO 10/22/20 EF >65%. No regional wall abnls; Grade I DD with normal LV filling pressure. Trivial TR/HI (no change from 4/19 ECHO). Most recent EKG 11/6/20 NSR 77bpm. Note CHADs-vasc=2 and PAF with no symptoms I will continue eliquis 5mg BID. 2. Essential hypertension: Well controlled and will continue current medical regimen. 3. Hyperlipidemia, unspecified hyperlipidemia type: Most recent lipids 10/22/20 see above. I personally reviewed lab results in epic and discussed with patient. 4.      Chest pain: Resolved with no recurrence since admission in October. Given normal ECHO and no further CP I will watch clinically for now. No stress testing will be ordered. She is to call if further CP occurs and will consider nuc stress at that time if needed. Plan:  1. Meds reviewed. Refills as warranted   2. No changes today. Continue current medications   3. Follow up with me in 4-5 months     This note was scribed in the presence of Erickson Pop MD by Dwayne Zavala RN    I, Dr. Felicity Lopez, personally performed the services described in this documentation, as scribed by the above signed scribe in my presence. It is both accurate and complete to my knowledge. I agree with the details independently gathered by the clinical support staff, while the remaining scribed note accurately describes my personal service to the patient. Cost of prescription medications and patient compliance have been reviewed with patient. All questions answered. Thank you for allowing me to participate in the care of this individual.    Jules Ruiz.  Jarvis Stewart M.D., [facility] [place]

## 2020-11-10 NOTE — CARE COORDINATION
[address] Transitions Follow Up Call    11/10/2020    Patient: Ainsley Lima  Patient : 1945   MRN: [id number]  Reason for Admission: cp  Discharge Date: 10/22/20 RARS: Readmission Risk Score: 14         Spoke with: Ainsley Lima (patient)    Stopped Eliquis after  dose as directed. She was able to get in touch with the [address] regarding the count in the bottle being incorrect and they had the  call her. She has appt with Dr Feliberto Ovalle today and will request he send rx to [facility] mail order. Denies needs otherwise. She will be admitted after her surgery this week. Care Transitions Subsequent and Final Call    Schedule Follow Up Appointment with PCP:  Completed  Subsequent and Final Calls  Do you have any ongoing symptoms?:  No  Have your medications changed?:  No  Do you have any questions related to your medications?:  No  Do you currently have any active services?:  No  Do you have any needs or concerns that I can assist you with?:  No  Identified Barriers:  None  Care Transitions Interventions  No Identified Needs  Other Interventions:             Follow Up  Future Appointments   Date Time Provider Clayton Cai   11/10/2020  1:30 PM Sharyn Laird MD [facility]   2020  9:00 AM [facility] PULMONARY FUNCTION TESTING 2 [facility] PFT Nithya Nelson   2020  8:40 AM [place] Davee Prophet, MD [facility]   2021 11:00 AM [facility] CT VCT [facility] CT [facility]       Alisia Jang RN

## 2020-11-11 ENCOUNTER — HOSPITAL ENCOUNTER (OUTPATIENT)
Dept: PULMONOLOGY | Age: 75
Discharge: HOME | End: 2020-11-11
Payer: MEDICARE

## 2020-11-11 VITALS — OXYGEN SATURATION: 97 %

## 2020-11-11 PROCEDURE — 94640 AIRWAY INHALATION TREATMENT: CPT

## 2020-11-11 PROCEDURE — 94726 PLETHYSMOGRAPHY LUNG VOLUMES: CPT

## 2020-11-11 PROCEDURE — 94760 N-INVAS EAR/PLS OXIMETRY 1: CPT

## 2020-11-11 PROCEDURE — 94060 EVALUATION OF WHEEZING: CPT

## 2020-11-11 PROCEDURE — 6370000000 HC RX 637 (ALT 250 FOR IP): Performed by: THORACIC SURGERY (CARDIOTHORACIC VASCULAR SURGERY)

## 2020-11-11 PROCEDURE — 94729 DIFFUSING CAPACITY: CPT

## 2020-11-11 RX ORDER — ALBUTEROL SULFATE 90 UG/1
2 AEROSOL, METERED RESPIRATORY (INHALATION) ONCE
Status: COMPLETED | OUTPATIENT
Start: 2020-11-11 | End: 2020-11-11

## 2020-11-11 RX ADMIN — Medication 2 PUFF: at 09:14

## 2020-11-12 ENCOUNTER — HOSPITAL ENCOUNTER (INPATIENT)
Age: 75
LOS: 4 days | Discharge: HOME | DRG: 166 | End: 2020-11-16
Attending: THORACIC SURGERY (CARDIOTHORACIC VASCULAR SURGERY) | Admitting: THORACIC SURGERY (CARDIOTHORACIC VASCULAR SURGERY)
Payer: MEDICARE

## 2020-11-12 ENCOUNTER — APPOINTMENT (OUTPATIENT)
Dept: GENERAL RADIOLOGY | Age: 75
DRG: 166 | End: 2020-11-12
Attending: THORACIC SURGERY (CARDIOTHORACIC VASCULAR SURGERY)
Payer: MEDICARE

## 2020-11-12 ENCOUNTER — ANESTHESIA (OUTPATIENT)
Dept: OPERATING ROOM | Age: 75
DRG: 166 | End: 2020-11-12
Payer: MEDICARE

## 2020-11-12 VITALS
SYSTOLIC BLOOD PRESSURE: 90 MMHG | OXYGEN SATURATION: 100 % | RESPIRATION RATE: 15 BRPM | DIASTOLIC BLOOD PRESSURE: 51 MMHG | TEMPERATURE: 96.8 F

## 2020-11-12 LAB
ABO/RH: NORMAL
ANTIBODY SCREEN: NORMAL

## 2020-11-12 PROCEDURE — 88331 PATH CONSLTJ SURG 1 BLK 1SPC: CPT

## 2020-11-12 PROCEDURE — 0BB64ZX EXCISION OF RIGHT LOWER LOBE BRONCHUS, PERCUTANEOUS ENDOSCOPIC APPROACH, DIAGNOSTIC: ICD-10-PCS | Performed by: THORACIC SURGERY (CARDIOTHORACIC VASCULAR SURGERY)

## 2020-11-12 PROCEDURE — 6370000000 HC RX 637 (ALT 250 FOR IP): Performed by: ANESTHESIOLOGY

## 2020-11-12 PROCEDURE — 2500000003 HC RX 250 WO HCPCS: Performed by: ANESTHESIOLOGY

## 2020-11-12 PROCEDURE — 3E0T3BZ INTRODUCTION OF ANESTHETIC AGENT INTO PERIPHERAL NERVES AND PLEXI, PERCUTANEOUS APPROACH: ICD-10-PCS | Performed by: THORACIC SURGERY (CARDIOTHORACIC VASCULAR SURGERY)

## 2020-11-12 PROCEDURE — 6360000002 HC RX W HCPCS: Performed by: THORACIC SURGERY (CARDIOTHORACIC VASCULAR SURGERY)

## 2020-11-12 PROCEDURE — 6370000000 HC RX 637 (ALT 250 FOR IP): Performed by: THORACIC SURGERY (CARDIOTHORACIC VASCULAR SURGERY)

## 2020-11-12 PROCEDURE — 6360000002 HC RX W HCPCS: Performed by: NURSE ANESTHETIST, CERTIFIED REGISTERED

## 2020-11-12 PROCEDURE — 2720000010 HC SURG SUPPLY STERILE: Performed by: THORACIC SURGERY (CARDIOTHORACIC VASCULAR SURGERY)

## 2020-11-12 PROCEDURE — C9290 INJ, BUPIVACAINE LIPOSOME: HCPCS | Performed by: THORACIC SURGERY (CARDIOTHORACIC VASCULAR SURGERY)

## 2020-11-12 PROCEDURE — 0BJ08ZZ INSPECTION OF TRACHEOBRONCHIAL TREE, VIA NATURAL OR ARTIFICIAL OPENING ENDOSCOPIC: ICD-10-PCS | Performed by: THORACIC SURGERY (CARDIOTHORACIC VASCULAR SURGERY)

## 2020-11-12 PROCEDURE — 87102 FUNGUS ISOLATION CULTURE: CPT

## 2020-11-12 PROCEDURE — 32674 THORACOSCOPY LYMPH NODE EXC: CPT | Performed by: THORACIC SURGERY (CARDIOTHORACIC VASCULAR SURGERY)

## 2020-11-12 PROCEDURE — 2000000000 HC ICU R&B

## 2020-11-12 PROCEDURE — 2500000003 HC RX 250 WO HCPCS: Performed by: NURSE ANESTHETIST, CERTIFIED REGISTERED

## 2020-11-12 PROCEDURE — 87205 SMEAR GRAM STAIN: CPT

## 2020-11-12 PROCEDURE — 87206 SMEAR FLUORESCENT/ACID STAI: CPT

## 2020-11-12 PROCEDURE — 2500000003 HC RX 250 WO HCPCS: Performed by: THORACIC SURGERY (CARDIOTHORACIC VASCULAR SURGERY)

## 2020-11-12 PROCEDURE — 86900 BLOOD TYPING SEROLOGIC ABO: CPT

## 2020-11-12 PROCEDURE — 71045 X-RAY EXAM CHEST 1 VIEW: CPT

## 2020-11-12 PROCEDURE — 7100000001 HC PACU RECOVERY - ADDTL 15 MIN: Performed by: THORACIC SURGERY (CARDIOTHORACIC VASCULAR SURGERY)

## 2020-11-12 PROCEDURE — 88305 TISSUE EXAM BY PATHOLOGIST: CPT

## 2020-11-12 PROCEDURE — 2709999900 HC NON-CHARGEABLE SUPPLY: Performed by: THORACIC SURGERY (CARDIOTHORACIC VASCULAR SURGERY)

## 2020-11-12 PROCEDURE — 94640 AIRWAY INHALATION TREATMENT: CPT

## 2020-11-12 PROCEDURE — 3600000012 HC SURGERY LEVEL 2 ADDTL 15MIN: Performed by: THORACIC SURGERY (CARDIOTHORACIC VASCULAR SURGERY)

## 2020-11-12 PROCEDURE — 6360000002 HC RX W HCPCS: Performed by: ANESTHESIOLOGY

## 2020-11-12 PROCEDURE — 86901 BLOOD TYPING SEROLOGIC RH(D): CPT

## 2020-11-12 PROCEDURE — 94761 N-INVAS EAR/PLS OXIMETRY MLT: CPT

## 2020-11-12 PROCEDURE — 2700000000 HC OXYGEN THERAPY PER DAY

## 2020-11-12 PROCEDURE — 3700000001 HC ADD 15 MINUTES (ANESTHESIA): Performed by: THORACIC SURGERY (CARDIOTHORACIC VASCULAR SURGERY)

## 2020-11-12 PROCEDURE — 2580000003 HC RX 258: Performed by: THORACIC SURGERY (CARDIOTHORACIC VASCULAR SURGERY)

## 2020-11-12 PROCEDURE — 88307 TISSUE EXAM BY PATHOLOGIST: CPT

## 2020-11-12 PROCEDURE — 88312 SPECIAL STAINS GROUP 1: CPT

## 2020-11-12 PROCEDURE — 86850 RBC ANTIBODY SCREEN: CPT

## 2020-11-12 PROCEDURE — 7100000000 HC PACU RECOVERY - FIRST 15 MIN: Performed by: THORACIC SURGERY (CARDIOTHORACIC VASCULAR SURGERY)

## 2020-11-12 PROCEDURE — 0BBF8ZX EXCISION OF RIGHT LOWER LUNG LOBE, VIA NATURAL OR ARTIFICIAL OPENING ENDOSCOPIC, DIAGNOSTIC: ICD-10-PCS | Performed by: THORACIC SURGERY (CARDIOTHORACIC VASCULAR SURGERY)

## 2020-11-12 PROCEDURE — C2618 PROBE/NEEDLE, CRYO: HCPCS | Performed by: THORACIC SURGERY (CARDIOTHORACIC VASCULAR SURGERY)

## 2020-11-12 PROCEDURE — 3600000002 HC SURGERY LEVEL 2 BASE: Performed by: THORACIC SURGERY (CARDIOTHORACIC VASCULAR SURGERY)

## 2020-11-12 PROCEDURE — 87116 MYCOBACTERIA CULTURE: CPT

## 2020-11-12 PROCEDURE — 87070 CULTURE OTHR SPECIMN AEROBIC: CPT

## 2020-11-12 PROCEDURE — 32666 THORACOSCOPY W/WEDGE RESECT: CPT | Performed by: THORACIC SURGERY (CARDIOTHORACIC VASCULAR SURGERY)

## 2020-11-12 PROCEDURE — 2580000003 HC RX 258: Performed by: ANESTHESIOLOGY

## 2020-11-12 PROCEDURE — 2580000003 HC RX 258: Performed by: NURSE ANESTHETIST, CERTIFIED REGISTERED

## 2020-11-12 PROCEDURE — 87015 SPECIMEN INFECT AGNT CONCNTJ: CPT

## 2020-11-12 PROCEDURE — 3700000000 HC ANESTHESIA ATTENDED CARE: Performed by: THORACIC SURGERY (CARDIOTHORACIC VASCULAR SURGERY)

## 2020-11-12 RX ORDER — FENTANYL CITRATE 50 UG/ML
INJECTION, SOLUTION INTRAMUSCULAR; INTRAVENOUS PRN
Status: DISCONTINUED | OUTPATIENT
Start: 2020-11-12 | End: 2020-11-12 | Stop reason: SDUPTHER

## 2020-11-12 RX ORDER — LABETALOL HYDROCHLORIDE 5 MG/ML
5 INJECTION, SOLUTION INTRAVENOUS EVERY 10 MIN PRN
Status: DISCONTINUED | OUTPATIENT
Start: 2020-11-12 | End: 2020-11-13

## 2020-11-12 RX ORDER — TRAMADOL HYDROCHLORIDE 50 MG/1
50 TABLET ORAL EVERY 6 HOURS PRN
Status: DISCONTINUED | OUTPATIENT
Start: 2020-11-12 | End: 2020-11-13

## 2020-11-12 RX ORDER — SODIUM CHLORIDE 0.9 % (FLUSH) 0.9 %
10 SYRINGE (ML) INJECTION PRN
Status: DISCONTINUED | OUTPATIENT
Start: 2020-11-12 | End: 2020-11-16 | Stop reason: HOSPADM

## 2020-11-12 RX ORDER — ONDANSETRON 2 MG/ML
4 INJECTION INTRAMUSCULAR; INTRAVENOUS EVERY 6 HOURS PRN
Status: DISCONTINUED | OUTPATIENT
Start: 2020-11-12 | End: 2020-11-16 | Stop reason: HOSPADM

## 2020-11-12 RX ORDER — ACETAMINOPHEN 325 MG/1
650 TABLET ORAL EVERY 4 HOURS PRN
Status: DISCONTINUED | OUTPATIENT
Start: 2020-11-12 | End: 2020-11-13

## 2020-11-12 RX ORDER — DIPHENHYDRAMINE HYDROCHLORIDE 50 MG/ML
12.5 INJECTION INTRAMUSCULAR; INTRAVENOUS
Status: ACTIVE | OUTPATIENT
Start: 2020-11-12 | End: 2020-11-12

## 2020-11-12 RX ORDER — ALBUTEROL SULFATE 2.5 MG/3ML
2.5 SOLUTION RESPIRATORY (INHALATION)
Status: DISCONTINUED | OUTPATIENT
Start: 2020-11-12 | End: 2020-11-16 | Stop reason: HOSPADM

## 2020-11-12 RX ORDER — ROCURONIUM BROMIDE 10 MG/ML
INJECTION, SOLUTION INTRAVENOUS PRN
Status: DISCONTINUED | OUTPATIENT
Start: 2020-11-12 | End: 2020-11-12 | Stop reason: SDUPTHER

## 2020-11-12 RX ORDER — SODIUM CHLORIDE 0.9 % (FLUSH) 0.9 %
10 SYRINGE (ML) INJECTION EVERY 12 HOURS SCHEDULED
Status: DISCONTINUED | OUTPATIENT
Start: 2020-11-12 | End: 2020-11-13

## 2020-11-12 RX ORDER — HYDRALAZINE HYDROCHLORIDE 20 MG/ML
5 INJECTION INTRAMUSCULAR; INTRAVENOUS EVERY 10 MIN PRN
Status: DISCONTINUED | OUTPATIENT
Start: 2020-11-12 | End: 2020-11-13

## 2020-11-12 RX ORDER — ACETAMINOPHEN 325 MG/1
650 TABLET ORAL EVERY 6 HOURS
Status: DISCONTINUED | OUTPATIENT
Start: 2020-11-12 | End: 2020-11-13

## 2020-11-12 RX ORDER — MORPHINE SULFATE 2 MG/ML
2 INJECTION, SOLUTION INTRAMUSCULAR; INTRAVENOUS EVERY 5 MIN PRN
Status: DISCONTINUED | OUTPATIENT
Start: 2020-11-12 | End: 2020-11-13

## 2020-11-12 RX ORDER — SODIUM CHLORIDE 0.9 % (FLUSH) 0.9 %
10 SYRINGE (ML) INJECTION EVERY 12 HOURS SCHEDULED
Status: DISCONTINUED | OUTPATIENT
Start: 2020-11-12 | End: 2020-11-16 | Stop reason: HOSPADM

## 2020-11-12 RX ORDER — PROMETHAZINE HYDROCHLORIDE 25 MG/ML
6.25 INJECTION, SOLUTION INTRAMUSCULAR; INTRAVENOUS
Status: DISCONTINUED | OUTPATIENT
Start: 2020-11-12 | End: 2020-11-13

## 2020-11-12 RX ORDER — MEPERIDINE HYDROCHLORIDE 50 MG/ML
12.5 INJECTION INTRAMUSCULAR; INTRAVENOUS; SUBCUTANEOUS EVERY 5 MIN PRN
Status: DISCONTINUED | OUTPATIENT
Start: 2020-11-12 | End: 2020-11-13

## 2020-11-12 RX ORDER — ONDANSETRON 2 MG/ML
4 INJECTION INTRAMUSCULAR; INTRAVENOUS PRN
Status: DISCONTINUED | OUTPATIENT
Start: 2020-11-12 | End: 2020-11-13

## 2020-11-12 RX ORDER — MORPHINE SULFATE 2 MG/ML
2 INJECTION, SOLUTION INTRAMUSCULAR; INTRAVENOUS
Status: DISCONTINUED | OUTPATIENT
Start: 2020-11-12 | End: 2020-11-16 | Stop reason: HOSPADM

## 2020-11-12 RX ORDER — MORPHINE SULFATE 4 MG/ML
4 INJECTION, SOLUTION INTRAMUSCULAR; INTRAVENOUS
Status: DISCONTINUED | OUTPATIENT
Start: 2020-11-12 | End: 2020-11-16 | Stop reason: HOSPADM

## 2020-11-12 RX ORDER — SODIUM CHLORIDE 0.9 % (FLUSH) 0.9 %
10 SYRINGE (ML) INJECTION PRN
Status: DISCONTINUED | OUTPATIENT
Start: 2020-11-12 | End: 2020-11-13

## 2020-11-12 RX ORDER — MIDAZOLAM HYDROCHLORIDE 1 MG/ML
INJECTION INTRAMUSCULAR; INTRAVENOUS PRN
Status: DISCONTINUED | OUTPATIENT
Start: 2020-11-12 | End: 2020-11-12 | Stop reason: SDUPTHER

## 2020-11-12 RX ORDER — PROPOFOL 10 MG/ML
INJECTION, EMULSION INTRAVENOUS PRN
Status: DISCONTINUED | OUTPATIENT
Start: 2020-11-12 | End: 2020-11-12 | Stop reason: SDUPTHER

## 2020-11-12 RX ORDER — PHENYLEPHRINE HCL IN 0.9% NACL 1 MG/10 ML
SYRINGE (ML) INTRAVENOUS PRN
Status: DISCONTINUED | OUTPATIENT
Start: 2020-11-12 | End: 2020-11-12 | Stop reason: SDUPTHER

## 2020-11-12 RX ORDER — SCOLOPAMINE TRANSDERMAL SYSTEM 1 MG/1
1 PATCH, EXTENDED RELEASE TRANSDERMAL ONCE
Status: DISCONTINUED | OUTPATIENT
Start: 2020-11-12 | End: 2020-11-13

## 2020-11-12 RX ORDER — MORPHINE SULFATE 2 MG/ML
1 INJECTION, SOLUTION INTRAMUSCULAR; INTRAVENOUS EVERY 5 MIN PRN
Status: DISCONTINUED | OUTPATIENT
Start: 2020-11-12 | End: 2020-11-13

## 2020-11-12 RX ORDER — ONDANSETRON 8 MG/1
4 TABLET, ORALLY DISINTEGRATING ORAL EVERY 8 HOURS PRN
Status: DISCONTINUED | OUTPATIENT
Start: 2020-11-12 | End: 2020-11-16 | Stop reason: HOSPADM

## 2020-11-12 RX ORDER — LIDOCAINE HYDROCHLORIDE 10 MG/ML
INJECTION, SOLUTION INFILTRATION; PERINEURAL PRN
Status: DISCONTINUED | OUTPATIENT
Start: 2020-11-12 | End: 2020-11-12 | Stop reason: SDUPTHER

## 2020-11-12 RX ORDER — SODIUM CHLORIDE 9 MG/ML
INJECTION, SOLUTION INTRAVENOUS CONTINUOUS
Status: DISCONTINUED | OUTPATIENT
Start: 2020-11-12 | End: 2020-11-13

## 2020-11-12 RX ORDER — OXYCODONE HYDROCHLORIDE AND ACETAMINOPHEN 5; 325 MG/1; MG/1
2 TABLET ORAL PRN
Status: ACTIVE | OUTPATIENT
Start: 2020-11-12 | End: 2020-11-12

## 2020-11-12 RX ORDER — SODIUM CHLORIDE, SODIUM LACTATE, POTASSIUM CHLORIDE, CALCIUM CHLORIDE 600; 310; 30; 20 MG/100ML; MG/100ML; MG/100ML; MG/100ML
INJECTION, SOLUTION INTRAVENOUS CONTINUOUS PRN
Status: DISCONTINUED | OUTPATIENT
Start: 2020-11-12 | End: 2020-11-12 | Stop reason: SDUPTHER

## 2020-11-12 RX ORDER — SODIUM CHLORIDE, SODIUM LACTATE, POTASSIUM CHLORIDE, CALCIUM CHLORIDE 600; 310; 30; 20 MG/100ML; MG/100ML; MG/100ML; MG/100ML
INJECTION, SOLUTION INTRAVENOUS CONTINUOUS
Status: DISCONTINUED | OUTPATIENT
Start: 2020-11-12 | End: 2020-11-13

## 2020-11-12 RX ORDER — ONDANSETRON 2 MG/ML
INJECTION INTRAMUSCULAR; INTRAVENOUS PRN
Status: DISCONTINUED | OUTPATIENT
Start: 2020-11-12 | End: 2020-11-12 | Stop reason: SDUPTHER

## 2020-11-12 RX ORDER — OXYCODONE HYDROCHLORIDE AND ACETAMINOPHEN 5; 325 MG/1; MG/1
1 TABLET ORAL PRN
Status: ACTIVE | OUTPATIENT
Start: 2020-11-12 | End: 2020-11-12

## 2020-11-12 RX ADMIN — TRAMADOL HYDROCHLORIDE 50 MG: 50 TABLET ORAL at 22:44

## 2020-11-12 RX ADMIN — LIDOCAINE HYDROCHLORIDE 30 MG: 10 INJECTION, SOLUTION INFILTRATION; PERINEURAL at 07:52

## 2020-11-12 RX ADMIN — Medication 100 MCG: at 09:27

## 2020-11-12 RX ADMIN — SODIUM CHLORIDE, SODIUM LACTATE, POTASSIUM CHLORIDE, AND CALCIUM CHLORIDE: .6; .31; .03; .02 INJECTION, SOLUTION INTRAVENOUS at 07:52

## 2020-11-12 RX ADMIN — HYDROMORPHONE HYDROCHLORIDE 0.5 MG: 1 INJECTION, SOLUTION INTRAMUSCULAR; INTRAVENOUS; SUBCUTANEOUS at 10:53

## 2020-11-12 RX ADMIN — SODIUM CHLORIDE, POTASSIUM CHLORIDE, SODIUM LACTATE AND CALCIUM CHLORIDE: 600; 310; 30; 20 INJECTION, SOLUTION INTRAVENOUS at 06:28

## 2020-11-12 RX ADMIN — SUGAMMADEX 200 MG: 100 INJECTION, SOLUTION INTRAVENOUS at 09:42

## 2020-11-12 RX ADMIN — ONDANSETRON 4 MG: 2 INJECTION INTRAMUSCULAR; INTRAVENOUS at 08:25

## 2020-11-12 RX ADMIN — HYDROMORPHONE HYDROCHLORIDE 0.5 MG: 1 INJECTION, SOLUTION INTRAMUSCULAR; INTRAVENOUS; SUBCUTANEOUS at 11:19

## 2020-11-12 RX ADMIN — MORPHINE SULFATE 2 MG: 2 INJECTION, SOLUTION INTRAMUSCULAR; INTRAVENOUS at 14:14

## 2020-11-12 RX ADMIN — Medication 100 MCG: at 07:52

## 2020-11-12 RX ADMIN — ALBUTEROL SULFATE 2.5 MG: 2.5 SOLUTION RESPIRATORY (INHALATION) at 19:48

## 2020-11-12 RX ADMIN — CEFAZOLIN SODIUM 2 G: 10 INJECTION, POWDER, FOR SOLUTION INTRAVENOUS at 17:03

## 2020-11-12 RX ADMIN — TRAMADOL HYDROCHLORIDE 50 MG: 50 TABLET ORAL at 16:23

## 2020-11-12 RX ADMIN — SODIUM CHLORIDE: 9 INJECTION, SOLUTION INTRAVENOUS at 16:24

## 2020-11-12 RX ADMIN — MORPHINE SULFATE 4 MG: 4 INJECTION, SOLUTION INTRAMUSCULAR; INTRAVENOUS at 20:06

## 2020-11-12 RX ADMIN — Medication 100 MCG: at 09:02

## 2020-11-12 RX ADMIN — ROCURONIUM BROMIDE 50 MG: 10 SOLUTION INTRAVENOUS at 07:52

## 2020-11-12 RX ADMIN — FENTANYL CITRATE 100 MCG: 50 INJECTION INTRAMUSCULAR; INTRAVENOUS at 07:52

## 2020-11-12 RX ADMIN — ACETAMINOPHEN 650 MG: 325 TABLET, FILM COATED ORAL at 13:45

## 2020-11-12 RX ADMIN — MORPHINE SULFATE 2 MG: 2 INJECTION, SOLUTION INTRAMUSCULAR; INTRAVENOUS at 12:49

## 2020-11-12 RX ADMIN — HYDROMORPHONE HYDROCHLORIDE 0.5 MG: 1 INJECTION, SOLUTION INTRAMUSCULAR; INTRAVENOUS; SUBCUTANEOUS at 10:44

## 2020-11-12 RX ADMIN — Medication 10 ML: at 20:07

## 2020-11-12 RX ADMIN — ROCURONIUM BROMIDE 30 MG: 10 SOLUTION INTRAVENOUS at 08:39

## 2020-11-12 RX ADMIN — FAMOTIDINE 20 MG: 10 INJECTION, SOLUTION INTRAVENOUS at 06:28

## 2020-11-12 RX ADMIN — FENTANYL CITRATE 50 MCG: 50 INJECTION INTRAMUSCULAR; INTRAVENOUS at 10:30

## 2020-11-12 RX ADMIN — HYDROMORPHONE HYDROCHLORIDE 0.5 MG: 1 INJECTION, SOLUTION INTRAMUSCULAR; INTRAVENOUS; SUBCUTANEOUS at 11:03

## 2020-11-12 RX ADMIN — Medication 100 MCG: at 08:46

## 2020-11-12 RX ADMIN — PROPOFOL 170 MG: 10 INJECTION, EMULSION INTRAVENOUS at 07:52

## 2020-11-12 RX ADMIN — ACETAMINOPHEN 650 MG: 325 TABLET, FILM COATED ORAL at 19:08

## 2020-11-12 RX ADMIN — MIDAZOLAM HYDROCHLORIDE 2 MG: 2 INJECTION, SOLUTION INTRAMUSCULAR; INTRAVENOUS at 07:52

## 2020-11-12 RX ADMIN — SODIUM CHLORIDE, SODIUM LACTATE, POTASSIUM CHLORIDE, AND CALCIUM CHLORIDE: .6; .31; .03; .02 INJECTION, SOLUTION INTRAVENOUS at 09:31

## 2020-11-12 RX ADMIN — CEFAZOLIN SODIUM 2 G: 10 INJECTION, POWDER, FOR SOLUTION INTRAVENOUS at 07:52

## 2020-11-12 RX ADMIN — SODIUM CHLORIDE, PRESERVATIVE FREE 10 ML: 5 INJECTION INTRAVENOUS at 20:06

## 2020-11-12 ASSESSMENT — PULMONARY FUNCTION TESTS
PIF_VALUE: 26
PIF_VALUE: 33
PIF_VALUE: 26
PIF_VALUE: 26
PIF_VALUE: 29
PIF_VALUE: 26
PIF_VALUE: 1
PIF_VALUE: 18
PIF_VALUE: 36
PIF_VALUE: 30
PIF_VALUE: 19
PIF_VALUE: 0
PIF_VALUE: 40
PIF_VALUE: 1
PIF_VALUE: 0
PIF_VALUE: 25
PIF_VALUE: 13
PIF_VALUE: 21
PIF_VALUE: 21
PIF_VALUE: 26
PIF_VALUE: 37
PIF_VALUE: 21
PIF_VALUE: 36
PIF_VALUE: 4
PIF_VALUE: 27
PIF_VALUE: 21
PIF_VALUE: 32
PIF_VALUE: 3
PIF_VALUE: 16
PIF_VALUE: 16
PIF_VALUE: 26
PIF_VALUE: 29
PIF_VALUE: 27
PIF_VALUE: 25
PIF_VALUE: 36
PIF_VALUE: 1
PIF_VALUE: 25
PIF_VALUE: 12
PIF_VALUE: 35
PIF_VALUE: 12
PIF_VALUE: 54
PIF_VALUE: 36
PIF_VALUE: 15
PIF_VALUE: 0
PIF_VALUE: 26
PIF_VALUE: 18
PIF_VALUE: 13
PIF_VALUE: 37
PIF_VALUE: 58
PIF_VALUE: 3
PIF_VALUE: 25
PIF_VALUE: 26
PIF_VALUE: 36
PIF_VALUE: 26
PIF_VALUE: 32
PIF_VALUE: 16
PIF_VALUE: 19
PIF_VALUE: 26
PIF_VALUE: 11
PIF_VALUE: 12
PIF_VALUE: 21
PIF_VALUE: 12
PIF_VALUE: 27
PIF_VALUE: 27
PIF_VALUE: 5
PIF_VALUE: 18
PIF_VALUE: 35
PIF_VALUE: 36
PIF_VALUE: 38
PIF_VALUE: 5
PIF_VALUE: 19
PIF_VALUE: 36
PIF_VALUE: 28
PIF_VALUE: 35
PIF_VALUE: 36
PIF_VALUE: 13
PIF_VALUE: 6
PIF_VALUE: 26
PIF_VALUE: 26
PIF_VALUE: 14
PIF_VALUE: 25
PIF_VALUE: 16
PIF_VALUE: 39
PIF_VALUE: 26
PIF_VALUE: 26
PIF_VALUE: 19
PIF_VALUE: 4
PIF_VALUE: 35
PIF_VALUE: 4
PIF_VALUE: 5
PIF_VALUE: 30
PIF_VALUE: 40
PIF_VALUE: 1
PIF_VALUE: 35
PIF_VALUE: 28
PIF_VALUE: 26
PIF_VALUE: 31
PIF_VALUE: 26
PIF_VALUE: 36
PIF_VALUE: 27
PIF_VALUE: 35
PIF_VALUE: 39
PIF_VALUE: 37
PIF_VALUE: 26
PIF_VALUE: 5
PIF_VALUE: 12
PIF_VALUE: 33
PIF_VALUE: 36
PIF_VALUE: 31
PIF_VALUE: 34
PIF_VALUE: 17
PIF_VALUE: 25
PIF_VALUE: 36
PIF_VALUE: 26
PIF_VALUE: 25
PIF_VALUE: 37
PIF_VALUE: 28
PIF_VALUE: 19
PIF_VALUE: 35
PIF_VALUE: 27
PIF_VALUE: 1
PIF_VALUE: 15
PIF_VALUE: 13
PIF_VALUE: 36
PIF_VALUE: 1
PIF_VALUE: 19
PIF_VALUE: 4
PIF_VALUE: 25
PIF_VALUE: 27
PIF_VALUE: 33
PIF_VALUE: 18
PIF_VALUE: 27
PIF_VALUE: 26
PIF_VALUE: 25
PIF_VALUE: 38
PIF_VALUE: 29
PIF_VALUE: 39
PIF_VALUE: 29
PIF_VALUE: 26
PIF_VALUE: 35
PIF_VALUE: 28
PIF_VALUE: 17

## 2020-11-12 ASSESSMENT — PAIN SCALES - GENERAL
PAINLEVEL_OUTOF10: 8
PAINLEVEL_OUTOF10: 5
PAINLEVEL_OUTOF10: 6
PAINLEVEL_OUTOF10: 4
PAINLEVEL_OUTOF10: 10
PAINLEVEL_OUTOF10: 8
PAINLEVEL_OUTOF10: 8
PAINLEVEL_OUTOF10: 5
PAINLEVEL_OUTOF10: 2
PAINLEVEL_OUTOF10: 8

## 2020-11-12 ASSESSMENT — PAIN DESCRIPTION - DESCRIPTORS: DESCRIPTORS: ACHING;STABBING

## 2020-11-12 ASSESSMENT — PAIN DESCRIPTION - ORIENTATION: ORIENTATION: RIGHT

## 2020-11-12 ASSESSMENT — PAIN DESCRIPTION - PAIN TYPE: TYPE: SURGICAL PAIN

## 2020-11-12 ASSESSMENT — PAIN DESCRIPTION - LOCATION: LOCATION: CHEST;SHOULDER

## 2020-11-12 ASSESSMENT — PAIN DESCRIPTION - ONSET: ONSET: ON-GOING

## 2020-11-12 ASSESSMENT — PAIN - FUNCTIONAL ASSESSMENT: PAIN_FUNCTIONAL_ASSESSMENT: 0-10

## 2020-11-12 NOTE — PROGRESS NOTES
Received patient from pacu alert and oriented times 4 in moderate amount of discomfort repositioned in bed chest tube rt lateral chest to 20 cm suction draining sero sanguinous fluid arterial line secure to right radial wrist pressure bag in place zeroed per pacu nurse. Monitor showing sinus rhythm sadler draining clear yellow urine oriented to room and treatment plan call light within reach.

## 2020-11-12 NOTE — H&P
BACK/FLANK SUBQ <3CM N/A 8/23/2019    EXCISION BACK CYST TIMES TWO performed by Dany Raza MD at [place] [address] ARTHROSCOPY Right 8/25/2020    VIDEO ARTHROSCOPY RIGHT SHOULDER, ROTATOR CUFF REPAIR, DECOMPRESSION, EXTENSIVE DEBRIDEMENT performed by Zurdo Melendez MD at [address] [place] [address] Medications:   Prior to Admission medications    Medication Sig Start Date End Date Taking?  Authorizing Provider   acetaminophen (TYLENOL) 500 MG tablet Take 500 mg by mouth every 6 hours as needed for Pain   Yes Historical Provider, MD   nitrofurantoin, macrocrystal-monohydrate, (MACROBID) 100 MG capsule Take 1 capsule by mouth 2 times daily for 10 days 11/2/20 11/12/20 Yes Perry Alatorre MD   losartan (COZAAR) 100 MG tablet TAKE 1 TABLET BY MOUTH  DAILY  Patient taking differently: Take 100 mg by mouth daily Take 1 tablet by mouth  daily 7/30/20  Yes Perry Alatorre MD   omeprazole (PRILOSEC) 10 MG delayed release capsule TAKE 1 CAPSULE BY MOUTH  DAILY  Patient taking differently: Take 10 mg by mouth daily as needed  7/30/20  Yes Perry Alatorre MD   amLODIPine (NORVASC) 10 MG tablet TAKE 1 TABLET BY MOUTH  DAILY  Patient taking differently: Take 10 mg by mouth daily Take 1 tablet by mouth  daily 7/30/20  Yes Perry Alatorre MD   Calcium Carb-Cholecalciferol (CALCIUM + D3) 600-200 MG-UNIT TABS Take 1 tablet by mouth 2 times daily    Yes Historical Provider, MD   apixaban (ELIQUIS) 5 MG TABS tablet Take 1 tablet by mouth 2 times daily 11/10/20 12/10/20  Rebecca Stone MD   simvastatin (ZOCOR) 20 MG tablet TAKE 1 TABLET BY MOUTH  NIGHTLY 7/30/20   Perry Alatorre MD   alendronate (FOSAMAX) 70 MG tablet TAKE 1 TABLET BY MOUTH  EVERY 7 DAYS  Patient taking differently: Take 70 mg by mouth every 7 days Takes weekly on Mondays 7/24/20   JEANNA Cristobal - CNP        Facility Administered Medications:    ceFAZolin (ANCEF) IVPB  2 g Intravenous On Call to OR    sodium chloride flush  10 mL Intravenous 2 times per day    scopolamine  1 patch Transdermal Once       Allergies: Allergies   Allergen Reactions    Naproxen Anaphylaxis    Percocet [Oxycodone-Acetaminophen] Nausea Only        Social History:    Working:   Caffeine:   Lifestyle:    Social History     Socioeconomic History    Marital status:       Spouse name: Not on file    Number of children: Not on file    Years of education: Not on file    Highest education level: Not on file   Occupational History    Not on file   Social Needs    Financial resource strain: Not on file    Food insecurity     Worry: Not on file     Inability: Not on file    Transportation needs     Medical: Not on file     Non-medical: Not on file   Tobacco Use    Smoking status: Former Smoker     Packs/day: 2.00     Years: 30.00     Pack years: 60.00     Types: Cigarettes     Start date: 1969     Last attempt to quit: 2004     Years since quittin.8    Smokeless tobacco: Never Used   Substance and Sexual Activity    Alcohol use: Yes     Comment: very rare - one a year    Drug use: No    Sexual activity: Not Currently   Lifestyle    Physical activity     Days per week: Not on file     Minutes per session: Not on file    Stress: Not on file   Relationships    Social connections     Talks on phone: Not on file     Gets together: Not on file     Attends Worship service: Not on file     Active member of club or organization: Not on file     Attends meetings of clubs or organizations: Not on file     Relationship status: Not on file    Intimate partner violence     Fear of current or ex partner: Not on file     Emotionally abused: Not on file     Physically abused: Not on file     Forced sexual activity: Not on file   Other Topics Concern    Not on file   Social History Narrative    Not on file       Family History:      Problem Relation Age of Onset    Heart Disease Mother     High Blood Pressure Mother     Alzheimer's Disease Mother     Heart Disease Father     Heart Disease Sister     Heart Attack Sister     Heart Disease Brother        Review of Systems:  Reviewed, negative unless noted  Constitutional: weight change, weakness, impairment of ADLs  Eyes: eyestrain, redness, discharges  ENMT: post nasal drip, sinus pain, discharge   Cardiovascular:right chest pain  Respiratory: cough, sputum, hemoptysis  GI: excessive thirst, changes in bowel habits, abdominal swelling  : painful urination, pyuria, incontinence  Musculoskeletal: cramps, swelling, limitation of motor activity  Integumentary: cyanosis, changes in skin, dryness  Neurological: paralysis, tingling, tremors  Psychiatric: restlessness, irritability, mood swings  Endocrine: heat/cold intolerance, excessive sweating, hair loss  Hematologic/lymphatic: swollen glands, anemia, easy bruising/bleeding      Physical Examination:    BP (!) 133/48   Pulse 77   Temp 97.5 °F (36.4 °C) (Temporal)   Resp 16   Ht 4' 11\" (1.499 m)   Wt 127 lb 12.8 oz (58 kg)   LMP  (LMP Unknown)   SpO2 94%   BMI 25.81 kg/m²      BP RUE:  BP LUE:   Admission Weight: 123 lb (55.8 kg)   Hand dominance:    General appearance: NAD, well nourished  Eyes: anicteric, PERRLA  ENMT: no scars or lesions, no nasal deformity, normal dentition, no cyanosis of oral mucosa  Neck: no masses, no thyroid enlargement, no JVD. Respiratory: effort is unlabored, symmetric, no crackles, wheezes or rubs. No palpable/percussable abnormalities. Cardiovascular: regular, no murmur. PMI normal, no thrill. No carotid bruits. No edema or varicosities. Abdominal aorta cannot be appreciated given body habitus. GI: abdomen soft, nondistended, no organomegaly. No masses. Lymphatic: no cervical/supraclavicular adenopathy  Musculoskeletal: strength and tone normal. Full ROM. No scoliosis. Extremities: warm and pink. No clubbing or petechiae. Skin: no dermatitis or ulceration. No nodularity or induration.   Neuro: CN grossly intact. Sensation and motor function grossly intact. Psychiatric: oriented, appropriate mood/affect. MEDICAL DECISION MAKING/TESTING  Studies personally reviewed. CT:  arrative    EXAMINATION:    CTA OF THE CHEST 10/20/2020 3:02 pm         TECHNIQUE:    CTA of the chest was performed after the administration of intravenous    contrast.  Multiplanar reformatted images are provided for review.  MIP    images are provided for review.  Dose modulation, iterative reconstruction,    and/or weight based adjustment of the mA/kV was utilized to reduce the    radiation dose to as low as reasonably achievable.         COMPARISON:    September 13, 2020 PET scan September 25, 2020         HISTORY:    ORDERING SYSTEM PROVIDED HISTORY: pleuritic chest pain    TECHNOLOGIST PROVIDED HISTORY:    Reason for exam:->pleuritic chest pain    Reason for Exam: Pt c/o chest pains    Acuity: Acute    Type of Exam: Initial         FINDINGS:    Pulmonary Arteries: Pulmonary arteries are adequately opacified for    evaluation.  No evidence of intraluminal filling defect to suggest pulmonary    embolism.  Main pulmonary artery is normal in caliber.         Mediastinum: No evidence of mediastinal lymphadenopathy.  The heart and    pericardium demonstrate no acute abnormality.  There is no acute abnormality    of the thoracic aorta.         Lungs/pleura: Patchy ground-glass appearance of the lungs with areas of gas    trapping.  This is increased from prior study.  There is thickening of the    fissures bilaterally new from the prior study         interval increase in size of right peripheral densities now measuring 2.2 cm    x 1.9 cm previously measuring 1.8 x 1.3 cm.  There is interval increase in of    soft tissue density in the right hilum which appears represent a significant    adenopathy there is central peribronchial thickening.         Upper Abdomen: Limited images of the upper abdomen are unremarkable.         Soft Tissues/Bones: No acute bone or soft tissue abnormality.  No change in    alignment compared to the prior study              Impression    No evidence of pulmonary embolism         Interval increase in size of right peripheral densities interval increase in    adenopathy in the right hilum interval development of Sigrid bronchial    thickening findings are non-specific could represent infectious process with    associated hilar adenopathy autoimmune or inflammatory disorder is included    in the differential correlation clinical data base is required.         Findings are nonspecific.  The possibility of infectious pneumonic process    and associated hilar adenopathy is possibility.  Inflammatory or autoimmune    process would also be in the differential.         Patient had a recent PET scan which demonstrated peripheral nodules to be not    significantly hypermetabolic. Labs:   CBC: No results for input(s): WBC, HGB, HCT, MCV, PLT in the last 72 hours. BMP: No results for input(s): NA, K, CL, CO2, PHOS, BUN, CREATININE, CALCIUM, MG in the last 72 hours. Invalid input(s): KRFLXMG  Cardiac Enzymes: No results for input(s): CKTOTAL, CKMB, CKMBINDEX, TROPONINI in the last 72 hours. PT/INR: No results for input(s): PROTIME, INR in the last 72 hours. APTT: No results for input(s): APTT in the last 72 hours.   Liver Profile:  Lab Results   Component Value Date    AST 14 10/21/2020    ALT 20 10/21/2020    BILIDIR 0.25 10/29/2010    BILITOT 0.5 10/21/2020    ALKPHOS 74 10/21/2020    LABALBU 3.4 10/21/2020    LABALBU 3.5 10/21/2020     Lab Results   Component Value Date    CHOL 134 10/21/2020    HDL 44 10/21/2020    HDL 43 10/29/2010    TRIG 106 10/21/2020     HgbA1c:  Lab Results   Component Value Date    LABA1C 5.5 06/09/2020     UA:   Lab Results   Component Value Date    NITRITE trace 10/30/2020    COLORU Yellow 11/06/2020    PHUR 6.5 11/06/2020    LABCAST 0-1 Hyaline 10/17/2016    WBCUA 6-9 11/06/2020 RBCUA 3-4 11/06/2020    MUCUS 1+ 11/10/2017    BACTERIA 1+ 11/06/2020    CLARITYU Clear 11/06/2020    SPECGRAV 1.010 11/06/2020    LEUKOCYTESUR TRACE 11/06/2020    UROBILINOGEN 0.2 11/06/2020    BILIRUBINUR Negative 11/06/2020    BILIRUBINUR neg 10/30/2020    BLOODU SMALL 11/06/2020    GLUCOSEU Negative 11/06/2020       Assessment and Plan:   70Year-old female with mediastinal and hilar adenopathy  She underwent a non-diagnostic EBUS. Plan for right diagnostic vats / wedge resection and mediastinal sampling  Her eliquis has been held since Monday of this week. All questions answered bedside in preop. No new complaints since office visit. Risks, benefits and postoperative complications discussed including bleeding with need for reoperation and/or transfusions, infection, myocardial infarction, graft failure, stroke, multi-organ failures, prolonged intubation, death. Patient has expressed understanding of risks of surgery and agrees to proceed with surgery. Typical periop/postop course reviewed including initial limitations on driving/heavy lifting.         Kerry Pillai MD  Cardiothoracic Surgery

## 2020-11-12 NOTE — PROGRESS NOTES
Preoperative Screening for Elective Surgery/Invasive Procedures While COVID-19 present in the community     Have you had any of the following symptoms? o Fever, chills  o Cough  o Shortness of breath  o Muscle aches/pain  o Diarrhea  o Abdominal pain, nausea, vomiting  o Loss or decrease in taste and / or smell   Risk of Exposure  o Have you recently been hospitalized for COVID-19 or flu-like illness, if so when?  o Recently diagnosed with COVID-19, if so when?  o Recently tested for COVID-19, if so when?  o Have you been in close contact with a person or family member who currently has or recently had COVID-19? If yes, when and in what context?  o Do you live with anybody who in the last 14 days has had fever, chills, shortness of breath, muscle aches, flu-like illness?  o Do you have any close contacts or family members who are currently in the hospital for COVID-19 or flu-like illness? If yes, assess recent close contact with this person. Indicate if the patient has a positive screen by answering yes to one or more of the above questions. Patients who test positive or screen positive prior to surgery or on the day of surgery should be evaluated in conjunction with the surgeon/proceduralist/anesthesiologist to determine the urgency of the procedure.        No to all above questions

## 2020-11-12 NOTE — PROGRESS NOTES
Patient ready for surgery, family at bedside, call light in reach. IS instructions given.    Campbell Asa

## 2020-11-12 NOTE — ANESTHESIA POSTPROCEDURE EVALUATION
Department of Anesthesiology  Postprocedure Note    Patient: Rony Stiles  MRN: [id number]  YOB: 1945  Date of evaluation: 11/12/2020  Time:  12:26 PM     Procedure Summary     Date:  11/12/20 Room / Location:  [address]    Anesthesia Start:  8477 Anesthesia Stop:  8774    Procedure:  RIGHT VIDEO THORACOSCOPY, WEDGE LUNG BIOPSY, MEDIASTINAL LYMPH NODE BIOSPIES WITH CRYOABLATION NERVE BLOCKS  CPT CODE - 14241 (Right ) Diagnosis:       Hilar lymphadenopathy      (HILAR LYMPHADENOPATHY)    Surgeon:  Paul Barron MD Responsible Provider:  Letty Block MD    Anesthesia Type:  general ASA Status:  3          Anesthesia Type: general    Cheng Phase I: Cheng Score: 8    Cheng Phase II:      Last vitals: Reviewed and per EMR flowsheets.        Anesthesia Post Evaluation    Patient location during evaluation: PACU  Patient participation: complete - patient participated  Level of consciousness: awake and alert  Pain score: 0  Airway patency: patent  Nausea & Vomiting: no nausea and no vomiting  Complications: no  Cardiovascular status: blood pressure returned to baseline  Respiratory status: acceptable  Hydration status: stable

## 2020-11-12 NOTE — PROGRESS NOTES
Pt brought to PACU. Report obtained from OR RN and anesthesia. Pt placed on monitor and O2 at 4 L per NC. Pt moaning and c/o pain. Med with fentanyl per CRNA. Pt with dressing to right chest CDI with chest tube in place. Continue to monitor.  Melania Richards

## 2020-11-12 NOTE — PROGRESS NOTES
Pt continues to c/o pain in her right upper back scapula area. Pt repostioned and call to Dr. Brittnee Garcia. Order obtained.  Johnny Thomson

## 2020-11-12 NOTE — PROCEDURES
[address]                 [address] [place] [address]                               PULMONARY FUNCTION    PATIENT NAME: Lopez Avila                     :        1945  MED REC NO:   [id number]                          ROOM:  ACCOUNT NO:   [de-identified]                           ADMIT DATE: 2020  PROVIDER:     Malik Ruiz MD    DATE OF PROCEDURE:  2020    ORDERING PROVIDER:  Dr. Miranda Johnson. GIVEN DIAGNOSES:  Adenopathy and preoperative evaluation. FINDINGS:  1. Spirometry:  FEV1 is 1.83 liters or 106% of predicted. FVC is 2.23  liters or 97% of predicted. FEV1/FVC ratio is 82%. There is no  demonstrative response to inhaled bronchodilators. 2.  Plethysmography:  Total lung capacity is 3.23 liters or 77% of  predicted. 3.  Diffusion capacity:  The diffusion capacity for carbon monoxide is  13.69 mL/min/mmHg, which is 73% of predicted. 4.  Flow volume loop: The tracings appear to show a restrictive defect  pattern. IMPRESSION:  1. There is no evidence of an obstructive lung defect or response to  inhaled bronchodilators. 2.  There is a mild restrictive ventilatory defect. 3.  There is mild reduction in diffusion capacity.         Shonna Peres MD    D: 2020 12:39:16       T: 2020 14:21:42     [id number]  Job#: [id number]     Doc#: [id number]    CC:

## 2020-11-12 NOTE — PROGRESS NOTES
This note also relates to the following rows which could not be included:  Resp - Cannot attach notes to unvalidated device data  SpO2 - Cannot attach notes to unvalidated device data       11/12/20 1640   Oxygen Therapy/Pulse Ox   O2 Therapy Oxygen   $Oxygen $Daily Charge   O2 Device Nasal cannula   O2 Flow Rate (L/min) 5 L/min   Pulse Oximeter Device Mode Intermittent   $Pulse Oximeter $Spot check (multiple/continuous)

## 2020-11-12 NOTE — ANESTHESIA PRE PROCEDURE
Department of Anesthesiology  Preprocedure Note       Name:  Joe Manzano   Age:  70 y.o.  :  1945                                          MRN:  [id number]         Date:  2020      Surgeon: Inga Walsh):  Annita Jerez MD    Procedure: Procedure(s):  RIGHT VIDEO THORACOSCOPY, POSSIBLE WEDGE LUNG BIOPSY, POSSIBLE MEDIASTINAL LYMPH NODE BIOSPIES WITH CRYOABLATION BERVE BLOCKS  CPT CODE - 75255    Medications prior to admission:   Prior to Admission medications    Medication Sig Start Date End Date Taking?  Authorizing Provider   acetaminophen (TYLENOL) 500 MG tablet Take 500 mg by mouth every 6 hours as needed for Pain   Yes Historical Provider, MD   nitrofurantoin, macrocrystal-monohydrate, (MACROBID) 100 MG capsule Take 1 capsule by mouth 2 times daily for 10 days 20 Yes Marleen Edmonds MD   losartan (COZAAR) 100 MG tablet TAKE 1 TABLET BY MOUTH  DAILY  Patient taking differently: Take 100 mg by mouth daily Take 1 tablet by mouth  daily 20  Yes Marleen Edmonds MD   omeprazole (PRILOSEC) 10 MG delayed release capsule TAKE 1 CAPSULE BY MOUTH  DAILY  Patient taking differently: Take 10 mg by mouth daily as needed  20  Yes Marleen Edmonds MD   amLODIPine (NORVASC) 10 MG tablet TAKE 1 TABLET BY MOUTH  DAILY  Patient taking differently: Take 10 mg by mouth daily Take 1 tablet by mouth  daily 20  Yes Marleen Edmonds MD   Calcium Carb-Cholecalciferol (CALCIUM + D3) 600-200 MG-UNIT TABS Take 1 tablet by mouth 2 times daily    Yes Historical Provider, MD   apixaban (ELIQUIS) 5 MG TABS tablet Take 1 tablet by mouth 2 times daily 11/10/20 12/10/20  Yee Arrington MD   simvastatin (ZOCOR) 20 MG tablet TAKE 1 TABLET BY MOUTH  NIGHTLY 20   Marleen Edmonds MD   alendronate (FOSAMAX) 70 MG tablet TAKE 1 TABLET BY MOUTH  EVERY 7 DAYS  Patient taking differently: Take 70 mg by mouth every 7 days Takes weekly on 20   JEANNA Dailey - CNP       Current BRONCHOSCOPY ALVEOLAR LAVAGE performed by Kayy Bhatt MD at [address] [place] [address]  10/22/2020    BRONCHOSCOPY BRUSHINGS performed by Kayy Bhatt MD at [address] [place] [address]  10/22/2020    BRONCHOSCOPY/TRANSBRONCHIAL NEEDLE BIOPSY performed by Kayy Bhatt MD at [address] [place] [address]  10/22/2020    BRONCHOSCOPY/TRANSBRONCHIAL NEEDLE BIOPSY ADDL LOBE performed by Kayy Bhatt MD at [facility] [address]  11/15/2016    colon polyps, diverticulosis    OTHER SURGICAL HISTORY  10/22/2020    EBUS w/anesthesia bronchoscopy alveilar lavage bronchoscopy brushings    OVARIAN CYST REMOVAL      MN EXCISION TUMOR SOFT TISSUE BACK/FLANK SUBQ <3CM N/A 2019    EXCISION BACK CYST TIMES TWO performed by Breann Pearl MD at [place] [address] ARTHROSCOPY Right 2020    VIDEO ARTHROSCOPY RIGHT SHOULDER, ROTATOR CUFF REPAIR, DECOMPRESSION, EXTENSIVE DEBRIDEMENT performed by Sera Ceja MD at [address] [place] [address] Rd History:    Social History     Tobacco Use    Smoking status: Former Smoker     Packs/day: 2.00     Years: 30.00     Pack years: 60.00     Types: Cigarettes     Start date: 1969     Last attempt to quit: 2004     Years since quittin.8    Smokeless tobacco: Never Used   Substance Use Topics    Alcohol use: Yes     Comment: very rare - one a year                                Counseling given: Not Answered      Vital Signs (Current):   Vitals:    20 1209 20 0614   BP:  (!) 133/48   Pulse:  77   Resp:  16   Temp:  97.5 °F (36.4 °C)   TempSrc:  Temporal   SpO2:  94%   Weight: 123 lb (55.8 kg) 127 lb 12.8 oz (58 kg)   Height: 4' 11\" (1.499 m) 4' 11\" (1.499 m)                                              BP Readings from Last 3 Encounters:   20 (!) 133/48   11/10/20 130/72   20 112/72       NPO Status: Time of last liquid consumption: 2200                        Time of last solid consumption: 2200                        Date of last liquid consumption: 11/11/20                        Date of last solid food consumption: 11/11/20    BMI:   Wt Readings from Last 3 Encounters:   11/12/20 127 lb 12.8 oz (58 kg)   11/10/20 128 lb 8 oz (58.3 kg)   11/03/20 128 lb (58.1 kg)     Body mass index is 25.81 kg/m². CBC:   Lab Results   Component Value Date    WBC 8.7 10/22/2020    RBC 4.16 10/22/2020    HGB 13.1 10/22/2020    HCT 39.0 10/22/2020    MCV 93.8 10/22/2020    RDW 13.3 10/22/2020     10/22/2020       CMP:   Lab Results   Component Value Date     10/22/2020    K 3.9 10/22/2020     10/22/2020    CO2 24 10/22/2020    BUN 20 10/22/2020    CREATININE 0.7 10/22/2020    GFRAA >60 10/22/2020    GFRAA >60 10/29/2010    AGRATIO 1.3 10/21/2020    LABGLOM >60 10/22/2020    GLUCOSE 115 10/22/2020    PROT 6.1 10/21/2020    PROT 6.9 10/29/2010    CALCIUM 8.9 10/22/2020    BILITOT 0.5 10/21/2020    ALKPHOS 74 10/21/2020    AST 14 10/21/2020    ALT 20 10/21/2020       POC Tests: No results for input(s): POCGLU, POCNA, POCK, POCCL, POCBUN, POCHEMO, POCHCT in the last 72 hours. Coags:   Lab Results   Component Value Date    PROTIME 12.0 10/20/2020    INR 1.03 10/20/2020    APTT 27.5 10/20/2020       HCG (If Applicable): No results found for: PREGTESTUR, PREGSERUM, HCG, HCGQUANT     ABGs: No results found for: PHART, PO2ART, EUV7CIX, JPV8VBV, BEART, E9MDBRKF     Type & Screen (If Applicable):  No results found for: LABABO, LABRH    Drug/Infectious Status (If Applicable):  No results found for: HIV, HEPCAB    COVID-19 Screening (If Applicable):   Lab Results   Component Value Date    COVID19 Not Detected 11/06/2020    COVID19 NOT DETECTED 08/19/2020         Anesthesia Evaluation  Patient summary reviewed and Nursing notes reviewed   history of anesthetic complications: PONV.   Airway: Mallampati: I  TM distance: <3 FB   Neck ROM: full  Mouth opening: > = 3 FB Dental: normal exam Pulmonary:Negative Pulmonary ROS and normal exam  breath sounds clear to auscultation                             Cardiovascular:    (+) hypertension:,         Rhythm: regular  Rate: normal                    Neuro/Psych:   Negative Neuro/Psych ROS              GI/Hepatic/Renal:   (+) GERD: well controlled,           Endo/Other: Negative Endo/Other ROS                    Abdominal:           Vascular: negative vascular ROS. Anesthesia Plan      general     ASA 3       Induction: intravenous. MIPS: Postoperative opioids intended and Prophylactic antiemetics administered. Anesthetic plan and risks discussed with patient. Plan discussed with CRNA.                   Paula Garcia MD   11/12/2020

## 2020-11-13 ENCOUNTER — APPOINTMENT (OUTPATIENT)
Dept: GENERAL RADIOLOGY | Age: 75
DRG: 166 | End: 2020-11-13
Attending: THORACIC SURGERY (CARDIOTHORACIC VASCULAR SURGERY)
Payer: MEDICARE

## 2020-11-13 LAB
ANION GAP SERPL CALCULATED.3IONS-SCNC: 8 MMOL/L (ref 3–16)
BUN BLDV-MCNC: 8 MG/DL (ref 7–20)
CALCIUM SERPL-MCNC: 8.4 MG/DL (ref 8.3–10.6)
CHLORIDE BLD-SCNC: 103 MMOL/L (ref 99–110)
CO2: 29 MMOL/L (ref 21–32)
CREAT SERPL-MCNC: <0.5 MG/DL (ref 0.6–1.2)
DLCO %PRED: 73 %
DLCO PRED: NORMAL
DLCO/VA %PRED: NORMAL
DLCO/VA PRED: NORMAL
DLCO/VA: NORMAL
DLCO: NORMAL
EXPIRATORY TIME-POST: NORMAL
EXPIRATORY TIME: NORMAL
FEF 25-75% %CHNG: NORMAL
FEF 25-75% %PRED-POST: NORMAL
FEF 25-75% %PRED-PRE: NORMAL
FEF 25-75% PRED: NORMAL
FEF 25-75%-POST: NORMAL
FEF 25-75%-PRE: NORMAL
FEV1 %PRED-POST: 108 %
FEV1 %PRED-PRE: 106 %
FEV1 PRED: NORMAL
FEV1-POST: NORMAL
FEV1-PRE: NORMAL
FEV1/FVC %PRED-POST: NORMAL
FEV1/FVC %PRED-PRE: NORMAL
FEV1/FVC PRED: NORMAL
FEV1/FVC-POST: 86 %
FEV1/FVC-PRE: 82 %
FVC %PRED-POST: NORMAL
FVC %PRED-PRE: NORMAL
FVC PRED: NORMAL
FVC-POST: NORMAL
FVC-PRE: NORMAL
GAW %PRED: NORMAL
GAW PRED: NORMAL
GAW: NORMAL
GFR AFRICAN AMERICAN: >60
GFR NON-AFRICAN AMERICAN: >60
GLUCOSE BLD-MCNC: 109 MG/DL (ref 70–99)
HCT VFR BLD CALC: 37.1 % (ref 36–48)
HEMOGLOBIN: 12.4 G/DL (ref 12–16)
IC %PRED: NORMAL
IC PRED: NORMAL
IC: NORMAL
MCH RBC QN AUTO: 30.8 PG (ref 26–34)
MCHC RBC AUTO-ENTMCNC: 33.5 G/DL (ref 31–36)
MCV RBC AUTO: 91.9 FL (ref 80–100)
MEP: NORMAL
MIP: NORMAL
MVV %PRED-PRE: NORMAL
MVV PRED: NORMAL
MVV-PRE: NORMAL
PDW BLD-RTO: 13.1 % (ref 12.4–15.4)
PEF %PRED-POST: NORMAL
PEF %PRED-PRE: NORMAL
PEF PRED: NORMAL
PEF%CHNG: NORMAL
PEF-POST: NORMAL
PEF-PRE: NORMAL
PLATELET # BLD: 170 K/UL (ref 135–450)
PMV BLD AUTO: 9.2 FL (ref 5–10.5)
POTASSIUM SERPL-SCNC: 3.1 MMOL/L (ref 3.5–5.1)
RAW %PRED: NORMAL
RAW PRED: NORMAL
RAW: NORMAL
RBC # BLD: 4.04 M/UL (ref 4–5.2)
RV %PRED: NORMAL
RV PRED: NORMAL
RV: NORMAL
SODIUM BLD-SCNC: 140 MMOL/L (ref 136–145)
SVC %PRED: NORMAL
SVC PRED: NORMAL
SVC: NORMAL
TLC %PRED: 77 %
TLC PRED: NORMAL
TLC: NORMAL
VA %PRED: NORMAL
VA PRED: NORMAL
VA: NORMAL
VTG %PRED: NORMAL
VTG PRED: NORMAL
VTG: NORMAL
WBC # BLD: 10.9 K/UL (ref 4–11)

## 2020-11-13 PROCEDURE — 94640 AIRWAY INHALATION TREATMENT: CPT

## 2020-11-13 PROCEDURE — 2700000000 HC OXYGEN THERAPY PER DAY

## 2020-11-13 PROCEDURE — 6370000000 HC RX 637 (ALT 250 FOR IP): Performed by: THORACIC SURGERY (CARDIOTHORACIC VASCULAR SURGERY)

## 2020-11-13 PROCEDURE — 97116 GAIT TRAINING THERAPY: CPT

## 2020-11-13 PROCEDURE — 2000000000 HC ICU R&B

## 2020-11-13 PROCEDURE — 97530 THERAPEUTIC ACTIVITIES: CPT

## 2020-11-13 PROCEDURE — 6370000000 HC RX 637 (ALT 250 FOR IP): Performed by: NURSE PRACTITIONER

## 2020-11-13 PROCEDURE — 2580000003 HC RX 258: Performed by: THORACIC SURGERY (CARDIOTHORACIC VASCULAR SURGERY)

## 2020-11-13 PROCEDURE — 6370000000 HC RX 637 (ALT 250 FOR IP)

## 2020-11-13 PROCEDURE — 71045 X-RAY EXAM CHEST 1 VIEW: CPT

## 2020-11-13 PROCEDURE — 6360000002 HC RX W HCPCS: Performed by: THORACIC SURGERY (CARDIOTHORACIC VASCULAR SURGERY)

## 2020-11-13 PROCEDURE — 85027 COMPLETE CBC AUTOMATED: CPT

## 2020-11-13 PROCEDURE — 94761 N-INVAS EAR/PLS OXIMETRY MLT: CPT

## 2020-11-13 PROCEDURE — 97161 PT EVAL LOW COMPLEX 20 MIN: CPT

## 2020-11-13 PROCEDURE — 80048 BASIC METABOLIC PNL TOTAL CA: CPT

## 2020-11-13 RX ORDER — OXYCODONE HYDROCHLORIDE 5 MG/1
10 TABLET ORAL EVERY 4 HOURS PRN
Status: DISCONTINUED | OUTPATIENT
Start: 2020-11-13 | End: 2020-11-16 | Stop reason: HOSPADM

## 2020-11-13 RX ORDER — OXYCODONE HYDROCHLORIDE 5 MG/1
5 TABLET ORAL EVERY 4 HOURS PRN
Status: DISCONTINUED | OUTPATIENT
Start: 2020-11-13 | End: 2020-11-16 | Stop reason: HOSPADM

## 2020-11-13 RX ORDER — OXYCODONE HYDROCHLORIDE 5 MG/1
TABLET ORAL
Status: COMPLETED
Start: 2020-11-13 | End: 2020-11-13

## 2020-11-13 RX ORDER — ATORVASTATIN CALCIUM 10 MG/1
10 TABLET, FILM COATED ORAL DAILY
Status: DISCONTINUED | OUTPATIENT
Start: 2020-11-13 | End: 2020-11-16 | Stop reason: HOSPADM

## 2020-11-13 RX ORDER — POTASSIUM CHLORIDE 7.45 MG/ML
10 INJECTION INTRAVENOUS PRN
Status: DISCONTINUED | OUTPATIENT
Start: 2020-11-13 | End: 2020-11-13

## 2020-11-13 RX ORDER — POTASSIUM CHLORIDE 20 MEQ/1
40 TABLET, EXTENDED RELEASE ORAL PRN
Status: DISCONTINUED | OUTPATIENT
Start: 2020-11-13 | End: 2020-11-16 | Stop reason: HOSPADM

## 2020-11-13 RX ORDER — ACETAMINOPHEN 325 MG/1
650 TABLET ORAL EVERY 4 HOURS PRN
Status: DISCONTINUED | OUTPATIENT
Start: 2020-11-13 | End: 2020-11-16 | Stop reason: HOSPADM

## 2020-11-13 RX ADMIN — ATORVASTATIN CALCIUM 10 MG: 10 TABLET, FILM COATED ORAL at 08:49

## 2020-11-13 RX ADMIN — POTASSIUM CHLORIDE 40 MEQ: 20 TABLET, EXTENDED RELEASE ORAL at 08:49

## 2020-11-13 RX ADMIN — MORPHINE SULFATE 4 MG: 4 INJECTION, SOLUTION INTRAMUSCULAR; INTRAVENOUS at 21:54

## 2020-11-13 RX ADMIN — MORPHINE SULFATE 4 MG: 4 INJECTION, SOLUTION INTRAMUSCULAR; INTRAVENOUS at 05:30

## 2020-11-13 RX ADMIN — APIXABAN 5 MG: 5 TABLET, FILM COATED ORAL at 20:24

## 2020-11-13 RX ADMIN — ALBUTEROL SULFATE 2.5 MG: 2.5 SOLUTION RESPIRATORY (INHALATION) at 16:06

## 2020-11-13 RX ADMIN — ALBUTEROL SULFATE 2.5 MG: 2.5 SOLUTION RESPIRATORY (INHALATION) at 08:31

## 2020-11-13 RX ADMIN — SODIUM CHLORIDE, PRESERVATIVE FREE 10 ML: 5 INJECTION INTRAVENOUS at 08:50

## 2020-11-13 RX ADMIN — APIXABAN 5 MG: 5 TABLET, FILM COATED ORAL at 08:49

## 2020-11-13 RX ADMIN — SODIUM CHLORIDE, PRESERVATIVE FREE 10 ML: 5 INJECTION INTRAVENOUS at 20:28

## 2020-11-13 RX ADMIN — CEFAZOLIN SODIUM 2 G: 10 INJECTION, POWDER, FOR SOLUTION INTRAVENOUS at 01:19

## 2020-11-13 RX ADMIN — ALBUTEROL SULFATE 2.5 MG: 2.5 SOLUTION RESPIRATORY (INHALATION) at 11:38

## 2020-11-13 RX ADMIN — ACETAMINOPHEN 650 MG: 325 TABLET, FILM COATED ORAL at 06:51

## 2020-11-13 RX ADMIN — OXYCODONE 10 MG: 5 TABLET ORAL at 20:24

## 2020-11-13 RX ADMIN — ACETAMINOPHEN 650 MG: 325 TABLET ORAL at 11:20

## 2020-11-13 RX ADMIN — OXYCODONE HYDROCHLORIDE 5 MG: 5 TABLET ORAL at 07:10

## 2020-11-13 RX ADMIN — BISACODYL 5 MG: 5 TABLET, COATED ORAL at 08:49

## 2020-11-13 RX ADMIN — MUPIROCIN: 20 OINTMENT TOPICAL at 08:49

## 2020-11-13 RX ADMIN — TRAMADOL HYDROCHLORIDE 50 MG: 50 TABLET ORAL at 04:46

## 2020-11-13 RX ADMIN — ALBUTEROL SULFATE 2.5 MG: 2.5 SOLUTION RESPIRATORY (INHALATION) at 19:27

## 2020-11-13 RX ADMIN — MUPIROCIN: 20 OINTMENT TOPICAL at 20:27

## 2020-11-13 ASSESSMENT — PAIN DESCRIPTION - PAIN TYPE
TYPE: SURGICAL PAIN
TYPE: SURGICAL PAIN
TYPE: SURGICAL PAIN;ACUTE PAIN
TYPE: SURGICAL PAIN

## 2020-11-13 ASSESSMENT — PAIN DESCRIPTION - DESCRIPTORS: DESCRIPTORS: ACHING

## 2020-11-13 ASSESSMENT — PULMONARY FUNCTION TESTS
FEV1_PERCENT_PREDICTED_POST: 108
FEV1/FVC_POST: 86
FEV1_PERCENT_PREDICTED_PRE: 106
FEV1/FVC_PRE: 82

## 2020-11-13 ASSESSMENT — PAIN SCALES - GENERAL
PAINLEVEL_OUTOF10: 9
PAINLEVEL_OUTOF10: 0
PAINLEVEL_OUTOF10: 7
PAINLEVEL_OUTOF10: 6
PAINLEVEL_OUTOF10: 10
PAINLEVEL_OUTOF10: 8
PAINLEVEL_OUTOF10: 6
PAINLEVEL_OUTOF10: 4
PAINLEVEL_OUTOF10: 8
PAINLEVEL_OUTOF10: 4
PAINLEVEL_OUTOF10: 6
PAINLEVEL_OUTOF10: 7
PAINLEVEL_OUTOF10: 6

## 2020-11-13 ASSESSMENT — PAIN DESCRIPTION - LOCATION
LOCATION: CHEST
LOCATION: CHEST;BACK

## 2020-11-13 ASSESSMENT — PAIN DESCRIPTION - ORIENTATION
ORIENTATION: RIGHT
ORIENTATION: RIGHT;LOWER

## 2020-11-13 ASSESSMENT — PAIN DESCRIPTION - ONSET: ONSET: ON-GOING

## 2020-11-13 ASSESSMENT — PAIN DESCRIPTION - PROGRESSION: CLINICAL_PROGRESSION: GRADUALLY WORSENING

## 2020-11-13 ASSESSMENT — PAIN - FUNCTIONAL ASSESSMENT: PAIN_FUNCTIONAL_ASSESSMENT: PREVENTS OR INTERFERES SOME ACTIVE ACTIVITIES AND ADLS

## 2020-11-13 ASSESSMENT — PAIN DESCRIPTION - FREQUENCY: FREQUENCY: CONTINUOUS

## 2020-11-13 NOTE — PROGRESS NOTES
Progress Note     S/P RIGHT VIDEO THORACOSCOPY, WEDGE LUNG BIOPSY, MEDIASTINAL LYMPH NODE BIOSPIES WITH CRYOABLATION NERVE BLOCKS     CC: abnormal CT     Hospital Course:   11/13 Patient is doing well. No complaints.      Vital Signs:                                                 /60   Pulse 76   Temp 98.1 °F (36.7 °C) (Axillary)   Resp 24   Ht 4' 11\" (1.499 m)   Wt 126 lb 1.7 oz (57.2 kg)   LMP  (LMP Unknown)   SpO2 93%   BMI 25.47 kg/m²  O2 Flow Rate (L/min): (S) 4 L/min      Admission Weight: 123 lb (55.8 kg)    11/13 57.2 kg     SpO2: 92% on 4L NC     Drips:  none at this time     I/O:       Intake/Output Summary (Last 24 hours) at 11/13/2020 0844  Last data filed at 11/13/2020 0600      Gross per 24 hour   Intake 940 ml   Output 1725 ml   Net -785 ml      Chest Tube: CT 0-50-0 = 50 mL/24 hours. Removed 11/13.     O/E  GEN: alert, and oriented x 3, in NAD  HEENT: unremarkable  CV: reg, wound c/d/i  Pulm: decreased, 4L NC  Abd: soft, nt, nd, no peritoneal signs  Ext: warm, edema, wound c/d/i     Data Review:  CBC:       Recent Labs     11/13/20  0410   WBC 10.9   HGB 12.4   HCT 37.1   MCV 91.9         BMP:       Recent Labs     11/13/20  0410      K 3.1*      CO2 29   BUN 8   CREATININE <0.5*   CALCIUM 8.4      Cardiac Enzymes: No results for input(s): CKTOTAL, CKMB, CKMBINDEX, TROPONINI in the last 72 hours. PT/INR: No results for input(s): PROTIME, INR in the last 72 hours. APTT: No results for input(s): APTT in the last 72 hours.     Assessment/Plan:  S/P VATS with R lower lobe wedge biopsy  - POD #1  - CT removed today, 11/13.  - Restarted patient on Eliquis  - SpO2 92% 4L NC, wean oxygen as tolerated.  - PT/OT, IS acapella   - UOP adequate [phone] = 1675 mL/24 hours.  Cr <0.5    Hypokalemia  - K+ 3.1  - Replace per sliding scale    Plan to d/c likely 1 to 2 days.     MIRANDA SteeleS  11/13/20  3:12 AM    Note reviewed, events of last 24 hours reviewed along with vital signs and I/Os and patient examined. Images reviewed. Assessment & Plans  No new complaints. Patient with expected post operative thoracic pain, well controlled on narcotics.      Vasu Hillman MD  Cardiothoracic Surgery

## 2020-11-13 NOTE — PROGRESS NOTES
Assistance: Independent  Active : Yes  Leisure & Hobbies: watch grandkids play sports, [place] in the winter    Objective  Bed mobility  Supine to Sit: Unable to assess  Sit to Supine: Unable to assess  Comment: Pt in chair at beginning and end of session     Transfers  Sit to Stand: Minimal Assistance(No AD, increase time to complete)  Stand to sit: Minimal Assistance  Bed to Chair: Minimal assistance     Ambulation  Ambulation?: Yes  Ambulation 1  Surface: level tile  Device: No Device  Assistance: Minimal assistance  Quality of Gait: Incease lateral sway, slow ana m, increase CINTHYA,  Gait Deviations: Increased CINTHYA; Slow Ana M;Decreased step length;Decreased step height  Distance: 5 fwd, 5 bckwrd  Comments: Limited due to pain  Stairs/Curb  Stairs?: No     Balance  Posture: Good  Sitting - Static: Good  Sitting - Dynamic: Good  Standing - Static: Good;-  Standing - Dynamic: Fair;+  Comments: Slight unsteadiness with standing requiring min(A)      Plan   Plan  Times per week: 5-7x/week  Times per day: Daily  Current Treatment Recommendations: Strengthening, Home Exercise Program, Safety Education & Training, Balance Training, Endurance Training, Patient/Caregiver Education & Training, Transfer Training, Gait Training, Stair training, ADL/Self-care Training, Pain Management  Safety Devices  Type of devices:  All fall risk precautions in place, Left in chair, Nurse notified, Chair alarm in place, Call light within reach, Gait belt, Patient at risk for falls  Restraints  Initially in place: No    AM-PAC Score  AM-PAC Inpatient Mobility Raw Score : 16 (11/13/20 1028)  AM-PAC Inpatient T-Scale Score : 40.78 (11/13/20 1028)  Mobility Inpatient CMS 0-100% Score: 54.16 (11/13/20 1028)  Mobility Inpatient CMS G-Code Modifier : CK (11/13/20 1028)       Goals  Short term goals  Time Frame for Short term goals: 1 week (11/20/2020)  Short term goal 1: pt will perform bed mobility (I)  Short term goal 2: Pt will perform transfers with LRAD and (I)  Short term goal 3: Pt will ambulate 150' with LRAD and supervision  Short term goal 4: Pt will perform HEP BLE exercises 15-20 reps (I)  Patient Goals   Patient goals : to get stronger and have less pain       Therapy Time   Individual Concurrent Group Co-treatment   Time In 0905         Time Out 0940         Minutes 35         Timed Code Treatment Minutes: 25 Minutes     If pt is unable to be seen after this session, please let this note serve as discharge summary. Please see case management note for discharge disposition. Thank you.     Jose G Hartmann, PT

## 2020-11-13 NOTE — CARE COORDINATION
CASE MANAGEMENT INITIAL ASSESSMENT      Reviewed chart and completed assessment with: patient and boyfriend   Explained Case Management role/services. Primary contact information: Jarvis Urrutia [phone]    Health Care Decision Maker:  Who do you trust or have selected to make healthcare decisions for you? Name:   Jarvis Urrutia   Phone  Number: [phone]  Can this person be reached and be able to respond quickly, such as within a few minutes or hours? Yes  Who would be your back-up decision maker? Name Reggie Farias  Phone Number: [phone]    Admit date/status:11/12/20  Diagnosis:hilar lymphadenopathy   Is this a Readmission?:  Yes      Insurance:medicare primary and [facility] secondary    Precert required for SNF: No       3 night stay required: waived    Living arrangements, Adls, care needs, prior to admission: patient lives alone in a single story house. Independent in all ADL's     [address] at home:  None    Services in the home and/or outpatient, prior to [address] Notification (HEN):not initiated    Barriers to discharge:none    Plan/comments:  Spoke to patient and boyfriend at bedside. Per patient she is anxious about discharge today. Feels that she will be ready to discharge tomorrow. RN entered room to remove chest tube. CM offered support and will continue to follow. Please advise of needs should any arise.       ECOC on chart for MD signature      Faisal Johnsno RN

## 2020-11-14 ENCOUNTER — APPOINTMENT (OUTPATIENT)
Dept: GENERAL RADIOLOGY | Age: 75
DRG: 166 | End: 2020-11-14
Attending: THORACIC SURGERY (CARDIOTHORACIC VASCULAR SURGERY)
Payer: MEDICARE

## 2020-11-14 LAB
ANION GAP SERPL CALCULATED.3IONS-SCNC: 9 MMOL/L (ref 3–16)
BASOPHILS ABSOLUTE: 0.1 K/UL (ref 0–0.2)
BASOPHILS RELATIVE PERCENT: 0.5 %
BUN BLDV-MCNC: 9 MG/DL (ref 7–20)
CALCIUM SERPL-MCNC: 8.9 MG/DL (ref 8.3–10.6)
CHLORIDE BLD-SCNC: 100 MMOL/L (ref 99–110)
CO2: 30 MMOL/L (ref 21–32)
CREAT SERPL-MCNC: <0.5 MG/DL (ref 0.6–1.2)
EOSINOPHILS ABSOLUTE: 0.1 K/UL (ref 0–0.6)
EOSINOPHILS RELATIVE PERCENT: 1 %
GFR AFRICAN AMERICAN: >60
GFR NON-AFRICAN AMERICAN: >60
GLUCOSE BLD-MCNC: 134 MG/DL (ref 70–99)
HCT VFR BLD CALC: 40.9 % (ref 36–48)
HEMOGLOBIN: 13.7 G/DL (ref 12–16)
LYMPHOCYTES ABSOLUTE: 1.3 K/UL (ref 1–5.1)
LYMPHOCYTES RELATIVE PERCENT: 11.4 %
MAGNESIUM: 1.9 MG/DL (ref 1.8–2.4)
MCH RBC QN AUTO: 30.5 PG (ref 26–34)
MCHC RBC AUTO-ENTMCNC: 33.6 G/DL (ref 31–36)
MCV RBC AUTO: 91 FL (ref 80–100)
MONOCYTES ABSOLUTE: 0.7 K/UL (ref 0–1.3)
MONOCYTES RELATIVE PERCENT: 6.3 %
NEUTROPHILS ABSOLUTE: 9.2 K/UL (ref 1.7–7.7)
NEUTROPHILS RELATIVE PERCENT: 80.8 %
PDW BLD-RTO: 13.4 % (ref 12.4–15.4)
PLATELET # BLD: 294 K/UL (ref 135–450)
PMV BLD AUTO: 7.8 FL (ref 5–10.5)
POTASSIUM SERPL-SCNC: 3.3 MMOL/L (ref 3.5–5.1)
POTASSIUM SERPL-SCNC: 4.1 MMOL/L (ref 3.5–5.1)
RBC # BLD: 4.49 M/UL (ref 4–5.2)
REASON FOR REJECTION: NORMAL
REJECTED TEST: NORMAL
SODIUM BLD-SCNC: 139 MMOL/L (ref 136–145)
WBC # BLD: 11.3 K/UL (ref 4–11)

## 2020-11-14 PROCEDURE — 6360000002 HC RX W HCPCS: Performed by: THORACIC SURGERY (CARDIOTHORACIC VASCULAR SURGERY)

## 2020-11-14 PROCEDURE — 6370000000 HC RX 637 (ALT 250 FOR IP): Performed by: NURSE PRACTITIONER

## 2020-11-14 PROCEDURE — 6370000000 HC RX 637 (ALT 250 FOR IP): Performed by: THORACIC SURGERY (CARDIOTHORACIC VASCULAR SURGERY)

## 2020-11-14 PROCEDURE — 2580000003 HC RX 258: Performed by: THORACIC SURGERY (CARDIOTHORACIC VASCULAR SURGERY)

## 2020-11-14 PROCEDURE — 94660 CPAP INITIATION&MGMT: CPT

## 2020-11-14 PROCEDURE — 94761 N-INVAS EAR/PLS OXIMETRY MLT: CPT

## 2020-11-14 PROCEDURE — 83735 ASSAY OF MAGNESIUM: CPT

## 2020-11-14 PROCEDURE — 2000000000 HC ICU R&B

## 2020-11-14 PROCEDURE — 2700000000 HC OXYGEN THERAPY PER DAY

## 2020-11-14 PROCEDURE — 36415 COLL VENOUS BLD VENIPUNCTURE: CPT

## 2020-11-14 PROCEDURE — 84132 ASSAY OF SERUM POTASSIUM: CPT

## 2020-11-14 PROCEDURE — 99024 POSTOP FOLLOW-UP VISIT: CPT | Performed by: THORACIC SURGERY (CARDIOTHORACIC VASCULAR SURGERY)

## 2020-11-14 PROCEDURE — 71045 X-RAY EXAM CHEST 1 VIEW: CPT

## 2020-11-14 PROCEDURE — 97116 GAIT TRAINING THERAPY: CPT

## 2020-11-14 PROCEDURE — 94669 MECHANICAL CHEST WALL OSCILL: CPT

## 2020-11-14 PROCEDURE — 94640 AIRWAY INHALATION TREATMENT: CPT

## 2020-11-14 PROCEDURE — 80048 BASIC METABOLIC PNL TOTAL CA: CPT

## 2020-11-14 PROCEDURE — 85025 COMPLETE CBC W/AUTO DIFF WBC: CPT

## 2020-11-14 RX ORDER — LEVALBUTEROL 1.25 MG/.5ML
1.25 SOLUTION, CONCENTRATE RESPIRATORY (INHALATION) ONCE
Status: COMPLETED | OUTPATIENT
Start: 2020-11-14 | End: 2020-11-14

## 2020-11-14 RX ORDER — TRAMADOL HYDROCHLORIDE 50 MG/1
50 TABLET ORAL EVERY 6 HOURS PRN
Status: DISCONTINUED | OUTPATIENT
Start: 2020-11-14 | End: 2020-11-16 | Stop reason: HOSPADM

## 2020-11-14 RX ORDER — AMIODARONE HYDROCHLORIDE 200 MG/1
400 TABLET ORAL 2 TIMES DAILY
Status: DISCONTINUED | OUTPATIENT
Start: 2020-11-14 | End: 2020-11-16 | Stop reason: HOSPADM

## 2020-11-14 RX ORDER — FUROSEMIDE 10 MG/ML
20 INJECTION INTRAMUSCULAR; INTRAVENOUS ONCE
Status: COMPLETED | OUTPATIENT
Start: 2020-11-14 | End: 2020-11-14

## 2020-11-14 RX ORDER — POTASSIUM CHLORIDE 20 MEQ/1
20 TABLET, EXTENDED RELEASE ORAL 2 TIMES DAILY
Status: DISCONTINUED | OUTPATIENT
Start: 2020-11-14 | End: 2020-11-16 | Stop reason: HOSPADM

## 2020-11-14 RX ORDER — AMIODARONE HYDROCHLORIDE 200 MG/1
200 TABLET ORAL DAILY
Status: DISCONTINUED | OUTPATIENT
Start: 2020-11-19 | End: 2020-11-16 | Stop reason: HOSPADM

## 2020-11-14 RX ADMIN — APIXABAN 5 MG: 5 TABLET, FILM COATED ORAL at 20:22

## 2020-11-14 RX ADMIN — ALBUTEROL SULFATE 2.5 MG: 2.5 SOLUTION RESPIRATORY (INHALATION) at 19:58

## 2020-11-14 RX ADMIN — AMIODARONE HYDROCHLORIDE 400 MG: 200 TABLET ORAL at 20:22

## 2020-11-14 RX ADMIN — ALBUTEROL SULFATE 2.5 MG: 2.5 SOLUTION RESPIRATORY (INHALATION) at 16:02

## 2020-11-14 RX ADMIN — LEVALBUTEROL HYDROCHLORIDE 1.25 MG: 1.25 SOLUTION RESPIRATORY (INHALATION) at 04:45

## 2020-11-14 RX ADMIN — MUPIROCIN: 20 OINTMENT TOPICAL at 08:48

## 2020-11-14 RX ADMIN — ATORVASTATIN CALCIUM 10 MG: 10 TABLET, FILM COATED ORAL at 08:48

## 2020-11-14 RX ADMIN — POTASSIUM CHLORIDE 20 MEQ: 20 TABLET, EXTENDED RELEASE ORAL at 20:24

## 2020-11-14 RX ADMIN — ALBUTEROL SULFATE 2.5 MG: 2.5 SOLUTION RESPIRATORY (INHALATION) at 12:01

## 2020-11-14 RX ADMIN — FUROSEMIDE 20 MG: 10 INJECTION, SOLUTION INTRAMUSCULAR; INTRAVENOUS at 04:40

## 2020-11-14 RX ADMIN — POTASSIUM CHLORIDE 20 MEQ: 20 TABLET, EXTENDED RELEASE ORAL at 08:48

## 2020-11-14 RX ADMIN — SODIUM CHLORIDE, PRESERVATIVE FREE 10 ML: 5 INJECTION INTRAVENOUS at 20:24

## 2020-11-14 RX ADMIN — DEXTROSE MONOHYDRATE 150 MG: 50 INJECTION, SOLUTION INTRAVENOUS at 04:11

## 2020-11-14 RX ADMIN — SODIUM CHLORIDE, PRESERVATIVE FREE 10 ML: 5 INJECTION INTRAVENOUS at 09:00

## 2020-11-14 RX ADMIN — AMIODARONE HYDROCHLORIDE 400 MG: 200 TABLET ORAL at 08:48

## 2020-11-14 RX ADMIN — ALBUTEROL SULFATE 2.5 MG: 2.5 SOLUTION RESPIRATORY (INHALATION) at 07:57

## 2020-11-14 RX ADMIN — BISACODYL 5 MG: 5 TABLET, COATED ORAL at 08:47

## 2020-11-14 RX ADMIN — APIXABAN 5 MG: 5 TABLET, FILM COATED ORAL at 08:48

## 2020-11-14 RX ADMIN — TRAMADOL HYDROCHLORIDE 50 MG: 50 TABLET ORAL at 11:53

## 2020-11-14 RX ADMIN — MUPIROCIN: 20 OINTMENT TOPICAL at 20:24

## 2020-11-14 RX ADMIN — ACETAMINOPHEN 650 MG: 325 TABLET ORAL at 20:23

## 2020-11-14 RX ADMIN — DEXTROSE MONOHYDRATE 1 MG/MIN: 50 INJECTION, SOLUTION INTRAVENOUS at 04:42

## 2020-11-14 RX ADMIN — POTASSIUM CHLORIDE 40 MEQ: 20 TABLET, EXTENDED RELEASE ORAL at 04:51

## 2020-11-14 RX ADMIN — ACETAMINOPHEN 650 MG: 325 TABLET ORAL at 10:00

## 2020-11-14 RX ADMIN — TRAMADOL HYDROCHLORIDE 50 MG: 50 TABLET ORAL at 20:22

## 2020-11-14 ASSESSMENT — PAIN DESCRIPTION - PAIN TYPE
TYPE: ACUTE PAIN;CHRONIC PAIN
TYPE: CHRONIC PAIN
TYPE: SURGICAL PAIN

## 2020-11-14 ASSESSMENT — PAIN SCALES - GENERAL
PAINLEVEL_OUTOF10: 3
PAINLEVEL_OUTOF10: 0
PAINLEVEL_OUTOF10: 3
PAINLEVEL_OUTOF10: 7
PAINLEVEL_OUTOF10: 5
PAINLEVEL_OUTOF10: 0
PAINLEVEL_OUTOF10: 5

## 2020-11-14 ASSESSMENT — PAIN DESCRIPTION - LOCATION
LOCATION: BACK
LOCATION: BACK

## 2020-11-14 ASSESSMENT — PAIN DESCRIPTION - ORIENTATION: ORIENTATION: RIGHT

## 2020-11-14 NOTE — PROGRESS NOTES
11/14/20 0445   NIV Type   Skin Assessment Clean, dry, & intact   Skin Protection for O2 Device Yes   NIV Started/Stopped On   Equipment Type v60   Mode Bilevel   Mask Type Full face mask   Mask Size Medium   Settings/Measurements   IPAP 14 cmH20   CPAP/EPAP 6 cmH2O   Rate Ordered 4   Resp 28   FiO2  100 %   Vt Exhaled 556 mL   Minute Volume 15.6 Liters   Mask Leak (lpm) 14 lpm   Comfort Level Fair   Using Accessory Muscles Yes   SpO2 92   Alarm Settings   Alarms On Y   Press Low Alarm 6 cmH2O   High Pressure Alarm 30 cmH2O   Delay Alarm 20 sec(s)   Resp Rate Low Alarm 6   High Respiratory Rate 40 br/min   Oxygen Therapy/Pulse Ox   SpO2 92 %

## 2020-11-14 NOTE — PROGRESS NOTES
Physical Therapy  Facility/Department: [facility] B2 - ICU  Daily Treatment Note  NAME: Kathy Syed  : 1945  MRN: [id number]    Date of Service: 2020    Discharge Recommendations:  24 hour supervision or assist   PT Equipment Recommendations  Equipment Needed: No    Assessment   Body structures, Functions, Activity limitations: Decreased functional mobility ; Increased pain;Decreased balance;Decreased strength;Decreased endurance  Assessment: Pt pleasant and agreeable to PT tx, pt demonstrates improved performance overall with functional mobility and gait and able to participate in stair training this date. Pt completes functional t/f with S and ambulates community distances with 3QV with S without LOB. Pt requires CGA to SBA for ambulation without AD and CGA for stair activities. Pt is limited by decreased activity tolerance with drop in SpO2 with mobility. Pt cued on PLB and requiring extended seated rest break to recover. Pt educated on energy conservation and limiting gait distances based on SOB/activity tolerance at d/c. Pt will benefit from continued skilled PT in acute care setting to address above deficits. Treatment Diagnosis: decreased functional mobility and endurance  Specific instructions for Next Treatment: Progress mobility as tolerated  Prognosis: Good  Decision Making: Low Complexity  PT Education: Goals; General Safety;Gait Training;PT Role;Plan of Care;Home Exercise Program;Transfer Training;Disease Specific Education; Functional Mobility Training;Energy Conservation  Patient Education: Pt educated on breathing techniques with mobility. Verbalizes understanding  Barriers to Learning: none  REQUIRES PT FOLLOW UP: Yes  Activity Tolerance  Activity Tolerance: Patient Tolerated treatment well;Patient limited by pain; Patient limited by endurance  Activity Tolerance: Pt on 6L O2 NC at rest, increased to 8L for gait (see gait for SpO2 details with mobility)     Patient Diagnosis(es): The encounter diagnosis was Hilar lymphadenopathy. has a past medical history of Atrial fibrillation ([facility] [address] Difficult intravenous access, Diverticulitis, GERD (gastroesophageal reflux disease), Hyperlipidemia, Hypertension, Osteoarthritis, Osteopenia, PONV (postoperative nausea and vomiting), Prolonged emergence from general anesthesia, Restless legs syndrome, and S/P shoulder surgery. has a past surgical history that includes ovarian cyst removal; Breast reduction surgery (10/15/15); Colonoscopy (11/15/2016); pr excision tumor soft tissue back/flank subq <3cm (N/A, 8/23/2019); Shoulder arthroscopy (Right, 8/25/2020); Tonsillectomy; other surgical history (10/22/2020); bronchoscopy (N/A, 10/22/2020); bronchoscopy (10/22/2020); bronchoscopy (10/22/2020); bronchoscopy (10/22/2020); bronchoscopy (10/22/2020); and Thoracoscopy (Right, 11/12/2020). Restrictions  Restrictions/Precautions  Restrictions/Precautions: Up as Tolerated, Fall Risk, Isolation  Required Braces or Orthoses?: No  Position Activity Restriction  Other position/activity restrictions: 6L HF NC  Subjective   General  Chart Reviewed: Yes  Additional Pertinent Hx: R Rotator cuff repair August 2020  Response To Previous Treatment: Not applicable  Family / Caregiver Present: Yes(Boyfriend)  Referring Practitioner: Leanne Dudley MD  Subjective  Subjective: Pt seated in recliner on approach, pleasant and agreeable to PT tx  General Comment  Comments: RN cleared pt for session  Pain Screening  Patient Currently in Pain: Yes  Pain Assessment  Pain Assessment: 0-10  Pain Level: 3  Pain Type: Acute pain;Chronic pain  Pain Location: Back  Pain Orientation: Right  Non-Pharmaceutical Pain Intervention(s): Ambulation/Increased Activity; Therapeutic presence;Distraction  Vital Signs  Patient Currently in Pain: Yes       Orientation  Orientation  Overall Orientation Status: Within Normal Limits    Objective   Bed mobility  Supine to Sit: Unable to assess  Sit to Supine: Unable to assess  Comment: Pt seated in recliner at start and end of session     Transfers  Sit to Stand: Supervision  Stand to sit: Supervision  Comment: Recliner to 4WW completed x 3 reps     Ambulation  Ambulation?: Yes  More Ambulation?: Yes  Ambulation 1  Surface: level tile  Device: Rollator  Other Apparatus: O2(8L O2 NC)  Assistance: Supervision  Quality of Gait: Reciprocal pattern, B decreased heel strike, slight forward flexed trunk. Pt steady with no overt LOB  Distance: 200'  Comments: Distances limited by fatigue, SpO2 83-84% following gait/stairs, requiring increase to 15L and 3 minutes to recover to >90% (O2 then returned to 8L)  Ambulation 2  Surface - 2: level tile  Device 2: No device  Assistance 2: Contact guard assistance;Stand by assistance  Quality of Gait 2: Reciprocal pattern, B decreased heel strike, increased lateral sway. Pt mildly unstead, x 2 minimal lateral LOB with self-correction, CGA for safety  Distance: 48' + 150'  Comments: distances limited by fatigue, SpO2 86% on 8L O2 NC, improved to >90% within 3 minutes with seated rest break  Stairs/Curb  Stairs?: Yes  Stairs  # Steps : 4  Stairs Height: 8\"  Rails: None  Assistance: Contact guard assistance  Comment: Pt ascends/descends with step to pattern, cues for sequence and additional time to complete.  Pt mildly unsteady, CGA for balance        Balance  Posture: Good  Sitting - Static: Good  Sitting - Dynamic: Good  Standing - Static: Good;-  Standing - Dynamic: Fair;+                           AM-PAC Score  AM-PAC Inpatient Mobility Raw Score : 16 (11/14/20 1713)  AM-PAC Inpatient T-Scale Score : 40.78 (11/14/20 1713)  Mobility Inpatient CMS 0-100% Score: 54.16 (11/14/20 1713)  Mobility Inpatient CMS G-Code Modifier : CK (11/14/20 1713)          Goals  Short term goals  Time Frame for Short term goals: 1 week (11/20/2020)  Short term goal 1: pt will perform bed mobility (I) -- 11/14: DNT  Short term goal 2: Pt will perform transfers with LRAD and (I) -- 11/14: S with 4WW  Short term goal 3: Pt will ambulate 150' with LRAD and supervision -- 11/14: GOAL MET x 200' with 4WW S; upgrade goal: Pt will ambulate >150' without AD with S without LOB  Short term goal 4: Pt will perform HEP BLE exercises 15-20 reps (I) -- 11/14: DNT  Short term goal 5: New goal 11/14: Pt will ascend/descend 2 steps without HR with SBA -- 11/14: CGA x 4 steps no HR  Patient Goals   Patient goals : to get stronger and have less pain    Plan    Plan  Times per week: 5-7x/week  Times per day: Daily  Specific instructions for Next Treatment: Progress mobility as tolerated  Current Treatment Recommendations: Strengthening, Home Exercise Program, Safety Education & Training, Balance Training, Endurance Training, Patient/Caregiver Education & Training, Transfer Training, Gait Training, Stair training, ADL/Self-care Training, Pain Management  Safety Devices  Type of devices: All fall risk precautions in place, Left in chair, Nurse notified, Chair alarm in place, Call light within reach, Gait belt, Patient at risk for falls  Restraints  Initially in place: No     Therapy Time   Individual Concurrent Group Co-treatment   Time In 1450         Time Out 1520         Minutes 30         Timed Code Treatment Minutes: 30 Minutes     If pt is unable to be seen after this session, please let this note serve as discharge summary. Please see case management note for discharge disposition. Thank you.     Christina Garibay, PT, DPT

## 2020-11-14 NOTE — PLAN OF CARE
Problem: Pain:  Goal: Control of acute pain  Description: Continuing to monitor pain and discomfort. Monitoring pain level on scale of 0-10. Non- pharmacological measures encouraged to reduce discomfort/pain. PRN pain meds administeration continues as ordered by physician.     Outcome: Ongoing

## 2020-11-14 NOTE — PROGRESS NOTES
Pt HR in 120s to 130s with increasing O2 demands. On 15L HFNC. Dr. Gaurav Valentine notified via perfect serve. See new orders.    Electronically signed by Jung Rico RN on 11/14/2020 at 3:01 AM

## 2020-11-14 NOTE — PLAN OF CARE
[address] REHAB PHASE I  THORACOSCOPY      Anette Lucero   1945 11/14/20    Previous cardiac rehab notes: none previously documented      Pt is currently working with PT/OT at this time. Will follow up with activity and/or ambulation when appropriate. Will continue to follow up during the duration of the CR order.      KHADRA Pena 11/14/20 2:30 PM    Exercise Physiologist    Phone: [phone]

## 2020-11-14 NOTE — PROGRESS NOTES
Progress Note     S/P RIGHT VIDEO THORACOSCOPY, WEDGE LUNG BIOPSY, MEDIASTINAL LYMPH NODE BIOSPIES WITH CRYOABLATION NERVE BLOCKS     CC: abnormal CT     Hospital Course:   11/13 Patient is doing well. No complaints. 11/14 patient oxygen requirement increased overnight I received a phone call at 4:02 AM requesting verbal term 15 later and the patient started on amiodarone due to A. Fib  Order was initiated for BiPAP, aggressive I-S, amiodarone bolus and drip, Lasix 20 IV, currently this morning the patient is in sinus rhythm 50% oxygen on BiPAP,     Vital Signs:                                                 /60   Pulse 76   Temp 98.1 °F (36.7 °C) (Axillary)   Resp 24   Ht 4' 11\" (1.499 m)   Wt 126 lb 1.7 oz (57.2 kg)   LMP  (LMP Unknown)   SpO2 93%   BMI 25.47 kg/m²  O2 Flow Rate (L/min): (S) 4 L/min      Admission Weight: 123 lb (55.8 kg)    11/13 57.2 kg     SpO2: 92% on 4L NC     Drips:  none at this time     I/O:       Intake/Output Summary (Last 24 hours) at 11/13/2020 0844  Last data filed at 11/13/2020 0600      Gross per 24 hour   Intake 940 ml   Output 1725 ml   Net -785 ml      Chest Tube: CT 0-50-0 = 50 mL/24 hours. Removed 11/13.     O/E  GEN: alert, and oriented x 3, in NAD  HEENT: unremarkable  CV: reg, wound c/d/i  Pulm: decreased, 4L NC  Abd: soft, nt, nd, no peritoneal signs  Ext: warm, edema, wound c/d/i     Data Review:  CBC:       Recent Labs     11/13/20  0410   WBC 10.9   HGB 12.4   HCT 37.1   MCV 91.9         BMP:       Recent Labs     11/13/20  0410      K 3.1*      CO2 29   BUN 8   CREATININE <0.5*   CALCIUM 8.4      Cardiac Enzymes: No results for input(s): CKTOTAL, CKMB, CKMBINDEX, TROPONINI in the last 72 hours. PT/INR: No results for input(s): PROTIME, INR in the last 72 hours. APTT: No results for input(s):  APTT in the last 72 hours.     Assessment/Plan:  Potassium replacement  Continue with amiodarone drip  BiPAP to off to on during the day and overnight  Facemask as the patient is mouth breather  If improved her acute respiratory decompensations with possible discharge Monday

## 2020-11-15 PROCEDURE — 97110 THERAPEUTIC EXERCISES: CPT

## 2020-11-15 PROCEDURE — 94669 MECHANICAL CHEST WALL OSCILL: CPT

## 2020-11-15 PROCEDURE — 94660 CPAP INITIATION&MGMT: CPT

## 2020-11-15 PROCEDURE — 97116 GAIT TRAINING THERAPY: CPT

## 2020-11-15 PROCEDURE — 6370000000 HC RX 637 (ALT 250 FOR IP): Performed by: THORACIC SURGERY (CARDIOTHORACIC VASCULAR SURGERY)

## 2020-11-15 PROCEDURE — 6360000002 HC RX W HCPCS: Performed by: THORACIC SURGERY (CARDIOTHORACIC VASCULAR SURGERY)

## 2020-11-15 PROCEDURE — 6370000000 HC RX 637 (ALT 250 FOR IP): Performed by: NURSE PRACTITIONER

## 2020-11-15 PROCEDURE — 94640 AIRWAY INHALATION TREATMENT: CPT

## 2020-11-15 PROCEDURE — 2700000000 HC OXYGEN THERAPY PER DAY

## 2020-11-15 PROCEDURE — 2580000003 HC RX 258: Performed by: THORACIC SURGERY (CARDIOTHORACIC VASCULAR SURGERY)

## 2020-11-15 PROCEDURE — 99024 POSTOP FOLLOW-UP VISIT: CPT | Performed by: THORACIC SURGERY (CARDIOTHORACIC VASCULAR SURGERY)

## 2020-11-15 PROCEDURE — 2000000000 HC ICU R&B

## 2020-11-15 PROCEDURE — 94761 N-INVAS EAR/PLS OXIMETRY MLT: CPT

## 2020-11-15 RX ADMIN — ALBUTEROL SULFATE 2.5 MG: 2.5 SOLUTION RESPIRATORY (INHALATION) at 08:58

## 2020-11-15 RX ADMIN — APIXABAN 5 MG: 5 TABLET, FILM COATED ORAL at 08:16

## 2020-11-15 RX ADMIN — TRAMADOL HYDROCHLORIDE 50 MG: 50 TABLET ORAL at 08:30

## 2020-11-15 RX ADMIN — SODIUM CHLORIDE, PRESERVATIVE FREE 10 ML: 5 INJECTION INTRAVENOUS at 08:16

## 2020-11-15 RX ADMIN — ALBUTEROL SULFATE 2.5 MG: 2.5 SOLUTION RESPIRATORY (INHALATION) at 16:21

## 2020-11-15 RX ADMIN — ATORVASTATIN CALCIUM 10 MG: 10 TABLET, FILM COATED ORAL at 08:16

## 2020-11-15 RX ADMIN — AMIODARONE HYDROCHLORIDE 400 MG: 200 TABLET ORAL at 20:40

## 2020-11-15 RX ADMIN — POTASSIUM CHLORIDE 20 MEQ: 20 TABLET, EXTENDED RELEASE ORAL at 08:18

## 2020-11-15 RX ADMIN — SODIUM CHLORIDE, PRESERVATIVE FREE 10 ML: 5 INJECTION INTRAVENOUS at 20:42

## 2020-11-15 RX ADMIN — TRAMADOL HYDROCHLORIDE 50 MG: 50 TABLET ORAL at 22:14

## 2020-11-15 RX ADMIN — POTASSIUM CHLORIDE 20 MEQ: 20 TABLET, EXTENDED RELEASE ORAL at 20:41

## 2020-11-15 RX ADMIN — ALBUTEROL SULFATE 2.5 MG: 2.5 SOLUTION RESPIRATORY (INHALATION) at 20:27

## 2020-11-15 RX ADMIN — AMIODARONE HYDROCHLORIDE 400 MG: 200 TABLET ORAL at 08:16

## 2020-11-15 RX ADMIN — MUPIROCIN: 20 OINTMENT TOPICAL at 08:16

## 2020-11-15 RX ADMIN — APIXABAN 5 MG: 5 TABLET, FILM COATED ORAL at 20:41

## 2020-11-15 RX ADMIN — MUPIROCIN: 20 OINTMENT TOPICAL at 20:42

## 2020-11-15 RX ADMIN — ACETAMINOPHEN 650 MG: 325 TABLET ORAL at 18:16

## 2020-11-15 RX ADMIN — BISACODYL 5 MG: 5 TABLET, COATED ORAL at 08:16

## 2020-11-15 RX ADMIN — ALBUTEROL SULFATE 2.5 MG: 2.5 SOLUTION RESPIRATORY (INHALATION) at 12:28

## 2020-11-15 ASSESSMENT — PAIN DESCRIPTION - PAIN TYPE
TYPE: ACUTE PAIN;SURGICAL PAIN
TYPE: SURGICAL PAIN
TYPE: ACUTE PAIN;SURGICAL PAIN

## 2020-11-15 ASSESSMENT — PAIN DESCRIPTION - ORIENTATION: ORIENTATION: RIGHT

## 2020-11-15 ASSESSMENT — PAIN DESCRIPTION - LOCATION: LOCATION: BACK

## 2020-11-15 ASSESSMENT — PAIN SCALES - GENERAL
PAINLEVEL_OUTOF10: 6
PAINLEVEL_OUTOF10: 0
PAINLEVEL_OUTOF10: 3
PAINLEVEL_OUTOF10: 2
PAINLEVEL_OUTOF10: 6
PAINLEVEL_OUTOF10: 6
PAINLEVEL_OUTOF10: 2

## 2020-11-15 ASSESSMENT — PAIN DESCRIPTION - ONSET: ONSET: ON-GOING

## 2020-11-15 ASSESSMENT — PAIN DESCRIPTION - FREQUENCY: FREQUENCY: CONTINUOUS

## 2020-11-15 ASSESSMENT — PAIN DESCRIPTION - PROGRESSION: CLINICAL_PROGRESSION: GRADUALLY WORSENING

## 2020-11-15 ASSESSMENT — PAIN DESCRIPTION - DESCRIPTORS: DESCRIPTORS: ACHING;DISCOMFORT

## 2020-11-15 NOTE — PROGRESS NOTES
11/15/20 0338   NIV Type   Equipment Type v60   Mode Bilevel   Mask Type Full face mask   Mask Size Medium   Settings/Measurements   IPAP 12 cmH20   CPAP/EPAP 6 cmH2O   Rate Ordered 4   Resp 17   FiO2  50 %   Vt Exhaled 508 mL   Minute Volume 8.6 Liters   Mask Leak (lpm) 26 lpm   Comfort Level Good   Using Accessory Muscles No   SpO2 96   Alarm Settings   Alarms On Y   Press Low Alarm 6 cmH2O   High Pressure Alarm 30 cmH2O   Delay Alarm 20 sec(s)   Resp Rate Low Alarm 6   High Respiratory Rate 40 br/min   Oxygen Therapy/Pulse Ox   SpO2 96 %

## 2020-11-15 NOTE — PROGRESS NOTES
11/15/20 0130   NIV Type   Skin Protection for O2 Device Yes   Equipment Type v60   Mode Bilevel   Mask Type Full face mask   Mask Size Medium   Settings/Measurements   IPAP 12 cmH20   CPAP/EPAP 6 cmH2O   Rate Ordered 4   Resp 25   FiO2  50 %   Vt Exhaled 578 mL   Minute Volume 14.5 Liters   Mask Leak (lpm) 11 lpm   Comfort Level Fair   Using Accessory Muscles No   SpO2 92   Alarm Settings   Alarms On Y   Press Low Alarm 6 cmH2O   High Pressure Alarm 30 cmH2O   Delay Alarm 20 sec(s)   Resp Rate Low Alarm 6   High Respiratory Rate 40 br/min

## 2020-11-15 NOTE — PROGRESS NOTES
Physical Therapy  Facility/Department: [facility] B2 - ICU  Daily Treatment Note  NAME: Kathy Syed  : 1945  MRN: [id number]    Date of Service: 11/15/2020    Discharge Recommendations:  24 hour supervision or assist   PT Equipment Recommendations  Equipment Needed: No    Assessment   Body structures, Functions, Activity limitations: Decreased functional mobility ; Increased pain;Decreased balance;Decreased strength;Decreased endurance  Assessment: Pt pleasant and agreeable to PT tx, pt demonstrates improved performance overall with functional mobility and gait/stairs. Pt completes functional t/f with I and ambulates community distances without AD with S without LOB. Pt requires SBA stairs this date. Pt is limited by decreased activity tolerance with drop in SpO2 with mobility. Pt cued on PLB and requiring extended seated rest break to recover. Pt educated on energy conservation and limiting gait distances based on SOB/activity tolerance at d/c. Pt will benefit from continued skilled PT in acute care setting to address above deficits. Treatment Diagnosis: decreased functional mobility and endurance  Specific instructions for Next Treatment: Progress mobility as tolerated  Prognosis: Good  Decision Making: Low Complexity  PT Education: Goals; General Safety;Gait Training;PT Role;Plan of Care;Home Exercise Program;Transfer Training;Disease Specific Education; Functional Mobility Training;Energy Conservation  Patient Education: Pt educated on breathing techniques with mobility. Verbalizes understanding  Barriers to Learning: none  REQUIRES PT FOLLOW UP: Yes  Activity Tolerance  Activity Tolerance: Patient Tolerated treatment well;Patient limited by endurance  Activity Tolerance: Pt on 6L O2 NC at rest, increased to 8L for gait (see gait for SpO2 details with mobility)     Patient Diagnosis(es): The encounter diagnosis was Hilar lymphadenopathy.      has a past medical history of Atrial fibrillation ([facility] [address] Difficult end of session     Transfers  Sit to Stand: Independent  Stand to sit: Independent  Comment: Without AD     Ambulation  Ambulation?: Yes  Ambulation 1  Surface: level tile  Device: No Device  Other Apparatus: O2(8L HF NC)  Assistance: Supervision  Quality of Gait: Reciprocal pattern, B decreased heel strike, slight forward flexed trunk. Pt steady with no overt LOB  Gait Deviations: Increased CINTHYA; Slow Mel;Decreased step length;Decreased step height  Distance: 250'  Comments: Distances limited by fatigue and SOB, SpO2 83-85% on 8L at ~ 170' with standing rest break and x 1 minute to recover to >90%, briefly reading 74% seated following gait but increasing back up to >90% within 30 seconds. Pt cued  Stairs/Curb  Stairs?: Yes  Stairs  # Steps : 4  Stairs Height: 6\"  Rails: None  Device: No Device  Assistance: Stand by assistance  Comment: Pt ascends/descends with step to pattern, cues for sequence and additional time to complete. Pt mildly unsteady, pt's boyfriend educated on positioning for stairs at d/c to provide [facility] support as needed.      Balance  Posture: Good  Sitting - Static: Good  Sitting - Dynamic: Good  Standing - Static: Good;-  Standing - Dynamic: Fair;+     Exercises  Gluteal Sets: Seated BLE x 15  Hip Flexion: Standing marches BLE x 10 with HHA and SBA for balance  Hip Abduction: Seated clamshells BLE x 15  Knee Long Arc Quad: Seated BLE x 15  Ankle Pumps: Standing calf raises x 10 without UE support SBA for balance  Other exercises  Other exercises?: Yes  Other exercises 1: Standing minisquats x 10 with SBA for balance                          AM-PAC Score  AM-PAC Inpatient Mobility Raw Score : 20 (11/15/20 1639)  AM-PAC Inpatient T-Scale Score : 47.67 (11/15/20 1639)  Mobility Inpatient CMS 0-100% Score: 35.83 (11/15/20 1639)  Mobility Inpatient CMS G-Code Modifier : CJ (11/15/20 1639)          Goals  Short term goals  Time Frame for Short term goals: 1 week (11/20/2020)  Short term goal 1: pt will perform bed mobility (I) -- 11/15: DNT  Short term goal 2: Pt will perform transfers with LRAD and (I) -- GOAL MET 11/15 I without AD  Short term goal 3: Pt will ambulate 150' with LRAD and supervision -- 11/14: GOAL MET x 200' with 4WW S; upgrade goal: Pt will ambulate >150' without AD with S without LOB -- GOAL MET 11/15 x 250' no AD S; upgrade goal: Pt will ambulate >150' without AD with I while maintaining SpO2 >90%  Short term goal 4: Pt will perform HEP BLE exercises 15-20 reps (I) -- 11/15: x 10-15 reps seated and standing ther ex  Short term goal 5: New goal 11/14: Pt will ascend/descend 2 steps without HR with SBA -- 11/15: GOAL MET x 4 steps no HR SBA  Patient Goals   Patient goals : to get stronger and have less pain    Plan    Plan  Times per week: 5-7x/week  Times per day: Daily  Specific instructions for Next Treatment: Progress mobility as tolerated  Current Treatment Recommendations: Strengthening, Home Exercise Program, Safety Education & Training, Balance Training, Endurance Training, Patient/Caregiver Education & Training, Transfer Training, Gait Training, Stair training, ADL/Self-care Training, Pain Management  Safety Devices  Type of devices: All fall risk precautions in place, Left in chair, Nurse notified, Call light within reach, Gait belt, Patient at risk for falls  Restraints  Initially in place: No     Therapy Time   Individual Concurrent Group Co-treatment   Time In 1334         Time Out 1357         Minutes 23         Timed Code Treatment Minutes: 23 Minutes     If pt is unable to be seen after this session, please let this note serve as discharge summary. Please see case management note for discharge disposition. Thank you.     Justin Worrell, PT, DPT

## 2020-11-15 NOTE — PROGRESS NOTES
11/14/20 2117   NIV Type   Skin Assessment Clean, dry, & intact   Skin Protection for O2 Device Yes   NIV Started/Stopped On   Equipment Type v60   Mode Bilevel   Mask Type Full face mask   Mask Size Medium   Settings/Measurements   IPAP 12 cmH20   CPAP/EPAP 6 cmH2O   Rate Ordered 4   Resp 26   FiO2  50 %   Vt Exhaled 556 mL   Minute Volume 13.1 Liters   Mask Leak (lpm) 5 lpm   Comfort Level Fair   Using Accessory Muscles No   SpO2 93   Alarm Settings   Alarms On Y   Press Low Alarm 6 cmH2O   High Pressure Alarm 30 cmH2O   Delay Alarm 20 sec(s)   Resp Rate Low Alarm 6   High Respiratory Rate 40 br/min   Oxygen Therapy/Pulse Ox   SpO2 93 %

## 2020-11-15 NOTE — PROGRESS NOTES
Progress Note     S/P RIGHT VIDEO THORACOSCOPY, WEDGE LUNG BIOPSY, MEDIASTINAL LYMPH NODE BIOSPIES WITH CRYOABLATION NERVE BLOCKS     CC: abnormal CT     Hospital Course:   11/13 Patient is doing well. No complaints. 11/14 patient oxygen requirement increased overnight I received a phone call at 4:02 AM requesting verbal term 15 later and the patient started on amiodarone due to A. Fib  Order was initiated for BiPAP, aggressive I-S, amiodarone bolus and drip, Lasix 20 IV, currently this morning the patient is in sinus rhythm 50% oxygen on BiPAP,  11/15 significantly improved, still on 6 L     Vital Signs:                                                 /60   Pulse 76   Temp 98.1 °F (36.7 °C) (Axillary)   Resp 24   Ht 4' 11\" (1.499 m)   Wt 126 lb 1.7 oz (57.2 kg)   LMP  (LMP Unknown)   SpO2 93%   BMI 25.47 kg/m²  O2 Flow Rate (L/min): (S) 4 L/min      Admission Weight: 123 lb (55.8 kg)    11/13 57.2 kg     SpO2: 94% on 4L NC     Drips:  none at this time     I/O:       Intake/Output Summary (Last 24 hours) at 11/13/2020 0844  Last data filed at 11/13/2020 0600      Gross per 24 hour   Intake 940 ml   Output 1725 ml   Net -785 ml      Chest Tube: CT 0-50-0 = 50 mL/24 hours. Removed 11/13.     O/E  GEN: alert, and oriented x 3, in NAD  HEENT: unremarkable  CV: reg, wound c/d/i  Pulm: decreased, 4L NC  Abd: soft, nt, nd, no peritoneal signs  Ext: warm, edema, wound c/d/i     Data Review:  CBC:       Recent Labs     11/13/20  0410   WBC 10.9   HGB 12.4   HCT 37.1   MCV 91.9         BMP:       Recent Labs     11/13/20  0410      K 3.1*      CO2 29   BUN 8   CREATININE <0.5*   CALCIUM 8.4      Cardiac Enzymes: No results for input(s): CKTOTAL, CKMB, CKMBINDEX, TROPONINI in the last 72 hours. PT/INR: No results for input(s): PROTIME, INR in the last 72 hours. APTT: No results for input(s):  APTT in the last 72 hours.     Assessment/Plan:   arrange for oxygen at home will discharge tomorrow

## 2020-11-16 ENCOUNTER — APPOINTMENT (OUTPATIENT)
Dept: GENERAL RADIOLOGY | Age: 75
DRG: 166 | End: 2020-11-16
Attending: THORACIC SURGERY (CARDIOTHORACIC VASCULAR SURGERY)
Payer: MEDICARE

## 2020-11-16 VITALS
HEART RATE: 82 BPM | HEIGHT: 59 IN | DIASTOLIC BLOOD PRESSURE: 46 MMHG | WEIGHT: 128.4 LBS | OXYGEN SATURATION: 94 % | TEMPERATURE: 98.5 F | BODY MASS INDEX: 25.88 KG/M2 | SYSTOLIC BLOOD PRESSURE: 133 MMHG | RESPIRATION RATE: 17 BRPM

## 2020-11-16 LAB
ANION GAP SERPL CALCULATED.3IONS-SCNC: 10 MMOL/L (ref 3–16)
BASOPHILS ABSOLUTE: 0 K/UL (ref 0–0.2)
BASOPHILS RELATIVE PERCENT: 0.6 %
BUN BLDV-MCNC: 9 MG/DL (ref 7–20)
CALCIUM SERPL-MCNC: 9 MG/DL (ref 8.3–10.6)
CHLORIDE BLD-SCNC: 101 MMOL/L (ref 99–110)
CO2: 25 MMOL/L (ref 21–32)
CREAT SERPL-MCNC: <0.5 MG/DL (ref 0.6–1.2)
EOSINOPHILS ABSOLUTE: 0.2 K/UL (ref 0–0.6)
EOSINOPHILS RELATIVE PERCENT: 2 %
GFR AFRICAN AMERICAN: >60
GFR NON-AFRICAN AMERICAN: >60
GLUCOSE BLD-MCNC: 157 MG/DL (ref 70–99)
HCT VFR BLD CALC: 36.8 % (ref 36–48)
HEMOGLOBIN: 12.4 G/DL (ref 12–16)
LYMPHOCYTES ABSOLUTE: 1 K/UL (ref 1–5.1)
LYMPHOCYTES RELATIVE PERCENT: 12.6 %
MCH RBC QN AUTO: 31.3 PG (ref 26–34)
MCHC RBC AUTO-ENTMCNC: 33.6 G/DL (ref 31–36)
MCV RBC AUTO: 92.9 FL (ref 80–100)
MONOCYTES ABSOLUTE: 0.6 K/UL (ref 0–1.3)
MONOCYTES RELATIVE PERCENT: 7 %
NEUTROPHILS ABSOLUTE: 6.3 K/UL (ref 1.7–7.7)
NEUTROPHILS RELATIVE PERCENT: 77.8 %
PDW BLD-RTO: 13.3 % (ref 12.4–15.4)
PLATELET # BLD: 297 K/UL (ref 135–450)
PMV BLD AUTO: 8.5 FL (ref 5–10.5)
POTASSIUM REFLEX MAGNESIUM: 4.2 MMOL/L (ref 3.5–5.1)
RBC # BLD: 3.96 M/UL (ref 4–5.2)
SODIUM BLD-SCNC: 136 MMOL/L (ref 136–145)
WBC # BLD: 8 K/UL (ref 4–11)

## 2020-11-16 PROCEDURE — 94640 AIRWAY INHALATION TREATMENT: CPT

## 2020-11-16 PROCEDURE — 94761 N-INVAS EAR/PLS OXIMETRY MLT: CPT

## 2020-11-16 PROCEDURE — 6370000000 HC RX 637 (ALT 250 FOR IP): Performed by: THORACIC SURGERY (CARDIOTHORACIC VASCULAR SURGERY)

## 2020-11-16 PROCEDURE — 80048 BASIC METABOLIC PNL TOTAL CA: CPT

## 2020-11-16 PROCEDURE — 2700000000 HC OXYGEN THERAPY PER DAY

## 2020-11-16 PROCEDURE — 6370000000 HC RX 637 (ALT 250 FOR IP): Performed by: NURSE PRACTITIONER

## 2020-11-16 PROCEDURE — 36415 COLL VENOUS BLD VENIPUNCTURE: CPT

## 2020-11-16 PROCEDURE — 94669 MECHANICAL CHEST WALL OSCILL: CPT

## 2020-11-16 PROCEDURE — 85025 COMPLETE CBC W/AUTO DIFF WBC: CPT

## 2020-11-16 PROCEDURE — 2580000003 HC RX 258: Performed by: THORACIC SURGERY (CARDIOTHORACIC VASCULAR SURGERY)

## 2020-11-16 PROCEDURE — 71045 X-RAY EXAM CHEST 1 VIEW: CPT

## 2020-11-16 PROCEDURE — 6360000002 HC RX W HCPCS: Performed by: THORACIC SURGERY (CARDIOTHORACIC VASCULAR SURGERY)

## 2020-11-16 PROCEDURE — 94660 CPAP INITIATION&MGMT: CPT

## 2020-11-16 RX ORDER — TRAMADOL HYDROCHLORIDE 50 MG/1
50 TABLET ORAL EVERY 6 HOURS PRN
Qty: 28 TABLET | Refills: 0 | Status: SHIPPED | OUTPATIENT
Start: 2020-11-16 | End: 2020-11-23

## 2020-11-16 RX ORDER — AMIODARONE HYDROCHLORIDE 200 MG/1
200 TABLET ORAL DAILY
Qty: 21 TABLET | Refills: 0 | Status: SHIPPED | OUTPATIENT
Start: 2020-11-19 | End: 2020-12-15

## 2020-11-16 RX ADMIN — ACETAMINOPHEN 650 MG: 325 TABLET ORAL at 10:54

## 2020-11-16 RX ADMIN — MUPIROCIN: 20 OINTMENT TOPICAL at 08:43

## 2020-11-16 RX ADMIN — AMIODARONE HYDROCHLORIDE 400 MG: 200 TABLET ORAL at 08:42

## 2020-11-16 RX ADMIN — BISACODYL 5 MG: 5 TABLET, COATED ORAL at 08:42

## 2020-11-16 RX ADMIN — POTASSIUM CHLORIDE 20 MEQ: 20 TABLET, EXTENDED RELEASE ORAL at 08:43

## 2020-11-16 RX ADMIN — ATORVASTATIN CALCIUM 10 MG: 10 TABLET, FILM COATED ORAL at 08:42

## 2020-11-16 RX ADMIN — ALBUTEROL SULFATE 2.5 MG: 2.5 SOLUTION RESPIRATORY (INHALATION) at 12:09

## 2020-11-16 RX ADMIN — ACETAMINOPHEN 650 MG: 325 TABLET ORAL at 05:51

## 2020-11-16 RX ADMIN — ALBUTEROL SULFATE 2.5 MG: 2.5 SOLUTION RESPIRATORY (INHALATION) at 07:58

## 2020-11-16 RX ADMIN — APIXABAN 5 MG: 5 TABLET, FILM COATED ORAL at 08:42

## 2020-11-16 RX ADMIN — SODIUM CHLORIDE, PRESERVATIVE FREE 10 ML: 5 INJECTION INTRAVENOUS at 08:43

## 2020-11-16 ASSESSMENT — PAIN SCALES - GENERAL
PAINLEVEL_OUTOF10: 0
PAINLEVEL_OUTOF10: 3
PAINLEVEL_OUTOF10: 2
PAINLEVEL_OUTOF10: 4
PAINLEVEL_OUTOF10: 5

## 2020-11-16 ASSESSMENT — PAIN DESCRIPTION - LOCATION: LOCATION: BACK

## 2020-11-16 ASSESSMENT — PAIN DESCRIPTION - DESCRIPTORS: DESCRIPTORS: ACHING;DISCOMFORT;SORE

## 2020-11-16 ASSESSMENT — PAIN DESCRIPTION - FREQUENCY: FREQUENCY: CONTINUOUS

## 2020-11-16 ASSESSMENT — PAIN DESCRIPTION - PAIN TYPE
TYPE: SURGICAL PAIN
TYPE: SURGICAL PAIN

## 2020-11-16 ASSESSMENT — PAIN DESCRIPTION - ORIENTATION: ORIENTATION: RIGHT

## 2020-11-16 NOTE — PROGRESS NOTES
11/16/20 0101   NIV Type   Equipment Type v60   Mode Bilevel   Mask Type Full face mask   Mask Size Medium   Settings/Measurements   IPAP 12 cmH20   CPAP/EPAP 6 cmH2O   Rate Ordered 4   Resp 23   FiO2  50 %   Vt Exhaled 450 mL   Minute Volume 11.3 Liters   Mask Leak (lpm) 3 lpm   Comfort Level Good   Using Accessory Muscles No   SpO2 96   Alarm Settings   Alarms On Y   Press Low Alarm 6 cmH2O   High Pressure Alarm 30 cmH2O   Delay Alarm 20 sec(s)   Resp Rate Low Alarm 6   High Respiratory Rate 40 br/min   Oxygen Therapy/Pulse Ox   SpO2 95 %

## 2020-11-16 NOTE — PROGRESS NOTES
Progress Note     11/12/20 RIGHT VIDEO THORACOSCOPY, WEDGE LUNG BIOPSY, MEDIASTINAL LYMPH NODE BIOSPIES WITH CRYOABLATION NERVE BLOCKS     CC: abnormal CT     Hospital Course:   11/13 Patient is doing well. No complaints. 11/14 patient oxygen requirement increased overnight I received a phone call at 4:02 AM requesting verbal term 15 later and the patient started on amiodarone due to A. Fib  Order was initiated for BiPAP, aggressive I-S, amiodarone bolus and drip, Lasix 20 IV, currently this morning the patient is in sinus rhythm 50% oxygen on BiPAP,  11/15 significantly improved, still on 6 L  11/16 up in chair, 94% on 4L per NC.      Vital Signs:                                                 /60   Pulse 76   Temp 98.1 °F (36.7 °C) (Axillary)   Resp 24   Ht 4' 11\" (1.499 m)   Wt 126 lb 1.7 oz (57.2 kg)   LMP  (LMP Unknown)   SpO2 93%   BMI 25.47 kg/m²  O2 Flow Rate (L/min): (S) 4 L/min      Admission Weight: 123 lb (55.8 kg)    11/13 57.2 kg  11/16  58.2 kg      SpO2: 94% on 4L NC     Drips:  none at this time     I/O:       Intake/Output Summary (Last 24 hours) at 11/13/2020 0844  Last data filed at 11/13/2020 0600      Gross per 24 hour   Intake 940 ml   Output 1725 ml   Net -785 ml      Chest Tube: CT removed 11/13.     O/E  GEN: alert, and oriented x 3, in NAD  HEENT: unremarkable  CV: reg, wound c/d/i  Pulm: decreased, 4L NC  Abd: soft, nt, nd, no peritoneal signs  Ext: warm, edema, wound c/d/i     Data Review:  CBC:       Recent Labs     11/13/20  0410   WBC 10.9   HGB 12.4   HCT 37.1   MCV 91.9         BMP:       Recent Labs     11/13/20  0410      K 3.1*      CO2 29   BUN 8   CREATININE <0.5*   CALCIUM 8.4      Assessment/Plan:   Acute pulmonary insufficiency following surgery:   Pt still requiring supplemental oxygen. Will arrange for home O2 with DCP as well as HHC. Discharging home today.    Will follow up in office with Dr. Andrew Pineda (Dr. Fred Richmond is OOT) on 11/23/20 at 11:45 am.   Pathology to be reviewed at follow up visit.   ____________________________________________________    Debo Hernandez JEANNA-CNP  11/16/20   11:50 AM     Note reviewed, events of last 24 hours reviewed along with vital signs and I/Os and patient examined. Images reviewed. Assessment & Plans  Patient is status post from thoracic surgery   Requested for Home O2 evaluation as patient with a significant history of chronic tobacco abuse of 30 years with 2 packs/day (cessation 16 years ago); pulmonary function tests obtained with decreased DLCO of 73% suggestive of mild COPD. There is no documentation of bronchodilator therapy administered in order to fully interpret spirometry. She clinically desaturates when taken off oxygen to room air and feels shortness of breath. Will likely recommend for pulmonology consultation if unable to wean off home oxygen for recommendations of further diagnostic pulmonary testing and management. Oxygen documentation:   O2 saturation at REST on ROOM AIR = 89%   If saturation is 89% or above please proceed with steps 2 and 3. - O2 saturation at REST on 4L/NC = 93 - 95% This level returned after applying 4LNC within 30 seconds.   O2 saturation with AMBULATION of 15 feet on ROOM AIR = 83%    O2 saturation with AMBULATION on 4 liter/min = 88% increased to 5LNC and O2 saturation increased to 93%     D/c planning  F/u in 1 week with [facility] office, Dr. Chandu Barker MD  Cardiothoracic Surgery

## 2020-11-16 NOTE — PROGRESS NOTES
11/15/20 2225   NIV Type   Skin Assessment Clean, dry, & intact   Skin Protection for O2 Device Yes   NIV Started/Stopped On   Equipment Type v60   Mode Bilevel   Mask Type Full face mask   Mask Size Medium   Settings/Measurements   IPAP 12 cmH20   CPAP/EPAP 6 cmH2O   Rate Ordered 4   Resp 29   FiO2  50 %   Vt Exhaled 595 mL   Minute Volume 15.2 Liters   Mask Leak (lpm) 8 lpm   Comfort Level Good   Using Accessory Muscles Yes   SpO2 95   Oxygen Therapy/Pulse Ox   SpO2 95 %

## 2020-11-16 NOTE — PROGRESS NOTES
Oxygen documentation:     O2 saturation at REST on ROOM AIR = 89%     If saturation is 89% or above please proceed with steps 2 and 3. - O2 saturation at REST on 4L/NC = 93 - 95% This level returned after applying 4LNC within 30 seconds.     O2 saturation with AMBULATION of 15 feet on ROOM AIR = 83%     O2 saturation with AMBULATION on 4 liter/min = 88% increased to 5LNC and O2 saturation increased to 93%    DCP notified: 11/16/20

## 2020-11-16 NOTE — DISCHARGE INSTR - COC
Continuity of Care Form    Patient Name: Elmira Swanson   :  1945  MRN:  [id number]    Admit date:  2020  Discharge date:  20    Code Status Order: Full Code   Advance Directives:   [facility] Documentation       Date/Time Healthcare Directive Type of Healthcare Directive Copy in [address] Agent's Name Healthcare Agent's Phone Number    20 0606  No, patient does not have an advance directive for healthcare treatment -- -- -- -- --    20 1236  No, patient does not have an advance directive for healthcare treatment -- -- -- -- --            Admitting Physician:  Tone Astudillo MD  PCP: Jamia Wahl MD    Discharging Nurse: Ngozi Decker RN  [address] Unit/Room#: 6660/2134-31  Discharging Unit Phone Number: [phone]    Emergency Contact:   Extended Emergency Contact Information  Primary Emergency Contact: [address] Phone: [phone]  Relation: Child  Secondary Emergency Contact: [facility] Phone: [phone]  Relation: Child    Past Surgical History:  Past Surgical History:   Procedure Laterality Date    BREAST REDUCTION SURGERY  10/15/15    BRONCHOSCOPY N/A 10/22/2020    EBUS W/ANESTHESIA performed by Brent [facility]rand MD at  [facility]  10/22/2020    BRONCHOSCOPY ALVEOLAR LAVAGE performed by Brent [facility]rand MD at  [facility]  10/22/2020    BRONCHOSCOPY BRUSHINGS performed by Brent Bertrand MD at  [facility]  10/22/2020    BRONCHOSCOPY/TRANSBRONCHIAL NEEDLE BIOPSY performed by Brent [facility]rand MD at  [facility]  10/22/2020    BRONCHOSCOPY/TRANSBRONCHIAL NEEDLE BIOPSY ADDL LOBE performed by Brent Bertrand MD at [address] [place] [address]  11/15/2016    colon polyps, diverticulosis    OTHER SURGICAL HISTORY  10/22/2020    EBUS w/anesthesia bronchoscopy alveilar lavage bronchoscopy brushings    OVARIAN CYST REMOVAL      NV EXCISION TUMOR SOFT TISSUE BACK/FLANK SUBQ <3CM N/A 8/23/2019    EXCISION BACK CYST TIMES TWO performed by Casey Laguerre MD at [place] [address] ARTHROSCOPY Right 8/25/2020    VIDEO ARTHROSCOPY RIGHT SHOULDER, ROTATOR CUFF REPAIR, DECOMPRESSION, EXTENSIVE DEBRIDEMENT performed by Stefany Ren MD at [address] Right 11/12/2020    RIGHT VIDEO THORACOSCOPY, WEDGE LUNG BIOPSY, MEDIASTINAL LYMPH NODE BIOSPIES WITH CRYOABLATION NERVE BLOCKS  CPT CODE - 82519 performed by Nanda Le MD at [facility]         Immunization History:   Immunization History   Administered Date(s) Administered    Pneumococcal Conjugate 13-valent (Iqtoxvp81) 06/03/2019    Pneumococcal Polysaccharide (Jmlklukox30) 07/02/2020    Zoster Recombinant (Shingrix) 09/23/2019, 11/19/2019       Active Problems:  Patient Active Problem List   Diagnosis Code    Hypertension I10    Hyperlipidemia E78.5    Osteopenia M85.80    IFG (impaired fasting glucose) R73.01    DDD (degenerative disc disease), cervical M50.30    Thoracic sprain S23. 9XXA    Macromastia N62    Infected sebaceous cyst L72.3, L08.9    Complete tear of right rotator cuff M75.121    Parotid mass K11.8    Chest pain R07.9    Hilar adenopathy R59.0    Hypokalemia E87.6    PAF (paroxysmal atrial fibrillation) (HCC) I48.0    Pulmonary nodule R91.1    History of tobacco abuse Z87.891    Hilar lymphadenopathy R59.0       Isolation/Infection:   Isolation            No Isolation          Patient Infection Status       Infection Onset Added Last Indicated Last Indicated By Review Planned Expiration Resolved Resolved By    None active    Resolved    MDRO (multi-drug resistant organism) 10/30/20 11/02/20 10/30/20 Culture, Urine   11/15/20 Megan Pinto RN    COVID-19 Rule Out 10/22/20 10/22/20 10/22/20 COVID-19 (Ordered)   10/22/20 Rule-Out Test Resulted            Nurse Assessment:  Last Vital Signs: BP (!) 122/50   Pulse 90   Temp 98.3 °F (36.8 °C) (Oral)   Resp 18   Ht 4' 11\" (1.499 m)   Wt 128 lb 6.4 oz (58.2 kg)   LMP  (LMP Unknown)   SpO2 93%   BMI 25.93 kg/m²     Last documented pain score (0-10 scale): Pain Level: 2(Reassessment)  Last Weight:   Wt Readings from Last 1 Encounters:   11/16/20 128 lb 6.4 oz (58.2 kg)     Mental Status:  oriented and alert    IV Access:  - None    Nursing Mobility/ADLs:  Walking   Independent  Transfer  Independent  Bathing  Independent  Dressing  Independent  Toileting  Independent  Feeding  Independent  Med [address]  Med Delivery   whole    Wound Care Documentation and Therapy:        Elimination:  Continence:   · Bowel: Yes  · Bladder: Yes  Urinary Catheter: None   Colostomy/Ileostomy/Ileal Conduit: No       Date of Last BM: 11/16/20    Intake/Output Summary (Last 24 hours) at 11/16/2020 1256  Last data filed at 11/15/2020 2211  Gross per 24 hour   Intake 200 ml   Output --   Net 200 ml     I/O last 3 completed shifts: In: 200 [P.O.:200]  Out: 500 [Urine:500]    Safety Concerns:     None    Impairments/Disabilities:      None    Nutrition Therapy:  Current Nutrition Therapy:   - Oral Diet:  Cardiac    Routes of Feeding: Oral  Liquids: Thin Liquids  Daily Fluid Restriction: yes - amount 2000mL  Last Modified Barium Swallow with Video (Video Swallowing Test): not done    Treatments at the Time of Hospital Discharge:   Respiratory Treatments: New order for home O2  Oxygen Therapy:  is on oxygen at 4 L/min per nasal cannula.   Ventilator:    - No ventilator support    Rehab Therapies: Physical Therapy, Occupational Therapy and Nurse  Weight Bearing Status/Restrictions: No weight bearing restirctions  Other Medical Equipment (for information only, NOT a DME order):  bath bench  Other Treatments: HOME HEALTH CARE: LEVEL 3 [address] to establish plan of care for patient over 60 day period   [address] Initial home SN evaluation visit to occur within 24-48 hours for:  1)  medication management  2)  VS and clinical assessment  3)  S&S chronic disease exacerbation education + when to contact MD/NP  4)  care coordination   Medication Reconciliation during 1st SN visit   PT/OT/Speech    Evaluations in home within 24-48 hours of discharge to include DME and home safety   [facility] 5 days, then 3x a week    OT to evaluate if patient has [address] needs for personal care     evaluation within 24-48 hours to evaluate resources & insurance for potential AL, [place], LTC, and Medicaid options    Palliative Care referral within 5 days of hospital discharge   PCP Visit scheduled within 3 - 7 days of hospital discharge    [address] (If patient is agreeable and meets guidelines)      Patient's personal belongings (please select all that are sent with patient):  Antione    RN SIGNATURE:  Electronically signed by Manish Britt RN on 11/16/20 at 3:20 PM EST    CASE MANAGEMENT/SOCIAL WORK SECTION    Inpatient Status Date: 11/12/20    Readmission Risk Assessment Score:  Readmission Risk              Risk of Unplanned Readmission:        14            Discharging to Facility/ Agency    Name:  [facility] care     Address: [address] [place] [address], [address] [place] [address]   Phone: [phone]   Fax: [phone]      Dialysis Facility (if applicable)   · Name:  · Address:  · Dialysis Schedule:  · Phone:  · Fax:    / signature: Electronically signed by Vinicio Cardoza RN on 11/16/20 at 12:56 PM EST    PHYSICIAN SECTION    Prognosis: Good    Condition at Discharge: Stable    Rehab Potential (if transferring to Rehab): Good    Recommended Labs or Other Treatments After Discharge: discharge with home oxygen 4L n/c; please keep spO2 > 47%    Physician Certification: I certify the above information and transfer of Chet Thomas  is necessary for the continuing treatment of the diagnosis listed and that she requires Home Care for less 30 days.      Update Admission H&P: No change in H&P    PHYSICIAN SIGNATURE:  Electronically signed by Figueroa Nieto MD on 11/16/20 at 2:41 PM EST

## 2020-11-16 NOTE — OP NOTE
[facility]                 Mayda Moe                                 OPERATIVE REPORT    PATIENT NAME: Ministerio Mcarthur                     :        1945  MED REC NO:   [id number]                          ROOM:       5511  ACCOUNT NO:   [de-identified]                           ADMIT DATE: 2020  PROVIDER:     Jaden Stanton MD    DATE OF PROCEDURE:  2020    LOCATION:  Cardiothoracic Surgery Department. PREOPERATIVE DIAGNOSES:  1. Right lower lobe pulmonary nodules. 2.  Mediastinal and hilar lymphadenopathy. 3.  Paroxysmal atrial fibrillation, on Eliquis. 4.  Former smoker. POSTOPERATIVE DIAGNOSES:  1. Right lower lobe pulmonary nodules. 2.  Mediastinal and hilar lymphadenopathy. 3.  Paroxysmal atrial fibrillation, on Eliquis. 4.  Former smoker. PROCEDURES PERFORMED:  1. Diagnostic right video-assisted thoracoscopy. 2.  Right lower lobe wedge biopsy. 3.  Mediastinal lymph node biopsies. 4.  Cryoablative therapy with intercostal nerve blocks 4 through 8 and  Exparel. 5.  Therapeutic bronchoscopy. SURGEON:  Jaden Stanton MD    FIRST ASSISTANT:  Halie Simms SA    ANESTHESIA:  General anesthesia. ESTIMATED BLOOD LOSS:  Less than 50 mL. COMPLICATIONS:  No complications. FINDINGS:  Intraoperative frozen section with necrotizing granuloma per  pathologist and medical doctor. INDICATIONS:  A very pleasant 33-year-old female who came to my  attention with an enlarging right lower lobe dominant pulmonary nodule. Pulmonary function tests were obtained and found to be acceptable. She  also had a PET CT scan which was no suspicion for any clinical activity  of malignancy. I explained to her the possibility that I would perform  a diagnostic wedge along with mediastinal sampling given her mediastinal  and hilar lymphadenopathy.   All risks, benefits and alternatives were  explained to the patient who agreed. She was on Eliquis for atrial  fibrillation, so this was held three days prior to her surgery date of  11/12/2020. OPERATIVE PROCEDURE:  The patient was brought to the operating room and  placed on the operating table. Endotracheal intubation was performed  with a double lumen tube and bronchoscopy was used for confirmation. The patient was placed in the left lateral decubitus position with the  right chest up and the patient was padded in a standard fashion. An  axillary roll and paddings were placed at all pressure points. The  right chest was prepped and draped in the usual sterile fashion and a  time-out was performed. The time-out was performed with everyone in  agreement of the correct patient as the patient and the correct side of  surgery, right side being the correct side. A three-port VATS access was obtained easily into the chest and the  right lower lobe pulmonary nodule was easily identified. Sequential  firing of the staplers were employed and a wedge biopsy was made. An  EndoCatch bag was used to remove the specimen and it was sent for frozen  to Pathology. The frozen came back as a necrotizing granuloma. At this time, attention was turned to the mediastinal adenopathy in  which several lymph node stations were sampled including level 9, level  7, level 10, and level 4. Intercostal nerve blocks for cryoablation and  Exparel were then performed. A 32-Japanese chest tube was placed towards  the apical section and sutured in place with a silk suture. All  sponges, needles and instruments were accounted for by the end of the  case. The patient did just well and was extubated on the table. It  should be noted that a therapeutic bronchoscopy was performed prior to  extubation. DISPOSITION:  The patient was sent to the intensive care unit in a  stable fashion.         Jerome Mac MD    D: 11/16/2020 12:02:08       T: 11/16/2020 14:03:35     [id number]  Job#: [id number] Doc#: [id number]    CC:

## 2020-11-16 NOTE — FLOWSHEET NOTE
Bedside report given by Ginger Bush RN, Skin assessment completed. Shift assessment completed and documented; see flowsheets for details. Pt resting in bed, VSS, Lines checked and verified, alarms on and audible. Repositioned patient, sacral Mepilex in place, call light within reach, will continue to closely monitor. Recent Labs     11/16/20  0929   WBC 8.0   HGB 12.4   HCT 36.8   MCV 92.9        Recent Labs     11/14/20  0519 11/14/20  1158     --    K 3.3* 4.1     --    CO2 30  --    GLUCOSE 134*  --    MG 1.90  --    BUN 9  --    CREATININE <0.5*  --    CALCIUM 8.9  --    LABGLOM >60  --    GFRAA >60  --      Mueller Goodhue 11/16/2020 10:28 AM  CrCl cannot be calculated (This lab value cannot be used to calculate CrCl because it is not a number: <0.5).     DVT Prophylaxis: apixaban (ELIQUIS) tablet     GI Prophylaxis: None ordered

## 2020-11-16 NOTE — PROGRESS NOTES
Pt discharged to home with all belongings per order from Dr. Salazar Raya in care of . Discharge instructions and medications reviewed and all questions answered, pt and family verbalize understanding.

## 2020-11-16 NOTE — DISCHARGE SUMMARY
Cardiac, Vascular & Thoracic Surgery  Discharge Summary    Patient:  Azra Coates 1945 [id number]   Admission Date:  11/12/2020  5:30 AM  Discharge Date: 11/16/20      Principle Diagnosis:      Secondary Diagnosis:  Active Problems:    Pulmonary nodule    Hilar lymphadenopathy  Resolved Problems:    * No resolved hospital problems. *    CT chest: 10/20/20   Impression    No evidence of pulmonary embolism         Interval increase in size of right peripheral densities interval increase in    adenopathy in the right hilum interval development of Sigrid bronchial    thickening findings are non-specific could represent infectious process with    associated hilar adenopathy autoimmune or inflammatory disorder is included    in the differential correlation clinical data base is required.         Findings are nonspecific.  The possibility of infectious pneumonic process    and associated hilar adenopathy is possibility.  Inflammatory or autoimmune    process would also be in the differential.         Patient had a recent PET scan which demonstrated peripheral nodules to be not    significantly hypermetabolic. Procedure: 111/12/20 DIAGNOSTIC RIGHT VIDEO-ASSISTED THORACOSCOPY. RIGHT LOWER LOBE WEDGE BIOPSY. MEDIASTINAL LYMPH NODE BIOPSIES. CRYOABLATION INTERCOSTAL NERVE BLOCK 4-8  AND EXPAREL. THERAPEUTIC BRONCHOSCOPY     History:  The patient is a 70 y.o. female who is a former smoker of 30 years, history of PAF on eliquis presented to my attention for mediastinal and hilar adenopathy. A right lower lobe pulmonary nodule also present with has increased in size within serial imaging. A PET/CT scan was obtained and this RLL nodule was not avid    Hospital Course: The post operative period for this patient involved routine need for supplemental oxygenation. She remains on 4L per NC (sat 94%) at the time of discharge and will be going home with home oxygen and home health care.  She also went into atrial fibrillation post-op but converted back with amiodarone which was very short-lived. She remained in SR well over 24 hours prior to discharge. She will follow up in the office with Dr. Court Liocna on 11/23/20 (as Dr. Daniela Duque is OOT). The patient was discharged on 11/16/20. Discharged Condition: stable    Disposition:  [place] [id number] and home oxygen    Discharge Medications:   [facility] Medication Instructions QNB:[id number]    Printed on:11/16/20 1205   Medication Information                      acetaminophen (TYLENOL) 500 MG tablet  Take 500 mg by mouth every 6 hours as needed for Pain             alendronate (FOSAMAX) 70 MG tablet  TAKE 1 TABLET BY MOUTH  EVERY 7 DAYS             amiodarone (CORDARONE) 200 MG tablet  Take 1 tablet by mouth daily for 21 days             apixaban (ELIQUIS) 5 MG TABS tablet  Take 1 tablet by mouth 2 times daily             bisacodyl (DULCOLAX) 5 MG EC tablet  Take 1 tablet by mouth daily for 7 days             Calcium Carb-Cholecalciferol (CALCIUM + D3) 600-200 MG-UNIT TABS  Take 1 tablet by mouth 2 times daily              omeprazole (PRILOSEC) 10 MG delayed release capsule  TAKE 1 CAPSULE BY MOUTH  DAILY             simvastatin (ZOCOR) 20 MG tablet  TAKE 1 TABLET BY MOUTH  NIGHTLY             traMADol (ULTRAM) 50 MG tablet  Take 1 tablet by mouth every 6 hours as needed (acute post op pain) for up to 7 days. Patient Instructions: Activity: DO NOT LIFT, PUSH, OR PULL ANYTHING OVER 5 POUNDS FOR 6 WEEK from the day of surgery  Diet:  regular diet  Wound Care:  [address] YOUR INCISIONS DAILY WITH A CLEAN WASHCLOTH AND ANTIBACTERIAL SOAP. Do not wash your incisions after you have cleansed other parts of your body    Follow up with Cardiothoracic Surgeon, Dr. Court Licona on 11/23/20 at 11:45 am.    Follow up with pulmonologist in 1-2 weeks.      Medardo Singh, JEANNA-CNP

## 2020-11-16 NOTE — PROGRESS NOTES
11/16/20 0355   NIV Type   Equipment Type v60   Mode Bilevel   Mask Type Full face mask   Mask Size Medium   Settings/Measurements   IPAP 12 cmH20   CPAP/EPAP 6 cmH2O   Rate Ordered 4   Resp 23   FiO2  50 %   Vt Exhaled 502 mL   Minute Volume 11.2 Liters   Mask Leak (lpm) 4 lpm   Comfort Level Good   Using Accessory Muscles No   SpO2 97   Alarm Settings   Alarms On Y   Press Low Alarm 6 cmH2O   High Pressure Alarm 30 cmH2O   Delay Alarm 20 sec(s)   Resp Rate Low Alarm 6   High Respiratory Rate 40 br/min   Oxygen Therapy/Pulse Ox   SpO2 97 %

## 2020-11-16 NOTE — CARE COORDINATION
[facility]    Referral received from CM to follow for home care services. I will follow for needs, and speak with patient to verify demos. DC order noted, all docs needed have been faxed to [facility] for home care services.     Home care to see patient within 24-48 hrs    Celedonio Gosselin RN, BSN CTN  [facility] [phone]

## 2020-11-16 NOTE — PROGRESS NOTES
Physician Progress Note      PATIENT:               Michelle De Paz  CSN #:                  [id number]  :                       1945  ADMIT DATE:       2020 5:30 AM  DISCH DATE:  RESPONDING  PROVIDER #:        GARRETT Mclain CNP          QUERY TEXT:    Pt admitted with Right Lower lobe pulmonary nodule and underwent Thoracoscopy   with wedge biopsy of right lower lobe. If possible, please document in the   progress notes and discharge summary if you are evaluating and/or treating any   of the following: The medical record reflects the following:  Risk Factors: 70 yr old, post Thoracoscopy/biopsy procedure, Atrial Fib, HTN,  Clinical Indicators: On - -156, RR 22-30, O2 2 L with sats of 88%   requiring up to 15 L high flow O2 and Bipap support  Treatment: O2 increased, Bipap, monitoring, Amiodarone for At Fib/heart rate   control. Thank you,  Fuentes Beckman RN, CDS  [phone]  Options provided:  -- Acute pulmonary insufficiency following surgery  -- Acute respiratory failure, unrelated to surgery, due to other specified   cause. , please specify other cause. -- Acute respiratory failure due to the surgery  -- Other - I will add my own diagnosis  -- Disagree - Not applicable / Not valid  -- Disagree - Clinically unable to determine / Unknown  -- Refer to Clinical Documentation Reviewer    PROVIDER RESPONSE TEXT:    This patient has acute postoperative pulmonary insufficiency.     Query created by: Tierra Mackenzie on 2020 11:23 AM      Electronically signed by:  Elmira Mclain CNP 2020 11:48 AM

## 2020-11-16 NOTE — CARE COORDINATION
CASE MANAGEMENT DISCHARGE SUMMARY      Discharge to: home with [address] Durable Medical Equipment ordered/agency: Home oxygen-no preference on DME provider. [facility] to provide oxygen    Transportation:  private    Family/car: boyfriend at bedside to transport home. Confirmed discharge plan with:   Patient: yes   Family, name and contact number: Boyfriend at bedside and aware of discharge plans. Facility/Agency, name:  [facility] faxed to [address] [place] [address] by Tosin Hernandez RN, name: Yumiko Steele RN aware of the above.  Faisal Johnson RN

## 2020-11-17 ENCOUNTER — CARE COORDINATION (OUTPATIENT)
Dept: CASE MANAGEMENT | Age: 75
End: 2020-11-17

## 2020-11-17 LAB
ANAEROBIC CULTURE: NORMAL
GRAM STAIN RESULT: NORMAL
WOUND/ABSCESS: NORMAL

## 2020-11-17 PROCEDURE — 1111F DSCHRG MED/CURRENT MED MERGE: CPT | Performed by: INTERNAL MEDICINE

## 2020-11-17 NOTE — CARE COORDINATION
[facility] Initial [facility] Up [facility]    Call within 2 business days of discharge: Yes    Patient: Narciso Vo Patient : 1945   MRN: [id number]  Reason for Admission: Post op RRL wedge/biopsy  Discharge Date: 20 RARS: Readmission Risk Score: 14      Last Discharge [facility]       Complaint Diagnosis Description Type Department Provider    20  Acute post-operative pain . .. Admission (Discharged) Yuliana Zambrano MD           Spoke with: Narciso Vo, patient    Facility: [facility]      Challenges to be reviewed by the provider   Additional needs identified to be addressed with provider No  home health [address]    Discussed COVID-19 related testing which was not done at this time. Test results were not done. Patient informed of results, if available? No         Method of communication with provider : phone    Advance Care Planning:   Does patient have an Advance Directive:  not on file. Was this a readmission? Yes  Patient stated reason for admission: s/p RRL wedge/biopsy  Patients top risk factors for readmission: medical condition    Care Transition Nurse (CTN) contacted the patient by telephone to perform post hospital discharge assessment. Verified name and  with patient as identifiers. Provided introduction to self, and explanation of the CTN role. CTN reviewed discharge instructions, medical action plan and red flags with patient who verbalized understanding. Patient given an opportunity to ask questions and does not have any further questions or concerns at this time. Were discharge instructions available to patient? Yes. Reviewed appropriate site of care based on symptoms and resources available to patient including: Urgent care clinics, Home health and When to call 911. The patient agrees to contact the PCP office for questions related to their healthcare.      Medication reconciliation was performed with patient, who verbalizes understanding of administration of home medications. Advised obtaining a 90-day supply of all daily and as-needed medications. Discussed follow-up appointments. If no appointment was previously scheduled, appointment scheduling offered: Yes. Is follow up appointment scheduled within 7 days of discharge? Yes      Plan for follow-up call in 3-5 days based on severity of symptoms and risk factors. CTN provided contact information for future needs. Patient verified  and agreeable to call. Patient had a difficult time sleeping last night due to her incisional pain. Patient confirmed that she is wearing O2 continuously and feels her breathing is better. Patient was contacted by [facility] and nurse scheduled for home visit today. All medications reviewed and currently in home. Patient taking medications as directed, but had questions about Amiodarone purpose. CTN reviewed frequency and purpose of all medications on list. Patient scheduled for follow up with Dr Andrew Pineda scheduled. Post Op instructions from surgeon reviewed with patient. Patient Instructions: Activity: DO NOT LIFT, PUSH, OR PULL ANYTHING OVER 5 POUNDS FOR 6 WEEK from the day of surgery  Diet:  regular diet  Wound Care:  [address] YOUR INCISIONS DAILY WITH A CLEAN WASHCLOTH AND ANTIBACTERIAL SOAP. Do not wash your incisions after you have cleansed other parts of your body  CTN contact information given to patient and follow up call scheduled. CTN reviewed with patient when to call PCP, surgeon, or home care agency with change in status. Patient verbalized understanding.   Cordell Terrell RN   Care Transition Nurse  [phone]             Care Transitions 24 Hour Call    Do you have any ongoing symptoms?:  No  Do you have a copy of your discharge instructions?:  Yes  Do you have all of your prescriptions and are they filled?:  Yes  Have you been contacted by a [id number] [employer] Pharmacist?:  No  Have you scheduled your follow up appointment?:  Yes  How are you going to get to your appointment?:  Car - family or friend to transport  Were you discharged with any Home Care or Post Acute Services:  Yes  Post Acute Services:  Home Health  Do you feel like you have everything you need to keep you well at home?:  Yes  Care Transitions Interventions         Follow Up  Future Appointments   Date Time Provider Clayton Cai   11/23/2020  8:40 AM [place] Suella Ladd, MD [facility]   11/23/2020 11:45 AM MD Fran Hinojosa [facility] CT [facility]   1/5/2021 11:00 AM KEVIN CT VCT [place] CT [facility]   4/21/2021  1:15 PM Erinn Arthur MD [facility] OLAMIDE Ramírez RN

## 2020-11-20 ENCOUNTER — CARE COORDINATION (OUTPATIENT)
Dept: CASE MANAGEMENT | Age: 75
End: 2020-11-20

## 2020-11-20 ENCOUNTER — TELEPHONE (OUTPATIENT)
Dept: CARDIOTHORACIC SURGERY | Age: 75
End: 2020-11-20

## 2020-11-20 ENCOUNTER — TELEPHONE (OUTPATIENT)
Dept: CARDIOLOGY CLINIC | Age: 75
End: 2020-11-20

## 2020-11-20 RX ORDER — GABAPENTIN 100 MG/1
100 CAPSULE ORAL 3 TIMES DAILY
Qty: 90 CAPSULE | Refills: 0 | Status: SHIPPED | OUTPATIENT
Start: 2020-11-20 | End: 2020-12-15 | Stop reason: DRUGHIGH

## 2020-11-20 NOTE — TELEPHONE ENCOUNTER
SMM, last OV 11/10/20, on 11/12/20 pt had PCR w/Dr. Nikia Carvalho. Please advise, TY  Assessment:      1. PAF (paroxysmal atrial fibrillation) [facility]): Diagnosed October 2020. Note she did NOT feel palpitations. I reviewed EKG showing afib RVR 155bpm and another EKG showed afib RVR 120bpm.   Most recent ECHO 10/22/20 EF >65%. No regional wall abnls; Grade I DD with normal LV filling pressure. Trivial TR/WA (no change from 4/19 ECHO). Most recent EKG 11/6/20 NSR 77bpm. Note CHADs-vasc=2 and PAF with no symptoms I will continue eliquis 5mg BID.       2. Essential hypertension: Well controlled and will continue current medical regimen.      3. Hyperlipidemia, unspecified hyperlipidemia type: Most recent lipids 10/22/20 see above. I personally reviewed lab results in epic and discussed with patient.      4.      Chest pain: Resolved with no recurrence since admission in October. Given normal ECHO and no further CP I will watch clinically for now. No stress testing will be ordered. She is to call if further CP occurs and will consider nuc stress at that time if needed.      Plan:  1. Meds reviewed. Refills as warranted   2. No changes today. Continue current medications   3.  Follow up with me in 4-5 months

## 2020-11-20 NOTE — TELEPHONE ENCOUNTER
Called pt and she sts that the [facility] that took her BP may have taken it wrong because after those readings were taken, pt took her BP and it was ranging in the 130's/70's and pulse was 82. Pt sts that she also believes that the post-op pain could be a factor as well. I relayed to pt that if she just wants to monitor BP and then just contact the office once she starts to feel better and she VU.  TY

## 2020-11-20 NOTE — TELEPHONE ENCOUNTER
Patient called with concerns of post op pain. She is allergic to Naproxen so she cannot take any NSAIDS. The pain is described as a burning/ neuropathic pain. She did have oxycodone in the hospital and tolerated it well. She has been taking tramadol and tylenol with very little relief. We will start her on Neurontin 100 mg TID and see if that brings some pain relief over the weekend. She will still take Tylenol as needed. She has OV with Dr. Addis Saez on Monday. She is agreeable to wait until then to see if she wants to try the oxycodone.

## 2020-11-20 NOTE — CARE COORDINATION
[facility] 45 Transitions Follow Up Call    2020    Patient: Elmira Swanson  Patient : 1945   MRN: [id number]  Reason for Admission: pulmonary biopsy  Discharge Date: 20 RARS: Readmission Risk Score: 15         Spoke with: patient    Patient answered call and verified . Patient not sleeping well at night and continues to have incisional pain rated 8-9/10 on pain scale. Patient described pain as a \"nerve type\" feeling. Patient is taking Tramadol and Tylenol as prescribed with minimal relief. CTN placed 3 way call with patient to Dr Mikael Gaston office and spoke to Natanael Nelson. Natanael Nelson spoke to patient and message to be sent to Dr Mikael Gaston with above concerns from patient. Progression to call patient back and preferred contact number given to Natanael Nelson. Patient already scheduled to see Dr Mikael Gaston next week for follow up  Patient wearing O2 at 2l/nc and oxygen saturation 95%  Patient scheduled to have home care nurse and occupational therapy scheduled today. Kory Ferreira RN   Care Transition Nurse  [phone]        Care Transitions Subsequent and Final Call    Subsequent and Final Calls  Care Transitions Interventions  Other Interventions:             Follow Up  Future Appointments   Date Time Provider Clayton Cai   2020  8:40 AM [place] Adonna Curia, MD [facility]   2020 11:45 AM MD Kellie Garcia Medico CT [facility]   2021 11:00 AM A CT VCT [facility] CT Elfredia Guillermo   2021  1:15 PM Brian Soto MD [facility]       Kory Ferreira, RN

## 2020-11-20 NOTE — TELEPHONE ENCOUNTER
Her BP may be higher than ideal because of post-op pain/discomfort. I would wait until healed and feeling better and more comfortable. If BP remains 140/90 or above then call back and I can adjust meds. Thanks.

## 2020-11-20 NOTE — TELEPHONE ENCOUNTER
B/P readings after PCR with DR Lyly Sutton on 11/12/2020 11/17 142/72  11/18 155/74  11/20 172/82  Please advise if any medications should be adjusted.  Spenser Chowdary

## 2020-11-23 ENCOUNTER — OFFICE VISIT (OUTPATIENT)
Dept: ORTHOPEDIC SURGERY | Age: 75
End: 2020-11-23

## 2020-11-23 ENCOUNTER — OFFICE VISIT (OUTPATIENT)
Dept: CARDIOTHORACIC SURGERY | Age: 75
End: 2020-11-23

## 2020-11-23 VITALS
HEIGHT: 59 IN | OXYGEN SATURATION: 95 % | HEART RATE: 76 BPM | DIASTOLIC BLOOD PRESSURE: 80 MMHG | TEMPERATURE: 97.6 F | BODY MASS INDEX: 25.8 KG/M2 | SYSTOLIC BLOOD PRESSURE: 172 MMHG | WEIGHT: 128 LBS

## 2020-11-23 VITALS — WEIGHT: 128 LBS | BODY MASS INDEX: 25.8 KG/M2 | HEIGHT: 59 IN

## 2020-11-23 LAB
FUNGUS (MYCOLOGY) CULTURE: NORMAL
FUNGUS (MYCOLOGY) CULTURE: NORMAL
FUNGUS STAIN: NORMAL
FUNGUS STAIN: NORMAL

## 2020-11-23 PROCEDURE — G8399 PT W/DXA RESULTS DOCUMENT: HCPCS | Performed by: ORTHOPAEDIC SURGERY

## 2020-11-23 PROCEDURE — G8428 CUR MEDS NOT DOCUMENT: HCPCS | Performed by: ORTHOPAEDIC SURGERY

## 2020-11-23 PROCEDURE — G8484 FLU IMMUNIZE NO ADMIN: HCPCS | Performed by: ORTHOPAEDIC SURGERY

## 2020-11-23 PROCEDURE — 1111F DSCHRG MED/CURRENT MED MERGE: CPT | Performed by: ORTHOPAEDIC SURGERY

## 2020-11-23 PROCEDURE — 3017F COLORECTAL CA SCREEN DOC REV: CPT | Performed by: ORTHOPAEDIC SURGERY

## 2020-11-23 PROCEDURE — G8417 CALC BMI ABV UP PARAM F/U: HCPCS | Performed by: ORTHOPAEDIC SURGERY

## 2020-11-23 PROCEDURE — 4040F PNEUMOC VAC/ADMIN/RCVD: CPT | Performed by: ORTHOPAEDIC SURGERY

## 2020-11-23 PROCEDURE — 1123F ACP DISCUSS/DSCN MKR DOCD: CPT | Performed by: ORTHOPAEDIC SURGERY

## 2020-11-23 PROCEDURE — 1036F TOBACCO NON-USER: CPT | Performed by: ORTHOPAEDIC SURGERY

## 2020-11-23 PROCEDURE — 1090F PRES/ABSN URINE INCON ASSESS: CPT | Performed by: ORTHOPAEDIC SURGERY

## 2020-11-23 PROCEDURE — 99024 POSTOP FOLLOW-UP VISIT: CPT | Performed by: THORACIC SURGERY (CARDIOTHORACIC VASCULAR SURGERY)

## 2020-11-23 PROCEDURE — 99024 POSTOP FOLLOW-UP VISIT: CPT | Performed by: ORTHOPAEDIC SURGERY

## 2020-11-23 RX ORDER — OXYCODONE HYDROCHLORIDE 5 MG/1
5 TABLET ORAL EVERY 6 HOURS PRN
Qty: 28 TABLET | Refills: 0 | Status: SHIPPED | OUTPATIENT
Start: 2020-11-23 | End: 2020-11-30

## 2020-11-23 NOTE — PROGRESS NOTES
Cardiac, Vascular and Thoracic Surgeons  Clinic Note     11/23/2020 11:44 AM  Surgeon:  Jesse Phillips     S/P :  Maria Elena Garcia w/ RLL wedge resection & MSLND on 11/12/20. Chief complaint : 1st post op  Subjective:  Ms. Padmini Wood is doing fairly well post operatively. Currently wearing 1.5 L O2. Denies SOB. C/o post op pain. Severe allergy to Naproxen. Tramadol ineffective and no longer taking. Tylenol only works minimally. Did try to start gabapentin but makes her sleepy. Tolerated Oxycodone in the hospital and would like to try a script. Vital Signs: LMP  (LMP Unknown)      I/O:  No intake or output data in the 24 hours ending 11/23/20 1144    Exam:   Cardiovascular: S1 plus S2 +0 no additional sound  Pulmonary: Bilaterally clear no additional sound      Incision: Incision is dry and clean          Labs:   CBC: No results for input(s): WBC, HGB, HCT, MCV, PLT in the last 72 hours. BMP: No results for input(s): NA, K, CL, CO2, PHOS, BUN, CREATININE in the last 72 hours. Invalid input(s): CA  PT/INR: No results for input(s): PROTIME, INR in the last 72 hours. APTT: No results for input(s): APTT in the last 72 hours. Scheduled Meds:     Patient Active Problem List   Diagnosis    Hypertension    Hyperlipidemia    Osteopenia    IFG (impaired fasting glucose)    DDD (degenerative disc disease), cervical    Thoracic sprain    Macromastia    Infected sebaceous cyst    Complete tear of right rotator cuff    Parotid mass    Chest pain    Hilar adenopathy    Hypokalemia    PAF (paroxysmal atrial fibrillation) (HCC)    Pulmonary nodule    History of tobacco abuse    Hilar lymphadenopathy       Assessment/Plan:   1. Out of bed as tolerated  2. Wean oxygen to off she could use 1 L of oxygen during the night  3.  Pathology was discussed with the patient in detail no need for further follow-up    Vickie Hunt MD FACS

## 2020-11-23 NOTE — TELEPHONE ENCOUNTER
Patient called stating her BP has been running high since [facility] took her off her BP medications

## 2020-11-23 NOTE — PROGRESS NOTES
MD Regina Gonzalez, [place]         Orthopaedic Surgery and Sports Medicine    Patient Name: Dillon Pedersen  YOB: 1945  Patient's PCP is Jessica Almonte MD      SUBJECTIVE  Chief Complaint  Post op right shoulder arthroscopy with rotator cuff repair and subacromial decompression  Date of Surgery: 08/25/2020    History of Present Illness:  Dillon Pedersen is a 70 y.o. female  Here for follow up of right shoulder arthroscopy with with rotator cuff repair and subacromial decompression. So we have last seen her she has had 2 procedures on her long one was a biopsy and then the other was a wedge resection. She is recovering from that actually doing pretty well not having much problems with her right shoulder. Her physical therapy was interrupted by this and she feels that she might want to resume her therapy. Not been having any problems with her right shoulder. Pain Assessment:  Pain Assessment  Location of Pain: Shoulder  Location Modifiers: Right  Severity of Pain: 0  Relieving Factors: Rest, Ice  Result of Injury: No  Work-Related Injury: No  Are there other pain locations you wish to document?: No        OBJECTIVE  PHYSICAL EXAM  Vital Signs: There were no vitals filed for this visit. Body mass index is 25.85 kg/m². General Appearance: Patient is adequately groomed with no evidence of malnutrition   Orientation: Patient is alert and oriented to person, place and time  Mood and Affect: Neutral/Euthymic(normal)    Right Shoulder Examination:  The active range of motion of her shoulder actually is pretty good. She can elevate up to about 160 degrees of forward flexion. I tested strength and it is 4/5 today. Certainly has a negative drop arm test.    DIAGNOSTICS:   No new xrays taken today.         ASSESSMENT (Medical Decision Making)    Dillon Pedersen is a 70 y.o. female progressing well from right shoulder arthroscopy with rotator cuff repair and subacromial decompression. Still recovering from pulmonary surgery wedge resection. No diagnosis found. PLAN (Medical Decision Making)  Office Procedures:  No orders of the defined types were placed in this encounter. Treatment Plan: Today the plan is to write a new prescription for outpatient physical therapy at [facility]. Will be for once or twice a week. She can attend through the month of December. If she is doing well at the end of December they are probably going to go to [place] for 3 or 4 months. Follow-up with us as needed. Non-steroidal anti-inflammatories medications (NSAIDs) can be used to assist with pain control and to reduce inflammatory changes. These medications may be over-the-counter or prescribed. We discussed taking the NSAID properly and the precautions. The patient understands that this medication may potentially interfere with other medications. Patient was also instructed to immediately discontinue the medication is there is any possible complication. Dillon Pedersen was instructed to call the office if her symptoms worsen or if new symptoms appear prior to the next scheduled visit. She is specifically instructed to contact the office between now and schedule appointment if she has concerns related to her condition or if she needs assistance in scheduling any above tests. She is welcome to call for an appointment sooner if she has any additional concerns or questions. This dictation was performed with a verbal recognition program (DRAGON) and it was checked for errors. It is possible that there are still dictated errors within this office note. If so, please bring any errors to my attention for an addendum. All efforts were made to ensure that this office note is accurate.

## 2020-11-24 ENCOUNTER — CARE COORDINATION (OUTPATIENT)
Dept: CASE MANAGEMENT | Age: 75
End: 2020-11-24

## 2020-11-24 NOTE — CARE COORDINATION
[facility] Transitions Follow Up Call    2020    Patient: Alex Garcia  Patient : 1945   MRN: [id number]  Reason for Admission: s/p lung biopsy wedge  Discharge Date: 20 RARS: Readmission Risk Score: 14         Spoke with: Alex Garcia, patient    Patient pleasant, answered call and verified . Patient is feeling better since her pain is under better control. Patient was seen by cardio-thoracic surgeon and orthopedic surgeon yesterday. Patient was prescribed gabapentin 100mg TID and Tylenol and taking as directed. Patient was also prescribed oxycodone and took at bedtime and slept well last night. Patient continues with home care and denied any concerns. Patient's blood pressure and heart rated has been slightly elevated. CTN reviewed critical guidelines and contacting MD when needed. Patient keeping log of vital signs and will contact MD if needed  Lydia Badillo RN   Care Transition Nurse  [phone]        Care Transitions Subsequent and Final Call    Subsequent and Final Calls  Do you have any ongoing symptoms?:  No  Have your medications changed?:  Yes  Do you have any questions related to your medications?:  No  Do you currently have any active services?:  Yes  Are you currently active with any services?:  Home Health  Do you have any needs or concerns that I can assist you with?:  No  Identified Barriers:  None  Care Transitions Interventions  Other Interventions:             Follow Up  Future Appointments   Date Time Provider Clayton Cai   2021 11:00 AM KEVIN CT MARQUES ANDERSON CT Leopoldo Hanley   2021  1:15 PM Angelica Gan MD [facility] CLER DEVI Badillo RN

## 2020-11-24 NOTE — TELEPHONE ENCOUNTER
Pt calling back. She said the top number of her b/p is high but the bottom number is around 65. The last few days the top number has been 171,161, 171, and 168. Present b/p is 148/75. Pt denies and chest pain except from where she had surgery. Please call pt to advise.

## 2020-11-24 NOTE — TELEPHONE ENCOUNTER
Called and [facility] for pt to contact the office, would like to get some BP examples from the last few days. Also is she having any symptoms?

## 2020-11-24 NOTE — TELEPHONE ENCOUNTER
SMM, please advise on what you would like the pt to do? She is not currently on any medications for BP, and I looked on past med list and nothing was d/c? Please advise, last ov 11/10/20  Assessment:      1. PAF (paroxysmal atrial fibrillation) [facility]): Diagnosed October 2020. Note she did NOT feel palpitations. I reviewed EKG showing afib RVR 155bpm and another EKG showed afib RVR 120bpm.   Most recent ECHO 10/22/20 EF >65%. No regional wall abnls; Grade I DD with normal LV filling pressure. Trivial TR/ME (no change from 4/19 ECHO). Most recent EKG 11/6/20 NSR 77bpm. Note CHADs-vasc=2 and PAF with no symptoms I will continue eliquis 5mg BID.       2. Essential hypertension: Well controlled and will continue current medical regimen.      3. Hyperlipidemia, unspecified hyperlipidemia type: Most recent lipids 10/22/20 see above. I personally reviewed lab results in epic and discussed with patient.      4.      Chest pain: Resolved with no recurrence since admission in October. Given normal ECHO and no further CP I will watch clinically for now. No stress testing will be ordered. She is to call if further CP occurs and will consider nuc stress at that time if needed.      Plan:  1. Meds reviewed. Refills as warranted   2. No changes today. Continue current medications   3.  Follow up with me in 4-5 months

## 2020-11-25 RX ORDER — HYDROCHLOROTHIAZIDE 25 MG/1
25 TABLET ORAL EVERY MORNING
Qty: 30 TABLET | Refills: 5 | Status: SHIPPED | OUTPATIENT
Start: 2020-11-25 | End: 2021-04-22 | Stop reason: SDUPTHER

## 2020-11-25 RX ORDER — LISINOPRIL 5 MG/1
5 TABLET ORAL DAILY
Qty: 30 TABLET | Refills: 5 | Status: SHIPPED | OUTPATIENT
Start: 2020-11-25 | End: 2020-12-11 | Stop reason: SDUPTHER

## 2020-11-25 NOTE — TELEPHONE ENCOUNTER
Pt has prescription for Amiodarone 21 day supply when discharged from hospital . She is unsure who started mediation. Wants to know if she needs to continue past 21 days. Please clarify. HCTZ and lisinopril pending please review. BMP order in epic she will complete  At [facility] facility7-10 days after starting meds.

## 2020-12-01 ENCOUNTER — CARE COORDINATION (OUTPATIENT)
Dept: CASE MANAGEMENT | Age: 75
End: 2020-12-01

## 2020-12-01 NOTE — CARE COORDINATION
[facility] Transitions Follow Up Call    2020    Patient: Harriett Walker  Patient : 1945   MRN: [id number]  Reason for Admission: s/p lung biopsy wedge  Discharge Date: 20 RARS: Readmission Risk Score: 14         Spoke with: Harriett Walker    Patient feeling better and biopsy results came back negative. Patient was told that biopsy showed a type of fungus and no further treatment at this time. Patient has spoke to Dr Vandana Obrien office due to elevated blood pressure and new medications added. Medication list was updated by MD office. Patient to have blood work collected this week and patient is aware. CTN educated patient on purpose of blood work. Patient's pain is being managed well with the Gabapentin prescription and verifies that she is taking as directed. Patient to call Dr Sherin Dupree office on Friday and request that oxygen concentrator be picked up. Patient is 93-95% on room air. Patient denies any further questions at this time. Mikaela Tan RN   Care Transition Nurse  [phone]      Care Transitions Subsequent and Final Call    Subsequent and Final Calls  Do you have any ongoing symptoms?:  No  Have your medications changed?:  No  Do you have any questions related to your medications?:  No  Do you currently have any active services?:  No  Are you currently active with any services?:  Home Health  Do you have any needs or concerns that I can assist you with?:  No  Identified Barriers:  None  Care Transitions Interventions  Other Interventions:             Follow Up  Future Appointments   Date Time Provider Clayton Cai   2021 11:00 AM SELINAA CT T SELINAAZ CT Bel Mccall   2021  1:15 PM Ham Oneill MD P CLER CAR OLAMIDE Tan RN

## 2020-12-04 ENCOUNTER — TELEPHONE (OUTPATIENT)
Dept: CARDIOTHORACIC SURGERY | Age: 75
End: 2020-12-04

## 2020-12-04 ENCOUNTER — TELEPHONE (OUTPATIENT)
Dept: CARDIOLOGY CLINIC | Age: 75
End: 2020-12-04

## 2020-12-04 ENCOUNTER — HOSPITAL ENCOUNTER (OUTPATIENT)
Age: 75
Discharge: HOME | End: 2020-12-04
Payer: MEDICARE

## 2020-12-04 LAB
ANION GAP SERPL CALCULATED.3IONS-SCNC: 12 MMOL/L (ref 3–16)
BUN BLDV-MCNC: 15 MG/DL (ref 7–20)
CALCIUM SERPL-MCNC: 8.9 MG/DL (ref 8.3–10.6)
CHLORIDE BLD-SCNC: 100 MMOL/L (ref 99–110)
CO2: 27 MMOL/L (ref 21–32)
CREAT SERPL-MCNC: 0.7 MG/DL (ref 0.6–1.2)
GFR AFRICAN AMERICAN: >60
GFR NON-AFRICAN AMERICAN: >60
GLUCOSE BLD-MCNC: 89 MG/DL (ref 70–99)
POTASSIUM SERPL-SCNC: 4.1 MMOL/L (ref 3.5–5.1)
SODIUM BLD-SCNC: 139 MMOL/L (ref 136–145)

## 2020-12-04 PROCEDURE — 36415 COLL VENOUS BLD VENIPUNCTURE: CPT

## 2020-12-04 PROCEDURE — 80048 BASIC METABOLIC PNL TOTAL CA: CPT

## 2020-12-04 NOTE — TELEPHONE ENCOUNTER
I spoke to patient she doesn't  think her BP cuff is accurate. She is going to use a family members cuff. She will continue to monitor over the weekend and will call on call doc if warranted. I let her know I would send for [facility] to review on Monday.

## 2020-12-04 NOTE — TELEPHONE ENCOUNTER
Pt called, was in the hospital for a while, while in hospital BP was ok. But when she got out, BP started going up again. Pt was started on meds 10-14 days ago (hydroCHLOROthiazide & lisinopril ) Pt BP is about 170s/70s. Pt has been more tired, but did just have lung surgery. Pt is on amiodarone, doesn't know if she needs to stay on that or not. Was only sent with 3 wks worth    Did have the blood test done that [facility] ordered today.

## 2020-12-07 ENCOUNTER — CARE COORDINATION (OUTPATIENT)
Dept: CASE MANAGEMENT | Age: 75
End: 2020-12-07

## 2020-12-07 ENCOUNTER — HOSPITAL ENCOUNTER (OUTPATIENT)
Dept: PHYSICAL THERAPY | Age: 75
Setting detail: THERAPIES SERIES
Discharge: HOME | End: 2020-12-07
Payer: MEDICARE

## 2020-12-07 PROCEDURE — 97140 MANUAL THERAPY 1/> REGIONS: CPT

## 2020-12-07 PROCEDURE — 97110 THERAPEUTIC EXERCISES: CPT

## 2020-12-07 NOTE — FLOWSHEET NOTE
[address] [facility], [address]  Phone [phone]  Fax [phone]    Outpatient Physical Therapy     [x] Daily Treatment Note   [] Progress Note  12/7/20  Bottom of this page. [] Discharge Note    Date:  12/7/2020    Patient Name:  Nataliia Reynoso         YOB: 1945    Medical Diagnosis: R RTC repair            (small/medium)     Treatment Diagnosis: same                                   Onset Date:  8/25/20               Referral Date:  8/25/20           Referring Physician: Cinthia Acosta MD                                                Visits Allowed/Insurance/Certification Information: Medicare, [facility]     Restrictions/Precautions: Per protocol MD sent on script. Patient will remain in sling with the abduction pillow for 3 weeks post-op, then can discontinue the use of the abduction pillow, but should remain in regular sling for 3 more weeks. Can d/c sling at 6 weeks. Progress PROM and begin AAROM weeks 4-6. See script for protocol   10/5/20 Treatment Plan:  Instructions for sling usage and slow progressive mobilization of shoulder were reviewed. She can discontinue the use of her sling at 6 weeks postop. Specific precautions were reviewed and emphasized. Patient will continue outpatient physical therapy as scheduled. She is okay to begin gentle active range of motion and strengthening at this time. Today, we will also plan on injecting the patient's left shoulder with a cortisone injection.   She has had this in the past and helped a great deal.  Follow up appointment was arranged at patient's convenience in approximately 4 weeks    Plan of care sent to provider:   []NA   []Faxed   [x]Co-signature       Plan of care signed:    []NA   [x]Yes, signed on 9/2/20, 10/9/20   []No      Progress Note covers period from (if applicable):    []NA    [x]From 10/1/20-10/19/20    Next Progress Note due: Visit# / total visits:     Visit # Insurance Allowable Auth Required   In Person  11 PRN []  Yes     [x]  No    Tele Health 0  []  Yes     []  No    Total  11       Plan for Next Session:  Progress per protocol. Can initiate AROM and gentle strengthening      Subjective:  12/7/20 see prog note below- signif hx in the past month! Follows up with MD in late November. Leaving for [place] today or tomorrow. Will be there for 4-6 weeks. Planning to continue with PT treatment while in [place]. Pain level: Sh 0-4/10   Lung 4-5/10  . Objective:  See belwo      Exercises:   8 wks p/o 10/20/20  Exercises in bold performed in department today. Items not bolded are carried forward from prior visits for continuity of the record. Exercise/Equipment Resistance/Repetitions Other comments     codmans x10      PROM R shoulder (flex, abd, IR/ER) 2x10 each direction      scap retraction 2x10 yellow tband    Standing tricep kickback with tband 2x10 yellow    Standing bicep curls with tband 2x10 yellow           Supine back pocket shrugs 3x10      AAROM cane flex supine x5-  Hold today due to pain        \"          Cane ER supine x10- hold today due to pain    Table slides flex and er     3x10 at end of 10 hold 10+ sec ea Changed due to L shoulder pain   active IR/ER seated small rom   3x10    Supine arom flex elbow bent  2x3     kashmir adduct, wall flex, ext,abd, IR, ER   5\" x 5 progress to 10x gentle    Standing reach upper trap then high hip  x5 prog to 2x10 gradually      Rhythmic stabilization in supine 90/90 x1 min with manual resistance      Ceiling punches 2x10      Supine figure 8 2x10    Supine chest press 2x10    12/7/20  Mid and low rows Red  2x10                  IR/ER Red  2x 10                 adduction Red  2x 10    Needs pic for home                 Ceiling punch Green  2x 10                                  NV- active to 90 flx w/1#, active abd on her side w/ wt, ER on her side 13 or more. Therapeutic Exercise/Home Exercise Program:   X 20 min           Pt does have red tputty for  strengthening. Group Therapy:    0 minutes    Therapeutic Activity:  0 minutes     Gait: 0 minutes    Neuromuscular Re-Education:  0 minutes    Manual Therapy:  25 minutes. obj findings and ROM  PROM to R shoulder as noted above. Support provided under R UE when at rest.    Modalities:. Functional Outcome Measure:   []NA  Measure Used:  Quick dash  Date Assessed:9/1/20  10/19/20  Score: 48 total= 84%   20.45%    Assessment/Treatment/Activity Tolerance:  Treatment in [place] on hold until her return from [facility]. She plans to continue her treatment in [place]. Patients response to treatment:   [x]Patient tolerated treatment well with no adverse reactions noted  []Patient limited by fatigue   []Patient limited by pain  []Patient limited by other medical complications   [x]Other:  Pain decreased to 1/10 after treatment. Goals:      GOALS    Short Term Goals:  6   weeks Long Term Goals:  12   weeks   1). Establish HEP- MET 1). Pt independent with HEP   2). Pain 5 /10 or less MET 2). Pain 2 /10 or less   3). Progress ROM per protocol- MET 3). AROM R shoulder WNL   4). Progress strength per protocol-MET 4). MMT 4+/5 or >throughout R shoulder   5). Patient able to sleep without waking due to pain 5). Patient able to return to prior level of activity without limitations noted. Prognosis: [x]Good   []Fair   []Poor    Patient Requires Follow-up:  [x]Yes  []No    Plan: []Plan of care initiated     []Continue per plan of care    [] Alter current plan (see comments)    [x]Hold pending return from [facility] []Discharge    Timed Code Treatment Minutes:  39  (2 man, 1 ex,  )    Total Treatment Minutes:   50    Medicare Cap total YTD:  $1250  Electronically signed by:  RUSSELL Ferraro      Outpatient Physical Therapy  Progress Note      Progress Note covers period from:     To       Subjective: reports being away from therapy due to fluttering in her chest.  Possible blood clot- she had a CT scan. 2 spots on her lung was discovered. She had further imaging ( PET scan). Then a spot on her neck was discovered. She had biopsy and her neck spot was negative. A few weeks later she had another attack of chest pain and fluttering. It was discovered she had A-fib. The doctors were still concerned about the spots on her lung, they had grown a bit. She then had a lung wedge biopsy  3 weeks ago. , hospitalized for 6 days. .  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 She is now on eliquis for her heart, nothing for the lung. Dr. Luis Antonio Liu is her cardiologist.   Dr. Daily Has referred her back to therapy  Pain scale:  shoulder 0-4/10    Lun-5/10 constant. Takes tylenol 3x day for it.  States she can't reach up to the 2nd shelf very well. Her shoulder feels stiff and weak.       Objective:   Observation:   Test measurements:  AROM:   flx 140*     ext 50     abd 140*     IR to T9         ER to T3                                            MMT:       3+/5             4/5          4-/5             4/5               4-/5                                            PROM:     165                            155                                   90    Functional Outcome Measure:      quick dash:   20   Score:  28     % disability: 39%   ( scored better on 10/19/20)      Assessment: doing well with ROM and strength. Her lung pain is the most limiting. Would expect her to do well with 1 month of therapy to get full AROM and improve her strength and overall functional use.  Summary: seen x 11 visits, includes today   Patient's response to treatment:      Goals:  · Progress toward previous goals: working toward 12 wk goals above. Plan: cont 2x wk for manual tech and exervcise progression.   ·     Electronically Signed by: Joseph Paulino PT [id number]          Note: If patient does not return for scheduled/ recommended follow up visits, this note will serve as a discharge from care along with most recent update on progress.

## 2020-12-07 NOTE — TELEPHONE ENCOUNTER
Spoke to pt. She has not had her BP checked for accuracy. I reiterated the importance of having it checked. Goal BP <140/90. Call the office when machine is checked for accuracy so we can accurately follow numbers and adjust medications.

## 2020-12-07 NOTE — FLOWSHEET NOTE
[address], [facility] [address], [address]  Phone [phone]                                                [] Daily Treatment Note   [] Progress Note   [] Discharge Note      Date:  2020    Patient Name:  Jared Oakley        :  1945    Diagnosis:      Treatment Diagnosis:      Insurance:    Referring Physician:      Plan of care signed (Y/N):      Progress report will be due (10 Rx or 30 days whichever is less): ***     Recertification will be due (POC Duration  / 90 days whichever is less): ***    Visit# / total visits:   Visit # Insurance Allowable Auth Required   In Person *** *** []  Yes     []  No    Tele Health 0  []  Yes     []  No    Total ***       Latex Allergy:  [x]NO      []YES    Pain level: /10     Subjective:         Exercises:   Exercise/Equipment Resistance/Repetitions Other comments                                                                                                       Therapeutic Exercise:      Group Therapy:      Home Exercise Program:      Therapeutic Activity:      Neuromuscular Re-education:      Gait:      Manual Therapy:      Canalith Repositioning Procedure:       Modalities:      Charges:  Timed Code Treatment Minutes: ***   Total Treatment Minutes:  ***   BWC time for each procedure?:  TE TIME:  NMR TIME:  MANUAL TIME:  UNTIMED MINUTES:   ***  ***  ***  ***      [] EVAL (LOW) 36294 (typically 20 minutes face-to-face)  [] EVAL (MOD) 08377 (typically 30 minutes face-to-face)  [] EVAL (HIGH) 20158 (typically 45 minutes face-to-face)  [] RE-EVAL     [] SI(96535) x     [] IONTO  [] NMR (91568) x     [] VASO  [] Manual (03438) x     [] Other:  [] TA x      [] Mech Traction (01059)  [] ES(attended) (00200)     [] ES (un) (57405):     Medicare Cap Total YTD:      Treatment/Activity Tolerance:    [] Patient tolerated treatment well [] Patient limited by fatigue   [] Patient limited by pain [] Patient limited by other medical complications   [] Other:     Prognosis: [] Good [] Fair  [] Poor    Patient Requires Follow-up:  [] Yes  [] No    Plan: [] Continue per plan of care [] Alter current plan (see comments)   [] Plan of care initiated [] Hold pending MD visit [] Discharge    Plan for Next Session:      See Progress Note: [] Yes  [] No       Next due:         Electronically signed by:  Britney Jacobs PT     Note: If patient does not return for scheduled/ recommended follow up visits, this note will serve as a discharge from care along with most recent update on progress. Outpatient Physical Therapy  Progress Note      Progress Note covers period from:     To       Subjective:           Objective:   Observation:   Test measurements:       Functional Outcome Measure:            Assessment:   Summary:    Patient's response to treatment:      Goals:  · Progress toward previous goals:      Plan:  ·     Electronically Signed by:

## 2020-12-07 NOTE — CARE COORDINATION
Bard Natali ELMORE   12/23/2020 10:30 AM Jacob Joy PT [facility] Bard Natali ELMORE   1/5/2021 11:00 AM KEVIN CT T SELINAAZ CT Adrianne Arnold   4/21/2021  1:15 PM Yazan Kimble MD [facility] CAR OLAMIDE Rice RN

## 2020-12-07 NOTE — TELEPHONE ENCOUNTER
OK. She needs home BP cuff that is accurate. I would have whatever she is using checked in PCP or our office to determine accuracy. Then follow BP readings and call if avg >140/90mmHg. I can adjust if necessary. She does NOT need to continue amiodarone. Will see how her rhythm does off it. I want her to continue eliquis given hx paroxysmal afib. Thank you.

## 2020-12-08 LAB
AFB CULTURE (MYCOBACTERIA): NORMAL
AFB CULTURE (MYCOBACTERIA): NORMAL
AFB SMEAR: NORMAL
AFB SMEAR: NORMAL

## 2020-12-09 ENCOUNTER — HOSPITAL ENCOUNTER (OUTPATIENT)
Dept: PHYSICAL THERAPY | Age: 75
Setting detail: THERAPIES SERIES
Discharge: HOME | End: 2020-12-09
Payer: MEDICARE

## 2020-12-09 PROCEDURE — 97140 MANUAL THERAPY 1/> REGIONS: CPT

## 2020-12-09 PROCEDURE — 97110 THERAPEUTIC EXERCISES: CPT

## 2020-12-09 NOTE — PROGRESS NOTES
[address] [facility], [address]  Phone [phone]  Fax [phone]    Outpatient Physical Therapy     [x] Daily Treatment Note   [x] Progress Note  12/7/20  Bottom of this page. [] Discharge Note    Date:  12/9/2020    Patient Name:  Faustino Carranza         YOB: 1945    Medical Diagnosis: R RTC repair            (small/medium)     Treatment Diagnosis: same                                   Onset Date:  8/25/20               Referral Date:  8/25/20           Referring Physician: Lan Pfeiffer MD                                                Visits Allowed/Insurance/Certification Information: Medicare, [facility]     Restrictions/Precautions: Per protocol MD sent on script. Patient will remain in sling with the abduction pillow for 3 weeks post-op, then can discontinue the use of the abduction pillow, but should remain in regular sling for 3 more weeks. Can d/c sling at 6 weeks. Progress PROM and begin AAROM weeks 4-6. See script for protocol   10/5/20 Treatment Plan:  Instructions for sling usage and slow progressive mobilization of shoulder were reviewed. She can discontinue the use of her sling at 6 weeks postop. Specific precautions were reviewed and emphasized. Patient will continue outpatient physical therapy as scheduled. She is okay to begin gentle active range of motion and strengthening at this time. Today, we will also plan on injecting the patient's left shoulder with a cortisone injection.   She has had this in the past and helped a great deal.  Follow up appointment was arranged at patient's convenience in approximately 4 weeks    Plan of care sent to provider:   []NA   []Faxed   [x]Co-signature       Plan of care signed:    []NA   [x]Yes, signed on 9/2/20, 10/9/20   []No      Progress Note covers period from (if applicable):    []NA    [x]From 10/1/20-10/19/20    Next Progress Note due: Visit# / total visits:     Visit # Insurance Allowable Auth Required   In Person   12 PRN []  Yes     [x]  No    Tele Health 0  []  Yes     []  No    Total  11       Plan for Next Session:  Progress per protocol. Can initiate AROM and gentle strengthening      Subjective:  12/9/20 her shoulder    12/7/20 see prog note below- signif hx in the past month! Follows up with MD in late November. Leaving for [facility] today or tomorrow. Will be there for 4-6 weeks. Planning to continue with PT treatment while in [place]. Pain level: Sh 0-4/10   Lung 4-5/10  . Objective:  See belwo      Exercises:   8 wks p/o 10/20/20  Exercises in bold performed in department today. Items not bolded are carried forward from prior visits for continuity of the record. Exercise/Equipment Resistance/Repetitions Other comments     codmans x10      PROM R shoulder (flex, abd, IR/ER) 2x10 each direction      scap retraction 2x10 yellow tband    Standing tricep kickback with tband 2x10 yellow    Standing bicep curls with tband 2x10 yellow           Supine back pocket shrugs 3x10      AAROM cane flex supine x5-  Hold today due to pain        \"          Cane ER supine x10- hold today due to pain    Table slides flex and er     3x10 at end of 10 hold 10+ sec ea Changed due to L shoulder pain   active IR/ER seated small rom   3x10    Supine arom flex elbow bent  2x3     kashmir adduct, wall flex, ext,abd, IR, ER   5\" x 5 progress to 10x gentle    Standing reach upper trap then high hip  x5 prog to 2x10 gradually      Rhythmic stabilization in supine 90/90 x1 min with manual resistance      Ceiling punches 2x10      Supine figure 8 2x10    Supine chest press 2x10    12/7/20  Mid and low rows Red  2x10                  IR/ER Red  2x 10                 adduction Red  2x 10    Needs pic for home                 Ceiling punch Green  2x 10    12//9/20 re instruct above ex.      Will advance on NV                         NV- active to 80 flx w/1#, active abd on her side w/ wt, ER on her side 13 or more. Therapeutic Exercise/Home Exercise Program:    x40 min           Pt does have red tputty for  strengthening. Group Therapy:    0 minutes    Therapeutic Activity:  0 minutes     Gait: 0 minutes    Neuromuscular Re-Education:  0 minutes    Manual Therapy:   5 minutes. obj findings and ROM  PROM to R shoulder as noted above. Support provided under R UE when at rest.    Modalities:. Functional Outcome Measure:   []NA  Measure Used:  Quick dash  Date Assessed:9/1/20  10/19/20  Score: 48 total= 84%   20.45%    Assessment/Treatment/Activity Tolerance:  Treatment in [place] on hold until her return from [facility]. She plans to continue her treatment in [place]. Patients response to treatment:   [x]Patient tolerated treatment well with no adverse reactions noted  []Patient limited by fatigue   []Patient limited by pain  []Patient limited by other medical complications   [x]Other:  Pain decreased to 1/10 after treatment. Goals:      GOALS    Short Term Goals:  6   weeks Long Term Goals:  12   weeks   1). Establish HEP- MET 1). Pt independent with HEP   2). Pain 5 /10 or less MET 2). Pain 2 /10 or less   3). Progress ROM per protocol- MET 3). AROM R shoulder WNL   4). Progress strength per protocol-MET 4). MMT 4+/5 or >throughout R shoulder   5). Patient able to sleep without waking due to pain 5). Patient able to return to prior level of activity without limitations noted.         Prognosis: [x]Good   []Fair   []Poor    Patient Requires Follow-up:  [x]Yes  []No    Plan: []Plan of care initiated     []Continue per plan of care    [] Alter current plan (see comments)    [x]Hold pending return from [facility] []Discharge    Timed Code Treatment Minutes:  39  (2 man, 1 ex,  )    Total Treatment Minutes:    45    Medicare Cap total YTD:  $8190  Electronically signed by:  Kusum Singh [phone]      Outpatient Physical Therapy  Progress Note      Progress Note covers period from: To       Subjective: reports being away from therapy due to fluttering in her chest.  Possible blood clot- she had a CT scan. 2 spots on her lung was discovered. She had further imaging ( PET scan). Then a spot on her neck was discovered. She had biopsy and her neck spot was negative. A few weeks later she had another attack of chest pain and fluttering. It was discovered she had A-fib. The doctors were still concerned about the spots on her lung, they had grown a bit. She then had a lung wedge biopsy  3 weeks ago. , hospitalized for 6 days. .  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 She is now on eliquis for her heart, nothing for the lung. Dr. Mandeep Siegel is her cardiologist.   Dr. Ellie Burnett referred her back to therapy  Pain scale:  shoulder 0-4/10    Lun-5/10 constant. Takes tylenol 3x day for it.  States she can't reach up to the 2nd shelf very well. Her shoulder feels stiff and weak.       Objective:   Observation:   Test measurements:  AROM:   flx 140*     ext 50     abd 140*     IR to T9         ER to T3                                            MMT:       3+/5             4/5          4-/5             4/5               4-/5                                            PROM:     165                            155                                   90    Functional Outcome Measure:      quick dash:   20   Score:  28     % disability: 39%   ( scored better on 10/19/20)      Assessment: doing well with ROM and strength. Her lung pain is the most limiting. Would expect her to do well with 1 month of therapy to get full AROM and improve her strength and overall functional use.     Summary: seen x 11 visits, includes today   Patient's response to treatment:      Goals:  · Progress toward previous goals: working toward 12 wk goals above. Plan: cont 2x wk for manual tech and exervcise progression.   ·     Electronically Signed by: [place] Deirdre           Note: If patient does not return for scheduled/ recommended follow up visits, this note will serve as a discharge from care along with most recent update on progress.

## 2020-12-11 RX ORDER — LISINOPRIL 10 MG/1
10 TABLET ORAL DAILY
Qty: 90 TABLET | Refills: 3 | Status: SHIPPED | OUTPATIENT
Start: 2020-12-11 | End: 2020-12-30 | Stop reason: SINTOL

## 2020-12-14 ENCOUNTER — CARE COORDINATION (OUTPATIENT)
Dept: CASE MANAGEMENT | Age: 75
End: 2020-12-14

## 2020-12-14 LAB
FUNGUS (MYCOLOGY) CULTURE: NORMAL
FUNGUS STAIN: NORMAL

## 2020-12-14 NOTE — CARE COORDINATION
[facility] Transitions Follow Up Call    2020    Patient: Tracee Carrera  Patient : 1945   MRN: [id number]  Reason for Admission: pulmonary nodule s/p thoracotomy  Discharge Date: 20 RARS: Readmission Risk Score: 14         Spoke with: Tracee Carrera     Patient pleasant and agreeable to follow up call. Patient had placed call to Dr Yin Mcmullen office last week due to her persistent dry cough and Lisinopril was decreased to 10mg daily. Medication change update noted in system. Patient continues to monitor blood pressure and results 170s/70s. Patient states that cough is about the same and will follow up with his office if needed. Patient has appt with Dr Tay Butler tomorrow to discuss swelling that has developed under right breast and \"sore to touch\". Patient continues with outpatient physical therapy for her shoulder and questioning when home care would discharge. CTN educated patient that home care is suppose to discharge once patients enroll into outpatient services. CTN placed call to [facility] and was required to leave message with Edy requesting agency discharge to be scheduled. Patient denies any acute needs at present time. Agreeable to f/u calls. Educated on the use of urgent care or physicians 24 hr access line if assistance is needed after hours. Care Transitions Subsequent and Final Call    Subsequent and Final Calls  Do you have any ongoing symptoms?: No  Have your medications changed?: Yes  Do you have any questions related to your medications?: Yes  Do you currently have any active services?: No  Are you currently active with any services?: Home Health  Do you have any needs or concerns that I can assist you with?: No  Identified Barriers: None  Care Transitions Interventions  Other Interventions:            Follow Up  Future Appointments   Date Time Provider Clayton Cai   12/15/2020 10:00 AM Althea Speed, MD [facility]   2020  2:30 PM Keke Cam Rdz, PT [address] [facility] HOD   12/18/2020  3:15 PM Katina Singh, PT [facility] Wes Menezes [facility]OD   12/21/2020 10:30 AM Kevin Culp [address] [facility] HOD   12/23/2020 10:30 AM Katina Singh, PT [facility] Blas Jordan HOD   1/5/2021 11:00 AM [facility] T [facility] CT Erich Abreu   4/21/2021  1:15 PM Ricardo Fernandez MD [facility] CLER CAR OLAMIDE Shetty, RN    Care Transition Nurse  [phone]

## 2020-12-15 ENCOUNTER — TELEPHONE (OUTPATIENT)
Dept: CARDIOTHORACIC SURGERY | Age: 75
End: 2020-12-15

## 2020-12-15 ENCOUNTER — OFFICE VISIT (OUTPATIENT)
Dept: CARDIOTHORACIC SURGERY | Age: 75
End: 2020-12-15

## 2020-12-15 VITALS
OXYGEN SATURATION: 96 % | RESPIRATION RATE: 16 BRPM | HEIGHT: 59 IN | HEART RATE: 83 BPM | BODY MASS INDEX: 25.92 KG/M2 | SYSTOLIC BLOOD PRESSURE: 160 MMHG | WEIGHT: 128.6 LBS | DIASTOLIC BLOOD PRESSURE: 82 MMHG | TEMPERATURE: 95.5 F

## 2020-12-15 PROCEDURE — 99024 POSTOP FOLLOW-UP VISIT: CPT | Performed by: THORACIC SURGERY (CARDIOTHORACIC VASCULAR SURGERY)

## 2020-12-15 RX ORDER — GABAPENTIN 300 MG/1
300 CAPSULE ORAL 3 TIMES DAILY
Qty: 90 CAPSULE | Refills: 0 | Status: SHIPPED | OUTPATIENT
Start: 2020-12-15 | End: 2021-01-25 | Stop reason: SDUPTHER

## 2020-12-15 RX ORDER — OXYCODONE HYDROCHLORIDE 5 MG/1
5 TABLET ORAL NIGHTLY
COMMUNITY
End: 2021-04-21

## 2020-12-15 NOTE — PROGRESS NOTES
Progress Note      CC:  Post Op Follow-up    S/P right VATS with lymph node biopsy. Subjective: She continues to have aches and pains as well as numbness in her right side related to her VATS incisions. She also feels like she has a lump in her right upper quadrant. No fevers or chills but one of her concerns was an infection brewing inside. Vital Signs:                                                 BP (!) 160/82 (Site: Left Upper Arm, Position: Sitting)   Pulse 83   Temp 95.5 °F (35.3 °C)   Resp 16   Ht 4' 11\" (1.499 m)   Wt 128 lb 9.6 oz (58.3 kg)   LMP  (LMP Unknown)   SpO2 96%   BMI 25.97 kg/m²        CV:   Regular rate and rhythm with no rubs or murmurs. Pulm: Clear lung fields with no rales or rhonchi. Incisions:    Right chest incisions are clean, dry and intact. Abd:  Soft  Ext:  No peripheral edema. Assessment/Plan:  Overall doing very well post right VATS. I told her that a lot of the sensation she is experiencing is quite common with thoracic surgery whether it is a VATS or open procedures. Many of these sensations take several months to resolve. She does not have a hernia as she believes and there is no evidence of infection. 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 I discussed secondary risk prevention for cardiovascular disease with the patient. The patient may increase activities as she feels comfortable doing so. Follow-up with her PCP and Dr. Ermelinda Husain as prescribed. Follow-up with me as needed. I increased her gabapentin to 300 mg 3 times daily for the next 2 months. It is okay for her to travel to [place] as planned.     Jose Kellogg MD  12/15/2020  10:18 AM

## 2020-12-15 NOTE — LETTER
12/15/20      Delfino Katz M.D., [facility], [facility], [address] [place] [address] Cardiovascular and Thoracic Surgeons  [address] [address]        RE:    Mirian Ceballos,   1945    Dear Haven Damon MD    Mirian Ceballos was seen today for routine follow-up after her recent right VATS with lymph node biopsy. Attached is the postop note. Sincerely,     Karen Goel MD    CC: No referring provider defined for this encounter.

## 2020-12-15 NOTE — PROGRESS NOTES
Dr. Debora Lucia copied me instead of you. I wanted to make sure you saw his note and her pathology results. Hope you're well and surviving the COVID surge.   Abimael Galvan

## 2020-12-16 ENCOUNTER — HOSPITAL ENCOUNTER (OUTPATIENT)
Dept: PHYSICAL THERAPY | Age: 75
Setting detail: THERAPIES SERIES
Discharge: HOME | End: 2020-12-16
Payer: MEDICARE

## 2020-12-16 PROCEDURE — 97110 THERAPEUTIC EXERCISES: CPT

## 2020-12-16 PROCEDURE — 97140 MANUAL THERAPY 1/> REGIONS: CPT

## 2020-12-16 PROCEDURE — 97016 VASOPNEUMATIC DEVICE THERAPY: CPT

## 2020-12-16 NOTE — FLOWSHEET NOTE
[address] [facility], [address]  Phone [phone]  Fax [phone]    Outpatient Physical Therapy     [x] Daily Treatment Note   [x] Progress Note  12/7/20  Bottom of this page. [] Discharge Note    Date:  12/16/2020    Patient Name:  Jean Angel         YOB: 1945    Medical Diagnosis: R RTC repair            (small/medium)     Treatment Diagnosis: same                                   Onset Date:  8/25/20               Referral Date:  8/25/20           Referring Physician: Hai Roy MD                                                Visits Allowed/Insurance/Certification Information: Medicare, [facility]     Restrictions/Precautions: Per protocol MD sent on script. Patient will remain in sling with the abduction pillow for 3 weeks post-op, then can discontinue the use of the abduction pillow, but should remain in regular sling for 3 more weeks. Can d/c sling at 6 weeks. Progress PROM and begin AAROM weeks 4-6. See script for protocol   10/5/20 Treatment Plan:  Instructions for sling usage and slow progressive mobilization of shoulder were reviewed. She can discontinue the use of her sling at 6 weeks postop. Specific precautions were reviewed and emphasized. Patient will continue outpatient physical therapy as scheduled. She is okay to begin gentle active range of motion and strengthening at this time. Today, we will also plan on injecting the patient's left shoulder with a cortisone injection.   She has had this in the past and helped a great deal.  Follow up appointment was arranged at patient's convenience in approximately 4 weeks    Plan of care sent to provider:   []NA   []Faxed   [x]Co-signature       Plan of care signed:    []NA   [x]Yes, signed on 9/2/20, 10/9/20   []No      Progress Note covers period from (if applicable):    []NA    [x]From 10/1/20-10/19/20    Next Progress Note due: Visit# / total visits:     Visit # Insurance Allowable Auth Required   In Person   12 PRN []  Yes     [x]  No    Tele Health 0  []  Yes     []  No    Total  11       Plan for Next Session:  Progress per protocol. Can initiate AROM and gentle strengthening      Subjective:  12/16/20 her arm is stiff and sore today- not sure if she was lying on her side. She can't find her ex papers. 12/9/20 her shoulder    12/7/20 see prog note below- signif hx in the past month! Follows up with MD in late November. Leaving for [facility] today or tomorrow. Will be there for 4-6 weeks. Planning to continue with PT treatment while in [place]. Pain level: Sh 0-4/10   Lung 4-5/10  . Objective:  See belwo      Exercises:   8 wks p/o 10/20/20  Exercises in bold performed in department today. Items not bolded are carried forward from prior visits for continuity of the record.   Exercise/Equipment Resistance/Repetitions Other comments     codmans x10      PROM R shoulder (flex, abd, IR/ER) 2x10 each direction      scap retraction 2x10 yellow tband    Standing tricep kickback with tband 2x10 yellow    Standing bicep curls with tband 2x10 yellow           Supine back pocket shrugs 3x10      AAROM cane flex supine x5-  Hold today due to pain        \"          Cane ER supine x10- hold today due to pain    Table slides flex and er     3x10 at end of 10 hold 10+ sec ea Changed due to L shoulder pain   active IR/ER seated small rom   3x10    Supine arom flex elbow bent  2x3     kashmir adduct, wall flex, ext,abd, IR, ER   5\" x 5 progress to 10x gentle    Standing reach upper trap then high hip  x5 prog to 2x10 gradually      Rhythmic stabilization in supine 90/90 x1 min with manual resistance      Ceiling punches 2x10      Supine figure 8 2x10    Supine chest press 2x10    12/7/20  Mid and low rows Red  2x10                  IR/ER Red  2x 10                 adduction Red  2x 10    Needs pic for home                 Ceiling punch Green 2x 10    12//9/20 re instruct above ex.      12/16/20 abduction in SL 1#  2x 10  Then 3x10                    ER in SL 1#  2x10  Then 3x 10    Given HO of ex from past 2 visits due to loosing them! Therapeutic Exercise/Home Exercise Program:    X 25 min           Pt does have red tputty for  strengthening. Group Therapy:    0 minutes    Therapeutic Activity:  0 minutes     Gait: 0 minutes    Neuromuscular Re-Education:  0 minutes    Manual Therapy:   15 minutes. ROM, light stretching due to it feeling tight and sore today. t.    Modalities:Game ready to R shoulder with her  seated, 34 deg, mod pressure. Functional Outcome Measure:   []NA  Measure Used:  Quick dash  Date Assessed:9/1/20  10/19/20  Score: 48 total= 84%   20.45%    Assessment/Treatment/Activity Tolerance:  Treatment in [place] on hold until her return from [facility]. She plans to continue her treatment in [place]. Patients response to treatment:   [x]Patient tolerated treatment well with no adverse reactions noted  []Patient limited by fatigue   []Patient limited by pain  []Patient limited by other medical complications   [x]Other:  Pain decreased to 1/10 after treatment. Goals:      GOALS    Short Term Goals:  6   weeks Long Term Goals:  12   weeks   1). Establish HEP- MET 1). Pt independent with HEP   2). Pain 5 /10 or less MET 2). Pain 2 /10 or less   3). Progress ROM per protocol- MET 3). AROM R shoulder WNL   4). Progress strength per protocol-MET 4). MMT 4+/5 or >throughout R shoulder   5). Patient able to sleep without waking due to pain 5). Patient able to return to prior level of activity without limitations noted.         Prognosis: [x]Good   []Fair   []Poor    Patient Requires Follow-up:  [x]Yes  []No    Plan: []Plan of care initiated     [x]Continue per plan of care    [] Alter current plan (see comments)    []Hold pending return from [facility] []Discharge    Timed Code Treatment Minutes:  39  ( 1man,  2 ex,   gameready )    Total Treatment Minutes:     60    Medicare Cap total YTD:  $ 1500  Electronically signed by:  Aiden Springer, [address]      Outpatient Physical Therapy  Progress Note      Progress Note covers period from: To       Subjective: reports being away from therapy due to fluttering in her chest.  Possible blood clot- she had a CT scan. 2 spots on her lung was discovered. She had further imaging ( PET scan). Then a spot on her neck was discovered. She had biopsy and her neck spot was negative. A few weeks later she had another attack of chest pain and fluttering. It was discovered she had A-fib. The doctors were still concerned about the spots on her lung, they had grown a bit. She then had a lung wedge biopsy  3 weeks ago. , hospitalized for 6 days. .  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 She is now on eliquis for her heart, nothing for the lung. Dr. Aaron Barton is her cardiologist.   Dr. Paul Stone referred her back to therapy  Pain scale:  shoulder 0-4/10    Lun-5/10 constant. Takes tylenol 3x day for it.  States she can't reach up to the 2nd shelf very well. Her shoulder feels stiff and weak.       Objective:   Observation:   Test measurements:  AROM:   flx 140*     ext 50     abd 140*     IR to T9         ER to T3                                            MMT:       3+/5             4/5          4-/5             4/5               4-/5                                            PROM:     165                            155                                   90    Functional Outcome Measure:      quick dash:   20   Score:  28     % disability: 39%   ( scored better on 10/19/20)      Assessment: doing well with ROM and strength. Her lung pain is the most limiting. Would expect her to do well with 1 month of therapy to get full AROM and improve her strength and overall functional use.       Summary: seen x 11 visits, includes today   Patient's response to treatment:      Goals:  · Progress toward previous goals: working toward 12 wk goals above. Plan: cont 2x wk for manual tech and exervcise progression. ·     Electronically Signed by: Damian White PT [id number]          Note: If patient does not return for scheduled/ recommended follow up visits, this note will serve as a discharge from care along with most recent update on progress.

## 2020-12-18 ENCOUNTER — HOSPITAL ENCOUNTER (OUTPATIENT)
Dept: PHYSICAL THERAPY | Age: 75
Setting detail: THERAPIES SERIES
Discharge: HOME | End: 2020-12-18
Payer: MEDICARE

## 2020-12-18 ENCOUNTER — TELEPHONE (OUTPATIENT)
Dept: CARDIOLOGY CLINIC | Age: 75
End: 2020-12-18

## 2020-12-18 PROCEDURE — 97140 MANUAL THERAPY 1/> REGIONS: CPT

## 2020-12-18 NOTE — TELEPHONE ENCOUNTER
Called pt and relayed message. Pt did not get her BMP checked after increasing her Lisinopril to 10mg qd. She will get that done and then get another BMP done after the new increased dose of Lisinopril 10mg BID.

## 2020-12-18 NOTE — FLOWSHEET NOTE
[address] [facility], [address] [facility] [address], [facility], [place] [address]  Phone [phone]  Fax [phone]    Outpatient Physical Therapy     [x] Daily Treatment Note   [x] Progress Note  12/7/20  Bottom of this page. [] Discharge Note    Date:  12/18/2020    Patient Name:  Raul Iverson         YOB: 1945    Medical Diagnosis: R RTC repair            (small/medium)     Treatment Diagnosis: same                                   Onset Date:  8/25/20               Referral Date:  8/25/20           Referring Physician: Zuleyka Parham MD                                                Visits Allowed/Insurance/Certification Information: Medicare, [facility]     Restrictions/Precautions: Per protocol MD sent on script. Patient will remain in sling with the abduction pillow for 3 weeks post-op, then can discontinue the use of the abduction pillow, but should remain in regular sling for 3 more weeks. Can d/c sling at 6 weeks. Progress PROM and begin AAROM weeks 4-6. See script for protocol   10/5/20 Treatment Plan:  Instructions for sling usage and slow progressive mobilization of shoulder were reviewed. She can discontinue the use of her sling at 6 weeks postop. Specific precautions were reviewed and emphasized. Patient will continue outpatient physical therapy as scheduled. She is okay to begin gentle active range of motion and strengthening at this time. Today, we will also plan on injecting the patient's left shoulder with a cortisone injection.   She has had this in the past and helped a great deal.  Follow up appointment was arranged at patient's convenience in approximately 4 weeks    Plan of care sent to provider:   []NA   []Faxed   [x]Co-signature       Plan of care signed:    []NA   [x]Yes, signed on 9/2/20, 10/9/20   []No      Progress Note covers period from (if applicable):    []NA    [x]From 10/1/20-10/19/20    Next Progress Note due: Visit# / total visits:     Visit # Insurance Allowable Auth Required   In Person    13 PRN []  Yes     [x]  No    Tele Health 0  []  Yes     []  No    Total   13       Plan for Next Session:  Progress per protocol. Can initiate AROM and gentle strengthening      Subjective:  12/18/20 12/16/20 her arm is stiff and sore today- not sure if she was lying on her side. She can't find her ex papers. 12/9/20 her shoulder    12/7/20 see prog note below- signif hx in the past month! Follows up with MD in late November. Leaving for [place] today or tomorrow. Will be there for 4-6 weeks. Planning to continue with PT treatment while in [place]. Pain level: Sh 0-4/10   Lung 4-5/10  . Objective:  See belwo      Exercises:   8 wks p/o 10/20/20  Exercises in bold performed in department today. Items not bolded are carried forward from prior visits for continuity of the record.   Exercise/Equipment Resistance/Repetitions Other comments     codmans x10      PROM R shoulder (flex, abd, IR/ER) 2x10 each direction      scap retraction 2x10 yellow tband    Standing tricep kickback with tband 2x10 yellow    Standing bicep curls with tband 2x10 yellow           Supine back pocket shrugs 3x10      AAROM cane flex supine x5-  Hold today due to pain        \"          Cane ER supine x10- hold today due to pain    Table slides flex and er     3x10 at end of 10 hold 10+ sec ea Changed due to L shoulder pain   active IR/ER seated small rom   3x10    Supine arom flex elbow bent  2x3     kashmir adduct, wall flex, ext,abd, IR, ER   5\" x 5 progress to 10x gentle    Standing reach upper trap then high hip  x5 prog to 2x10 gradually      Rhythmic stabilization in supine 90/90 x1 min with manual resistance      Ceiling punches 2x10      Supine figure 8 2x10    Supine chest press 2x10    12/7/20  Mid and low rows Red  2x10                  IR/ER Red  2x 10                 adduction Red  2x 10    Needs pic for home                 Ceiling punch Green  2x 10    12//9/20 re instruct above ex.      12/16/20 abduction in SL 1#  2x 10  Then 3x10                    ER in SL 1#  2x10  Then 3x 10    Given HO of ex from past 2 visits due to loosing them!     12/18/20  For rib/thoracic pain- stretch w/ tennis ball in sock, towel roll in horiz and vert 5 min                                       Therapeutic Exercise/Home Exercise Program:     5 min           Pt does have red tputty for  strengthening. Group Therapy:    0 minutes    Therapeutic Activity:  0 minutes     Gait: 0 minutes    Neuromuscular Re-Education:  0 minutes    Manual Therapy:    30 minutes. ROM, light stretching  . Thoracic mobiliz I prone- light, R shoulder abduction in SL in combination with stretching the R rib cage. Pt felt much better in her rib  Area after rx.   t.    Modalities:Game ready to R shoulder with her  seated, 34 deg, mod pressure. Functional Outcome Measure:   []NA  Measure Used:  Quick dash  Date Assessed:9/1/20  10/19/20  Score: 48 total= 84%   20.45%    Assessment/Treatment/Activity Tolerance:  Treatment in [place] on hold until her return from [facility]. She plans to continue her treatment in [place]. Patients response to treatment:   [x]Patient tolerated treatment well with no adverse reactions noted  []Patient limited by fatigue   []Patient limited by pain  []Patient limited by other medical complications   [x]Other:  Pain decreased to 1/10 after treatment. Goals:      GOALS    Short Term Goals:  6   weeks Long Term Goals:  12   weeks   1). Establish HEP- MET 1). Pt independent with HEP   2). Pain 5 /10 or less MET 2). Pain 2 /10 or less   3). Progress ROM per protocol- MET 3). AROM R shoulder WNL   4). Progress strength per protocol-MET 4). MMT 4+/5 or >throughout R shoulder   5). Patient able to sleep without waking due to pain 5). Patient able to return to prior level of activity without limitations noted.         Prognosis: [x]Good   []Fair []Poor    Patient Requires Follow-up:  [x]Yes  []No    Plan: []Plan of care initiated     [x]Continue per plan of care    [] Alter current plan (see comments)    []Hold pending return from [place] []Discharge    Timed Code Treatment Minutes:   30  ( 2man,    )    Total Treatment Minutes:     35    Medicare Cap total YTD:  $ [id number]  Electronically signed by:  RUSSELL Carrera 23      Outpatient Physical Therapy  Progress Note      Progress Note covers period from:  20   To 20      Subjective: reports being away from therapy due to fluttering in her chest.  Possible blood clot- she had a CT scan. 2 spots on her lung was discovered. She had further imaging ( PET scan). Then a spot on her neck was discovered. She had biopsy and her neck spot was negative. A few weeks later she had another attack of chest pain and fluttering. It was discovered she had A-fib. The doctors were still concerned about the spots on her lung, they had grown a bit. She then had a lung wedge biopsy  3 weeks ago. , hospitalized for 6 days. .  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 She is now on eliquis for her heart, nothing for the lung. Dr. Edomnd Shah is her cardiologist.   Dr. Childers referred her back to therapy  Pain scale:  shoulder 0-4/10    Lun-5/10 constant. Takes tylenol 3x day for it.  States she can't reach up to the 2nd shelf very well. Her shoulder feels stiff and weak.       Objective:   Observation:   Test measurements:  AROM:   flx 140*     ext 50     abd 140*     IR to T9         ER to T3                                            MMT:       3+/5             4/5          4-/5             4/5               4-/5                                            PROM:     165                            155                                   90    Functional Outcome Measure:      quick dash:   20   Score:  28     % disability: 39%   ( scored better on 10/19/20)      Assessment: doing well with ROM and strength.   Her lung pain is the most limiting. Would expect her to do well with 1 month of therapy to get full AROM and improve her strength and overall functional use.     Summary: seen x 11 visits, includes today   Patient's response to treatment:      Goals:  · Progress toward previous goals: working toward 12 wk goals above. Plan: cont 2x wk for manual tech and exervcise progression. ·     Electronically Signed by: Phi Kessler PT [id number]          Note: If patient does not return for scheduled/ recommended follow up visits, this note will serve as a discharge from care along with most recent update on progress.

## 2020-12-18 NOTE — TELEPHONE ENCOUNTER
[facility] called to inform smm of patient's BP readings:    12/18 170/80 P 92   12/12   160/72 P82   11/25  160/71 P84

## 2020-12-21 ENCOUNTER — CARE COORDINATION (OUTPATIENT)
Dept: CASE MANAGEMENT | Age: 75
End: 2020-12-21

## 2020-12-21 ENCOUNTER — HOSPITAL ENCOUNTER (OUTPATIENT)
Dept: PHYSICAL THERAPY | Age: 75
Setting detail: THERAPIES SERIES
Discharge: HOME | End: 2020-12-21
Payer: MEDICARE

## 2020-12-21 NOTE — FLOWSHEET NOTE
Physical Therapy  Cancellation/No-show Note  Patient Name:  Joe Manzano  :  1945   Date:  2020  Cancels to Date: 0  No-shows to Date: 1    For today's appointment patient:  []  Cancelled  []  Rescheduled appointment  [x]  No-show     Reason given by patient:  []  Patient ill  []  Conflicting appointment  []  No transportation    []  Conflict with work  []  No reason given  []  Other:     Comments:  PTA called pt and she had her days confused.   We rescheduled her for tomorrow at 3  Electronically signed by:  Dharmesh Bush, [id number]

## 2020-12-22 ENCOUNTER — HOSPITAL ENCOUNTER (OUTPATIENT)
Dept: PHYSICAL THERAPY | Age: 75
Setting detail: THERAPIES SERIES
Discharge: HOME | End: 2020-12-22
Payer: MEDICARE

## 2020-12-22 PROCEDURE — 97016 VASOPNEUMATIC DEVICE THERAPY: CPT

## 2020-12-22 PROCEDURE — 97110 THERAPEUTIC EXERCISES: CPT

## 2020-12-22 PROCEDURE — 97140 MANUAL THERAPY 1/> REGIONS: CPT

## 2020-12-22 NOTE — FLOWSHEET NOTE
[address] [facility], [address]  Phone [phone]  Fax [phone]    Outpatient Physical Therapy     [x] Daily Treatment Note   [] Progress Note  12/7/20  Bottom of this page. [] Discharge Note    Date:  12/22/2020    Patient Name:  Shelton Henry         YOB: 1945    Medical Diagnosis: R RTC repair            (small/medium)     Treatment Diagnosis: same                                   Onset Date:  8/25/20               Referral Date:  8/25/20           Referring Physician: Rebecca Wright MD                                                Visits Allowed/Insurance/Certification Information: Medicare, [facility]     Restrictions/Precautions: Per protocol MD sent on script. Patient will remain in sling with the abduction pillow for 3 weeks post-op, then can discontinue the use of the abduction pillow, but should remain in regular sling for 3 more weeks. Can d/c sling at 6 weeks. Progress PROM and begin AAROM weeks 4-6. See script for protocol   10/5/20 Treatment Plan:  Instructions for sling usage and slow progressive mobilization of shoulder were reviewed. She can discontinue the use of her sling at 6 weeks postop. Specific precautions were reviewed and emphasized. Patient will continue outpatient physical therapy as scheduled. She is okay to begin gentle active range of motion and strengthening at this time. Today, we will also plan on injecting the patient's left shoulder with a cortisone injection.   She has had this in the past and helped a great deal.  Follow up appointment was arranged at patient's convenience in approximately 4 weeks    Plan of care sent to provider:   []NA   []Faxed   [x]Co-signature       Plan of care signed:    []NA   [x]Yes, signed on 9/2/20, 10/9/20   []No      Progress Note covers period from (if applicable):    []NA    [x]From 10/1/20-10/19/20    Next Progress Note due: Visit# / total visits:     Visit # Insurance Allowable Auth Required   In Person 14 PRN []  Yes     [x]  No    Tele Health 0  []  Yes     []  No    Total  14       Plan for Next Session:  Progress per protocol. Can initiate AROM and gentle strengthening      Subjective:  12/22/20  Reports she is now on 300 gabapentin and it is better but not good - the lung pain is worse than the shoulder right now  12/18/20 12/16/20 her arm is stiff and sore today- not sure if she was lying on her side. She can't find her ex papers. 12/9/20 her shoulder    12/7/20 see prog note below- signif hx in the past month! Follows up with MD in late November. Leaving for [facility] today or tomorrow. Will be there for 4-6 weeks. Planning to continue with PT treatment while in [place]. Pain level: Sh 0-1/10   Lung 4-5/10  . Objective:  See belwo      Exercises:   8 wks p/o 10/20/20  Exercises in bold performed in department today. Items not bolded are carried forward from prior visits for continuity of the record.   Exercise/Equipment Resistance/Repetitions Other comments     codmans x10      PROM R shoulder (flex, abd, IR/ER) 2x10 each direction      scap retraction 2x10 yellow tband    Standing tricep kickback with tband 2x10 yellow    Standing bicep curls with tband 2x10 yellow           Supine back pocket shrugs 3x10      AAROM cane flex supine x5-  Hold today due to pain        \"          Cane ER supine x10- hold today due to pain    Table slides flex and er     3x10 at end of 10 hold 10+ sec ea Changed due to L shoulder pain   active IR/ER seated small rom   3x10    Supine arom flex elbow bent  2x3     kashmir adduct, wall flex, ext,abd, IR, ER   5\" x 5 progress to 10x gentle    Standing reach upper trap then high hip  x5 prog to 2x10 gradually      Rhythmic stabilization in supine 90/90 x1 min with manual resistance      Ceiling punches 2x10      Supine figure 8 2x10    Supine chest press 2x10    12/7/20  Mid and low rows Red  2x10                  IR/ER Red  2x 10                 adduction Red  2x 10    Needs pic for home                 Ceiling punch Green  2x 10    12//9/20 re instruct above ex.      12/16/20 abduction in SL 1#  2x 10  Then 3x10                    ER in SL 1#  2x10  Then 3x 10    Given HO of ex from past 2 visits due to loosing them!     12/18/20  For rib/thoracic pain- stretch w/ tennis ball in sock, towel roll in horiz and vert 5 min    Leaning horizontal abd 2x10 12/22/20                                 Therapeutic Exercise/Home Exercise Program:     15 min           Pt does have red tputty for  strengthening. Group Therapy:    0 minutes    Therapeutic Activity:  0 minutes     Gait: 0 minutes    Neuromuscular Re-Education:  0 minutes    Manual Therapy:    20 minutes. ROM, light stretching  . Thoracic mobiliz I prone- light, R shoulder abduction in SL in combination with stretching the R rib cage. Pt felt much better in her rib  Area after rx.   t.    Modalities: 15 minutes. Game ready to R shoulder with her  seated, 34 deg, mod pressure. Functional Outcome Measure:   []NA  Measure Used:  Quick dash  Date Assessed:9/1/20  10/19/20  Score: 48 total= 84%   20.45%    Assessment/Treatment/Activity Tolerance:  Treatment in [facility] on hold until her return from [facility]. She plans to continue her treatment in [place]. Patients response to treatment:   [x]Patient tolerated treatment well with no adverse reactions noted  []Patient limited by fatigue   []Patient limited by pain  []Patient limited by other medical complications   [x]Other:  Pain decreased to 1/10 after treatment. Goals:      GOALS    Short Term Goals:  6   weeks Long Term Goals:  12   weeks   1). Establish HEP- MET 1). Pt independent with HEP   2). Pain 5 /10 or less MET 2). Pain 2 /10 or less   3). Progress ROM per protocol- MET 3). AROM R shoulder WNL   4). Progress strength per protocol-MET 4).   MMT 4+/5 or >throughout R shoulder 5).  Patient able to sleep without waking due to pain 5). Patient able to return to prior level of activity without limitations noted. Prognosis: [x]Good   []Fair   []Poor    Patient Requires Follow-up:  [x]Yes  []No    Plan: []Plan of care initiated     [x]Continue per plan of care    [] Alter current plan (see comments)    []Hold pending return from [place] []Discharge    Timed Code Treatment Minutes:   28  ( 1 man, 1 ex 1 vaso   )    Total Treatment Minutes:    50    Medicare Cap total YTD:  $ [phone]    Electronically signed by:  Robbie Gloria, [id number]      Outpatient Physical Therapy  Progress Note      Progress Note covers period from:  20   To 20      Subjective: reports being away from therapy due to fluttering in her chest.  Possible blood clot- she had a CT scan. 2 spots on her lung was discovered. She had further imaging ( PET scan). Then a spot on her neck was discovered. She had biopsy and her neck spot was negative. A few weeks later she had another attack of chest pain and fluttering. It was discovered she had A-fib. The doctors were still concerned about the spots on her lung, they had grown a bit. She then had a lung wedge biopsy  3 weeks ago. , hospitalized for 6 days. .  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 She is now on eliquis for her heart, nothing for the lung. Dr. Erik Garber is her cardiologist.   Dr. Jacob Raymond referred her back to therapy  Pain scale:  shoulder 0-4/10    Lun-5/10 constant. Takes tylenol 3x day for it.  States she can't reach up to the 2nd shelf very well. Her shoulder feels stiff and weak.         Objective:   Observation:   Test measurements:  AROM:   flx 140*     ext 50     abd 140*     IR to T9         ER to T3                                            MMT:       3+/5             4/5          4-/5             4/5               4-/5                                            PROM:     165                            155

## 2020-12-23 ENCOUNTER — HOSPITAL ENCOUNTER (OUTPATIENT)
Dept: PHYSICAL THERAPY | Age: 75
Setting detail: THERAPIES SERIES
Discharge: HOME | End: 2020-12-23
Payer: MEDICARE

## 2020-12-23 PROCEDURE — 97140 MANUAL THERAPY 1/> REGIONS: CPT

## 2020-12-23 PROCEDURE — 97110 THERAPEUTIC EXERCISES: CPT

## 2020-12-23 NOTE — DISCHARGE SUMMARY
[facility], [address]  Phone [phone]  Fax [phone]    Outpatient Physical Therapy     [x] Daily Treatment Note   [] Progress Note  12/7/20  Bottom of this page. [x] Discharge Note  12/23/20    Date:  12/23/2020    Patient Name:  Cary Nguyen         YOB: 1945    Medical Diagnosis: R RTC repair            (small/medium)     Treatment Diagnosis: same                                   Onset Date:  8/25/20               Referral Date:  8/25/20           Referring Physician: Chelsea Myrick MD                                                Visits Allowed/Insurance/Certification Information: Medicare, [facility]     Restrictions/Precautions: Per protocol MD sent on script. Patient will remain in sling with the abduction pillow for 3 weeks post-op, then can discontinue the use of the abduction pillow, but should remain in regular sling for 3 more weeks. Can d/c sling at 6 weeks. Progress PROM and begin AAROM weeks 4-6. See script for protocol   10/5/20 Treatment Plan:  Instructions for sling usage and slow progressive mobilization of shoulder were reviewed. She can discontinue the use of her sling at 6 weeks postop. Specific precautions were reviewed and emphasized. Patient will continue outpatient physical therapy as scheduled. She is okay to begin gentle active range of motion and strengthening at this time. Today, we will also plan on injecting the patient's left shoulder with a cortisone injection.   She has had this in the past and helped a great deal.  Follow up appointment was arranged at patient's convenience in approximately 4 weeks    Plan of care sent to provider:   []NA   []Faxed   [x]Co-signature       Plan of care signed:    []NA   [x]Yes, signed on 9/2/20, 10/9/20   []No      Progress Note covers period from (if applicable):    []NA    [x]From 10/1/20-10/19/20    Next Progress Note due:       Visit# / total visits:     Visit # Insurance Allowable Auth Required   In Person  15 PRN []  Yes     [x]  No    Tele Health 0  []  Yes     []  No    Total   15       Plan for Next Session:  Progress per protocol. Can initiate AROM and gentle strengthening      Subjective:  12/23/20 see prog note below. 12/22/20  Reports she is now on 300 gabapentin and it is better but not good - the lung pain is worse than the shoulder right now  12/18/20 12/16/20 her arm is stiff and sore today- not sure if she was lying on her side. She can't find her ex papers. 12/9/20 her shoulder    12/7/20 see prog note below- signif hx in the past month! Follows up with MD in late November. Leaving for [facility] today or tomorrow. Will be there for 4-6 weeks. Planning to continue with PT treatment while in [place]. Pain level: Sh 0-1/10   Lung 4-5/10  . Objective:  See belwo      Exercises:   8 wks p/o 10/20/20  Exercises in bold performed in department today. Items not bolded are carried forward from prior visits for continuity of the record.   Exercise/Equipment Resistance/Repetitions Other comments     codmans x10      PROM R shoulder (flex, abd, IR/ER) 2x10 each direction      scap retraction 2x10 yellow tband    Standing tricep kickback with tband 2x10 yellow    Standing bicep curls with tband 2x10 yellow           Supine back pocket shrugs 3x10      AAROM cane flex supine x5-  Hold today due to pain        \"          Cane ER supine x10- hold today due to pain    Table slides flex and er     3x10 at end of 10 hold 10+ sec ea Changed due to L shoulder pain   active IR/ER seated small rom   3x10    Supine arom flex elbow bent  2x3     kashmir adduct, wall flex, ext,abd, IR, ER   5\" x 5 progress to 10x gentle    Standing reach upper trap then high hip  x5 prog to 2x10 gradually      Rhythmic stabilization in supine 90/90 x1 min with manual resistance      Ceiling punches 2x10      Supine figure 8 2x10    Supine chest press 2x10    12/7/20  Mid and low rows Red  2x10                  IR/ER Red  2x 10                 adduction Red  2x 10    Needs pic for home                 Ceiling punch Green  2x 10    12//9/20 re instruct above ex.      12/16/20 abduction in SL 1#  2x 10  Then 3x10                    ER in SL 1#  2x10  Then 3x 10    Given HO of ex from past 2 visits due to loosing them!     12/18/20  For rib/thoracic pain- stretch w/ tennis ball in sock, towel roll in horiz and vert 5 min    Leaning horizontal abd 2x10 12/22/20 12/23/20 given green band for progression. Therapeutic Exercise/Home Exercise Program:     10 min           Pt does have red tputty for  strengthening. Group Therapy:    0 minutes    Therapeutic Activity:  0 minutes     Gait: 0 minutes    Neuromuscular Re-Education:  0 minutes    Manual Therapy:     30 minutes. ROM, light stretching  . Thoracic mobiliz I prone- light, R shoulder abduction in SL in combination with stretching the R rib cage. Pt felt much better in her rib  Area after rx.   t.    Modalities:      Functional Outcome Measure:   []NA  Measure Used:  Quick dash  Date Assessed:9/1/20  10/19/20  Score: 48 total= 84%   20.45%    Assessment/Treatment/Activity Tolerance:   See prog note below    Patients response to treatment:   [x]Patient tolerated treatment well with no adverse reactions noted  []Patient limited by fatigue   []Patient limited by pain  []Patient limited by other medical complications   []Other:     Goals:      GOALS    Short Term Goals:  6   weeks Long Term Goals:  12   weeks   1). Establish HEP- MET 1). Pt independent with HEP   2). Pain 5 /10 or less MET 2). Pain 2 /10 or less   3). Progress ROM per protocol- MET 3). AROM R shoulder WNL   4). Progress strength per protocol-MET 4). MMT 4+/5 or >throughout R shoulder   5). Patient able to sleep without waking due to pain 5).   Patient able to return to prior level of activity without limitations noted. Prognosis: [x]Good   []Fair   []Poor    Patient Requires Follow-up:  [x]Yes  []No    Plan: []Plan of care initiated     [x]Continue per plan of care    [] Alter current plan (see comments)    []Hold pending return from [facility] []Discharge    Timed Code Treatment Minutes:    36 ( 2 man, 1 ex     )    Total Treatment Minutes:    43    Medicare Cap total YTD:  $ [id number]    Electronically signed by:  Kusum Guerra [id number]      Outpatient Physical Therapy  Progress Note      Progress Note covers period from:  20   To 20 to 20      Subjective: 20 states her shoulder is feeling much better and her rib pain is much improved. She is using her R arm for all light and medium activities. 20  reports being away from therapy due to fluttering in her chest.  Possible blood clot- she had a CT scan. 2 spots on her lung was discovered. She had further imaging ( PET scan). Then a spot on her neck was discovered. She had biopsy and her neck spot was negative. A few weeks later she had another attack of chest pain and fluttering. It was discovered she had A-fib. The doctors were still concerned about the spots on her lung, they had grown a bit. She then had a lung wedge biopsy  3 weeks ago. , hospitalized for 6 days. .  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 She is now on eliquis for her heart, nothing for the lung. Dr. Gemini Weeks is her cardiologist.   Dr. Childers referred her back to therapy  Pain scale:  shoulder 0-4/10    Lun-5/10 constant. Takes tylenol 3x day for it.  States she can't reach up to the 2nd shelf very well. Her shoulder feels stiff and weak.         Objective:   Observation:   Test measurements:  AROM:   flx 155      ext 50     abd 1 50       IR to T9         ER to T4                                            MMT:       4/5             4+/5          4/5             4+/5               4/5                                            PROM:     170

## 2020-12-29 ENCOUNTER — HOSPITAL ENCOUNTER (OUTPATIENT)
Age: 75
Discharge: HOME | End: 2020-12-29
Payer: MEDICARE

## 2020-12-29 LAB
AFB CULTURE (MYCOBACTERIA): NORMAL
AFB SMEAR: NORMAL
ANION GAP SERPL CALCULATED.3IONS-SCNC: 11 MMOL/L (ref 3–16)
BUN BLDV-MCNC: 16 MG/DL (ref 7–20)
CALCIUM SERPL-MCNC: 9.8 MG/DL (ref 8.3–10.6)
CHLORIDE BLD-SCNC: 100 MMOL/L (ref 99–110)
CO2: 30 MMOL/L (ref 21–32)
CREAT SERPL-MCNC: 0.8 MG/DL (ref 0.6–1.2)
GFR AFRICAN AMERICAN: >60
GFR NON-AFRICAN AMERICAN: >60
GLUCOSE BLD-MCNC: 97 MG/DL (ref 70–99)
POTASSIUM SERPL-SCNC: 3.9 MMOL/L (ref 3.5–5.1)
SODIUM BLD-SCNC: 141 MMOL/L (ref 136–145)

## 2020-12-29 PROCEDURE — 80048 BASIC METABOLIC PNL TOTAL CA: CPT

## 2020-12-29 PROCEDURE — 36415 COLL VENOUS BLD VENIPUNCTURE: CPT

## 2020-12-30 RX ORDER — AMLODIPINE BESYLATE 5 MG/1
5 TABLET ORAL DAILY
Qty: 30 TABLET | Refills: 5
Start: 2020-12-30 | End: 2021-04-21 | Stop reason: SDUPTHER

## 2020-12-30 NOTE — TELEPHONE ENCOUNTER
I called her   She has headache cough and thinks it started after taking lisinopril  Previously she use to take amlodipine 10 and losartan 100 before but not now. She should start back on amlodipine 5mg daily stop lisinopril  Continue HCTZ 25 mg daily. She will not start losartan yet but will monitor BP.

## 2021-01-25 DIAGNOSIS — M79.2 NEUROPATHIC PAIN: ICD-10-CM

## 2021-01-25 RX ORDER — GABAPENTIN 300 MG/1
300 CAPSULE ORAL 3 TIMES DAILY
Qty: 90 CAPSULE | Refills: 0 | OUTPATIENT
Start: 2021-01-25 | End: 2021-04-21

## 2021-01-25 NOTE — TELEPHONE ENCOUNTER
Pt called and requested a refill of Gabapentin 300 mg three times daily. Continues with neuropathic pain s/p thoracotomy. Verified with Dr. Ginette Steward and Rx called into pharm where pt is living in [facility].

## 2021-04-20 NOTE — PROGRESS NOTES
[facility]   Cardiac Followup    Referring Provider:  Zenia Villalta MD     No chief complaint on file. Subjective: Ms. Pedro Goel is here today for hospital follow up afib, HTN, HLD; no complaints today    History of Present Illness:   Rajiv Glover a 70 y. o. female [address] of HTN, PAF dx 10/20 (on eliquis for [facility]), HLD, RLS, OA, and GERD. Admitted to [facility] 10/20/20  c/o right-sided CP radiating into back and posterior neck described as sharp sensation occurring while laying in bed. Admit EKG Sinus rhythm. Note CT imaging negative PE but right nonspecific peripheral densities and hilar LAD.  After admit noted to be in afib and  was placed on Dilt drip 10mg/hr. Converted to NSR 4 hours later and gtt turned off but recurrent with dilt gtt then turned back on. She reverted back to NSR. I reviewed EKG showing afib RVR 155bpm; ST change inferior leads consider ischemia. Another EKG showed afib RVR 120bpm; nonspecific ST change. Note she did NOT feel palps with afib. She does report feeling \"fluttering\" in back in September? Note underwent w/u for RLL opacities on CT imaging but PET scan not hypermetabolic. Right parotid lesion found with increased activity and US guided biopsy 10/9/20 performed. She reports negative pathology. Most recent ECHO 10/22/20 EF >65%. No regional wall abnls; Grade I DD with normal LV filling pressure. Trivial TR/OK (no change from 4/19 ECHO). On 10/22/20  underwent EBUS with Dr. Radha Martinez- No malignant cells identified. Inconclusive study. Most recent EKG 11/6/20 NSR 77bpm. Referred to Dr. Vance Gomez who then referred to [address] [place] surgeon, Dr. Francis Rodgers. Today she reports being fatigued. She denies any recurrence of CP since being discharged from hospital in October. Denies shortness of breath, edema, dizziness, palpitations and syncope. She had VATS RLL wedge lung biopsy on 11/12/20 with Dr. Brittany Martin. Biopsies were benign.      Today she reports       Past Medical History:   has a past hydroCHLOROthiazide (HYDRODIURIL) 25 MG tablet Take 1 tablet by mouth every morning 11/25/20   Jessica Wing MD   acetaminophen (TYLENOL) 500 MG tablet Take 500 mg by mouth every 6 hours as needed for Pain    Historical Provider, MD   simvastatin (ZOCOR) 20 MG tablet TAKE 1 TABLET BY MOUTH  NIGHTLY 7/30/20   Denis Benz MD   omeprazole (PRILOSEC) 10 MG delayed release capsule TAKE 1 CAPSULE BY MOUTH  DAILY  Patient taking differently: Take 10 mg by mouth daily as needed  7/30/20   Denis Benz MD   alendronate (FOSAMAX) 70 MG tablet TAKE 1 TABLET BY MOUTH  EVERY 7 DAYS  Patient taking differently: Take 70 mg by mouth every 7 days Takes weekly on Mondays 7/24/20   Loa Bence, APRN - CNP   Calcium Carb-Cholecalciferol (CALCIUM + D3) 600-200 MG-UNIT TABS Take 1 tablet by mouth 2 times daily     Historical Provider, MD        Allergies:  Naproxen and Percocet [oxycodone-acetaminophen]     Review of Systems:   · Constitutional: there has been no unanticipated weight loss. There's been no change in energy level, sleep pattern, or activity level. · Eyes: No visual changes or diplopia. No scleral icterus. · ENT: No Headaches, hearing loss or vertigo. No mouth sores or sore throat. · Cardiovascular: Reviewed in HPI  · Respiratory: No cough or wheezing, no sputum production. No hematemesis. · Gastrointestinal: No abdominal pain, appetite loss, blood in stools. No change in bowel or bladder habits. · Genitourinary: No dysuria, trouble voiding, or hematuria. · Musculoskeletal:  No gait disturbance, weakness or joint complaints. · Integumentary: No rash or pruritis. · Neurological: No headache, diplopia, change in muscle strength, numbness or tingling. No change in gait, balance, coordination, mood, affect, memory, mentation, behavior. · Psychiatric: No anxiety, no depression. · Endocrine: No malaise, fatigue or temperature intolerance. No excessive thirst, fluid intake, or urination. No tremor.   · Hematologic/Lymphatic: No abnormal bruising or bleeding, blood clots or swollen lymph nodes. · Allergic/Immunologic: No nasal congestion or hives. Physical Examination:    There were no vitals filed for this visit. Constitutional and General Appearance: NAD   Respiratory:  · Normal excursion and expansion without use of accessory muscles  · Resp Auscultation: Soft crackles left base otherwise Normal breath sounds without dullness  Cardiovascular:  · The apical impulses not displaced  · Heart tones are crisp and normal  · Cervical veins are not engorged  · The carotid upstroke is normal in amplitude and contour without delay or bruit  · Normal S1S2, No S3, No Murmur  · Peripheral pulses are symmetrical and full  · There is no clubbing, cyanosis of the extremities. · No edema  · Femoral Arteries: 2+ and equal  · Pedal Pulses: 2+ and equal   Abdomen:  · No masses or tenderness  · Liver/Spleen: No Abnormalities Noted  Neurological/Psychiatric:  · Alert and oriented in all spheres  · Moves all extremities well  · Exhibits normal gait balance and coordination  · No abnormalities of mood, affect, memory, mentation, or behavior are noted        Skin: warm and dry  Lab Results   Component Value Date    CHOL 134 10/21/2020    CHOL 175 06/09/2020    CHOL 192 06/03/2019     Lab Results   Component Value Date    TRIG 106 10/21/2020    TRIG 141 06/09/2020    TRIG 200 (H) 06/03/2019     Lab Results   Component Value Date    HDL 44 10/21/2020    HDL 54 06/09/2020    HDL 52 06/03/2019     Lab Results   Component Value Date    LDLCALC 69 10/21/2020    LDLCALC 93 06/09/2020    LDLCALC 100 (H) 06/03/2019     Lab Results   Component Value Date    LABVLDL 21 10/21/2020    LABVLDL 28 06/09/2020    LABVLDL 40 06/03/2019     No results found for: CHOLHDLRATIO    Assessment:     1. PAF (paroxysmal atrial fibrillation) [facility]): Diagnosed October 2020. Note she did NOT feel palpitations.   I reviewed EKG showing afib RVR 155bpm and another EKG showed afib RVR 120bpm.   Most recent ECHO 10/22/20 EF >65%. No regional wall abnls; Grade I DD with normal LV filling pressure. Trivial TR/MD (no change from 4/19 ECHO). Most recent EKG 11/6/20 NSR 77bpm. Note CHADs-vasc=2 and PAF with no symptoms I will continue eliquis 5mg BID. 2. Essential hypertension: Well controlled and will continue current medical regimen. 3. Hyperlipidemia, unspecified hyperlipidemia type: Most recent lipids 10/22/20 see above. I personally reviewed lab results in epic and discussed with patient. 4.      Chest pain: Resolved with no recurrence since admission in October. Given normal ECHO and no further CP I will watch clinically for now. No stress testing will be ordered. She is to call if further CP occurs and will consider nuc stress at that time if needed. Plan:  1. This note was scribed in the presence of Elidia Wallace MD by Hunter Frankel RN      Cost of prescription medications and patient compliance have been reviewed with patient. All questions answered. Thank you for allowing me to participate in the care of this individual.    Mary Rose.  Kenny Montes M.D., [facility]

## 2021-04-21 ENCOUNTER — OFFICE VISIT (OUTPATIENT)
Dept: CARDIOLOGY CLINIC | Age: 76
End: 2021-04-21
Payer: MEDICARE

## 2021-04-21 VITALS
DIASTOLIC BLOOD PRESSURE: 64 MMHG | BODY MASS INDEX: 26.43 KG/M2 | TEMPERATURE: 98.2 F | WEIGHT: 131.12 LBS | SYSTOLIC BLOOD PRESSURE: 140 MMHG | HEIGHT: 59 IN | OXYGEN SATURATION: 95 % | HEART RATE: 77 BPM

## 2021-04-21 DIAGNOSIS — I10 ESSENTIAL HYPERTENSION: ICD-10-CM

## 2021-04-21 DIAGNOSIS — E78.5 HYPERLIPIDEMIA, UNSPECIFIED HYPERLIPIDEMIA TYPE: ICD-10-CM

## 2021-04-21 DIAGNOSIS — M79.2 NEUROPATHIC PAIN: ICD-10-CM

## 2021-04-21 DIAGNOSIS — Z87.891 HISTORY OF TOBACCO ABUSE: ICD-10-CM

## 2021-04-21 DIAGNOSIS — I48.0 PAF (PAROXYSMAL ATRIAL FIBRILLATION) (HCC): Primary | ICD-10-CM

## 2021-04-21 PROCEDURE — G8427 DOCREV CUR MEDS BY ELIG CLIN: HCPCS | Performed by: INTERNAL MEDICINE

## 2021-04-21 PROCEDURE — G8399 PT W/DXA RESULTS DOCUMENT: HCPCS | Performed by: INTERNAL MEDICINE

## 2021-04-21 PROCEDURE — 1123F ACP DISCUSS/DSCN MKR DOCD: CPT | Performed by: INTERNAL MEDICINE

## 2021-04-21 PROCEDURE — 3017F COLORECTAL CA SCREEN DOC REV: CPT | Performed by: INTERNAL MEDICINE

## 2021-04-21 PROCEDURE — 99214 OFFICE O/P EST MOD 30 MIN: CPT | Performed by: INTERNAL MEDICINE

## 2021-04-21 PROCEDURE — 1090F PRES/ABSN URINE INCON ASSESS: CPT | Performed by: INTERNAL MEDICINE

## 2021-04-21 PROCEDURE — 4040F PNEUMOC VAC/ADMIN/RCVD: CPT | Performed by: INTERNAL MEDICINE

## 2021-04-21 PROCEDURE — 1036F TOBACCO NON-USER: CPT | Performed by: INTERNAL MEDICINE

## 2021-04-21 PROCEDURE — G8417 CALC BMI ABV UP PARAM F/U: HCPCS | Performed by: INTERNAL MEDICINE

## 2021-04-21 RX ORDER — AMLODIPINE BESYLATE 5 MG/1
5 TABLET ORAL DAILY
Qty: 90 TABLET | Refills: 3 | Status: SHIPPED | OUTPATIENT
Start: 2021-04-21 | End: 2022-03-16

## 2021-04-21 RX ORDER — UBIDECARENONE 75 MG
50 CAPSULE ORAL DAILY
COMMUNITY

## 2021-04-21 RX ORDER — GABAPENTIN 300 MG/1
300 CAPSULE ORAL 4 TIMES DAILY
Qty: 90 CAPSULE | Refills: 0
Start: 2021-04-21 | End: 2021-07-20

## 2021-04-21 NOTE — PROGRESS NOTES
[address]   Cardiac Followup    Referring Provider:  Penny Ji MD     Chief Complaint   Patient presents with    Follow-up     5 month office visit    Hypertension    Hyperlipidemia    Other     No new cardiac complaints      Subjective: Ms. Richard Castañeda is here today for hospital follow up afib, HTN, HLD; no complaints today    History of Present Illness:   Erik Fontaine a 70 y. o. female [address] of HTN, PAF dx 10/20 (on eliquis for [facility]), HLD, RLS, OA, and GERD. Admitted to [facility] 10/20/20 with c/o CP radiating into back and neck.  After admit noted to be in afib and had couple episodes of PAF then converting NSR. I reviewed EKG showing afib RVR 155bpm. Second EKG same. Note she did NOT feel palps with afib. Note underwent w/u for RLL opacities on CT imaging but PET scan not hypermetabolic. Right parotid lesion found with increased activity and US guided biopsy 10/9/20 performed. She reports negative pathology. Most recent ECHO 10/22/20 EF >65%. No regional wall abnls; Grade I DD with normal LV filling pressure. Trivial TR/MT (no change from 4/19 ECHO). On 10/22/20  underwent EBUS with Dr. Jasmina Elena- No malignant cells identified. Inconclusive study. Most recent EKG 11/6/20 NSR 77bpm. Referred to Dr. Zora Alvarado who then referred to [facility] surgeon, Dr. Rick Raya. Since last OV she underwent a VATS wedge biopsy on 11/16/20. 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 Today she reports feeling good. She states she was in [place] and saw her PCP while there. Reports BP at that time was good (128/80mmHg)  She is taking her medications as prescribed. She can do her daily activities without issue. Patient denies chest pain, sob, palpitations, dizziness or syncope. She has been fully vaccinated against Covid-19.     Past Medical History:   has a past medical history of Atrial fibrillation ([address] COPD (chronic obstructive pulmonary disease) ([address] Difficult intravenous access, Diverticulitis, GERD (gastroesophageal reflux disease), Hyperlipidemia, Hypertension, Osteoarthritis, Osteopenia, PONV (postoperative nausea and vomiting), Prolonged emergence from general anesthesia, Restless legs syndrome, and S/P shoulder surgery. Surgical History:   has a past surgical history that includes ovarian cyst removal; Breast reduction surgery (10/15/15); Colonoscopy (11/15/2016); pr excision tumor soft tissue back/flank subq <3cm (N/A, 8/23/2019); Shoulder arthroscopy (Right, 8/25/2020); Tonsillectomy; other surgical history (10/22/2020); bronchoscopy (N/A, 10/22/2020); bronchoscopy (10/22/2020); bronchoscopy (10/22/2020); bronchoscopy (10/22/2020); bronchoscopy (10/22/2020); and Thoracoscopy (Right, 11/12/2020). Social History:   reports that she quit smoking about 17 years ago. Her smoking use included cigarettes. She started smoking about 51 years ago. She has a 60.00 pack-year smoking history. She has never used smokeless tobacco. She reports current alcohol use. She reports that she does not use drugs. Family History:  family history includes Alzheimer's Disease in her mother; Heart Attack in her sister; Heart Disease in her brother, father, mother, and sister; High Blood Pressure in her mother. Home Medications:  Prior to Admission medications    Medication Sig Start Date End Date Taking? Authorizing Provider   vitamin B-12 (CYANOCOBALAMIN) 100 MCG tablet Take 50 mcg by mouth daily   Yes Historical Provider, MD   gabapentin (NEURONTIN) 300 MG capsule Take 1 capsule by mouth 3 times daily for 30 days.  Intended supply: 30 days 1/25/21 4/21/21 Yes Mat Bah MD   amLODIPine (NORVASC) 5 MG tablet Take 1 tablet by mouth daily 12/30/20  Yes Grecia Jay MD   apixaban (ELIQUIS) 5 MG TABS tablet Take 5 mg by mouth 2 times daily   Yes Historical Provider, MD   hydroCHLOROthiazide (HYDRODIURIL) 25 MG tablet Take 1 tablet by mouth every morning 11/25/20  Yes Ángel Hinojosa MD   acetaminophen (TYLENOL) 500 MG tablet Take 500 mg by mouth every 6 hours as needed for Pain   Yes Historical Provider, MD   simvastatin (ZOCOR) 20 MG tablet TAKE 1 TABLET BY MOUTH  NIGHTLY 7/30/20  Yes Herb Rivas MD   omeprazole (PRILOSEC) 10 MG delayed release capsule TAKE 1 CAPSULE BY MOUTH  DAILY  Patient taking differently: Take 10 mg by mouth daily as needed  7/30/20  Yes Herb Rivas MD   alendronate (FOSAMAX) 70 MG tablet TAKE 1 TABLET BY MOUTH  EVERY 7 DAYS  Patient taking differently: Take 70 mg by mouth every 7 days Takes weekly on Mondays 7/24/20  Yes JEANNA Reed - CNP   Calcium Carb-Cholecalciferol (CALCIUM + D3) 600-200 MG-UNIT TABS Take 1 tablet by mouth 2 times daily    Yes Historical Provider, MD   oxyCODONE (ROXICODONE) 5 MG immediate release tablet Take 5 mg by mouth nightly. Historical Provider, MD        Allergies:  Naproxen and Percocet [oxycodone-acetaminophen]     Review of Systems:   · Constitutional: there has been no unanticipated weight loss. There's been no change in energy level, sleep pattern, or activity level. · Eyes: No visual changes or diplopia. No scleral icterus. · ENT: No Headaches, hearing loss or vertigo. No mouth sores or sore throat. · Cardiovascular: Reviewed in HPI  · Respiratory: No cough or wheezing, no sputum production. No hematemesis. · Gastrointestinal: No abdominal pain, appetite loss, blood in stools. No change in bowel or bladder habits. · Genitourinary: No dysuria, trouble voiding, or hematuria. · Musculoskeletal:  No gait disturbance, weakness or joint complaints. · Integumentary: No rash or pruritis. · Neurological: No headache, diplopia, change in muscle strength, numbness or tingling. No change in gait, balance, coordination, mood, affect, memory, mentation, behavior. · Psychiatric: No anxiety, no depression. · Endocrine: No malaise, fatigue or temperature intolerance. No excessive thirst, fluid intake, or urination. No tremor.   · Hematologic/Lymphatic: No abnormal bruising or bleeding, blood clots or swollen lymph nodes. · Allergic/Immunologic: No nasal congestion or hives. Physical Examination:    Vitals:    04/21/21 1402   BP: (!) 140/64   Pulse: 77   Temp: 98.2   SpO2: 95%        Constitutional and General Appearance: NAD   Respiratory:  · Normal excursion and expansion without use of accessory muscles  · Resp Auscultation: clear breath sounds  Cardiovascular:  · The apical impulses not displaced  · Heart tones are crisp and normal  · Cervical veins are not engorged  · The carotid upstroke is normal in amplitude and contour without delay or bruit  · Normal S1S2, No S3, No Murmur  · Peripheral pulses are symmetrical and full  · There is no clubbing, cyanosis of the extremities. · No edema  · Femoral Arteries: 2+ and equal  · Pedal Pulses: 2+ and equal   Abdomen:  · No masses or tenderness  · Liver/Spleen: No Abnormalities Noted  Neurological/Psychiatric:  · Alert and oriented in all spheres  · Moves all extremities well  · Exhibits normal gait balance and coordination  · No abnormalities of mood, affect, memory, mentation, or behavior are noted        Skin: warm and dry  Lab Results   Component Value Date    CHOL 134 10/21/2020    CHOL 175 06/09/2020    CHOL 192 06/03/2019     Lab Results   Component Value Date    TRIG 106 10/21/2020    TRIG 141 06/09/2020    TRIG 200 (H) 06/03/2019     Lab Results   Component Value Date    HDL 44 10/21/2020    HDL 54 06/09/2020    HDL 52 06/03/2019     Lab Results   Component Value Date    LDLCALC 69 10/21/2020    LDLCALC 93 06/09/2020    LDLCALC 100 (H) 06/03/2019     Lab Results   Component Value Date    LABVLDL 21 10/21/2020    LABVLDL 28 06/09/2020    LABVLDL 40 06/03/2019     No results found for: CHOLHDLRATIO    Assessment:     1. PAF (paroxysmal atrial fibrillation) [facility]): Diagnosed October 2020. Note she did NOT feel palpitations.   I reviewed EKG showing afib RVR 155bpm and second EKG showing similar findings. Most recent ECHO 10/22/20 EF >65%. No regional wall abnls; Grade I DD with normal LV filling pressure. Trivial TR/NJ (no change from 4/19 ECHO). Most recent EKG 11/6/20 NSR 77bpm. Note CHADs-vasc=2 and PAF with no symptoms I will continue eliquis 5mg BID. 2. Essential hypertension: Well controlled and will continue current medical regimen. 3. Hyperlipidemia, unspecified hyperlipidemia type: Most recent lipids 10/22/20 see above. I personally reviewed lab results in epic and discussed with patient. Well controlled and will continue current medical regimen. 4.      Chest pain: Resolved with no recurrence since admission in October. Given normal ECHO and no further CP. Will follow clinically only for now. No stress testing will be ordered. Plan:  1. Meds reviewed. No changes. Refills as warranted   2. When you get home, check your medications with our list   3. No cardiac testing warranted at this time  4. Follow up in 1 year or sooner if needed. 5. Check EKG next OV in 2022. Cost of prescription medications and patient compliance have been reviewed with patient. All questions answered. Thank you for allowing me to participate in the care of this individual.    This note was scribed in the presence of Dr. Modena Aase MD by Jerome Robles RN.     I, Dr. Bridget Jacobson, personally performed the services described in this documentation, as scribed by the above signed scribe in my presence. It is both accurate and complete to my knowledge. I agree with the details independently gathered by the clinical support staff, while the remaining scribed note accurately describes my personal service to the patient. Jaclyn Biggs.  Modena Aase, M.D., [facility] [place]

## 2021-04-21 NOTE — PATIENT INSTRUCTIONS
Plan:  1. Meds reviewed. No changes. Refills as warranted   2. When you get home, check your medications with our list   3. No cardiac testing warranted at this time  4.  Follow up in 1 year or sooner if needed

## 2021-04-22 RX ORDER — HYDROCHLOROTHIAZIDE 25 MG/1
25 TABLET ORAL EVERY MORNING
Qty: 30 TABLET | Refills: 0 | Status: SHIPPED | OUTPATIENT
Start: 2021-04-22 | End: 2021-04-26 | Stop reason: SDUPTHER

## 2021-04-22 NOTE — TELEPHONE ENCOUNTER
Pt was seen by [facility] on 4.21.21 was told to check what dosage of HCTZ she is taking. Per pt she is taking HCTZ  25 mg 1 qd. Dosage on med list is correct.

## 2021-04-26 RX ORDER — HYDROCHLOROTHIAZIDE 25 MG/1
25 TABLET ORAL EVERY MORNING
Qty: 90 TABLET | Refills: 5 | Status: SHIPPED | OUTPATIENT
Start: 2021-04-26 | End: 2022-06-28 | Stop reason: SDUPTHER

## 2021-04-28 ENCOUNTER — TELEPHONE (OUTPATIENT)
Dept: CARDIOTHORACIC SURGERY | Age: 76
End: 2021-04-28

## 2021-05-12 ENCOUNTER — HOSPITAL ENCOUNTER (OUTPATIENT)
Dept: PHYSICAL THERAPY | Age: 76
Setting detail: THERAPIES SERIES
Discharge: HOME | End: 2021-05-12
Payer: MEDICARE

## 2021-05-12 PROCEDURE — 97110 THERAPEUTIC EXERCISES: CPT

## 2021-05-12 PROCEDURE — 97140 MANUAL THERAPY 1/> REGIONS: CPT

## 2021-05-12 PROCEDURE — 97161 PT EVAL LOW COMPLEX 20 MIN: CPT

## 2021-05-12 NOTE — FLOWSHEET NOTE
[facility] [address]  Phone [phone]                                                [x] Daily Treatment Note   [] Progress Note   [] Discharge Note      Date:  2021    Patient Name:  Misti Wayne        :  1945   Medical Diagnosis:  Lumbago w/ sciatica,  Radiculopathy, spondyloysis                                        ICD 10:  M54.41   M43.06   M 54.16     Treatment Diagnosis:  Same, back pain     Onset Date:    3/1/21       Referral Date:5/10/21                             Referring Physician:  Dr. Salazar Ice of care signed (Y/N): Faxed today    Progress report will be due (10 Rx or 30 days whichever is less):     Recertification will be due (POC Duration  / 90 days whichever is less):  21    Visit# / total visits:   Visit # Insurance Allowable Auth Required   In Person  1  medicare and [facility] []  Yes     []  No    [facility] 0  []  Yes     []  No    Total  1       Latex Allergy:  [x]NO      []YES    Pain level: /10     Subjective:         Exercises:    osteopenia  Exercise/Equipment Resistance/Repetitions Other comments   21 explained course and role of PT     Hamstring stretch 20-30 sec x 5    TA in sit and supine Sets of 10,    50 a day                                                                   NV- check pelvis- standing, sit, sit up test, sulcus: trreat accordingly, manual lumbar distraction, core ex. Therapeutic Exercise:  15 min    Group Therapy:      Home Exercise Program:      Therapeutic Activity:      Neuromuscular Re-education:      Gait:      Manual Therapy:  30 min.  gentel lumbo pelvic roll bilat  No cavitaiton. Improved sit up test after. Manual lumbar distraction with pelvic girdle straps.     Canalith Repositioning Procedure:       Modalities:      Charges:  Timed Code Treatment Minutes:  45   Total Treatment Minutes:   60   BWC time for each procedure?:  TE TIME:  NMR TIME:  MANUAL TIME:  UNTIMED MINUTES:          [x] EVAL (LOW) 60688 (typically 20 minutes face-to-face)  [] EVAL (MOD) 29159 (typically 30 minutes face-to-face)  [] EVAL (HIGH) 00165 (typically 45 minutes face-to-face)  [] RE-EVAL     [x] JZ(87344) x     [] IONTO  [] NMR (66029) x     [] VASO  [x] Manual (64929) x 2    [] Other:  [] TA x      [] Mech Traction (23522)  [] ES(attended) (18689)     [] ES (un) (95069): Medicare Cap Total YTD:  180.00    Treatment/Activity Tolerance:    [x] Patient tolerated treatment well [] Patient limited by fatigue   [x] Patient limited by pain [] Patient limited by other medical complications   [] Other:     Prognosis: [x] Good [] Fair  [] Poor    Patient Requires Follow-up:  [x] Yes  [] No    Plan: [] Continue per plan of care [] Alter current plan (see comments)   [x] Plan of care initiated [] Hold pending MD visit [] Discharge    Plan for Next Session:  above  See Progress Note: [x] Yes  [] No       Next due:         Electronically signed by:  RUSSELL [address]  Note: If patient does not return for scheduled/ recommended follow up visits, this note will serve as a discharge from care along with most recent update on progress.            Outpatient Physical Therapy  Progress Note      Progress Note covers period from:   5/12/21 To       Subjective:           Objective:   Observation:   Test measurements:       Functional Outcome Measure:            Assessment:   Summary:    Patient's response to treatment:      Goals:  · Progress toward previous goals:      Plan:  ·     Electronically Signed by:

## 2021-05-12 NOTE — PROGRESS NOTES
[facility] [facility], [facility], [address]  Phone: [phone]                                                    Physical Therapy Certification    We had the pleasure of evaluating the following patient for physical therapy services at [address] [place] [address]. A summary of our findings can be found in the initial assessment below. This includes our plan of care. If you have any questions or concerns regarding these findings, please do not hesitate to contact me at the office phone number checked above. Thank you for the referral.       Physician Signature:_______________________________Date:__________________  By signing above (or electronic signature), therapists plan is approved by physician    LUMBOSACRAL PHYSICAL THERAPY EVALUATION    Patient: Emilee Gibson   : 1945   MRN: [id number]       Medical Diagnosis:  Lumbago w/ sciatica,  Radiculopathy, spondyloysis       ICD 10:  M54.41   M43.06   M 54.16    Treatment Diagnosis:  Same, back pain    Onset Date:   3/1/21     Referral Date:5/10/21      Referring Physician:  Dr. Ngozi Glynn        Visits Allowed/Insurance/Certification Information:  Medicare, [facility]    Precautions/ Contra-indications/Relevant Medical History:    C-SSRS Triggered by Intake questionnaire (Past 2 wk assessment):   [x] No, Questionnaire did not trigger screening.   [] Yes, Patient intake triggered further evaluation      [] C-SSRS Screening completed  [] PCP notified via Plan of Care  [] Emergency services notified     Pt's Occupation/Job Duties:     retired    Social support/Environment:   Lives alone. 2 steps to enter. She takes care of her property,  1 acre. Mows her grass, plants flowerd, has a garden    Health History reviewed with pt:  Yes.   A fib, COPD, osteopenia, OA, HTN, restless leg syndrome (mild), GERD    SUBJECTIVE FINDINGS       History of Present Illness:    21  Her problem started in early March. She had a similar episode 4-5 years ago that resolved with steroids. She started her steroids 1 day ago. Pain       Patient describes pain to be intermit. Starts in R buttock and runs down the back of her leg to just below her knee on the medial side. She has some intermit ant R knee pain, she has light pain in the R groin and down the front of her thigh. After sitting she has to stand still for a fewminutes due to the R leg pressure and a little pain. Able to felipe on her feet doing things without pain. Patient reports      0-3 /10 pain at rest- lying down in bed  ,      2-4/10 sitting.         /10 pain with activity. Worsened by  - prolonged sitting.     Improved by -   Pt. reports pain with coughing, sneezing and laughing:  NO  Pt. reports bowel and bladder changes:  YES/NO   Current Functional Limitations:  none  PLOF:  (I), able to walk a mile in Jan 2021  Pt's sleep is affected?   no    Patient goal for therapy:  Pain relief    OBJECTIVE FINDINGS       Posture         Standing pelvic landmarks: R landmarks all higher ( slightly longer leg)    Gait/Steps/Balance       Deviations on a level linoleum surface include - neg  Balance: good  Stairs:NT    Quick Tests/Functional Myotome Tests:     Unilateral sit to stand (upper lumbar):   Heel Walk (L4):able   Toe Walk (S1):able             SI Tests- standing:  March Test (Gillet Test):N  Cigarette Butt Test:  Standing Flexion Test:N  Sacral Sulci Tests: N    Lumbar Range of Motion/Strength Testing     ROM (*denotes pain) AROM PROM MMT/RESISTED  COMMENTS   Flexion wfl                        Extension ''      Sidebending Left ''      Sidebending Right ''      Rotation Left  ''   [x]   Seated  []   Standing       Rotation Right ''    [x]   Seated  []   Standing         SI/Hip Tests- seated:    Seated pelvic landmarks: R crest and PSIS hifgher  Sitting Flexion Test:  +R  Hip PROM IR/ER: WNL  Joe's sign: + R  And center of [x]Hypertension (I10)  [x]Hyperlipidemia (E78.5)  []Angina pectoris (I20)  []Atherosclerosis (I70)   Musculoskeletal conditions   []Disc pathology   []Congenital spine pathologies   []Prior surgical intervention  []Osteoporosis (M81.8)  [x]Osteopenia (M85.8)   Endocrine conditions   []Hypothyroid (E03.9)  []Hyperthyroid Gastrointestinal conditions   []Constipation (J59.44)   Metabolic conditions   []Morbid obesity (E66.01)  []Diabetes type 1(E10.65) or 2 (E11.65)   []Neuropathy (G60.9)     Pulmonary conditions   []Asthma (J45)  []Coughing   []COPD (J44.9)   Psychological Disorders  []Anxiety (F41.9)  []Depression (F32.9)   []Other:   [x]Other:   Heart problems/lung/ a fib       Specific Mobility Testing     Lumbar:         Other:         Functional Outcome Measure    Measure used:  LEFS  Score:  42  % Disability:  48%    ASSESSMENT: presents with s/s consistent with dx, also has R SI jt dysfucntion, slightly longer R leg, L4 and L5 myotome weakness and L4 decreased reflex at the R knee indicating nerve root compression and/or irritation. Would expect her to do well with 4-8 weeks of therapy.     Functional Impairments:     [x]Noted lumbar/proximal hip/LE joint hypomobility/hypermobility   []Decreased LE functional ROM   [x]Decreased core/proximal hip strength and neuromuscular control   []Decreased LE functional strength   []Reduced balance/proprioceptive control   []other:      Functional Activity Limitations (from functional questionnaire and intake)   []Reduced ability to tolerate prolonged functional positions   []Reduced ability or difficulty with changes of positions or transfers between positions   [x]Reduced ability to maintain good posture and demonstrate good body mechanics with sitting, bending, and lifting   []Reduced ability to sleep   [x] Reduced ability or tolerance with driving and/or computer work   []Reduced ability to perform lifting, carrying tasks   []Reduced ability to squat   []Reduced ability to forward bend   []Reduced ability to ambulate prolonged functional periods/distances/surfaces   []Reduced ability to ascend/descend stairs   []Reduced ability to run, hop, cut or jump   []other:    Participation Restrictions   []Reduced participation in self care activities   [x]Reduced participation in home management activities   []Reduced participation in work activities   []Reduced participation in social activities. []Reduced participation in sport/recreation activities. Classification :    []Signs/symptoms consistent with Lumbar instability/stabilization subgroup. [x]Signs/symptoms consistent with Lumbar mobilization/manipulation subgroup, myotomes and dermatomes intact. Meets manipulation criteria. []Signs/symptoms consistent with Lumbar direction specific/centralization subgroup   [x]Signs/symptoms consistent with Lumbar traction subgroup     [x]Signs/symptoms consistent with lumbar facet dysfunction   []Signs/symptoms consistent with lumbar stenosis type dysfunction   [x]Signs/symptoms consistent with nerve root involvement including myotome & dermatome dysfunction   []Signs/symptoms consistent with post-surgical status including: decreased ROM, strength and function. []signs/symptoms consistent with pathology which may benefit from Dry needling      Rehabilitation Potential:  Good for goals listed below. Strengths for achieving goals include: Motivated  Limitations for achieving goals include:  Severity of her condition. Prognosis: [x]    Good []    Fair []    Poor    Short Term Goals:  1. Independent in HEP and progression per patient tolerance, in order to prevent re-injury. [] Progressing: [] Met: [] Not Met: [] Adjusted   2. Patient will have a decrease in pain to facilitate improvement in movement, function, and ADLs as indicated by Functional Deficits. [] Progressing: [] Met: [] Not Met: [] Adjusted     Long Term Goals:  1.  Disability index score of 30% or less for the LEFS  functional questionnaire to assist with reaching prior level of function. [] Progressing: [] Met: [] Not Met: [] Adjusted   2. Patient will demonstrate increased AROM to WNL trunk and hips to allow for proper joint functioning as indicated by patients Functional Deficits. [] Progressing: [] Met: [] Not Met: [] Adjusted   3. Patient will demonstrate an increase in strength to 4+/5 to allow for proper functional mobility as indicated by patients Functional Deficits. [] Progressing: [] Met: [] Not Met: [] Adjusted   4. Patient will return to all transfers, work activities, and functional activities without increased symptoms or restriction. [] Progressing: [] Met: [] Not Met: [] Adjusted   5. Patient will have 0/10 pain with ADL's.  [] Progressing: [] Met: [] Not Met: [] Adjusted   6. Patient stated goal:  Pain relief  [] Progressing: [] Met: [] Not Met: [] Adjusted     PLAN OF CARE     To see patient  2 x/week for  4-8 weeks for the following treatment interventions:     Therapeutic Exercise   Progressive Resistive Exercise   Modalities of Choice (Heat/Cold/US/EStim/Ionto)   Home Exercise Program   Manual Techniques/Mobilization   Postural Reeducation    Physical Therapy Evaluation Complexity Justification  [x] EVAL (LOW) 21161 (typically 20 minutes face-to-face)  [] EVAL (MOD) 91621 (typically 30 minutes face-to-face)  [] EVAL (HIGH) 34113 (typically 45 minutes face-to-face)  [] RE-EVAL     Thank you for the referral of this patient.       Timed Code Treatment Minutes:   45  minutes      Total Treatment Time:  [address] 6 PT   Jefferson Darby

## 2021-05-19 ENCOUNTER — HOSPITAL ENCOUNTER (OUTPATIENT)
Dept: PHYSICAL THERAPY | Age: 76
Setting detail: THERAPIES SERIES
Discharge: HOME | End: 2021-05-19
Payer: MEDICARE

## 2021-05-19 ENCOUNTER — OFFICE VISIT (OUTPATIENT)
Dept: INTERNAL MEDICINE CLINIC | Age: 76
End: 2021-05-19
Payer: MEDICARE

## 2021-05-19 VITALS
TEMPERATURE: 97.2 F | HEART RATE: 81 BPM | OXYGEN SATURATION: 96 % | WEIGHT: 126 LBS | DIASTOLIC BLOOD PRESSURE: 80 MMHG | BODY MASS INDEX: 25.45 KG/M2 | SYSTOLIC BLOOD PRESSURE: 122 MMHG

## 2021-05-19 DIAGNOSIS — E78.5 HYPERLIPIDEMIA, UNSPECIFIED HYPERLIPIDEMIA TYPE: ICD-10-CM

## 2021-05-19 DIAGNOSIS — R73.01 IFG (IMPAIRED FASTING GLUCOSE): Primary | ICD-10-CM

## 2021-05-19 DIAGNOSIS — I10 ESSENTIAL HYPERTENSION: ICD-10-CM

## 2021-05-19 PROCEDURE — 4040F PNEUMOC VAC/ADMIN/RCVD: CPT | Performed by: INTERNAL MEDICINE

## 2021-05-19 PROCEDURE — G8399 PT W/DXA RESULTS DOCUMENT: HCPCS | Performed by: INTERNAL MEDICINE

## 2021-05-19 PROCEDURE — 1036F TOBACCO NON-USER: CPT | Performed by: INTERNAL MEDICINE

## 2021-05-19 PROCEDURE — 97110 THERAPEUTIC EXERCISES: CPT

## 2021-05-19 PROCEDURE — 1090F PRES/ABSN URINE INCON ASSESS: CPT | Performed by: INTERNAL MEDICINE

## 2021-05-19 PROCEDURE — 99214 OFFICE O/P EST MOD 30 MIN: CPT | Performed by: INTERNAL MEDICINE

## 2021-05-19 PROCEDURE — 97140 MANUAL THERAPY 1/> REGIONS: CPT

## 2021-05-19 PROCEDURE — G8417 CALC BMI ABV UP PARAM F/U: HCPCS | Performed by: INTERNAL MEDICINE

## 2021-05-19 PROCEDURE — G8427 DOCREV CUR MEDS BY ELIG CLIN: HCPCS | Performed by: INTERNAL MEDICINE

## 2021-05-19 PROCEDURE — 3017F COLORECTAL CA SCREEN DOC REV: CPT | Performed by: INTERNAL MEDICINE

## 2021-05-19 PROCEDURE — 1123F ACP DISCUSS/DSCN MKR DOCD: CPT | Performed by: INTERNAL MEDICINE

## 2021-05-19 SDOH — ECONOMIC STABILITY: FOOD INSECURITY: WITHIN THE PAST 12 MONTHS, YOU WORRIED THAT YOUR FOOD WOULD RUN OUT BEFORE YOU GOT MONEY TO BUY MORE.: NEVER TRUE

## 2021-05-19 SDOH — ECONOMIC STABILITY: FOOD INSECURITY: WITHIN THE PAST 12 MONTHS, THE FOOD YOU BOUGHT JUST DIDN'T LAST AND YOU DIDN'T HAVE MONEY TO GET MORE.: NEVER TRUE

## 2021-05-19 ASSESSMENT — ENCOUNTER SYMPTOMS
CHEST TIGHTNESS: 0
SHORTNESS OF BREATH: 0
DIARRHEA: 0
VOMITING: 0
ABDOMINAL DISTENTION: 0
COUGH: 0
NAUSEA: 0
CONSTIPATION: 0
WHEEZING: 0
BACK PAIN: 0

## 2021-05-19 ASSESSMENT — PATIENT HEALTH QUESTIONNAIRE - PHQ9
SUM OF ALL RESPONSES TO PHQ QUESTIONS 1-9: 0
2. FEELING DOWN, DEPRESSED OR HOPELESS: 0
SUM OF ALL RESPONSES TO PHQ QUESTIONS 1-9: 0

## 2021-05-19 NOTE — PROGRESS NOTES
5/19/21    [facility]  1945      Chief Complaint   Patient presents with    Hypertension       HPI    Here for f/u htn. Pt also has a h/o hyperlipidemia and IFG. Underwent right VATS with lymph node biopsy. Continues to have pain and numbness on the right side of her chest. Improved with gabapentin.     htn improved on current regimen. Denies SE to medication. Lipids improved with simvastatin 20mg. Denies muscle aches except for incision pain. Review of Systems   Constitutional: Negative for unexpected weight change. Respiratory: Negative for cough, chest tightness, shortness of breath and wheezing. Cardiovascular: Negative for palpitations and leg swelling. Gastrointestinal: Negative for abdominal distention, constipation, diarrhea, nausea and vomiting. Musculoskeletal: Negative for arthralgias, back pain and myalgias. Chest wall pain   Neurological: Negative for tremors and numbness. All other systems reviewed and are negative. Current Outpatient Medications   Medication Sig Dispense Refill    hydroCHLOROthiazide (HYDRODIURIL) 25 MG tablet Take 1 tablet by mouth every morning 90 tablet 5    vitamin B-12 (CYANOCOBALAMIN) 100 MCG tablet Take 50 mcg by mouth daily      gabapentin (NEURONTIN) 300 MG capsule Take 1 capsule by mouth 4 times daily for 30 days.  Intended supply: 30 days 90 capsule 0    amLODIPine (NORVASC) 5 MG tablet Take 1 tablet by mouth daily 90 tablet 3    apixaban (ELIQUIS) 5 MG TABS tablet Take 5 mg by mouth 2 times daily      acetaminophen (TYLENOL) 500 MG tablet Take 500 mg by mouth every 6 hours as needed for Pain      simvastatin (ZOCOR) 20 MG tablet TAKE 1 TABLET BY MOUTH  NIGHTLY 90 tablet 3    omeprazole (PRILOSEC) 10 MG delayed release capsule TAKE 1 CAPSULE BY MOUTH  DAILY (Patient taking differently: Take 10 mg by mouth daily as needed ) 90 capsule 3    alendronate (FOSAMAX) 70 MG tablet TAKE 1 TABLET BY MOUTH  EVERY 7 DAYS (Patient taking differently: Take 70 mg by mouth every 7 days Takes weekly on Mondays) 12 tablet 3    Calcium Carb-Cholecalciferol (CALCIUM + D3) 600-200 MG-UNIT TABS Take 1 tablet by mouth 2 times daily        No current facility-administered medications for this visit. Physical Exam  Constitutional:       General: She is not in acute distress. Appearance: She is well-developed. She is not diaphoretic. Neck:      Thyroid: No thyromegaly. Cardiovascular:      Rate and Rhythm: Normal rate and regular rhythm. Heart sounds: Normal heart sounds. No murmur heard. No friction rub. No gallop. Pulmonary:      Effort: Pulmonary effort is normal. No respiratory distress. Breath sounds: Normal breath sounds. No wheezing or rales. Abdominal:      General: There is no distension. Palpations: Abdomen is soft. Tenderness: There is no abdominal tenderness. There is no guarding or rebound. Musculoskeletal:      Cervical back: Neck supple. Neurological:      Mental Status: She is alert and oriented to person, place, and time. Vitals:    05/19/21 0704   BP: 122/80   Pulse: 81   Temp: 97.2 °F (36.2 °C)   SpO2: 96%         ASSESSMENT/PLAN:  1. IFG (impaired fasting glucose)  - Hemoglobin A1C; Future    2. Essential hypertension  Controlled. .continue current regimen  - Comprehensive Metabolic Panel; Future    3. Hyperlipidemia, unspecified hyperlipidemia type  Improved. Continue statin  - Lipid Panel; Future  - Comprehensive Metabolic Panel;  Future

## 2021-05-21 ENCOUNTER — HOSPITAL ENCOUNTER (OUTPATIENT)
Dept: PHYSICAL THERAPY | Age: 76
Setting detail: THERAPIES SERIES
Discharge: HOME | End: 2021-05-21
Payer: MEDICARE

## 2021-05-21 PROCEDURE — 97140 MANUAL THERAPY 1/> REGIONS: CPT

## 2021-05-21 PROCEDURE — 97110 THERAPEUTIC EXERCISES: CPT

## 2021-05-21 NOTE — FLOWSHEET NOTE
[facility] [address]  Phone [phone]                                                [x] Daily Treatment Note   [] Progress Note   [] Discharge Note      Date:  2021    Patient Name:  Mikie Curran        :  1945   Medical Diagnosis:  Lumbago w/ sciatica,  Radiculopathy, spondyloysis                                        ICD 10:  M54.41   M43.06   M 54.16     Treatment Diagnosis:  Same, back pain     Onset Date:    3/1/21       Referral Date:5/10/21                             Referring Physician:  Dr. Terese Matamoros of care signed (Y/N): Faxed today    Progress report will be due (10 Rx or 30 days whichever is less):     Recertification will be due (POC Duration  / 90 days whichever is less):  21    Visit# / total visits:   Visit # Insurance Allowable Auth Required   In Person   3  [facility] and [facility] []  Yes     []  No    [facility] 0  []  Yes     []  No    Total   3       Latex Allergy:  [x]NO      []YES    Pain level: 0-3/10 sitting    Subjective:  21 reports she is 50% improved since last visit. Finished her steroids on . Yesterday power washed her deck 2-3 hrs, ran errands, she was busy most of the day. She had buttock and post leg pain to below her knee by 9-10 pm,  She was better the few days before. Not much pain today  21  Her problem started in early March. She had a similar episode 4-5 years ago that resolved with steroids. She started her steroids 1 day ago.     Pain       Patient describes pain to be intermit. Starts in R buttock and runs down the back of her leg to just below her knee on the medial side. She has some intermit ant R knee pain, she has light pain in the R groin and down the front of her thigh.   After sitting she has to stand still for a fewminutes due to the R leg pressure and a little pain. Able to felipe on her feet doing things without pain. Patient reports      0-3 /10 pain at rest- lying down in bed  ,      2-4/10 sitting.         /10 pain with activity. Worsened by  - prolonged sitting. Improved by -   Pt. reports pain with coughing, sneezing and laughing:  NO  Pt. reports bowel and bladder changes:  YES/NO   Current Functional Limitations:  none  PLOF:  (I), able to walk a mile in Jan 2021  Pt's sleep is affected? no     Patient goal for therapy:  Pain relief          Exercises:    osteopenia  Exercise/Equipment Resistance/Repetitions Other comments   5/12/21 explained course and role of PT  HEP   Hamstring stretch 20-30 sec x 5 x   TA in sit and supine Sets of 10,    50 a day x   5/19/21 prone heel sqz Hold 5 sec  3x 5   incr reps x                        Hip IR/ER 3x5 bilat x                       Hip ext \"             \"      Feet over edge x   Positional distraction on L side 10-20 min for R leg relief X    towel at waist         Upper trunk rots 3x10 x          Lower trunk rots 3x10 x     5/21/21 bridges 3x10    Controlled knee fallout 3x10    Cont all above ex                    NV- check pelvis- standing, sit, sit up test, sulcus: trreat accordingly, manual lumbar distraction, core ex.- alnt arm/leg raise, planks, eventually birdog,                      Therapeutic Exercise:   30 min    Group Therapy:      Home Exercise Program:      Therapeutic Activity:      Neuromuscular Re-education:      Gait:      Manual Therapy:  30 min. Osteopenia.   5/21/21 neg SI jt tests, symmetrical sulcus. No MET performed. Manual lumbar distraction with pelvic girdle. Hamstrings L -25   R -33. L foot: no pain with AROM or resisted ankle/foot motions, pain w/ wt bearing throught the arch and dorsum of foot. Appears that she has sprained her mid foot. Suggested \"goog\" tennis shoes and using her plantar fasciitis inserts 100% of the time.   She has \"sacony\" shoes but likes to wear her slip on shoees with no arch support. Discussed this at length. 5/19//21  Minor + seated flx test.  Asymmetry in standing ( longer slightly R leg), improves in sittng. R DF 5/5    R EHL 4/5. Manul: MET for deep R sulcus, gentel lumbo pelvic roll for R SI jt- no cavitation. MET for post rot R ilium, shotgun. Manual lumbar distraction w/ pelvic girdle strap    5/12/21 gentel lumbo pelvic roll bilat  No cavitaiton. Improved sit up test after. Manual lumbar distraction with pelvic girdle straps. Canalith Repositioning Procedure:       Modalities:      Charges:  Timed Code Treatment Minutes:    60   Total Treatment Minutes:    60   BWC time for each procedure?:  TE TIME:  NMR TIME:  MANUAL TIME:  UNTIMED MINUTES:          [] EVAL (LOW) 76758 (typically 20 minutes face-to-face)  [] EVAL (MOD) 26295 (typically 30 minutes face-to-face)  [] EVAL (HIGH) 42136 (typically 45 minutes face-to-face)  [] RE-EVAL     [x] BB(00475) x  2   [] IONTO  [] NMR (20504) x     [] VASO  [x] Manual (80589) x 2    [] Other:  [] TA x      [] Mech Traction (19390)  [] ES(attended) (15356)     [] ES (un) (43323): Medicare Cap Total YTD:    425.00    Treatment/Activity Tolerance:    [x] Patient tolerated treatment well [] Patient limited by fatigue   [] Patient limited by pain [] Patient limited by other medical complications   [] Other:     Prognosis: [x] Good [] Fair  [] Poor    Patient Requires Follow-up:  [x] Yes  [] No    Plan: [x] Continue per plan of care [] Alter current plan (see comments)   [] Plan of care initiated [] Hold pending MD visit [] Discharge    Plan for Next Session:  above  See Progress Note: [x] Yes  [] No       Next due:         Electronically signed by:  Kellie Clinton PT, [address]  Note: If patient does not return for scheduled/ recommended follow up visits, this note will serve as a discharge from care along with most recent update on progress.            Outpatient Physical Therapy  Progress Note      Progress Note covers period from:   5/12/21 To       Subjective:           Objective:   Observation:   Test measurements:       Functional Outcome Measure:            Assessment:   Summary:    Patient's response to treatment:      Goals:  · Progress toward previous goals:      Plan:  ·     Electronically Signed by:

## 2021-05-22 DIAGNOSIS — K21.9 GASTROESOPHAGEAL REFLUX DISEASE: ICD-10-CM

## 2021-05-23 RX ORDER — SIMVASTATIN 20 MG
TABLET ORAL
Qty: 90 TABLET | Refills: 3 | Status: SHIPPED | OUTPATIENT
Start: 2021-05-23 | End: 2022-06-28 | Stop reason: SDUPTHER

## 2021-05-23 RX ORDER — OMEPRAZOLE 10 MG/1
10 CAPSULE, DELAYED RELEASE ORAL DAILY
Qty: 90 CAPSULE | Refills: 3 | Status: SHIPPED | OUTPATIENT
Start: 2021-05-23

## 2021-05-24 ENCOUNTER — HOSPITAL ENCOUNTER (OUTPATIENT)
Dept: PHYSICAL THERAPY | Age: 76
Setting detail: THERAPIES SERIES
Discharge: HOME | End: 2021-05-24
Payer: MEDICARE

## 2021-05-24 PROCEDURE — 97140 MANUAL THERAPY 1/> REGIONS: CPT

## 2021-05-24 NOTE — FLOWSHEET NOTE
[facility] [address]  Phone [phone]                                                [x] Daily Treatment Note   [] Progress Note   [] Discharge Note      Date:  2021    Patient Name:  Sera Calixto        :  1945   Medical Diagnosis:  Lumbago w/ sciatica,  Radiculopathy, spondyloysis                                        ICD 10:  M54.41   M43.06   M 54.16     Treatment Diagnosis:  Same, back pain     Onset Date:    3/1/21       Referral Date:5/10/21                             Referring Physician:  Dr. Scarlet Quinones of [facility] signed (Y/N): Faxed today    Progress report will be due (10 Rx or 30 days whichever is less):     Recertification will be due (POC Duration  / 90 days whichever is less):  21    Visit# / total visits:   Visit # Insurance Allowable Auth Required   In Person   4  medicare and [facility] []  Yes     []  No    [facility] 0  []  Yes     []  No    Total    4       Latex Allergy:  [x]NO      []YES    Pain level: 0-3/10 sitting    Subjective:  21 doing pretty well today. Last evening had pain down her leg to behind her knee/calf- she did lay on her L side for relief and did the upper trunk and lower trunk before she went to bed. She is busy today with flowers, baking a cake, etc.  21 reports she is 50% improved since last visit. Finished her steroids on . Yesterday power washed her deck 2-3 hrs, ran errands, she was busy most of the day. She had buttock and post leg pain to below her knee by 9-10 pm,  She was better the few days before. Not much pain today  21  Her problem started in early March. She had a similar episode 4-5 years ago that resolved with steroids. She started her steroids 1 day ago.     Pain       Patient describes pain to be intermit.   Starts in R buttock and runs down the back of her leg to just below her knee on the medial side. She has some intermit ant R knee pain, she has light pain in the R groin and down the front of her thigh. After sitting she has to stand still for a fewminutes due to the R leg pressure and a little pain. Able to felipe on her feet doing things without pain. Patient reports      0-3 /10 pain at rest- lying down in bed  ,      2-4/10 sitting.         /10 pain with activity. Worsened by  - prolonged sitting. Improved by -   Pt. reports pain with coughing, sneezing and laughing:  NO  Pt. reports bowel and bladder changes:  YES/NO   Current Functional Limitations:  none  PLOF:  (I), able to walk a mile in Jan 2021  Pt's sleep is affected? no     Patient goal for therapy:  Pain relief          Exercises:    osteopenia  Exercise/Equipment Resistance/Repetitions Other comments   5/12/21 explained course and role of PT  HEP   Hamstring stretch 20-30 sec x 5 x   TA in sit and supine Sets of 10,    50 a day x   5/19/21 prone heel sqz Hold 5 sec  3x 5   incr reps x                        Hip IR/ER 3x5 bilat x                       Hip ext \"             \"      Feet over edge x   Positional distraction on L side 10-20 min for R leg relief X    towel at waist         Upper trunk rots 3x10 x          Lower trunk rots 3x10 x     5/21/21 bridges 3x10    Controlled knee fallout 3x10    Cont all above ex                    NV- check pelvis- standing, sit, sit up test, sulcus: trreat accordingly, manual lumbar distraction, core ex.- alnt arm/leg raise, planks, eventually birdog,                      Therapeutic Exercise:     min    Group Therapy:      Home Exercise Program:      Therapeutic Activity:      Neuromuscular Re-education:      Gait:      Manual Therapy:   40 min. Osteopenia. 5/24/21 neg SI jt tests, neg sulcus. No MET performed. Manual lumbar distraction x 25 min.  sidelying over pillows x 10 min.  5/21/21 neg SI jt tests, symmetrical sulcus. No MET performed. Manual lumbar distraction with pelvic girdle. Hamstrings L -25   R -33. L foot: no pain with AROM or resisted ankle/foot motions, pain w/ wt bearing throught the arch and dorsum of foot. Appears that she has sprained her mid foot. Suggested \"good\" tennis shoes and using her plantar fasciitis inserts 100% of the time. She has \"sacony\" shoes but likes to wear her slip on shoees with no arch support. Discussed this at length. 5/19//21  Minor + seated flx test.  Asymmetry in standing ( longer slightly R leg), improves in sittng. R DF 5/5    R EHL 4/5. Manul: MET for deep R sulcus, gentel lumbo pelvic roll for R SI jt- no cavitation. MET for post rot R ilium, shotgun. Manual lumbar distraction w/ pelvic girdle strap    5/12/21 gentel lumbo pelvic roll bilat  No cavitaiton. Improved sit up test after. Manual lumbar distraction with pelvic girdle straps. Canalith Repositioning Procedure:       Modalities:      Charges:  Timed Code Treatment Minutes: 40      Total Treatment Minutes:   40   BWC time for each procedure?:  TE TIME:  NMR TIME:  MANUAL TIME:  UNTIMED MINUTES:          [] EVAL (LOW) 79273 (typically 20 minutes face-to-face)  [] EVAL (MOD) 96941 (typically 30 minutes face-to-face)  [] EVAL (HIGH) 72199 (typically 45 minutes face-to-face)  [] RE-EVAL     [] UG(03545) x  2   [] IONTO  [] NMR (84414) x     [] VASO  [x] Manual (74094) x 3    [] Other:  [] TA x      [] [facility] Traction (78954)  [] ES(attended) (94247)     [] ES (un) (17331):     Medicare Cap Total YTD:    515.00    Treatment/Activity Tolerance:    [x] Patient tolerated treatment well [] Patient limited by fatigue   [] Patient limited by pain [] Patient limited by other medical complications   [] Other:     Prognosis: [x] Good [] Fair  [] Poor    Patient Requires Follow-up:  [x] Yes  [] No    Plan: [x] Continue per plan of care [] Alter current plan (see comments)   [] Plan of care initiated [] Hold pending MD visit [] Discharge    Plan for Next Session:  above  See Progress Note: [] Yes  [x] No       Next due:         Electronically signed by:  Todd Washington PT, [address]  Note: If patient does not return for scheduled/ recommended follow up visits, this note will serve as a discharge from care along with most recent update on progress.            Outpatient Physical Therapy  Progress Note      Progress Note covers period from:   5/12/21 To       Subjective:           Objective:   Observation:   Test measurements:       Functional Outcome Measure:            Assessment:   Summary:    Patient's response to treatment:      Goals:  · Progress toward previous goals:      Plan:  ·     Electronically Signed by: No pertinent family history in first degree relatives  No pertinent family history in first degree relatives

## 2021-05-25 ENCOUNTER — APPOINTMENT (OUTPATIENT)
Dept: PHYSICAL THERAPY | Age: 76
End: 2021-05-25
Payer: MEDICARE

## 2021-05-26 ENCOUNTER — HOSPITAL ENCOUNTER (OUTPATIENT)
Dept: PHYSICAL THERAPY | Age: 76
Setting detail: THERAPIES SERIES
Discharge: HOME | End: 2021-05-26
Payer: MEDICARE

## 2021-05-26 PROCEDURE — 97110 THERAPEUTIC EXERCISES: CPT

## 2021-05-26 PROCEDURE — 97140 MANUAL THERAPY 1/> REGIONS: CPT

## 2021-05-26 NOTE — FLOWSHEET NOTE
[facility] [facility] [address]  Phone [phone]                                                [x] Daily Treatment Note   [] Progress Note   [] Discharge Note      Date:  2021    Patient Name:  Belinda Khan        :  1945   Medical Diagnosis:  Lumbago w/ sciatica,  Radiculopathy, spondyloysis                                        ICD 10:  M54.41   M43.06   M 54.16     Treatment Diagnosis:  Same, back pain     Onset Date:    3/1/21       Referral Date:5/10/21                             Referring Physician:  Dr. Sagrario Soler of care signed (Y/N): Faxed today    Progress report will be due (10 Rx or 30 days whichever is less):     Recertification will be due (POC Duration  / 90 days whichever is less):  21    Visit# / total visits:   Visit # Insurance Allowable Auth Required   In Person 5  []  Yes     []  No    Tele Health 0  []  Yes     []  No    Total 5       Latex Allergy:  [x]NO      []YES    Pain level: 0-3/10 sitting    Subjective: 21  Reports no pain in back (just a little in her R buttock) at the moment after cleaning [place].  21 doing pretty well today. Last evening had pain down her leg to behind her knee/calf- she did lay on her L side for relief and did the upper trunk and lower trunk before she went to bed. She is busy today with flowers, baking a cake, etc.  21 reports she is 50% improved since last visit. Finished her steroids on . Yesterday power washed her deck 2-3 hrs, ran errands, she was busy most of the day. She had buttock and post leg pain to below her knee by 9-10 pm,  She was better the few days before. Not much pain today  21  Her problem started in early March. She had a similar episode 4-5 years ago that resolved with steroids.   She started her steroids 1 day ago.     Pain       Patient describes pain to be intermit. Starts in R buttock and runs down the back of her leg to just below her knee on the medial side. She has some intermit ant R knee pain, she has light pain in the R groin and down the front of her thigh. After sitting she has to stand still for a fewminutes due to the R leg pressure and a little pain. Able to felipe on her feet doing things without pain. Patient reports      0-3 /10 pain at rest- lying down in bed  ,      2-4/10 sitting.         /10 pain with activity. Worsened by  - prolonged sitting. Improved by -   Pt. reports pain with coughing, sneezing and laughing:  NO  Pt. reports bowel and bladder changes:  YES/NO   Current Functional Limitations:  none  PLOF:  (I), able to walk a mile in Jan 2021  Pt's sleep is affected? no     Patient goal for therapy:  Pain relief          Exercises:    osteopenia  Exercise/Equipment Resistance/Repetitions Other comments   5/12/21 explained course and role of PT  HEP   Hamstring stretch 20-30 sec x 5 x   TA in sit and supine Sets of 10,    50 a day x   5/19/21 prone heel sqz Hold 5 sec  3x 5   incr reps x                        Hip IR/ER 3x5 bilat x                       Hip ext \"             \"      Feet over edge x   Positional distraction on L side 10-20 min for R leg relief X    towel at waist         Upper trunk rots 3x10 x          Lower trunk rots 3x10 x     5/21/21 bridges 3x10 x   Controlled knee fallout 3x10 x   Supine alt arm/leg  2x5  restricted rom for R shoulder  x                  NV- check pelvis- standing, sit, sit up test, sulcus: trreat accordingly, manual lumbar distraction, core ex. - planks, eventually birdog,                      Therapeutic Exercise:    8 min    Group Therapy:      Home Exercise Program:      Therapeutic Activity:      Neuromuscular Re-education:      Gait:      Manual Therapy:   32 min. Osteopenia. 5/24/21 neg SI jt tests, neg sulcus. No MET performed. Manual lumbar distraction x 25 min. sidelying over pillows x 10 min.  5/21/21 neg SI jt tests, symmetrical sulcus. No MET performed. Manual lumbar distraction with pelvic girdle. Hamstrings L -25   R -33. L foot: no pain with AROM or resisted ankle/foot motions, pain w/ wt bearing throught the arch and dorsum of foot. Appears that she has sprained her mid foot. Suggested \"good\" tennis shoes and using her plantar fasciitis inserts 100% of the time. She has \"sacony\" shoes but likes to wear her slip on shoees with no arch support. Discussed this at length. 5/19//21  Minor + seated flx test.  Asymmetry in standing ( longer slightly R leg), improves in sittng. R DF 5/5    R EHL 4/5. Manul: MET for deep R sulcus, gentel lumbo pelvic roll for R SI jt- no cavitation. MET for post rot R ilium, shotgun. Manual lumbar distraction w/ pelvic girdle strap    5/12/21 gentel lumbo pelvic roll bilat  No cavitaiton. Improved sit up test after. Manual lumbar distraction with pelvic girdle straps. Canalith Repositioning Procedure:       Modalities:      Charges:  Timed Code Treatment Minutes: 45     Total Treatment Minutes:  45   BWC time for each procedure?:  TE TIME:  NMR TIME:  MANUAL TIME:  UNTIMED MINUTES:          [] EVAL (LOW) 32587 (typically 20 minutes face-to-face)  [] EVAL (MOD) 73097 (typically 30 minutes face-to-face)  [] EVAL (HIGH) 96822 (typically 45 minutes face-to-face)  [] RE-EVAL     [x] BN(08691) x   1  [] IONTO  [] NMR (49635) x     [] VASO  [x] Manual (19629) x 2    [] Other:  [] TA x      [] Mech Traction (50400)  [] ES(attended) (96257)     [] ES (un) (78415):     Medicare Cap Total YTD:    578.00    Treatment/Activity Tolerance:    [x] Patient tolerated treatment well [] Patient limited by fatigue   [] Patient limited by pain [] Patient limited by other medical complications   [] Other:     Prognosis: [x] Good [] Fair  [] Poor    Patient Requires Follow-up:  [x] Yes  [] No    Plan: [x] Continue per plan of care [] Alter current plan (see comments)   [] Plan of care initiated [] Hold pending MD visit [] Discharge    Plan for Next Session:  above  See Progress Note: [] Yes  [x] No       Next due:         Electronically signed by:  AFSHIN [id number]  Note: If patient does not return for scheduled/ recommended follow up visits, this note will serve as a discharge from care along with most recent update on progress.            Outpatient Physical Therapy  Progress Note      Progress Note covers period from:   5/12/21 To       Subjective:           Objective:   Observation:   Test measurements:       Functional Outcome Measure:            Assessment:   Summary:    Patient's response to treatment:      Goals:  · Progress toward previous goals:      Plan:  ·     Electronically Signed by:

## 2021-05-28 ENCOUNTER — APPOINTMENT (OUTPATIENT)
Dept: PHYSICAL THERAPY | Age: 76
End: 2021-05-28
Payer: MEDICARE

## 2021-06-01 ENCOUNTER — HOSPITAL ENCOUNTER (OUTPATIENT)
Dept: PHYSICAL THERAPY | Age: 76
Setting detail: THERAPIES SERIES
Discharge: HOME | End: 2021-06-01
Payer: MEDICARE

## 2021-06-01 PROCEDURE — 97110 THERAPEUTIC EXERCISES: CPT

## 2021-06-01 PROCEDURE — 97140 MANUAL THERAPY 1/> REGIONS: CPT

## 2021-06-01 NOTE — FLOWSHEET NOTE
3 [facility] [address]  Phone [phone]                                                [x] Daily Treatment Note   [] Progress Note   [] Discharge Note      Date:  2021    Patient Name:  Negrita Jack        :  1945   Medical Diagnosis:  Lumbago w/ sciatica,  Radiculopathy, spondyloysis                                        ICD 10:  M54.41   M43.06   M 54.16     Treatment Diagnosis:  Same, back pain     Onset Date:    3/1/21       Referral Date:5/10/21                             Referring Physician:  Dr. Margy Ibrahim of care signed (Y/N): Faxed today    Progress report will be due (10 Rx or 30 days whichever is less): 3/51/51    Recertification will be due (POC Duration  / 90 days whichever is less):  21    Visit# / total visits:   Visit # Insurance Allowable Auth Required   In Person 6  Caro Cool 62. []  Yes     []  No    [email or web address] Health 0  []  Yes     []  No    Total  6       Latex Allergy:  [x]NO      []YES    Pain level:  0-1/10 sitting    Subjective: 21 planted her garden yesterday- about 6-7 hrs. Had pain behind her knee   3-410 last evening. Today mild pain in the post le/10. 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 Will cont 1x wk for 3 wks starting next wk. She has improved 80%  21  Reports no pain in back (just a little in her R buttock) at the moment after cleaning [employer].  21 doing pretty well today. Last evening had pain down her leg to behind her knee/calf- she did lay on her L side for relief and did the upper trunk and lower trunk before she went to bed. She is busy today with flowers, baking a cake, etc.  21 reports she is 50% improved since last visit. Finished her steroids on .  Yesterday power washed her deck 2-3 hrs, ran errands, she was busy most of the day. She had buttock and post leg pain to below her knee by 9-10 pm,  She was better the few days before. Not much pain today  5/12/21  Her problem started in early March. She had a similar episode 4-5 years ago that resolved with steroids. She started her steroids 1 day ago.     Pain       Patient describes pain to be intermit. Starts in R buttock and runs down the back of her leg to just below her knee on the medial side. She has some intermit ant R knee pain, she has light pain in the R groin and down the front of her thigh. After sitting she has to stand still for a fewminutes due to the R leg pressure and a little pain. Able to felipe on her feet doing things without pain. Patient reports      0-3 /10 pain at rest- lying down in bed  ,      2-4/10 sitting.         /10 pain with activity. Worsened by  - prolonged sitting. Improved by -   Pt. reports pain with coughing, sneezing and laughing:  NO  Pt. reports bowel and bladder changes:  YES/NO   Current Functional Limitations:  none  PLOF:  (I), able to walk a mile in Jan 2021  Pt's sleep is affected?    no     Patient goal for therapy:  Pain relief          Exercises:    osteopenia  Exercise/Equipment Resistance/Repetitions Other comments   5/12/21 explained course and role of PT  HEP   Hamstring stretch 20-30 sec x 5 x   TA in sit and supine Sets of 10,    50 a day x   5/19/21 prone heel sqz Hold 5 sec  3x 5   incr reps x                        Hip IR/ER 3x5 bilat x   x   Positional distraction on L side 10-20 min for R leg relief X    towel at waist         Upper trunk rots 3x10 x          Lower trunk rots 3x10 x     5/21/21 bridges 3x10 x   Controlled knee fallout 3x10 x   Supine alt arm/leg  2x5  restricted rom for R shoulder  x   6/1/21 [prone arm/leg raise 3x10       Supine red ball- curls 3x5 Will try bug and obliques NV for HEP                                obliques 2x10                        NV- check pelvis- face-to-face)  [] EVAL (MOD) 07104 (typically 30 minutes face-to-face)  [] EVAL (HIGH) 94444 (typically 45 minutes face-to-face)  [] RE-EVAL     [x] OO(49712) x   2  [] IONTO  [] NMR (67679) x     [] VASO  [x] Manual (05646) x      [] Other:  [] TA x      [] Mech Traction (11179)  [] ES(attended) (02229)     [] ES (un) (71581): Medicare Cap Total YTD:     678.00    Treatment/Activity Tolerance:    [x] Patient tolerated treatment well [] Patient limited by fatigue   [] Patient limited by pain [] Patient limited by other medical complications   [] Other:     Prognosis: [x] Good [] Fair  [] Poor    Patient Requires Follow-up:  [x] Yes  [] No    Plan: [x] Continue per plan of care [] Alter current plan (see comments)   [] Plan of care initiated [] Hold pending MD visit [] Discharge    Plan for Next Session:  above  See Progress Note: [] Yes  [x] No       Next due:         Electronically signed by:  Jovan Garcia PT, [facility] Instituteelisabeth 23  Note: If patient does not return for scheduled/ recommended follow up visits, this note will serve as a discharge from care along with most recent update on progress.            Outpatient Physical Therapy  Progress Note      Progress Note covers period from:   5/12/21 To       Subjective:           Objective:   Observation:   Test measurements:       Functional Outcome Measure:            Assessment:   Summary:    Patient's response to treatment:      Goals:  · Progress toward previous goals:      Plan:  ·     Electronically Signed by:

## 2021-06-09 ENCOUNTER — HOSPITAL ENCOUNTER (OUTPATIENT)
Dept: PHYSICAL THERAPY | Age: 76
Setting detail: THERAPIES SERIES
Discharge: HOME | End: 2021-06-09
Payer: MEDICARE

## 2021-06-09 PROCEDURE — 97110 THERAPEUTIC EXERCISES: CPT

## 2021-06-09 PROCEDURE — 97140 MANUAL THERAPY 1/> REGIONS: CPT

## 2021-06-09 NOTE — FLOWSHEET NOTE
3 [facility] [place] [address]  Phone [phone]                                                [x] Daily Treatment Note   [] Progress Note   [] Discharge Note      Date:  2021    Patient Name:  Briseida Wells        :  1945   Medical Diagnosis:  Lumbago w/ sciatica,  Radiculopathy, spondyloysis                                        ICD 10:  M54.41   M43.06   M 54.16     Treatment Diagnosis:  Same, back pain     Onset Date:    3/1/21       Referral Date:5/10/21                             Referring Physician:  Dr. Humaira Petersen of care signed (Y/N): Faxed today    Progress report will be due (10 Rx or 30 days whichever is less): 36    Recertification will be due (POC Duration  / 90 days whichever is less):  21    Visit# / total visits:   Visit # Insurance Allowable Auth Required   In Person 7 [employer] Cool [employer]. []  Yes     []  No    [facility] 0  []  Yes     []  No    Total 7       Latex Allergy:  [x]NO      []YES    Pain level:  0-1/10 Sitting  A couple of times up to 8/10 after increased activity    Subjective: 21  Reports she is doing great this morning  21 planted her garden yesterday- about 6-7 hrs. Had pain behind her knee   3-4/10 last evening. Today mild pain in the post le/10. 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 Will cont 1x wk for 3 wks starting next wk. She has improved 80%  21  Reports no pain in back (just a little in her R buttock) at the moment after cleaning [employer].  21 doing pretty well today. Last evening had pain down her leg to behind her knee/calf- she did lay on her L side for relief and did the upper trunk and lower trunk before she went to bed.  She is busy today with flowers, baking a cake, etc.  21 reports she is 50% minutes face-to-face)  [] RE-EVAL     [x] PA(00152) x   2  [] IONTO  [] NMR (40679) x     [] VASO  [x] Manual (80180) x   1   [] Other:  [] TA x      [] Mech Traction (39281)  [] ES(attended) (50454)     [] ES (un) (20074): Medicare Cap Total YTD:     763.00    Treatment/Activity Tolerance:    [x] Patient tolerated treatment well [] Patient limited by fatigue   [] Patient limited by pain [] Patient limited by other medical complications   [] Other:     Prognosis: [x] Good [] Fair  [] Poor    Patient Requires Follow-up:  [x] Yes  [] No    Plan: [x] Continue per plan of care [] Alter current plan (see comments)   [] Plan of care initiated [] Hold pending MD visit [] Discharge    Plan for Next Session:  above  See Progress Note: [] Yes  [x] No       Next due:         Electronically signed by:  Zhou Muñoz, [id number]  Note: If patient does not return for scheduled/ recommended follow up visits, this note will serve as a discharge from care along with most recent update on progress.            Outpatient Physical Therapy  Progress Note      Progress Note covers period from:   5/12/21 To       Subjective:           Objective:   Observation:   Test measurements:       Functional Outcome Measure:            Assessment:   Summary:    Patient's response to treatment:      Goals:  · Progress toward previous goals:      Plan:  ·     Electronically Signed by:

## 2021-06-16 ENCOUNTER — HOSPITAL ENCOUNTER (OUTPATIENT)
Dept: PHYSICAL THERAPY | Age: 76
Setting detail: THERAPIES SERIES
Discharge: HOME | End: 2021-06-16
Payer: MEDICARE

## 2021-06-16 PROCEDURE — 97140 MANUAL THERAPY 1/> REGIONS: CPT

## 2021-06-16 PROCEDURE — 97110 THERAPEUTIC EXERCISES: CPT

## 2021-06-16 NOTE — FLOWSHEET NOTE
[facility] [address]  Phone [phone]                                                [x] Daily Treatment Note   [] Progress Note   [] Discharge Note      Date:  2021    Patient Name:  Bonilla Gutierrez        :  1945   Medical Diagnosis:  Lumbago w/ sciatica,  Radiculopathy, spondyloysis                                        ICD 10:  M54.41   M43.06   M 54.16     Treatment Diagnosis:  Same, back pain     Onset Date:    3/1/21       Referral Date:5/10/21                             Referring Physician:  Dr. Robert Jerry of care signed (Y/N): Faxed today    Progress report will be due (10 Rx or 30 days whichever is less):     Recertification will be due (POC Duration  / 90 days whichever is less):  21    Visit# / total visits:   Visit # Insurance Allowable Auth Required   In Person 8 [employer]o Cool 62. []  Yes     []  No    [facility] 0  []  Yes     []  No    Total 8       Latex Allergy:  [x]NO      []YES    Pain level: Currently 3-4/10          0-2/10 Sitting short period, 1-2/10 sitting for an hour, yesterday with hoeing 4-5/10,  At times up to 6/10 at night. Pain runs bilateral sacrum down to post knee. Subjective: 21 reports she is having more pain today, probably from hoeing her garden most of the day yesterday even though she took rest breaks. Reports when getting out of car she has to stand for a bit before she can start to walk. Also reports some crepitus in R knee. 21  Reports she is doing great this morning  21 planted her garden yesterday- about 6-7 hrs. Had pain behind her knee   3-4/10 last evening. Today mild pain in the post le/10. 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 Will cont 1x wk for 3 wks starting next wk.   She has improved 80%  5/26/21  Reports no pain in back (just a little in her R buttock) at the moment after cleaning [employer].  5/24/21 doing pretty well today. Last evening had pain down her leg to behind her knee/calf- she did lay on her L side for relief and did the upper trunk and lower trunk before she went to bed. She is busy today with flowers, baking a cake, etc.  5/21/21 5/19/21 reports she is 50% improved since last visit. Finished her steroids on Sunday. Yesterday power washed her deck 2-3 hrs, ran errands, she was busy most of the day. She had buttock and post leg pain to below her knee by 9-10 pm,  She was better the few days before. Not much pain today  5/12/21  Her problem started in early March. She had a similar episode 4-5 years ago that resolved with steroids. She started her steroids 1 day ago.     Pain       Patient describes pain to be intermit. Starts in R buttock and runs down the back of her leg to just below her knee on the medial side. She has some intermit ant R knee pain, she has light pain in the R groin and down the front of her thigh. After sitting she has to stand still for a fewminutes due to the R leg pressure and a little pain. Able to felipe on her feet doing things without pain. Patient reports      0-3 /10 pain at rest- lying down in bed  ,      2-4/10 sitting.         /10 pain with activity. Worsened by  - prolonged sitting. Improved by -   Pt. reports pain with coughing, sneezing and laughing:  NO  Pt. reports bowel and bladder changes:  YES/NO   Current Functional Limitations:  none  PLOF:  (I), able to walk a mile in Jan 2021  Pt's sleep is affected?    no     Patient goal for therapy:  Pain relief         Exercises:    osteopenia  Exercise/Equipment Resistance/Repetitions Other comments   5/12/21 explained course and role of PT  HEP   Hamstring stretch 20-30 sec x 5 x   TA in sit and supine Sets of 10,    50 a day x   5/19/21 prone heel sqz Hold 5 sec  3x 5   incr reps x Hip IR/ER 3x5 bilat x   x   Positional distraction on L side 10-20 min for R leg relief X    towel at waist         Upper trunk rots 3x10 x          Lower trunk rots 3x10 x     5/21/21 bridges 3x10 x   Controlled knee fallout 3x10 x      6/1/21 prone arm/leg raise 3x10 x      Supine red ball- curls 20 total short range due to R knee pain                                 obliques 20-30 total     Modified bug 2x5 careful for R shoulder x   obligues 2x5 x             NV- check pelvis- standing, sit, sit up test, sulcus: treat accordingly, manual lumbar distraction, core ex. - planks, eventually birdog,                      Therapeutic Exercise:    25 min    Group Therapy:      Home Exercise Program:      Therapeutic Activity:      Neuromuscular Re-education:      Gait:      Manual Therapy:   20 min. Osteopenia. 5/24/21-6/9/21  neg SI jt tests, neg sulcus. No MET performed. Manual lumbar distraction x 20 min.  sidelying over pillows x no time today. Doing red ball curls her R knee bothered her- so restricted rom and that resolved the issue    5/21/21 neg SI jt tests, symmetrical sulcus. No MET performed. Manual lumbar distraction with pelvic girdle. Hamstrings L -25   R -33. L foot: no pain with AROM or resisted ankle/foot motions, pain w/ wt bearing throught the arch and dorsum of foot. Appears that she has sprained her mid foot. Suggested \"good\" tennis shoes and using her plantar fasciitis inserts 100% of the time. She has \"sacony\" shoes but likes to wear her slip on shoees with no arch support. Discussed this at length. 5/19//21  Minor + seated flx test.  Asymmetry in standing ( longer slightly R leg), improves in sittng. R DF 5/5    R EHL 4/5. Manul: MET for deep R sulcus, gentel lumbo pelvic roll for R SI jt- no cavitation. MET for post rot R ilium, shotgun. Manual lumbar distraction w/ pelvic girdle strap    5/12/21 gentel lumbo pelvic roll bilat  No cavitaiton.   Improved sit up test after.  Manual lumbar distraction with pelvic girdle straps. Canalith Repositioning Procedure:       Modalities:      Charges:  Timed Code Treatment Minutes: 45     Total Treatment Minutes:  45   BWC time for each procedure?:  TE TIME:  NMR TIME:  MANUAL TIME:  UNTIMED MINUTES:          [] EVAL (LOW) 92443 (typically 20 minutes face-to-face)  [] EVAL (MOD) 11686 (typically 30 minutes face-to-face)  [] EVAL (HIGH) 39441 (typically 45 minutes face-to-face)  [] RE-EVAL     [x] ZH(90996) x   2  [] IONTO  [] NMR (06407) x     [] VASO  [x] Manual (39510) x   1   [] Other:  [] TA x      [] Mech Traction (32507)  [] ES(attended) (27628)     [] ES (un) (01212): Medicare Cap Total YTD:     663.00    Treatment/Activity Tolerance:    [x] Patient tolerated treatment well [] Patient limited by fatigue   [] Patient limited by pain [] Patient limited by other medical complications   [] Other:     Prognosis: [x] Good [] Fair  [] Poor    Patient Requires Follow-up:  [x] Yes  [] No    Plan: [x] Continue per plan of care [] Alter current plan (see comments)   [] Plan of care initiated [] Hold pending MD visit [] Discharge    Plan for Next Session:  above  See Progress Note: [] Yes  [x] No       Next due:         Electronically signed by:  Ernesto Prescott, [id number]  Note: If patient does not return for scheduled/ recommended follow up visits, this note will serve as a discharge from care along with most recent update on progress.            Outpatient Physical Therapy  Progress Note      Progress Note covers period from:   5/12/21 To       Subjective:           Objective:   Observation:   Test measurements:       Functional Outcome Measure:            Assessment:   Summary:    Patient's response to treatment:      Goals:  · Progress toward previous goals:      Plan:  ·     Electronically Signed by:

## 2021-06-23 ENCOUNTER — HOSPITAL ENCOUNTER (OUTPATIENT)
Dept: PHYSICAL THERAPY | Age: 76
Setting detail: THERAPIES SERIES
Discharge: HOME | End: 2021-06-23
Payer: MEDICARE

## 2021-06-23 NOTE — FLOWSHEET NOTE
Physical Therapy  Cancellation/No-show Note  Patient Name:  Negrita Jack  :  1945   Date:  2021  Cancels to Date: 0  No-shows to Date: 1    For today's appointment patient:  []  Cancelled  []  Rescheduled appointment  [x]  No-show     Reason given by patient:  []  Patient ill  []  Conflicting appointment  []  No transportation    []  Conflict with work  []  No reason given  []  Other:     Comments:  Pt was called and she had written down the wrong time. Today was her last scheduled appt.   She is going to return to the MD .  She will let us know if she wants to re-scheduale  Electronically signed by:  Radha Obrien, [id number] [id number]

## 2021-07-06 RX ORDER — ALENDRONATE SODIUM 70 MG/1
TABLET ORAL
Qty: 12 TABLET | Refills: 3 | Status: SHIPPED | OUTPATIENT
Start: 2021-07-06

## 2021-07-07 ENCOUNTER — HOSPITAL ENCOUNTER (OUTPATIENT)
Dept: MRI IMAGING | Age: 76
Discharge: HOME | End: 2021-07-07
Payer: MEDICARE

## 2021-07-07 DIAGNOSIS — M25.461 EFFUSION OF RIGHT KNEE JOINT: ICD-10-CM

## 2021-07-07 DIAGNOSIS — M43.06 SPONDYLOLYSIS, LUMBAR REGION: ICD-10-CM

## 2021-07-07 DIAGNOSIS — M54.16 RADICULOPATHY, LUMBAR REGION: ICD-10-CM

## 2021-07-07 DIAGNOSIS — M54.41 ACUTE RIGHT-SIDED LOW BACK PAIN WITH RIGHT-SIDED SCIATICA: ICD-10-CM

## 2021-07-07 PROCEDURE — 73721 MRI JNT OF LWR EXTRE W/O DYE: CPT

## 2021-07-07 PROCEDURE — 72148 MRI LUMBAR SPINE W/O DYE: CPT

## 2021-07-12 ENCOUNTER — TELEPHONE (OUTPATIENT)
Dept: CARDIOLOGY CLINIC | Age: 76
End: 2021-07-12

## 2021-07-12 NOTE — TELEPHONE ENCOUNTER
CARDIAC CLEARANCE REQUEST    What type of procedure are you having:epidural injection    Are you taking any blood thinners:Eliquis  Needs to hold 3 days prior to injection.     When is your procedure scheduled for:7/20    What physician is performing your procedure: Dr Jenelle Manzanares    Phone Number:    Fax number to send the letter: [phone]

## 2021-07-12 NOTE — TELEPHONE ENCOUNTER
Lower risk clinically for lower risk type of procedure. Proceed as planned. OK to hold eliquis for least amount of time possible. Thanks.

## 2021-07-20 ENCOUNTER — NURSE TRIAGE (OUTPATIENT)
Dept: OTHER | Facility: CLINIC | Age: 76
End: 2021-07-20

## 2021-07-20 ENCOUNTER — OFFICE VISIT (OUTPATIENT)
Dept: INTERNAL MEDICINE CLINIC | Age: 76
End: 2021-07-20
Payer: MEDICARE

## 2021-07-20 VITALS
TEMPERATURE: 97.9 F | SYSTOLIC BLOOD PRESSURE: 138 MMHG | WEIGHT: 125 LBS | HEART RATE: 73 BPM | OXYGEN SATURATION: 99 % | DIASTOLIC BLOOD PRESSURE: 66 MMHG | BODY MASS INDEX: 25.25 KG/M2

## 2021-07-20 DIAGNOSIS — R31.9 URINARY TRACT INFECTION WITH HEMATURIA, SITE UNSPECIFIED: Primary | ICD-10-CM

## 2021-07-20 DIAGNOSIS — N39.0 URINARY TRACT INFECTION WITH HEMATURIA, SITE UNSPECIFIED: Primary | ICD-10-CM

## 2021-07-20 LAB
BILIRUBIN, POC: ABNORMAL
BLOOD URINE, POC: ABNORMAL
CLARITY, POC: CLEAR
COLOR, POC: YELLOW
GLUCOSE URINE, POC: ABNORMAL
KETONES, POC: ABNORMAL
LEUKOCYTE EST, POC: ABNORMAL
NITRITE, POC: ABNORMAL
PH, POC: 6
PROTEIN, POC: ABNORMAL
SPECIFIC GRAVITY, POC: 1.03
UROBILINOGEN, POC: 0.2

## 2021-07-20 PROCEDURE — 3017F COLORECTAL CA SCREEN DOC REV: CPT | Performed by: FAMILY MEDICINE

## 2021-07-20 PROCEDURE — G8427 DOCREV CUR MEDS BY ELIG CLIN: HCPCS | Performed by: FAMILY MEDICINE

## 2021-07-20 PROCEDURE — G8399 PT W/DXA RESULTS DOCUMENT: HCPCS | Performed by: FAMILY MEDICINE

## 2021-07-20 PROCEDURE — 1036F TOBACCO NON-USER: CPT | Performed by: FAMILY MEDICINE

## 2021-07-20 PROCEDURE — 1123F ACP DISCUSS/DSCN MKR DOCD: CPT | Performed by: FAMILY MEDICINE

## 2021-07-20 PROCEDURE — 1090F PRES/ABSN URINE INCON ASSESS: CPT | Performed by: FAMILY MEDICINE

## 2021-07-20 PROCEDURE — 4040F PNEUMOC VAC/ADMIN/RCVD: CPT | Performed by: FAMILY MEDICINE

## 2021-07-20 PROCEDURE — 99213 OFFICE O/P EST LOW 20 MIN: CPT | Performed by: FAMILY MEDICINE

## 2021-07-20 PROCEDURE — G8417 CALC BMI ABV UP PARAM F/U: HCPCS | Performed by: FAMILY MEDICINE

## 2021-07-20 PROCEDURE — 81002 URINALYSIS NONAUTO W/O SCOPE: CPT | Performed by: FAMILY MEDICINE

## 2021-07-20 RX ORDER — CIPROFLOXACIN 500 MG/1
500 TABLET, FILM COATED ORAL 2 TIMES DAILY
Qty: 14 TABLET | Refills: 0 | Status: SHIPPED | OUTPATIENT
Start: 2021-07-20 | End: 2021-07-27

## 2021-07-20 NOTE — PROGRESS NOTES
Subjective:      Patient ID: Chet Thomas is a 70 y. o.female who is being seen for   Chief Complaint   Patient presents with    Urinary Tract Infection     sx started last night , tx: cystex        HPI  C/O dysuria since yesterday- like \"spasms\"  Increased frequency and urgency. +past h/o UTI's- these sx are typical for her  Had been taking Azo bid, then started Cystex last night on the recommendation of the pharmacist.    Was scheduled for GEOVANNI with Dr. Cristina Hernandez today but it was canceled due to possibility of UTI. Patient's medications, past medical, surgical, social, and family historieswere reviewed by me personally and updated as appropriate. Outpatient Medications Marked as Taking for the 7/20/21 encounter (Office Visit) with Fredrick Rodriguez MD   Medication Sig Dispense Refill    alendronate (FOSAMAX) 70 MG tablet TAKE 1 TABLET BY MOUTH  WEEKLY WITH 8 OZ OF PLAIN  WATER 30 MINUTES BEFORE  FIRST FOOD, DRINK OR MEDS. STAY UPRIGHT FOR 30 MINS 12 tablet 3    simvastatin (ZOCOR) 20 MG tablet TAKE 1 TABLET BY MOUTH AT  NIGHT 90 tablet 3    omeprazole (PRILOSEC) 10 MG delayed release capsule TAKE 1 CAPSULE BY MOUTH  DAILY 90 capsule 3    hydroCHLOROthiazide (HYDRODIURIL) 25 MG tablet Take 1 tablet by mouth every morning 90 tablet 5    vitamin B-12 (CYANOCOBALAMIN) 100 MCG tablet Take 50 mcg by mouth daily      gabapentin (NEURONTIN) 300 MG capsule Take 1 capsule by mouth 4 times daily for 30 days.  Intended supply: 30 days 90 capsule 0    amLODIPine (NORVASC) 5 MG tablet Take 1 tablet by mouth daily 90 tablet 3    apixaban (ELIQUIS) 5 MG TABS tablet Take 5 mg by mouth 2 times daily      acetaminophen (TYLENOL) 500 MG tablet Take 500 mg by mouth every 6 hours as needed for Pain      Calcium Carb-Cholecalciferol (CALCIUM + D3) 600-200 MG-UNIT TABS Take 1 tablet by mouth 2 times daily          Review of Systems  Review of Systems   Constitutional: Negative for fatigue, fever and unexpected weight change. HENT: Negative for congestion, ear pain, hearing loss, rhinorrhea, sinus pressure, sore throat and trouble swallowing. Eyes: Negative for pain, discharge, redness and visual disturbance. Respiratory: Negative for cough, chest tightness, shortness of breath and wheezing. Cardiovascular: Negative for chest pain, palpitations and leg swelling. Gastrointestinal: Negative for abdominal pain, constipation, diarrhea, nausea and vomiting. Endocrine: Negative for cold intolerance, heat intolerance, polydipsia, polyphagia and polyuria. Genitourinary: Positive for dysuria, frequency and urgency. Negative for flank pain, menstrual problem, pelvic pain and vaginal discharge. Musculoskeletal: Positive for back pain (chronic, gets GEOVANNI's). Negative for arthralgias, gait problem, myalgias and neck pain. Skin: Negative for rash and wound. Allergic/Immunologic: Negative for environmental allergies, food allergies and immunocompromised state. Neurological: Negative for dizziness, seizures, syncope, weakness, numbness and headaches. Hematological: Negative for adenopathy. Psychiatric/Behavioral: Negative for agitation, confusion, dysphoric mood and sleep disturbance. The patient is not nervous/anxious. Objective:   Physical Exam    Wt Readings from Last 3 Encounters:   07/20/21 125 lb (56.7 kg)   05/19/21 126 lb (57.2 kg)   04/21/21 131 lb 1.9 oz (59.5 kg)       BP Readings from Last 3 Encounters:   07/20/21 138/66   05/19/21 122/80   04/21/21 (!) 140/64       Vitals:    07/20/21 1031   BP: 138/66   Pulse: 73   Temp: 97.9 °F (36.6 °C)   SpO2: 99%       Physical Exam  Nursing note and vitals reviewed.     Vitals:    07/20/21 1031   BP: 138/66   Site: Left Upper Arm   Position: Sitting   Cuff Size: Large Adult   Pulse: 73   Temp: 97.9 °F (36.6 °C)   TempSrc: Temporal   SpO2: 99%   Weight: 125 lb (56.7 kg)     Wt Readings from Last 3 Encounters:   07/20/21 125 lb (56.7 kg) 05/19/21 126 lb (57.2 kg)   04/21/21 131 lb 1.9 oz (59.5 kg)     BP Readings from Last 3 Encounters:   07/20/21 138/66   05/19/21 122/80   04/21/21 (!) 140/64     Body mass index is 25.25 kg/m². Constitutional: Patient appears well-developed and well-nourished. No distress. Head: Normocephalic and atraumatic. Oropharyngeal exam: mucous membranes moist, pharynx normal without lesions. Neck: Normal range of motion. Neck supple. No thyroidmegaly. No Carotid bruits. Cardiovascular: Normal rate, regular rhythm, normal heart sounds and intact distal pulses. Pulmonary/Chest: Effort normal and breath sounds normal. No stridor. No respiratory distress. No wheezes and no rales. Abdominal: Soft. Bowel sounds are normal. No distension and no mass. No tenderness. No rebound and no guarding. Musculoskeletal: No edema and no tenderness. Skin: No rash or erythema. Psychiatric: Normal mood and affect. Behavior is normal.       Assessment       Diagnosis Orders   1. Urinary tract infection with hematuria, site unspecified  POCT Urinalysis no Micro    Culture, Urine    ciprofloxacin (CIPRO) 500 MG tablet    POCT Urinalysis no Micro            Plan      Fredrick Orosco was seen today for urinary tract infection. Diagnoses and all orders for this visit:    Urinary tract infection with hematuria, site unspecified  -     POCT Urinalysis no Micro  -     Culture, Urine  -     ciprofloxacin (CIPRO) 500 MG tablet; Take 1 tablet by mouth 2 times daily for 7 days  -     POCT Urinalysis no Micro; Future      Patient Instructions     Take the full week course of Cipro. OK to have your urine rechecked on Monday next week in order to try to get back on the schedule for the epidural on Tuesday. I will send the results of the urine culture to you by MyChart. Increase fluid intake. Patient education materials given in AVS re: UTI. Return if symptoms worsen or fail to improve.

## 2021-07-20 NOTE — PATIENT INSTRUCTIONS
Take the full week course of Cipro. OK to have your urine rechecked on Monday next week in order to try to get back on the schedule for the epidural on Tuesday. I will [facility] the results of the [facility] culture [facility]. Increase fluid intake. Return if symptoms worsen or fail to improve. Patient Education        Urinary Tract Infection (UTI) in Women: Care Instructions  Overview     A urinary tract infection, or UTI, is a general term for an infection anywhere between the kidneys and the urethra (where urine comes out). Most UTIs are bladder infections. They often cause pain or burning when you urinate. UTIs are caused by bacteria and can be cured with antibiotics. Be sure to complete your treatment so that the infection does not get worse. Follow-up care is a key part of your treatment and safety. Be sure to make and go to all appointments, and call your doctor if you are having problems. It's also a good idea to know your test results and keep a list of the medicines you take. How can you care for yourself at home? · Take your antibiotics as directed. Do not stop taking them just because you feel better. You need to take the full course of antibiotics. · Drink extra water and other fluids for the next day or two. This will help make the urine less concentrated and help wash out the bacteria that are causing the infection. (If you have kidney, heart, or liver disease and have to limit fluids, talk with your doctor before you increase the amount of fluids you drink.)  · Avoid drinks that are carbonated or have caffeine. They can irritate the bladder. · Urinate often. Try to empty your bladder each time. · To relieve pain, take a hot bath or lay a heating pad set on low over your lower belly or genital area. Never go to sleep with a heating pad in place. To prevent UTIs  · Drink plenty of water each day. This helps you urinate often, which clears bacteria from your system.  (If you have kidney, heart, or liver disease and have to limit fluids, talk with your doctor before you increase the amount of fluids you drink.)  · Urinate when you need to. · If you are sexually active, urinate right after you have sex. · Change sanitary pads often. · Avoid douches, bubble baths, feminine hygiene sprays, and other feminine hygiene products that have deodorants. · After going to the bathroom, wipe from front to back. When should you call for help? Call your doctor now or seek immediate medical care if:    · Symptoms such as fever, chills, nausea, or vomiting get worse or appear for the first time.     · You have new pain in your back just below your rib cage. This is called flank pain.     · There is new blood or pus in your urine.     · You have any problems with your antibiotic medicine. Watch closely for changes in your health, and be sure to contact your doctor if:    · You are not getting better after taking an antibiotic for 2 days.     · Your symptoms go away but then come back. Where can you learn more? Go to [email or web address]://[email or web address]. org and sign in to your [employer] account. Enter P663 in the [facility] box to learn more about \"Urinary Tract Infection (UTI) in Women: Care Instructions. \"     If you do not have an account, please click on the \"Sign Up Now\" link. Current as of: February 10, 2021               Content Version: 12.9  © 6132-6019 Healthwise, Incorporated. Care instructions adapted under license by [facility] ([facility]). If you have questions about a medical condition or this instruction, always ask your healthcare professional. [address] any warranty or liability for your use of this information.

## 2021-07-21 ASSESSMENT — ENCOUNTER SYMPTOMS
DIARRHEA: 0
CHEST TIGHTNESS: 0
NAUSEA: 0
WHEEZING: 0
ABDOMINAL PAIN: 0
SINUS PRESSURE: 0
BACK PAIN: 1
CONSTIPATION: 0
TROUBLE SWALLOWING: 0
EYE DISCHARGE: 0
EYE PAIN: 0
EYE REDNESS: 0
VOMITING: 0
RHINORRHEA: 0
COUGH: 0
SORE THROAT: 0
SHORTNESS OF BREATH: 0

## 2021-07-22 LAB
ORGANISM: ABNORMAL
URINE CULTURE, ROUTINE: ABNORMAL

## 2021-07-26 ENCOUNTER — NURSE ONLY (OUTPATIENT)
Dept: INTERNAL MEDICINE CLINIC | Age: 76
End: 2021-07-26
Payer: MEDICARE

## 2021-07-26 ENCOUNTER — TELEPHONE (OUTPATIENT)
Dept: INTERNAL MEDICINE CLINIC | Age: 76
End: 2021-07-26

## 2021-07-26 DIAGNOSIS — N39.0 URINARY TRACT INFECTION WITH HEMATURIA, SITE UNSPECIFIED: ICD-10-CM

## 2021-07-26 DIAGNOSIS — R31.9 URINARY TRACT INFECTION WITH HEMATURIA, SITE UNSPECIFIED: Primary | ICD-10-CM

## 2021-07-26 DIAGNOSIS — R31.9 URINARY TRACT INFECTION WITH HEMATURIA, SITE UNSPECIFIED: ICD-10-CM

## 2021-07-26 DIAGNOSIS — N39.0 URINARY TRACT INFECTION WITH HEMATURIA, SITE UNSPECIFIED: Primary | ICD-10-CM

## 2021-07-26 LAB
BILIRUBIN, POC: NORMAL
BLOOD URINE, POC: NORMAL
CLARITY, POC: CLEAR
COLOR, POC: YELLOW
GLUCOSE URINE, POC: NORMAL
KETONES, POC: NORMAL
LEUKOCYTE EST, POC: NORMAL
NITRITE, POC: NORMAL
PH, POC: 6.5
PROTEIN, POC: NORMAL
SPECIFIC GRAVITY, POC: 0.2
UROBILINOGEN, POC: 1.02

## 2021-07-26 PROCEDURE — 81002 URINALYSIS NONAUTO W/O SCOPE: CPT | Performed by: FAMILY MEDICINE

## 2021-07-26 NOTE — TELEPHONE ENCOUNTER
Patient seen in office today for repeat urine. She has not finished ABX , had 2 days left. Please see lab results for POC urine . I did draw a CX if needed. She needs to have a clean urine for Dr. Ananda Go to do injections.

## 2021-07-27 LAB — URINE CULTURE, ROUTINE: NORMAL

## 2021-09-13 ENCOUNTER — OFFICE VISIT (OUTPATIENT)
Dept: INTERNAL MEDICINE CLINIC | Age: 76
End: 2021-09-13
Payer: MEDICARE

## 2021-09-13 VITALS
SYSTOLIC BLOOD PRESSURE: 132 MMHG | BODY MASS INDEX: 24.04 KG/M2 | OXYGEN SATURATION: 99 % | HEART RATE: 70 BPM | DIASTOLIC BLOOD PRESSURE: 66 MMHG | WEIGHT: 119 LBS

## 2021-09-13 DIAGNOSIS — E78.5 HYPERLIPIDEMIA, UNSPECIFIED HYPERLIPIDEMIA TYPE: ICD-10-CM

## 2021-09-13 DIAGNOSIS — Z12.31 ENCOUNTER FOR SCREENING MAMMOGRAM FOR MALIGNANT NEOPLASM OF BREAST: ICD-10-CM

## 2021-09-13 DIAGNOSIS — R73.01 IFG (IMPAIRED FASTING GLUCOSE): ICD-10-CM

## 2021-09-13 DIAGNOSIS — I10 ESSENTIAL HYPERTENSION: Primary | ICD-10-CM

## 2021-09-13 PROCEDURE — G8420 CALC BMI NORM PARAMETERS: HCPCS | Performed by: INTERNAL MEDICINE

## 2021-09-13 PROCEDURE — G8399 PT W/DXA RESULTS DOCUMENT: HCPCS | Performed by: INTERNAL MEDICINE

## 2021-09-13 PROCEDURE — G8427 DOCREV CUR MEDS BY ELIG CLIN: HCPCS | Performed by: INTERNAL MEDICINE

## 2021-09-13 PROCEDURE — 4040F PNEUMOC VAC/ADMIN/RCVD: CPT | Performed by: INTERNAL MEDICINE

## 2021-09-13 PROCEDURE — 3017F COLORECTAL CA SCREEN DOC REV: CPT | Performed by: INTERNAL MEDICINE

## 2021-09-13 PROCEDURE — 1036F TOBACCO NON-USER: CPT | Performed by: INTERNAL MEDICINE

## 2021-09-13 PROCEDURE — 1090F PRES/ABSN URINE INCON ASSESS: CPT | Performed by: INTERNAL MEDICINE

## 2021-09-13 PROCEDURE — 99213 OFFICE O/P EST LOW 20 MIN: CPT | Performed by: INTERNAL MEDICINE

## 2021-09-13 PROCEDURE — 1123F ACP DISCUSS/DSCN MKR DOCD: CPT | Performed by: INTERNAL MEDICINE

## 2021-09-13 ASSESSMENT — ENCOUNTER SYMPTOMS
CONSTIPATION: 0
BACK PAIN: 0
VOMITING: 0
NAUSEA: 0
DIARRHEA: 0
WHEEZING: 0
CHEST TIGHTNESS: 0
SHORTNESS OF BREATH: 0
ABDOMINAL DISTENTION: 0
COUGH: 0

## 2021-09-13 NOTE — PROGRESS NOTES
daily      amLODIPine (NORVASC) 5 MG tablet Take 1 tablet by mouth daily 90 tablet 3    apixaban (ELIQUIS) 5 MG TABS tablet Take 5 mg by mouth 2 times daily      acetaminophen (TYLENOL) 500 MG tablet Take 500 mg by mouth every 6 hours as needed for Pain      Calcium Carb-Cholecalciferol (CALCIUM + D3) 600-200 MG-UNIT TABS Take 1 tablet by mouth 2 times daily       gabapentin (NEURONTIN) 300 MG capsule Take 1 capsule by mouth 4 times daily for 30 days. Intended supply: 30 days 90 capsule 0     No current facility-administered medications for this visit. Physical Exam  Constitutional:       General: She is not in acute distress. Appearance: She is well-developed. She is not diaphoretic. Neck:      Thyroid: No thyromegaly. Vascular: No carotid bruit. Cardiovascular:      Rate and Rhythm: Normal rate and regular rhythm. Heart sounds: Normal heart sounds. No murmur heard. No friction rub. No gallop. Pulmonary:      Effort: Pulmonary effort is normal. No respiratory distress. Breath sounds: Normal breath sounds. No wheezing or rales. Abdominal:      General: There is no distension. Palpations: Abdomen is soft. Tenderness: There is no abdominal tenderness. There is no guarding or rebound. Musculoskeletal:      Cervical back: Neck supple. Neurological:      Mental Status: She is alert and oriented to person, place, and time. Psychiatric:         Mood and Affect: Mood normal.         Behavior: Behavior normal.         Thought Content:  Thought content normal.         Judgment: Judgment normal.         Vitals:    09/13/21 0806   BP: 132/66   Pulse: 70   SpO2: 99%

## 2021-09-16 ENCOUNTER — HOSPITAL ENCOUNTER (OUTPATIENT)
Dept: MAMMOGRAPHY | Age: 76
Discharge: HOME | End: 2021-09-16
Payer: MEDICARE

## 2021-09-16 ENCOUNTER — TELEPHONE (OUTPATIENT)
Dept: MAMMOGRAPHY | Age: 76
End: 2021-09-16

## 2021-09-16 ENCOUNTER — HOSPITAL ENCOUNTER (OUTPATIENT)
Age: 76
Discharge: HOME | End: 2021-09-16
Payer: MEDICARE

## 2021-09-16 DIAGNOSIS — Z12.31 ENCOUNTER FOR SCREENING MAMMOGRAM FOR MALIGNANT NEOPLASM OF BREAST: ICD-10-CM

## 2021-09-16 DIAGNOSIS — E78.5 HYPERLIPIDEMIA, UNSPECIFIED HYPERLIPIDEMIA TYPE: ICD-10-CM

## 2021-09-16 DIAGNOSIS — R73.01 IFG (IMPAIRED FASTING GLUCOSE): ICD-10-CM

## 2021-09-16 DIAGNOSIS — I10 ESSENTIAL HYPERTENSION: ICD-10-CM

## 2021-09-16 LAB
A/G RATIO: 1.8 (ref 1.1–2.2)
ALBUMIN SERPL-MCNC: 4.5 G/DL (ref 3.4–5)
ALP BLD-CCNC: 81 U/L (ref 40–129)
ALT SERPL-CCNC: 18 U/L (ref 10–40)
ANION GAP SERPL CALCULATED.3IONS-SCNC: 16 MMOL/L (ref 3–16)
AST SERPL-CCNC: 17 U/L (ref 15–37)
BILIRUB SERPL-MCNC: 0.6 MG/DL (ref 0–1)
BUN BLDV-MCNC: 14 MG/DL (ref 7–20)
CALCIUM SERPL-MCNC: 9.7 MG/DL (ref 8.3–10.6)
CHLORIDE BLD-SCNC: 101 MMOL/L (ref 99–110)
CHOLESTEROL, TOTAL: 175 MG/DL (ref 0–199)
CO2: 26 MMOL/L (ref 21–32)
CREAT SERPL-MCNC: 0.6 MG/DL (ref 0.6–1.2)
GFR AFRICAN AMERICAN: >60
GFR NON-AFRICAN AMERICAN: >60
GLOBULIN: 2.5 G/DL
GLUCOSE BLD-MCNC: 90 MG/DL (ref 70–99)
HDLC SERPL-MCNC: 48 MG/DL (ref 40–60)
LDL CHOLESTEROL CALCULATED: 96 MG/DL
POTASSIUM SERPL-SCNC: 3.6 MMOL/L (ref 3.5–5.1)
SODIUM BLD-SCNC: 143 MMOL/L (ref 136–145)
TOTAL PROTEIN: 7 G/DL (ref 6.4–8.2)
TRIGL SERPL-MCNC: 154 MG/DL (ref 0–150)
VLDLC SERPL CALC-MCNC: 31 MG/DL

## 2021-09-16 PROCEDURE — 80053 COMPREHEN METABOLIC PANEL: CPT

## 2021-09-16 PROCEDURE — 77063 BREAST TOMOSYNTHESIS BI: CPT

## 2021-09-16 PROCEDURE — 36415 COLL VENOUS BLD VENIPUNCTURE: CPT

## 2021-09-16 PROCEDURE — 83036 HEMOGLOBIN GLYCOSYLATED A1C: CPT

## 2021-09-16 PROCEDURE — 77067 SCR MAMMO BI INCL CAD: CPT

## 2021-09-16 PROCEDURE — 80061 LIPID PANEL: CPT

## 2021-09-17 LAB
ESTIMATED AVERAGE GLUCOSE: 111.2 MG/DL
HBA1C MFR BLD: 5.5 %

## 2021-10-04 ENCOUNTER — TELEPHONE (OUTPATIENT)
Dept: INTERNAL MEDICINE CLINIC | Age: 76
End: 2021-10-04

## 2021-10-04 NOTE — TELEPHONE ENCOUNTER
----- Message from Jessica Rochelle sent at 10/4/2021  8:13 AM EDT -----  Subject: Appointment Request    Reason for Call: Semi-Routine Cough, Cold Symptoms    QUESTIONS  Type of Appointment? Established Patient  Reason for appointment request? No appointments available during search  Additional Information for Provider? PT has runny nose, cough, and chest   congestion. PT screened red. PT asked for PCP to call her back to schedule   the Semi-Routine appointment as soon as possible. There is no available   appointments in the [address] [place] [address],[address].   ---------------------------------------------------------------------------  --------------  CALL BACK INFO  What is the best way for the office to contact you? OK to leave message on   voicemail  Preferred Call Back Phone Number? [phone]  ---------------------------------------------------------------------------  --------------  SCRIPT ANSWERS  Relationship to Patient? Self  Are you currently unable to finish sentences due to any difficulty   breathing? No  Are you unable to swallow liquids? No  Are you having fevers (100.4 or greater), chills, or sweats? No  Do you have COPD, asthma or a chronic lung condition? No  Have your symptoms been present for more than 5 days? No  Have you recently (14 days) been seen by a provider for this issue? No  Have you been diagnosed with, awaiting test results for, or told that you   are suspected of having COVID-19 (Coronavirus)? (If patient has tested   negative or was tested as a requirement for work, school, or travel and   not based on symptoms, answer no)? No  Within the past two weeks have you developed any of the following symptoms   (answer no if symptoms have been present longer than 2 weeks or began   more than 2 weeks ago)?  Fever or Chills, Cough, Shortness of breath or   difficulty breathing, Loss of taste or smell, Sore throat, Nasal   congestion, Sneezing or runny nose, Fatigue or generalized body aches   (answer no if pain is specific to a body part e.g. back pain), Diarrhea,   Headache?  Yes

## 2021-10-05 ENCOUNTER — VIRTUAL VISIT (OUTPATIENT)
Dept: INTERNAL MEDICINE CLINIC | Age: 76
End: 2021-10-05
Payer: MEDICARE

## 2021-10-05 DIAGNOSIS — R05.9 COUGH: Primary | ICD-10-CM

## 2021-10-05 PROCEDURE — G8399 PT W/DXA RESULTS DOCUMENT: HCPCS | Performed by: NURSE PRACTITIONER

## 2021-10-05 PROCEDURE — 99213 OFFICE O/P EST LOW 20 MIN: CPT | Performed by: NURSE PRACTITIONER

## 2021-10-05 PROCEDURE — 1123F ACP DISCUSS/DSCN MKR DOCD: CPT | Performed by: NURSE PRACTITIONER

## 2021-10-05 PROCEDURE — G8427 DOCREV CUR MEDS BY ELIG CLIN: HCPCS | Performed by: NURSE PRACTITIONER

## 2021-10-05 PROCEDURE — 1090F PRES/ABSN URINE INCON ASSESS: CPT | Performed by: NURSE PRACTITIONER

## 2021-10-05 PROCEDURE — 4040F PNEUMOC VAC/ADMIN/RCVD: CPT | Performed by: NURSE PRACTITIONER

## 2021-10-05 PROCEDURE — 3017F COLORECTAL CA SCREEN DOC REV: CPT | Performed by: NURSE PRACTITIONER

## 2021-10-05 RX ORDER — PREDNISONE 20 MG/1
40 TABLET ORAL DAILY
Qty: 10 TABLET | Refills: 0 | Status: SHIPPED | OUTPATIENT
Start: 2021-10-05 | End: 2021-10-10

## 2021-10-05 RX ORDER — DOXYCYCLINE HYCLATE 100 MG
100 TABLET ORAL 2 TIMES DAILY
Qty: 14 TABLET | Refills: 0 | Status: SHIPPED | OUTPATIENT
Start: 2021-10-05 | End: 2021-10-12

## 2021-10-05 RX ORDER — BENZONATATE 100 MG/1
100-200 CAPSULE ORAL 3 TIMES DAILY PRN
Qty: 60 CAPSULE | Refills: 0 | Status: SHIPPED | OUTPATIENT
Start: 2021-10-05 | End: 2021-10-12

## 2021-10-05 ASSESSMENT — ENCOUNTER SYMPTOMS
VOICE CHANGE: 1
SINUS PRESSURE: 1
ABDOMINAL DISTENTION: 0
CONSTIPATION: 0
COUGH: 1
RHINORRHEA: 1
CHEST TIGHTNESS: 0
WHEEZING: 0
VOMITING: 0
NAUSEA: 0
DIARRHEA: 0
SORE THROAT: 1
SHORTNESS OF BREATH: 0

## 2021-10-05 ASSESSMENT — PATIENT HEALTH QUESTIONNAIRE - PHQ9
SUM OF ALL RESPONSES TO PHQ9 QUESTIONS 1 & 2: 0
SUM OF ALL RESPONSES TO PHQ QUESTIONS 1-9: 0
2. FEELING DOWN, DEPRESSED OR HOPELESS: 0
SUM OF ALL RESPONSES TO PHQ QUESTIONS 1-9: 0
SUM OF ALL RESPONSES TO PHQ QUESTIONS 1-9: 0
1. LITTLE INTEREST OR PLEASURE IN DOING THINGS: 0

## 2021-10-05 NOTE — PROGRESS NOTES
10/5/2021    TELEHEALTH EVALUATION -- Audio/Visual (During KLIZU-06 public health emergency)    HPI:    Julian Blackburn (:  1945) has requested an audio/video evaluation for the following concern(s):    Julian Blackburn is a 78 y.o. female here for evaluation of a cough. Onset of symptoms was 3 days ago. Symptoms have been gradually worsening since that time. The cough is hoarse and productive of yellow sputum and is aggravated by exercise. Associated symptoms include: chills, postnasal drip, shortness of breath and sputum production. Patient does not have a history of asthma. Patient does not have a history of environmental allergens. Patient has not traveled recently. Patient does have a history of smoking. Patient has had a previous chest x-ray. Patient has not had a PPD done. Has treated with Yulisa selzer powermax. Tested negative for COVID x 2 at home. Patient's medications, allergies, past medical, surgical, social and family histories were reviewed and updated as appropriate. Review of Systems   Constitutional: Negative for activity change, fatigue and unexpected weight change. HENT: Positive for congestion, postnasal drip, rhinorrhea, sinus pressure, sore throat and voice change. Respiratory: Positive for cough. Negative for chest tightness, shortness of breath and wheezing. Cardiovascular: Negative for chest pain, palpitations and leg swelling. Gastrointestinal: Negative for abdominal distention, constipation, diarrhea, nausea and vomiting. Genitourinary: Negative for difficulty urinating and urgency. Skin: Negative for rash. Neurological: Negative for dizziness, weakness and light-headedness. Hematological: Positive for adenopathy. Prior to Visit Medications    Medication Sig Taking?  Authorizing Provider   benzonatate (TESSALON) 100 MG capsule Take 1-2 capsules by mouth 3 times daily as needed for Cough Yes Ash Barnes, APRN - CNP   doxycycline hyclate (VIBRA-TABS) 100 MG tablet Take 1 tablet by mouth 2 times daily for 7 days Yes JEANNA Alcaraz CNP   predniSONE (DELTASONE) 20 MG tablet Take 2 tablets by mouth daily for 5 days Yes JEANNA Alcaraz CNP   alendronate (FOSAMAX) 70 MG tablet TAKE 1 TABLET BY MOUTH  WEEKLY WITH 8 OZ OF PLAIN  WATER 30 MINUTES BEFORE  FIRST FOOD, DRINK OR MEDS. STAY UPRIGHT FOR 30 MINS Yes Gianna Howard MD   simvastatin (ZOCOR) 20 MG tablet TAKE 1 TABLET BY MOUTH AT  NIGHT Yes Gianna Howard MD   omeprazole (PRILOSEC) 10 MG delayed release capsule TAKE 1 CAPSULE BY MOUTH  DAILY Yes Gianna Howard MD   hydroCHLOROthiazide (HYDRODIURIL) 25 MG tablet Take 1 tablet by mouth every morning Yes Fabio Dumont MD   vitamin B-12 (CYANOCOBALAMIN) 100 MCG tablet Take 50 mcg by mouth daily Yes Historical Provider, MD   amLODIPine (NORVASC) 5 MG tablet Take 1 tablet by mouth daily Yes Fabio Dumont MD   apixaban (ELIQUIS) 5 MG TABS tablet Take 5 mg by mouth 2 times daily Yes Historical Provider, MD   acetaminophen (TYLENOL) 500 MG tablet Take 500 mg by mouth every 6 hours as needed for Pain Yes Historical Provider, MD   Calcium Carb-Cholecalciferol (CALCIUM + D3) 600-200 MG-UNIT TABS Take 1 tablet by mouth 2 times daily  Yes Historical Provider, MD   gabapentin (NEURONTIN) 300 MG capsule Take 1 capsule by mouth 4 times daily for 30 days.  Intended supply: 30 days  Fabio Dumont MD       Social History     Tobacco Use    Smoking status: Former Smoker     Packs/day: 2.00     Years: 30.00     Pack years: 60.00     Types: Cigarettes     Start date: 1969     Quit date: 2004     Years since quittin.7    Smokeless tobacco: Never Used   Vaping Use    Vaping Use: Never used   Substance Use Topics    Alcohol use: Yes     Comment: very rare - one a year    Drug use: No        Allergies   Allergen Reactions    Naproxen Anaphylaxis     Tachycardia  Acid reflux    Percocet [Oxycodone-Acetaminophen] Nausea Only   ,   Past Medical History:   Diagnosis Date    Atrial fibrillation ([facility] [address] 10/21/2020    COPD (chronic obstructive pulmonary disease) (HCC)     history 30 years of chronic smoking, DLCO 73%    Difficult intravenous access     Diverticulitis     GERD (gastroesophageal reflux disease)     Hyperlipidemia     Hypertension     Osteoarthritis     Osteopenia     PONV (postoperative nausea and vomiting)     Prolonged emergence from general anesthesia     Restless legs syndrome     S/P shoulder surgery 08/25/2020    RIGHT - still healing   ,   Past Surgical History:   Procedure Laterality Date    BREAST REDUCTION SURGERY  10/15/15    BRONCHOSCOPY N/A 10/22/2020    EBUS W/ANESTHESIA performed by Cassy Rico MD at [address] [place] [address]  10/22/2020    BRONCHOSCOPY ALVEOLAR LAVAGE performed by Cassy Rico MD at [address] [place] [address]  10/22/2020    BRONCHOSCOPY BRUSHINGS performed by Cassy Rico MD at [address] [place] [address]  10/22/2020    BRONCHOSCOPY/TRANSBRONCHIAL NEEDLE BIOPSY performed by Cassy Rico MD at [address] [place] [address]  10/22/2020    BRONCHOSCOPY/TRANSBRONCHIAL NEEDLE BIOPSY ADDL LOBE performed by Cassy Rico MD at [address] [place] [address]  11/15/2016    colon polyps, diverticulosis    OTHER SURGICAL HISTORY  10/22/2020    EBUS w/anesthesia bronchoscopy alveilar lavage bronchoscopy brushings    OVARIAN CYST REMOVAL      IN EXCISION TUMOR SOFT TISSUE BACK/FLANK SUBQ <3CM N/A 8/23/2019    EXCISION BACK CYST TIMES TWO performed by Emilia Best MD at [place] [address] ARTHROSCOPY Right 8/25/2020    VIDEO ARTHROSCOPY RIGHT SHOULDER, ROTATOR CUFF REPAIR, DECOMPRESSION, EXTENSIVE DEBRIDEMENT performed by Robinson Monroy MD [address] Right 11/12/2020    RIGHT VIDEO THORACOSCOPY, WEDGE LUNG BIOPSY, MEDIASTINAL LYMPH NODE BIOSPIES WITH CRYOABLATION NERVE BLOCKS  CPT CODE - 08252 performed by Deidra Agosto MD at [facility]    TONSILLECTOMY     ,   Social History     Tobacco Use    Smoking status: Former Smoker     Packs/day: 2.00     Years: 30.00     Pack years: 60.00     Types: Cigarettes     Start date: 1969     Quit date: 2004     Years since quittin.7    Smokeless tobacco: Never Used   Vaping Use    Vaping Use: Never used   Substance Use Topics    Alcohol use: Yes     Comment: very rare - one a year    Drug use: No   ,   Family History   Problem Relation Age of Onset    Heart Disease Mother     High Blood Pressure Mother     Alzheimer's Disease Mother     Heart Disease Father     Heart Disease Sister     Heart Attack Sister     Heart Disease Brother    ,   Immunization History   Administered Date(s) Administered    Pneumococcal Conjugate 13-valent ([id number]) 2019    Pneumococcal Polysaccharide ([id number]) 2020    Zoster Recombinant (Shingrix) 2019, 2019   ,   Health Maintenance   Topic Date Due    COVID-19 Vaccine (1) Never done    DTaP/Tdap/Td vaccine (1 - Tdap) Never done    Annual Wellness Visit (AWV)  Never done    Flu vaccine (1) Never done    A1C test (Diabetic or Prediabetic)  2022    Lipid screen  2022    Potassium monitoring  2022    Creatinine monitoring  2022    Breast cancer screen  2023    Colon cancer screen colonoscopy  11/15/2026    DEXA (modify frequency per FRAX score)  Completed    Shingles Vaccine  Completed    Pneumococcal 65+ years Vaccine  Completed    Hepatitis C screen  Completed    Hepatitis A vaccine  Aged Out    Hepatitis B vaccine  Aged Out    Hib vaccine  Aged Out    Meningococcal (ACWY) vaccine  Aged Out         PHYSICAL EXAMINATION:  [ INSTRUCTIONS:  \"[x]\" Indicates a positive item  \"[]\" Indicates a negative item  -- DELETE ALL ITEMS NOT EXAMINED]  Vital Signs: (As obtained by patient/caregiver or practitioner observation)    Blood pressure-  Heart rate-    Respiratory rate- Temperature-  Pulse oximetry-     Constitutional: [x] Appears well-developed and well-nourished [x] No apparent distress      [] Abnormal-   Mental status  [x] Alert and awake  [x] Oriented to person/place/time [x]Able to follow commands      Eyes:  EOM    [x]  Normal  [] Abnormal-  Sclera  [x]  Normal  [] Abnormal -         Discharge [x]  None visible  [] Abnormal -    HENT:   [x] Normocephalic, atraumatic. [] Abnormal   [x] Mouth/Throat: Mucous membranes are moist.     External Ears [x] Normal  [] Abnormal-     Neck: [x] No visualized mass     Pulmonary/Chest: [x] Respiratory effort normal.  [x] No visualized signs of difficulty breathing or respiratory distress        [] Abnormal-      Musculoskeletal:   [x] Normal gait with no signs of ataxia         [x] Normal range of motion of neck        [] Abnormal-       Neurological:        [x] No Facial Asymmetry (Cranial nerve 7 motor function) (limited exam to video visit)          [] No gaze palsy        [] Abnormal-         Skin:        [x] No significant exanthematous lesions or discoloration noted on facial skin         [] Abnormal-            Psychiatric:       [x] Normal Affect [x] No Hallucinations        [] Abnormal-     Other pertinent observable physical exam findings-       ASSESSMENT/PLAN:    1. Brian Amaya was seen today for other, cough and pharyngitis. Diagnoses and all orders for this visit:    Cough  -     benzonatate (TESSALON) 100 MG capsule; Take 1-2 capsules by mouth 3 times daily as needed for Cough  -     doxycycline hyclate (VIBRA-TABS) 100 MG tablet; Take 1 tablet by mouth 2 times daily for 7 days  -     predniSONE (DELTASONE) 20 MG tablet; Take 2 tablets by mouth daily for 5 days    Suspect bronchitis given negative COVID testing. Will treat with abx/steroid as she is traveling soon. If symptoms worsen, could consider PCR testing. Encouraged tessalon perles/mucinex to assist with expectoration/cough suppression.  Flonase to relieve sinus congestion. Warm fluids with honey, humidifier to assist in moistening of secretion recommended. Encouraged increased nutrition/hydration/rest while recovering. Return if symptoms worsen or fail to improve. Dinora Greenberg is a 78 y.o. female being evaluated by a Virtual Visit (video visit) encounter to address concerns as mentioned above. A caregiver was present when appropriate. Due to this being a TeleHealth encounter (During NNODN-14 public health emergency), evaluation of the following organ systems was limited: Vitals/Constitutional/EENT/Resp/CV/GI//MS/Neuro/Skin/Heme-Lymph-Imm. Pursuant to the emergency declaration under the [address] [place] [address], [address] [place] [address] authority and the [employer] Act, this Virtual Visit was conducted with patient's (and/or legal guardian's) consent, to reduce the patient's risk of exposure to COVID-19 and provide necessary medical care. The patient (and/or legal guardian) has also been advised to contact this office for worsening conditions or problems, and seek emergency medical treatment and/or call 911 if deemed necessary. Services were provided through a phone discussion virtually to substitute for in-person clinic visit. Patient and provider were located at their individual homes. Consent:  She and/or health care decision maker is aware that that she may receive a bill for this telephone service, depending on her insurance coverage, and has provided verbal consent to proceed: Yes    Total Time: minutes: 11-20 minutes    --JEANNA Moore - CNP on 10/5/2021 at 2:36 PM    An electronic signature was used to authenticate this note.

## 2021-10-15 ENCOUNTER — CLINICAL DOCUMENTATION (OUTPATIENT)
Dept: OTHER | Age: 76
End: 2021-10-15

## 2021-11-12 RX ORDER — APIXABAN 5 MG/1
TABLET, FILM COATED ORAL
Qty: 180 TABLET | Refills: 1 | Status: SHIPPED | OUTPATIENT
Start: 2021-11-12 | End: 2022-05-27

## 2021-11-15 ENCOUNTER — CLINICAL DOCUMENTATION (OUTPATIENT)
Dept: OTHER | Age: 76
End: 2021-11-15

## 2021-11-18 ENCOUNTER — TELEPHONE (OUTPATIENT)
Dept: ORTHOPEDIC SURGERY | Age: 76
End: 2021-11-18

## 2021-11-18 NOTE — TELEPHONE ENCOUNTER
Attempted to contact patient to inform them about the [address] [place] [address] joint pain program. Patient can call [phone] to find out more information or to schedule an appointment with a joint pain orthopedic specialist.

## 2022-01-31 ENCOUNTER — TELEPHONE (OUTPATIENT)
Dept: INTERNAL MEDICINE CLINIC | Age: 77
End: 2022-01-31

## 2022-02-02 NOTE — TELEPHONE ENCOUNTER
Called and LM on VM to CB if she is interested in staying at this practice as a patient and that she needs to schedule a Medicare AWV.

## 2022-03-16 RX ORDER — AMLODIPINE BESYLATE 5 MG/1
5 TABLET ORAL DAILY
Qty: 90 TABLET | Refills: 0 | Status: SHIPPED | OUTPATIENT
Start: 2022-03-16 | End: 2022-05-27

## 2022-04-21 PROBLEM — R07.9 CHEST PAIN: Status: RESOLVED | Noted: 2020-10-20 | Resolved: 2022-04-21

## 2022-04-21 PROBLEM — M75.121 COMPLETE TEAR OF RIGHT ROTATOR CUFF: Status: RESOLVED | Noted: 2020-08-25 | Resolved: 2022-04-21

## 2022-04-21 PROBLEM — N39.0 URINARY TRACT INFECTION WITH HEMATURIA: Status: RESOLVED | Noted: 2021-07-20 | Resolved: 2022-04-21

## 2022-04-21 PROBLEM — R31.9 URINARY TRACT INFECTION WITH HEMATURIA: Status: RESOLVED | Noted: 2021-07-20 | Resolved: 2022-04-21

## 2022-05-11 PROBLEM — K11.8 PAROTID MASS: Status: RESOLVED | Noted: 2020-10-06 | Resolved: 2022-05-11

## 2022-05-11 PROBLEM — Z87.891 HISTORY OF TOBACCO ABUSE: Status: RESOLVED | Noted: 2020-11-10 | Resolved: 2022-05-11

## 2022-05-19 NOTE — TELEPHONE ENCOUNTER
Patient called the office today to report that she is no longer requiring her home oxygen. Her O2 sat is running 95-96% on room air and she is sleeping well through the night without oxygen as well. Order faxed to [facility] for discontinuation of home oxygen. Patient aware and will wait for their call to coordinate a pickup time. Routing refill request to provider for review/approval because:  Drug not on the [facility] refill protocol     Alae Iqbal RN on 5/19/2022 at 10:46 AM

## 2022-05-27 ENCOUNTER — TELEPHONE (OUTPATIENT)
Dept: CARDIOLOGY CLINIC | Age: 77
End: 2022-05-27

## 2022-05-27 RX ORDER — AMLODIPINE BESYLATE 5 MG/1
5 TABLET ORAL DAILY
Qty: 90 TABLET | Refills: 1 | Status: SHIPPED | OUTPATIENT
Start: 2022-05-27

## 2022-05-27 RX ORDER — APIXABAN 5 MG/1
TABLET, FILM COATED ORAL
Qty: 180 TABLET | Refills: 1 | Status: SHIPPED | OUTPATIENT
Start: 2022-05-27 | End: 2022-10-25

## 2022-05-27 NOTE — TELEPHONE ENCOUNTER
05/27/22-Called & spoke with pt. Pt is now scheduled with M in [facility] ER on 06/28/22.

## 2022-06-24 NOTE — PROGRESS NOTES
[address]   Cardiac Followup    Referring Provider:  Jaclyn Licona MD     Chief Complaint   Patient presents with    Follow-up    Hypertension    Hyperlipidemia    Atrial Fibrillation      Subjective: Ms. Abi Alaniz is here today for hospital follow up afib, HTN, HLD; denies any complaints today. History of Present Illness:   Leonidas aguiar 78 y. o. female [address] of HTN, PAF dx 10/20 (on eliquis for [facility], [place]), HLD, RLS, OA, and GERD. Admitted to [facility] 10/20/20 with c/o CP and noted afib with couple episodes of PAF then converting NSR. I reviewed EKG showing afib RVR 155bpm. Second EKG same. Note she did NOT feel palps with afib. Note underwent w/u for RLL opacities on CT imaging but PET scan not hypermetabolic. Right parotid lesion found with increased activity and US guided biopsy 10/9/20 performed. She reports negative pathology. Most recent ECHO 10/22/20 EF >65%. No regional wall abnls; Grade I DD with normal LV filling pressure (no change from 4/19 ECHO). On 10/22/20  underwent EBUS with Dr. Qi Platt- No malignant cells identified. Most recent EKG 11/6/20 NSR 77bpm. Referred to Dr. Cinhtia Ambriz who then referred to [facility] surgeon, Dr. Ted Lemos. S/P VATS wedge biopsy on 11/16/20. 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 She has been fully vaccinated against Covid-19. Today she states she is doing well. She reports not taking BP medication this AM. She states she does not actively check BP at home. EKG today 6/28/22 SR, non-specific ST change, HR 65. Patient currently denies any weight gain, edema, palpitations, chest pain, shortness of breath, dizziness, and syncope. She questions if she needs to continue Eliquis as prescribed. She is taking all medications as prescribed, tolerating well. Denies recent issues with bleeding or bruising. Weight today #124, loss of 7# since last office visit.     Past Medical History:   has a past medical history of Atrial fibrillation ([address] Chest pain, Complete tear of right rotator cuff, COPD (chronic obstructive pulmonary disease) (HCC), Difficult intravenous access, Diverticulitis, GERD (gastroesophageal reflux disease), Hilar adenopathy, Hilar lymphadenopathy, History of tobacco abuse, Hyperlipidemia, Hypertension, Hypokalemia, IFG (impaired fasting glucose), Infected sebaceous cyst, Macromastia, Osteoarthritis, Osteopenia, Parotid mass, PONV (postoperative nausea and vomiting), Prolonged emergence from general anesthesia, Restless legs syndrome, S/P shoulder surgery, Thoracic sprain, and Urinary tract infection with hematuria. Surgical History:   has a past surgical history that includes ovarian cyst removal; Breast reduction surgery (10/15/15); Colonoscopy (11/15/2016); pr excision tumor soft tissue back/flank subq <3cm (N/A, 8/23/2019); Shoulder arthroscopy (Right, 8/25/2020); Tonsillectomy; other surgical history (10/22/2020); bronchoscopy (N/A, 10/22/2020); bronchoscopy (10/22/2020); bronchoscopy (10/22/2020); bronchoscopy (10/22/2020); bronchoscopy (10/22/2020); and Thoracoscopy (Right, 11/12/2020). Social History:   reports that she quit smoking about 2002. Her smoking use included cigarettes. She started smoking about 52 years ago. She has a 60.00 pack-year smoking history. She has never used smokeless tobacco. She reports current alcohol use. She reports that she does not use drugs. Family History:  family history includes Alzheimer's Disease in her mother; Heart Attack in her sister; Heart Disease in her brother, father, mother, and sister; High Blood Pressure in her mother. Home Medications:  Prior to Admission medications    Medication Sig Start Date End Date Taking?  Authorizing Provider   ELIQUIS 5 MG TABS tablet TAKE 1 TABLET BY MOUTH  TWICE DAILY 5/27/22  Yes Delfin Carpenter MD   amLODIPine (NORVASC) 5 MG tablet TAKE 1 TABLET BY MOUTH  DAILY 5/27/22  Yes Delfin Carpenter MD   alendronate (FOSAMAX) 70 MG tablet TAKE 1 TABLET BY MOUTH WEEKLY WITH 8 OZ OF PLAIN  WATER 30 MINUTES BEFORE  FIRST FOOD, DRINK OR MEDS. STAY UPRIGHT FOR 30 MINS 7/6/21  Yes Adam Valente MD   simvastatin (ZOCOR) 20 MG tablet TAKE 1 TABLET BY MOUTH AT  NIGHT 5/23/21  Yes Adam Valente MD   omeprazole (PRILOSEC) 10 MG delayed release capsule TAKE 1 CAPSULE BY MOUTH  DAILY 5/23/21  Yes Adam Valente MD   hydroCHLOROthiazide (HYDRODIURIL) 25 MG tablet Take 1 tablet by mouth every morning 4/26/21  Yes Julian Lainez MD   vitamin B-12 (CYANOCOBALAMIN) 100 MCG tablet Take 50 mcg by mouth daily   Yes Historical Provider, MD   acetaminophen (TYLENOL) 500 MG tablet Take 500 mg by mouth every 6 hours as needed for Pain   Yes Historical Provider, MD   Calcium Carb-Cholecalciferol (CALCIUM + D3) 600-200 MG-UNIT TABS Take 1 tablet by mouth 2 times daily    Yes Historical Provider, MD   gabapentin (NEURONTIN) 300 MG capsule Take 1 capsule by mouth 4 times daily for 30 days. Intended supply: 30 days  Patient not taking: Reported on 6/28/2022 4/21/21 7/20/21  Julian Lainez MD        Allergies:  Naproxen and Percocet [oxycodone-acetaminophen]     Review of Systems:   · Constitutional: there has been no unanticipated weight loss. There's been no change in energy level, sleep pattern, or activity level. · Eyes: No visual changes or diplopia. No scleral icterus. · ENT: No Headaches, hearing loss or vertigo. No mouth sores or sore throat. · Cardiovascular: Reviewed in HPI  · Respiratory: No cough or wheezing, no sputum production. No hematemesis. · Gastrointestinal: No abdominal pain, appetite loss, blood in stools. No change in bowel or bladder habits. · Genitourinary: No dysuria, trouble voiding, or hematuria. · Musculoskeletal:  No gait disturbance, weakness or joint complaints. · Integumentary: No rash or pruritis. · Neurological: No headache, diplopia, change in muscle strength, numbness or tingling.  No change in gait, balance, coordination, mood, affect, memory, mentation, behavior. · Psychiatric: No anxiety, no depression. · Endocrine: No malaise, fatigue or temperature intolerance. No excessive thirst, fluid intake, or urination. No tremor. · Hematologic/Lymphatic: No abnormal bruising or bleeding, blood clots or swollen lymph nodes. · Allergic/Immunologic: No nasal congestion or hives. Physical Examination:    Vitals:    06/28/22 0905   BP: (!) 160/68   Pulse: 63   SpO2: 95%       Constitutional and General Appearance: NAD   Respiratory:  · Normal excursion and expansion without use of accessory muscles  · Resp Auscultation: clear breath sounds  Cardiovascular:  · The apical impulses not displaced  · Heart tones are crisp and normal  · Cervical veins are not engorged  · The carotid upstroke is normal in amplitude and contour without delay or bruit  · Normal S1S2, No S3, No Murmur  · Peripheral pulses are symmetrical and full  · There is no clubbing, cyanosis of the extremities.   · No edema  · Femoral Arteries: 2+ and equal  · Pedal Pulses: 2+ and equal   Abdomen:  · No masses or tenderness  · Liver/Spleen: No Abnormalities Noted  Neurological/Psychiatric:  · Alert and oriented in all spheres  · Moves all extremities well  · Exhibits normal gait balance and coordination  · No abnormalities of mood, affect, memory, mentation, or behavior are noted        Skin: warm and dry  Lab Results   Component Value Date    CHOL 175 09/16/2021    CHOL 134 10/21/2020    CHOL 175 06/09/2020     Lab Results   Component Value Date    TRIG 154 (H) 09/16/2021    TRIG 106 10/21/2020    TRIG 141 06/09/2020     Lab Results   Component Value Date    HDL 48 09/16/2021    HDL 44 10/21/2020    HDL 54 06/09/2020     Lab Results   Component Value Date    LDLCALC 96 09/16/2021    LDLCALC 69 10/21/2020    LDLCALC 93 06/09/2020     Lab Results   Component Value Date    LABVLDL 31 09/16/2021    LABVLDL 21 10/21/2020    LABVLDL 28 06/09/2020     No results found for: KURTHDLRATIO    Assessment:     1. PAF (paroxysmal atrial fibrillation) [facility]): Diagnosed October 2020 and asymptomatic. Most recent ECHO 10/22/20 EF >65%. Most recent EKG today NSR. Note CHADs-vasc=2 and PAF with no symptoms I will continue eliquis 5mg BID. 2. Essential hypertension: Well controlled and will continue current medical regimen. 3. Hyperlipidemia, unspecified hyperlipidemia type: Most recent lipids 9/16/21 see above. I personally reviewed lab results in epic and discussed with patient. Well controlled and will continue current medical regimen. 4.      Chest pain: Resolved with no recurrence since admission 10/20. Given normal ECHO and no further CP. Will follow clinically only for now. No stress testing needed at this time. Plan:  1. Recommend checking blood pressure 3-4 times a week, in a relaxed calm environment, at random time intervals. Keep a log and call office for further intervention if consistently greater than goal.   ~ Goal Blood Pressure 130/85   ~ Discussion potentially increasing amlodipine if warranted. 2. Recommend continue taking Eliquis 5 mg twice daily ~ anticoagulation to prevent stroke given history of atrial fibrillation. ~ monitor for any signs of bleeding and report to office or seek emergency room. 3. Continue to follow with [facility], [facility] for annual blood work. 4. I recommend that the patient continue their currently prescribed medications. Their drug modifiable risk factors appear to be well controlled. I will continue to address the need/dosing of medications in future visits. Follow up in one year. Cost of prescription medications and patient compliance have been reviewed with patient. All questions answered. Thank you for allowing me to participate in the care of this individual.    Scribe's attestation:   This note was scribed in the presence of Yassine Moore by Laury Bucio RN     I, Dr. James Amador, personally performed the services described in this documentation, as scribed by the above signed scribe in my presence. It is both accurate and complete to my knowledge. I agree with the details independently gathered by the clinical support staff, while the remaining scribed note accurately describes my personal service to the patient. Mona Cloud.  Rachel Benitez M.D., [facility] [place]

## 2022-06-28 ENCOUNTER — OFFICE VISIT (OUTPATIENT)
Dept: CARDIOLOGY CLINIC | Age: 77
End: 2022-06-28
Payer: MEDICARE

## 2022-06-28 VITALS
BODY MASS INDEX: 26.03 KG/M2 | HEIGHT: 58 IN | DIASTOLIC BLOOD PRESSURE: 68 MMHG | SYSTOLIC BLOOD PRESSURE: 160 MMHG | WEIGHT: 124 LBS | HEART RATE: 63 BPM | OXYGEN SATURATION: 95 %

## 2022-06-28 DIAGNOSIS — Z87.891 HISTORY OF TOBACCO ABUSE: ICD-10-CM

## 2022-06-28 DIAGNOSIS — I48.0 PAF (PAROXYSMAL ATRIAL FIBRILLATION) (HCC): Primary | ICD-10-CM

## 2022-06-28 DIAGNOSIS — I10 PRIMARY HYPERTENSION: ICD-10-CM

## 2022-06-28 DIAGNOSIS — E78.5 HYPERLIPIDEMIA, UNSPECIFIED HYPERLIPIDEMIA TYPE: ICD-10-CM

## 2022-06-28 PROCEDURE — 1090F PRES/ABSN URINE INCON ASSESS: CPT | Performed by: INTERNAL MEDICINE

## 2022-06-28 PROCEDURE — G8399 PT W/DXA RESULTS DOCUMENT: HCPCS | Performed by: INTERNAL MEDICINE

## 2022-06-28 PROCEDURE — 93000 ELECTROCARDIOGRAM COMPLETE: CPT | Performed by: INTERNAL MEDICINE

## 2022-06-28 PROCEDURE — 1123F ACP DISCUSS/DSCN MKR DOCD: CPT | Performed by: INTERNAL MEDICINE

## 2022-06-28 PROCEDURE — G8427 DOCREV CUR MEDS BY ELIG CLIN: HCPCS | Performed by: INTERNAL MEDICINE

## 2022-06-28 PROCEDURE — 1036F TOBACCO NON-USER: CPT | Performed by: INTERNAL MEDICINE

## 2022-06-28 PROCEDURE — 3017F COLORECTAL CA SCREEN DOC REV: CPT | Performed by: INTERNAL MEDICINE

## 2022-06-28 PROCEDURE — G8417 CALC BMI ABV UP PARAM F/U: HCPCS | Performed by: INTERNAL MEDICINE

## 2022-06-28 PROCEDURE — 99214 OFFICE O/P EST MOD 30 MIN: CPT | Performed by: INTERNAL MEDICINE

## 2022-06-28 RX ORDER — SIMVASTATIN 20 MG
TABLET ORAL
Qty: 90 TABLET | Refills: 3 | Status: SHIPPED | OUTPATIENT
Start: 2022-06-28

## 2022-06-28 RX ORDER — HYDROCHLOROTHIAZIDE 25 MG/1
25 TABLET ORAL EVERY MORNING
Qty: 90 TABLET | Refills: 3 | Status: SHIPPED | OUTPATIENT
Start: 2022-06-28

## 2022-06-28 NOTE — PATIENT INSTRUCTIONS
Plan:  1. Recommend checking blood pressure 3-4 times a week, in a relaxed calm environment, at random time intervals. Keep a log and call office for further intervention if consistently greater than goal.   ~ Goal Blood Pressure 130/85   ~ Discussion potentially increasing amlodipine if warranted. 2. Recommend continue taking Eliquis 5 mg twice daily ~ anticoagulation to prevent stroke given history of atrial fibrillation. ~ monitor for any signs of bleeding and report to office or seek emergency room. 3. Continue to follow with [facility], MD for annual blood work. 4. I recommend that the patient continue their currently prescribed medications. Their drug modifiable risk factors appear to be well controlled. I will continue to address the need/dosing of medications in future visits. Follow up in one year.

## 2022-06-28 NOTE — LETTER
Kb [place]  1945      [address]   Cardiac Followup    Referring Provider:  Antonio Boone MD     Chief Complaint   Patient presents with    Follow-up    Hypertension    Hyperlipidemia    Atrial Fibrillation      Subjective: Ms. Arnel Palomares is here today for hospital follow up afib, HTN, HLD; denies any complaints today. History of Present Illness:   Mikey aguiar 78 y. o. female [address] [place] [address] Drive of HTN, PAF dx 10/20 (on eliquis for [facility]), HLD, RLS, OA, and GERD. Admitted to [facility] 10/20/20 with c/o CP and noted afib with couple episodes of PAF then converting NSR. I reviewed EKG showing afib RVR 155bpm. Second EKG same. Note she did NOT feel palps with afib. Note underwent w/u for RLL opacities on CT imaging but PET scan not hypermetabolic. Right parotid lesion found with increased activity and US guided biopsy 10/9/20 performed. She reports negative pathology. Most recent ECHO 10/22/20 EF >65%. No regional wall abnls; Grade I DD with normal LV filling pressure (no change from 4/19 ECHO). On 10/22/20  underwent EBUS with Dr. Meghan Morrissey- No malignant cells identified. Most recent EKG 11/6/20 NSR 77bpm. Referred to Dr. Breann Casillas who then referred to [facility] surgeon, Dr. Alix Sotelo. S/P VATS wedge biopsy on 11/16/20. 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 She has been fully vaccinated against Covid-19. Today she states she is doing well. She reports not taking BP medication this AM. She states she does not actively check BP at home. EKG today 6/28/22 SR, non-specific ST change, HR 65. Patient currently denies any weight gain, edema, palpitations, chest pain, shortness of breath, dizziness, and syncope. She questions if she needs to continue Eliquis as prescribed. She is taking all medications as prescribed, tolerating well. Denies recent issues with bleeding or bruising. Weight today #124, loss of 7# since last office visit.     Past Medical History:   has a past medical history of Atrial (FOSAMAX) 70 MG tablet TAKE 1 TABLET BY MOUTH  WEEKLY WITH 8 OZ OF PLAIN  WATER 30 MINUTES BEFORE  FIRST FOOD, DRINK OR MEDS. STAY UPRIGHT FOR 30 MINS 7/6/21  Yes Meghann Razo MD   simvastatin (ZOCOR) 20 MG tablet TAKE 1 TABLET BY MOUTH AT  NIGHT 5/23/21  Yes Meghann Razo MD   omeprazole (PRILOSEC) 10 MG delayed release capsule TAKE 1 CAPSULE BY MOUTH  DAILY 5/23/21  Yes Meghann Razo MD   hydroCHLOROthiazide (HYDRODIURIL) 25 MG tablet Take 1 tablet by mouth every morning 4/26/21  Yes Ham Oneill MD   vitamin B-12 (CYANOCOBALAMIN) 100 MCG tablet Take 50 mcg by mouth daily   Yes Historical Provider, MD   acetaminophen (TYLENOL) 500 MG tablet Take 500 mg by mouth every 6 hours as needed for Pain   Yes Historical Provider, MD   Calcium Carb-Cholecalciferol (CALCIUM + D3) 600-200 MG-UNIT TABS Take 1 tablet by mouth 2 times daily    Yes Historical Provider, MD   gabapentin (NEURONTIN) 300 MG capsule Take 1 capsule by mouth 4 times daily for 30 days. Intended supply: 30 days  Patient not taking: Reported on 6/28/2022 4/21/21 7/20/21  Hma Oneill MD        Allergies:  Naproxen and Percocet [oxycodone-acetaminophen]     Review of Systems:   · Constitutional: there has been no unanticipated weight loss. There's been no change in energy level, sleep pattern, or activity level. · Eyes: No visual changes or diplopia. No scleral icterus. · ENT: No Headaches, hearing loss or vertigo. No mouth sores or sore throat. · Cardiovascular: Reviewed in HPI  · Respiratory: No cough or wheezing, no sputum production. No hematemesis. · Gastrointestinal: No abdominal pain, appetite loss, blood in stools. No change in bowel or bladder habits. · Genitourinary: No dysuria, trouble voiding, or hematuria. · Musculoskeletal:  No gait disturbance, weakness or joint complaints. · Integumentary: No rash or pruritis. · Neurological: No headache, diplopia, change in muscle strength, numbness or tingling.  No change in gait, balance, coordination, mood, affect, memory, mentation, behavior. · Psychiatric: No anxiety, no depression. · Endocrine: No malaise, fatigue or temperature intolerance. No excessive thirst, fluid intake, or urination. No tremor. · Hematologic/Lymphatic: No abnormal bruising or bleeding, blood clots or swollen lymph nodes. · Allergic/Immunologic: No nasal congestion or hives. Physical Examination:    Vitals:    06/28/22 0905   BP: (!) 160/68   Pulse: 63   SpO2: 95%       Constitutional and General Appearance: NAD   Respiratory:  · Normal excursion and expansion without use of accessory muscles  · Resp Auscultation: clear breath sounds  Cardiovascular:  · The apical impulses not displaced  · Heart tones are crisp and normal  · Cervical veins are not engorged  · The carotid upstroke is normal in amplitude and contour without delay or bruit  · Normal S1S2, No S3, No Murmur  · Peripheral pulses are symmetrical and full  · There is no clubbing, cyanosis of the extremities.   · No edema  · Femoral Arteries: 2+ and equal  · Pedal Pulses: 2+ and equal   Abdomen:  · No masses or tenderness  · Liver/Spleen: No Abnormalities Noted  Neurological/Psychiatric:  · Alert and oriented in all spheres  · Moves all extremities well  · Exhibits normal gait balance and coordination  · No abnormalities of mood, affect, memory, mentation, or behavior are noted        Skin: warm and dry  Lab Results   Component Value Date    CHOL 175 09/16/2021    CHOL 134 10/21/2020    CHOL 175 06/09/2020     Lab Results   Component Value Date    TRIG 154 (H) 09/16/2021    TRIG 106 10/21/2020    TRIG 141 06/09/2020     Lab Results   Component Value Date    HDL 48 09/16/2021    HDL 44 10/21/2020    HDL 54 06/09/2020     Lab Results   Component Value Date    LDLCALC 96 09/16/2021    LDLCALC 69 10/21/2020    LDLCALC 93 06/09/2020     Lab Results   Component Value Date    LABVLDL 31 09/16/2021    LABVLDL 21 10/21/2020    LABVLDL 28 06/09/2020     No results found for: SHILPI    Assessment:     1. PAF (paroxysmal atrial fibrillation) [facility]): Diagnosed October 2020 and asymptomatic. Most recent ECHO 10/22/20 EF >65%. Most recent EKG today NSR. Note CHADs-vasc=2 and PAF with no symptoms I will continue eliquis 5mg BID. 2. Essential hypertension: Well controlled and will continue current medical regimen. 3. Hyperlipidemia, unspecified hyperlipidemia type: Most recent lipids 9/16/21 see above. I personally reviewed lab results in epic and discussed with patient. Well controlled and will continue current medical regimen. 4.      Chest pain: Resolved with no recurrence since admission 10/20. Given normal ECHO and no further CP. Will follow clinically only for now. No stress testing needed at this time. Plan:  1. Recommend checking blood pressure 3-4 times a week, in a relaxed calm environment, at random time intervals. Keep a log and call office for further intervention if consistently greater than goal.   ~ Goal Blood Pressure 130/85   ~ Discussion potentially increasing amlodipine if warranted. 2. Recommend continue taking Eliquis 5 mg twice daily ~ anticoagulation to prevent stroke given history of atrial fibrillation. ~ monitor for any signs of bleeding and report to office or seek emergency room. 3. Continue to follow with Corie Love MD for annual blood work. 4. I recommend that the patient continue their currently prescribed medications. Their drug modifiable risk factors appear to be well controlled. I will continue to address the need/dosing of medications in future visits. Follow up in one year. Cost of prescription medications and patient compliance have been reviewed with patient. All questions answered. Thank you for allowing me to participate in the care of this individual.    Scribe's attestation:   This note was scribed in the presence of Bharat Hill by Ramses Bassett RN     I, Dr. Shyanne Thakkar, personally performed the services described in this documentation, as scribed by the above signed scribe in my presence. It is both accurate and complete to my knowledge. I agree with the details independently gathered by the clinical support staff, while the remaining scribed note accurately describes my personal service to the patient. Damion Bill M.D., [facility]

## 2022-09-26 ENCOUNTER — HOSPITAL ENCOUNTER (OUTPATIENT)
Dept: MAMMOGRAPHY | Age: 77
Discharge: HOME | End: 2022-09-26
Payer: MEDICARE

## 2022-09-26 ENCOUNTER — HOSPITAL ENCOUNTER (OUTPATIENT)
Dept: GENERAL RADIOLOGY | Age: 77
Discharge: HOME | End: 2022-09-26
Payer: MEDICARE

## 2022-09-26 DIAGNOSIS — R92.8 ABNORMAL MAMMOGRAM: ICD-10-CM

## 2022-09-26 DIAGNOSIS — Z78.0 POST-MENOPAUSE: ICD-10-CM

## 2022-09-26 PROCEDURE — G0279 TOMOSYNTHESIS, MAMMO: HCPCS

## 2022-09-26 PROCEDURE — 77080 DXA BONE DENSITY AXIAL: CPT

## 2022-10-25 RX ORDER — APIXABAN 5 MG/1
TABLET, FILM COATED ORAL
Qty: 180 TABLET | Refills: 3 | Status: SHIPPED | OUTPATIENT
Start: 2022-10-25

## 2022-11-22 ENCOUNTER — TELEPHONE (OUTPATIENT)
Dept: CARDIOLOGY CLINIC | Age: 77
End: 2022-11-22

## 2022-11-22 NOTE — TELEPHONE ENCOUNTER
Lower risk clinically for lower risk type of procedure. Proceed as planned. OK to hold [facility] for least amount of time possible. Thanks.

## 2022-11-22 NOTE — TELEPHONE ENCOUNTER
CARDIAC CLEARANCE REQUEST    What type of procedure are you having:  Epidural Steroid Injection  Are you taking any blood thinners:  Eliquis-hold for 3 days prior  Type on anesthesia:    When is your procedure scheduled for:  11/28/2022  What physician is performing your procedure:  Dr. Mason Gates  Phone Number:  [phone]  Fax number to send the letter:  [phone]      Last ov 06/28/2022 sallie.

## 2023-08-09 ENCOUNTER — OFFICE VISIT (OUTPATIENT)
Dept: INTERNAL MEDICINE CLINIC | Age: 78
End: 2023-08-09
Payer: MEDICARE

## 2023-08-09 VITALS
BODY MASS INDEX: 26.54 KG/M2 | WEIGHT: 123 LBS | OXYGEN SATURATION: 96 % | DIASTOLIC BLOOD PRESSURE: 80 MMHG | SYSTOLIC BLOOD PRESSURE: 136 MMHG | TEMPERATURE: 97.4 F | HEART RATE: 73 BPM | HEIGHT: 57 IN

## 2023-08-09 DIAGNOSIS — Z12.31 ENCOUNTER FOR SCREENING MAMMOGRAM FOR BREAST CANCER: ICD-10-CM

## 2023-08-09 DIAGNOSIS — Z00.00 PREVENTATIVE HEALTH CARE: ICD-10-CM

## 2023-08-09 DIAGNOSIS — G43.909 MIGRAINE WITHOUT STATUS MIGRAINOSUS, NOT INTRACTABLE, UNSPECIFIED MIGRAINE TYPE: ICD-10-CM

## 2023-08-09 DIAGNOSIS — I10 ESSENTIAL HYPERTENSION: ICD-10-CM

## 2023-08-09 DIAGNOSIS — M85.80 OSTEOPENIA, UNSPECIFIED LOCATION: ICD-10-CM

## 2023-08-09 DIAGNOSIS — K21.9 GASTROESOPHAGEAL REFLUX DISEASE, UNSPECIFIED WHETHER ESOPHAGITIS PRESENT: ICD-10-CM

## 2023-08-09 DIAGNOSIS — I48.0 PAF (PAROXYSMAL ATRIAL FIBRILLATION) (HCC): ICD-10-CM

## 2023-08-09 DIAGNOSIS — Z76.89 ENCOUNTER TO ESTABLISH CARE: Primary | ICD-10-CM

## 2023-08-09 PROCEDURE — 1090F PRES/ABSN URINE INCON ASSESS: CPT | Performed by: STUDENT IN AN ORGANIZED HEALTH CARE EDUCATION/TRAINING PROGRAM

## 2023-08-09 PROCEDURE — 1036F TOBACCO NON-USER: CPT | Performed by: STUDENT IN AN ORGANIZED HEALTH CARE EDUCATION/TRAINING PROGRAM

## 2023-08-09 PROCEDURE — 3079F DIAST BP 80-89 MM HG: CPT | Performed by: STUDENT IN AN ORGANIZED HEALTH CARE EDUCATION/TRAINING PROGRAM

## 2023-08-09 PROCEDURE — G8419 CALC BMI OUT NRM PARAM NOF/U: HCPCS | Performed by: STUDENT IN AN ORGANIZED HEALTH CARE EDUCATION/TRAINING PROGRAM

## 2023-08-09 PROCEDURE — 3017F COLORECTAL CA SCREEN DOC REV: CPT | Performed by: STUDENT IN AN ORGANIZED HEALTH CARE EDUCATION/TRAINING PROGRAM

## 2023-08-09 PROCEDURE — G8427 DOCREV CUR MEDS BY ELIG CLIN: HCPCS | Performed by: STUDENT IN AN ORGANIZED HEALTH CARE EDUCATION/TRAINING PROGRAM

## 2023-08-09 PROCEDURE — 3075F SYST BP GE 130 - 139MM HG: CPT | Performed by: STUDENT IN AN ORGANIZED HEALTH CARE EDUCATION/TRAINING PROGRAM

## 2023-08-09 PROCEDURE — 99204 OFFICE O/P NEW MOD 45 MIN: CPT | Performed by: STUDENT IN AN ORGANIZED HEALTH CARE EDUCATION/TRAINING PROGRAM

## 2023-08-09 PROCEDURE — 1123F ACP DISCUSS/DSCN MKR DOCD: CPT | Performed by: STUDENT IN AN ORGANIZED HEALTH CARE EDUCATION/TRAINING PROGRAM

## 2023-08-09 PROCEDURE — G8399 PT W/DXA RESULTS DOCUMENT: HCPCS | Performed by: STUDENT IN AN ORGANIZED HEALTH CARE EDUCATION/TRAINING PROGRAM

## 2023-08-09 SDOH — ECONOMIC STABILITY: HOUSING INSECURITY
IN THE LAST 12 MONTHS, WAS THERE A TIME WHEN YOU DID NOT HAVE A STEADY PLACE TO SLEEP OR SLEPT IN A SHELTER (INCLUDING NOW)?: NO

## 2023-08-09 SDOH — ECONOMIC STABILITY: FOOD INSECURITY: WITHIN THE PAST 12 MONTHS, THE FOOD YOU BOUGHT JUST DIDN'T LAST AND YOU DIDN'T HAVE MONEY TO GET MORE.: NEVER TRUE

## 2023-08-09 SDOH — ECONOMIC STABILITY: INCOME INSECURITY: HOW HARD IS IT FOR YOU TO PAY FOR THE VERY BASICS LIKE FOOD, HOUSING, MEDICAL CARE, AND HEATING?: NOT HARD AT ALL

## 2023-08-09 SDOH — ECONOMIC STABILITY: FOOD INSECURITY: WITHIN THE PAST 12 MONTHS, YOU WORRIED THAT YOUR FOOD WOULD RUN OUT BEFORE YOU GOT MONEY TO BUY MORE.: NEVER TRUE

## 2023-08-09 ASSESSMENT — PATIENT HEALTH QUESTIONNAIRE - PHQ9
SUM OF ALL RESPONSES TO PHQ QUESTIONS 1-9: 0
SUM OF ALL RESPONSES TO PHQ9 QUESTIONS 1 & 2: 0
SUM OF ALL RESPONSES TO PHQ QUESTIONS 1-9: 0
SUM OF ALL RESPONSES TO PHQ QUESTIONS 1-9: 0
1. LITTLE INTEREST OR PLEASURE IN DOING THINGS: 0
2. FEELING DOWN, DEPRESSED OR HOPELESS: 0
SUM OF ALL RESPONSES TO PHQ QUESTIONS 1-9: 0

## 2023-08-09 NOTE — PROGRESS NOTES
chills, sweats or weight changes  EYES:  No redness or visual symptoms. EARS, NOSE AND THROAT:  No difficulties with hearing. No symptoms of rhinitis or sore throat. CARDIOVASCULAR:  No chest pains, palpitations, orthopnea or paroxysmal noctunal dyspnea. RESPIRATORY:  No dyspnea on exertion, wheezing or cough. GI:  No nausea, vomiting, diarrhea, constipation, abdominal pain, hematochezia or melena. :  No dysuria, urinary hesitancy or dribbling. No nocturia or urinary frequency. No abnormal urethral  discharge. MUSCULOSKELETAL:  No muscle, joint or back aches  NEUROLOGIC:  No chronic headaches, no seizures. No numbness, tingling or weakness. PSYCHIATRIC:  No anxiety, mood or sleep disturbance. ENDOCRINE:  No excessive urination or excessive thirst.  DERMATOLOGIC:  No rashes or skin changes. HISTORIES  Current Outpatient Medications on File Prior to Visit   Medication Sig Dispense Refill    ELIQUIS 5 MG TABS tablet TAKE 1 TABLET BY MOUTH  TWICE DAILY 180 tablet 3    hydroCHLOROthiazide (HYDRODIURIL) 25 MG tablet Take 1 tablet by mouth every morning 90 tablet 3    simvastatin (ZOCOR) 20 MG tablet TAKE 1 TABLET BY MOUTH AT  NIGHT 90 tablet 3    amLODIPine (NORVASC) 5 MG tablet TAKE 1 TABLET BY MOUTH  DAILY 90 tablet 1    alendronate (FOSAMAX) 70 MG tablet TAKE 1 TABLET BY MOUTH  WEEKLY WITH 8 OZ OF PLAIN  WATER 30 MINUTES BEFORE  FIRST FOOD, DRINK OR MEDS. STAY UPRIGHT FOR 30 MINS 12 tablet 3    omeprazole (PRILOSEC) 10 MG delayed release capsule TAKE 1 CAPSULE BY MOUTH  DAILY 90 capsule 3    vitamin B-12 (CYANOCOBALAMIN) 100 MCG tablet Take 50 mcg by mouth daily      acetaminophen (TYLENOL) 500 MG tablet Take 500 mg by mouth every 6 hours as needed for Pain      Calcium Carb-Cholecalciferol (CALCIUM + D3) 600-200 MG-UNIT TABS Take 1 tablet by mouth 2 times daily        No current facility-administered medications on file prior to visit.       Past Medical History:   Diagnosis Date    Atrial

## 2023-08-10 RX ORDER — AMLODIPINE BESYLATE 5 MG/1
5 TABLET ORAL DAILY
Qty: 90 TABLET | Refills: 1 | Status: SHIPPED | OUTPATIENT
Start: 2023-08-10

## 2023-08-10 NOTE — TELEPHONE ENCOUNTER
Refill request for amLODIPine (NORVASC) 5 MG tablet medication. Name of Pharmacy- [facility]      Last visit - 8/9/23     Pending visit - 8/9/24    Last refill - 5/27/22      Medication Contract signed - PDMP Monitoring:    Last PDMP Radha Rosado as Reviewed:  Review User Review Instant Review Result          [unfilled]  Urine Drug Screenings (1 yr)    No resulted procedures found.        Medication Contract and Consent for Opioid Use Documents Filed        No documents found                   Last Koby george-         Additional Comments

## 2023-08-27 DIAGNOSIS — E78.5 HYPERLIPIDEMIA, UNSPECIFIED HYPERLIPIDEMIA TYPE: ICD-10-CM

## 2023-08-28 RX ORDER — SIMVASTATIN 20 MG
TABLET ORAL
Qty: 90 TABLET | Refills: 3 | Status: SHIPPED | OUTPATIENT
Start: 2023-08-28

## 2023-09-11 RX ORDER — APIXABAN 5 MG/1
TABLET, FILM COATED ORAL
Qty: 160 TABLET | Refills: 3 | Status: SHIPPED | OUTPATIENT
Start: 2023-09-11

## 2023-09-18 ENCOUNTER — TELEPHONE (OUTPATIENT)
Dept: INTERNAL MEDICINE CLINIC | Age: 78
End: 2023-09-18

## 2023-09-28 ENCOUNTER — HOSPITAL ENCOUNTER (OUTPATIENT)
Dept: MAMMOGRAPHY | Age: 78
Discharge: HOME | End: 2023-09-28
Payer: MEDICARE

## 2023-09-28 ENCOUNTER — HOSPITAL ENCOUNTER (OUTPATIENT)
Age: 78
Discharge: HOME | End: 2023-09-28
Payer: MEDICARE

## 2023-09-28 DIAGNOSIS — Z12.31 ENCOUNTER FOR SCREENING MAMMOGRAM FOR BREAST CANCER: ICD-10-CM

## 2023-09-28 DIAGNOSIS — Z00.00 PREVENTATIVE HEALTH CARE: ICD-10-CM

## 2023-09-28 LAB
ALBUMIN SERPL-MCNC: 4.6 G/DL (ref 3.4–5)
ALBUMIN/GLOB SERPL: 1.8 {RATIO} (ref 1.1–2.2)
ALP SERPL-CCNC: 91 U/L (ref 40–129)
ALT SERPL-CCNC: 16 U/L (ref 10–40)
ANION GAP SERPL CALCULATED.3IONS-SCNC: 8 MMOL/L (ref 3–16)
AST SERPL-CCNC: 15 U/L (ref 15–37)
BASOPHILS # BLD: 0.1 K/UL (ref 0–0.2)
BASOPHILS NFR BLD: 1.4 %
BILIRUB SERPL-MCNC: 0.5 MG/DL (ref 0–1)
BUN SERPL-MCNC: 21 MG/DL (ref 7–20)
CALCIUM SERPL-MCNC: 10.2 MG/DL (ref 8.3–10.6)
CHLORIDE SERPL-SCNC: 101 MMOL/L (ref 99–110)
CHOLEST SERPL-MCNC: 196 MG/DL (ref 0–199)
CO2 SERPL-SCNC: 34 MMOL/L (ref 21–32)
CREAT SERPL-MCNC: 0.6 MG/DL (ref 0.6–1.2)
DEPRECATED RDW RBC AUTO: 15.6 % (ref 12.4–15.4)
EOSINOPHIL # BLD: 0.1 K/UL (ref 0–0.6)
EOSINOPHIL NFR BLD: 1.9 %
GFR SERPLBLD CREATININE-BSD FMLA CKD-EPI: >60 ML/MIN/{1.73_M2}
GLUCOSE SERPL-MCNC: 97 MG/DL (ref 70–99)
HCT VFR BLD AUTO: 45.4 % (ref 36–48)
HDLC SERPL-MCNC: 45 MG/DL (ref 40–60)
HGB BLD-MCNC: 15.2 G/DL (ref 12–16)
LDLC SERPL CALC-MCNC: 109 MG/DL
LYMPHOCYTES # BLD: 1.9 K/UL (ref 1–5.1)
LYMPHOCYTES NFR BLD: 25.6 %
MCH RBC QN AUTO: 29.6 PG (ref 26–34)
MCHC RBC AUTO-ENTMCNC: 33.4 G/DL (ref 31–36)
MCV RBC AUTO: 88.6 FL (ref 80–100)
MONOCYTES # BLD: 0.7 K/UL (ref 0–1.3)
MONOCYTES NFR BLD: 9.3 %
NEUTROPHILS # BLD: 4.6 K/UL (ref 1.7–7.7)
NEUTROPHILS NFR BLD: 61.8 %
PLATELET # BLD AUTO: 137 K/UL (ref 135–450)
PLATELET BLD QL SMEAR: ABNORMAL
PMV BLD AUTO: 8.9 FL (ref 5–10.5)
POTASSIUM SERPL-SCNC: 3.5 MMOL/L (ref 3.5–5.1)
PROT SERPL-MCNC: 7.1 G/DL (ref 6.4–8.2)
RBC # BLD AUTO: 5.13 M/UL (ref 4–5.2)
SLIDE REVIEW: ABNORMAL
SODIUM SERPL-SCNC: 143 MMOL/L (ref 136–145)
TRIGL SERPL-MCNC: 209 MG/DL (ref 0–150)
TSH SERPL DL<=0.005 MIU/L-ACNC: 3.56 UIU/ML (ref 0.27–4.2)
VLDLC SERPL CALC-MCNC: 42 MG/DL
WBC # BLD AUTO: 7.5 K/UL (ref 4–11)

## 2023-09-28 PROCEDURE — 80053 COMPREHEN METABOLIC PANEL: CPT

## 2023-09-28 PROCEDURE — 84443 ASSAY THYROID STIM HORMONE: CPT

## 2023-09-28 PROCEDURE — 77063 BREAST TOMOSYNTHESIS BI: CPT

## 2023-09-28 PROCEDURE — 80061 LIPID PANEL: CPT

## 2023-09-28 PROCEDURE — 36415 COLL VENOUS BLD VENIPUNCTURE: CPT

## 2023-09-28 PROCEDURE — 85025 COMPLETE CBC W/AUTO DIFF WBC: CPT

## 2023-09-28 PROCEDURE — 83036 HEMOGLOBIN GLYCOSYLATED A1C: CPT

## 2023-09-29 LAB
EST. AVERAGE GLUCOSE BLD GHB EST-MCNC: 116.9 MG/DL
HBA1C MFR BLD: 5.7 %

## 2023-10-02 DIAGNOSIS — E78.5 HYPERLIPIDEMIA, UNSPECIFIED HYPERLIPIDEMIA TYPE: ICD-10-CM

## 2023-10-02 DIAGNOSIS — E78.2 MIXED HYPERLIPIDEMIA: Primary | ICD-10-CM

## 2023-10-02 RX ORDER — SIMVASTATIN 40 MG
40 TABLET ORAL NIGHTLY
Qty: 90 TABLET | Refills: 1 | Status: SHIPPED | OUTPATIENT
Start: 2023-10-02 | End: 2024-03-30

## 2023-10-04 ENCOUNTER — OFFICE VISIT (OUTPATIENT)
Dept: CARDIOLOGY CLINIC | Age: 78
End: 2023-10-04
Payer: MEDICARE

## 2023-10-04 VITALS
BODY MASS INDEX: 26.75 KG/M2 | HEART RATE: 78 BPM | WEIGHT: 124 LBS | DIASTOLIC BLOOD PRESSURE: 58 MMHG | OXYGEN SATURATION: 95 % | SYSTOLIC BLOOD PRESSURE: 134 MMHG | HEIGHT: 57 IN

## 2023-10-04 DIAGNOSIS — I10 PRIMARY HYPERTENSION: ICD-10-CM

## 2023-10-04 PROCEDURE — G8419 CALC BMI OUT NRM PARAM NOF/U: HCPCS | Performed by: NURSE PRACTITIONER

## 2023-10-04 PROCEDURE — G8484 FLU IMMUNIZE NO ADMIN: HCPCS | Performed by: NURSE PRACTITIONER

## 2023-10-04 PROCEDURE — 1123F ACP DISCUSS/DSCN MKR DOCD: CPT | Performed by: NURSE PRACTITIONER

## 2023-10-04 PROCEDURE — G8399 PT W/DXA RESULTS DOCUMENT: HCPCS | Performed by: NURSE PRACTITIONER

## 2023-10-04 PROCEDURE — 1090F PRES/ABSN URINE INCON ASSESS: CPT | Performed by: NURSE PRACTITIONER

## 2023-10-04 PROCEDURE — 99214 OFFICE O/P EST MOD 30 MIN: CPT | Performed by: NURSE PRACTITIONER

## 2023-10-04 PROCEDURE — 1036F TOBACCO NON-USER: CPT | Performed by: NURSE PRACTITIONER

## 2023-10-04 PROCEDURE — G8427 DOCREV CUR MEDS BY ELIG CLIN: HCPCS | Performed by: NURSE PRACTITIONER

## 2023-10-04 PROCEDURE — 3078F DIAST BP <80 MM HG: CPT | Performed by: NURSE PRACTITIONER

## 2023-10-04 PROCEDURE — 3075F SYST BP GE 130 - 139MM HG: CPT | Performed by: NURSE PRACTITIONER

## 2023-10-04 RX ORDER — ANTIOX #8/OM3/DHA/EPA/LUT/ZEAX 250-2.5 MG
CAPSULE ORAL
COMMUNITY

## 2023-10-04 RX ORDER — HYDROCHLOROTHIAZIDE 25 MG/1
25 TABLET ORAL EVERY MORNING
Qty: 90 TABLET | Refills: 3 | Status: SHIPPED | OUTPATIENT
Start: 2023-10-04

## 2023-10-04 ASSESSMENT — ENCOUNTER SYMPTOMS
GASTROINTESTINAL NEGATIVE: 1
RESPIRATORY NEGATIVE: 1

## 2023-10-04 NOTE — PATIENT INSTRUCTIONS
Everything looks great today, good job!   Continue current medications  Stay active along with a healthy diet  Check BP at home and call the office if consistently out of goal range  Follow up in 1 year with Dr. Teofilo Herbert

## 2023-10-04 NOTE — PROGRESS NOTES
, statin recently increased by PCP and recheck ordered    Plan:   1. Continue Eliquis, amlodipine, HCTZ  2. Check BP and HR at home and call the office if consistently out of goal range  3. Regular exercise with healthy diet encouraged  4.   Follow-up in 1 year with Dr. Chris Harrell, [address] [place] [address]  [phone]

## 2023-11-08 ENCOUNTER — TELEPHONE (OUTPATIENT)
Dept: INTERNAL MEDICINE CLINIC | Age: 78
End: 2023-11-08

## 2023-11-08 DIAGNOSIS — M85.80 OSTEOPENIA, UNSPECIFIED LOCATION: Primary | ICD-10-CM

## 2023-11-08 RX ORDER — ALENDRONATE SODIUM 70 MG/1
TABLET ORAL
Qty: 12 TABLET | Refills: 3 | Status: SHIPPED | OUTPATIENT
Start: 2023-11-08

## 2023-11-08 NOTE — TELEPHONE ENCOUNTER
Additional Comments     Patient is calling and states that she has mail order pharmacy [facility] told her that they would be out of the medicine Fosamax will be out for 3 weeks and the Rx needs to be sent to [facility] in [place], [place]. Refill request for FOSAMAX medication.      Name of [address] [place] [address] visit - 8-9-2023     Pending visit - 8-9-2024    Last refill - 7-6-2021      Medication Contract signed -   Wicho brian

## 2023-12-13 ENCOUNTER — TELEPHONE (OUTPATIENT)
Dept: INTERNAL MEDICINE CLINIC | Age: 78
End: 2023-12-13

## 2023-12-13 ENCOUNTER — OFFICE VISIT (OUTPATIENT)
Dept: INTERNAL MEDICINE CLINIC | Age: 78
End: 2023-12-13
Payer: MEDICARE

## 2023-12-13 VITALS
WEIGHT: 122 LBS | DIASTOLIC BLOOD PRESSURE: 70 MMHG | HEIGHT: 57 IN | BODY MASS INDEX: 26.32 KG/M2 | RESPIRATION RATE: 16 BRPM | HEART RATE: 68 BPM | SYSTOLIC BLOOD PRESSURE: 128 MMHG

## 2023-12-13 DIAGNOSIS — Z00.00 MEDICARE ANNUAL WELLNESS VISIT, SUBSEQUENT: Primary | ICD-10-CM

## 2023-12-13 PROCEDURE — 3078F DIAST BP <80 MM HG: CPT | Performed by: STUDENT IN AN ORGANIZED HEALTH CARE EDUCATION/TRAINING PROGRAM

## 2023-12-13 PROCEDURE — G8484 FLU IMMUNIZE NO ADMIN: HCPCS | Performed by: STUDENT IN AN ORGANIZED HEALTH CARE EDUCATION/TRAINING PROGRAM

## 2023-12-13 PROCEDURE — 3074F SYST BP LT 130 MM HG: CPT | Performed by: STUDENT IN AN ORGANIZED HEALTH CARE EDUCATION/TRAINING PROGRAM

## 2023-12-13 PROCEDURE — G0439 PPPS, SUBSEQ VISIT: HCPCS | Performed by: STUDENT IN AN ORGANIZED HEALTH CARE EDUCATION/TRAINING PROGRAM

## 2023-12-13 PROCEDURE — 1123F ACP DISCUSS/DSCN MKR DOCD: CPT | Performed by: STUDENT IN AN ORGANIZED HEALTH CARE EDUCATION/TRAINING PROGRAM

## 2023-12-13 ASSESSMENT — PATIENT HEALTH QUESTIONNAIRE - PHQ9
SUM OF ALL RESPONSES TO PHQ QUESTIONS 1-9: 0
1. LITTLE INTEREST OR PLEASURE IN DOING THINGS: 0
SUM OF ALL RESPONSES TO PHQ QUESTIONS 1-9: 0
2. FEELING DOWN, DEPRESSED OR HOPELESS: 0
SUM OF ALL RESPONSES TO PHQ QUESTIONS 1-9: 0
SUM OF ALL RESPONSES TO PHQ QUESTIONS 1-9: 0
SUM OF ALL RESPONSES TO PHQ9 QUESTIONS 1 & 2: 0

## 2023-12-13 ASSESSMENT — LIFESTYLE VARIABLES
HOW OFTEN DO YOU HAVE A DRINK CONTAINING ALCOHOL: NEVER
HOW MANY STANDARD DRINKS CONTAINING ALCOHOL DO YOU HAVE ON A TYPICAL DAY: PATIENT DOES NOT DRINK

## 2023-12-13 NOTE — TELEPHONE ENCOUNTER
Pt was asking during AWV today about possibly need to stop Alendronate . Has been on for  probably 10 years and was told that sometimes needs to stop after a period of time. Last Dexa was  9/26/22. Please advise.

## 2024-06-21 ENCOUNTER — TELEPHONE (OUTPATIENT)
Dept: INTERNAL MEDICINE CLINIC | Age: 79
End: 2024-06-21

## 2024-06-21 NOTE — TELEPHONE ENCOUNTER
----- Message from Colleen Lafleur sent at 6/21/2024  9:42 AM EDT -----  Regarding: ECC Referral Request  ECC Referral Request    Reason for referral request: Lab/Test Order    Specialist/Lab/Test patient is requesting (if known): blood work, mammogram    Specialist Phone Number (if applicable):    Additional Information Patient wanted to request order for blood work and mammogram   --------------------------------------------------------------------------------------------------------------------------    Relationship to Patient: Self     Call Back Information: OK to leave message on voicemail  Preferred Call Back Number: Phone  [phone]

## 2024-06-24 DIAGNOSIS — E78.2 MIXED HYPERLIPIDEMIA: Primary | ICD-10-CM

## 2024-06-24 DIAGNOSIS — Z12.31 ENCOUNTER FOR SCREENING MAMMOGRAM FOR BREAST CANCER: ICD-10-CM

## 2024-06-24 NOTE — TELEPHONE ENCOUNTER
Called and spoke with the patient and she is informed that she has blood work put in and she will get it done tomorrow. She also is informed that she isn't due for her yearly mammogram until 9-.

## 2024-06-25 ENCOUNTER — HOSPITAL ENCOUNTER (OUTPATIENT)
Age: 79
Discharge: HOME | End: 2024-06-25
Payer: MEDICARE

## 2024-06-25 DIAGNOSIS — E78.2 MIXED HYPERLIPIDEMIA: ICD-10-CM

## 2024-06-25 LAB
ALBUMIN SERPL-MCNC: 4.7 G/DL (ref 3.4–5)
ALBUMIN/GLOB SERPL: 1.7 {RATIO} (ref 1.1–2.2)
ALP SERPL-CCNC: 96 U/L (ref 40–129)
ALT SERPL-CCNC: 14 U/L (ref 10–40)
ANION GAP SERPL CALCULATED.3IONS-SCNC: 12 MMOL/L (ref 3–16)
AST SERPL-CCNC: 14 U/L (ref 15–37)
BILIRUB SERPL-MCNC: 0.6 MG/DL (ref 0–1)
BUN SERPL-MCNC: 18 MG/DL (ref 7–20)
CALCIUM SERPL-MCNC: 10.3 MG/DL (ref 8.3–10.6)
CHLORIDE SERPL-SCNC: 104 MMOL/L (ref 99–110)
CHOLEST SERPL-MCNC: 202 MG/DL (ref 0–199)
CO2 SERPL-SCNC: 29 MMOL/L (ref 21–32)
CREAT SERPL-MCNC: 0.7 MG/DL (ref 0.6–1.2)
GFR SERPLBLD CREATININE-BSD FMLA CKD-EPI: 89 ML/MIN/{1.73_M2}
GLUCOSE SERPL-MCNC: 96 MG/DL (ref 70–99)
HDLC SERPL-MCNC: 55 MG/DL (ref 40–60)
LDLC SERPL CALC-MCNC: 109 MG/DL
POTASSIUM SERPL-SCNC: 3.5 MMOL/L (ref 3.5–5.1)
PROT SERPL-MCNC: 7.4 G/DL (ref 6.4–8.2)
SODIUM SERPL-SCNC: 145 MMOL/L (ref 136–145)
TRIGL SERPL-MCNC: 188 MG/DL (ref 0–150)
VLDLC SERPL CALC-MCNC: 38 MG/DL

## 2024-06-25 PROCEDURE — 80053 COMPREHEN METABOLIC PANEL: CPT

## 2024-06-25 PROCEDURE — 36415 COLL VENOUS BLD VENIPUNCTURE: CPT

## 2024-06-25 PROCEDURE — 80061 LIPID PANEL: CPT

## 2024-06-27 ENCOUNTER — OFFICE VISIT (OUTPATIENT)
Dept: INTERNAL MEDICINE CLINIC | Age: 79
End: 2024-06-27
Payer: MEDICARE

## 2024-06-27 VITALS
SYSTOLIC BLOOD PRESSURE: 156 MMHG | DIASTOLIC BLOOD PRESSURE: 77 MMHG | BODY MASS INDEX: 26.83 KG/M2 | HEART RATE: 83 BPM | TEMPERATURE: 97.5 F | OXYGEN SATURATION: 97 % | RESPIRATION RATE: 12 BRPM | WEIGHT: 124 LBS

## 2024-06-27 DIAGNOSIS — M48.061 SPINAL STENOSIS OF LUMBAR REGION, UNSPECIFIED WHETHER NEUROGENIC CLAUDICATION PRESENT: ICD-10-CM

## 2024-06-27 DIAGNOSIS — M25.562 ACUTE PAIN OF LEFT KNEE: ICD-10-CM

## 2024-06-27 DIAGNOSIS — M85.80 OSTEOPENIA, UNSPECIFIED LOCATION: Primary | ICD-10-CM

## 2024-06-27 DIAGNOSIS — I48.0 PAF (PAROXYSMAL ATRIAL FIBRILLATION) (HCC): ICD-10-CM

## 2024-06-27 DIAGNOSIS — I10 ESSENTIAL HYPERTENSION: ICD-10-CM

## 2024-06-27 DIAGNOSIS — G43.909 MIGRAINE WITHOUT STATUS MIGRAINOSUS, NOT INTRACTABLE, UNSPECIFIED MIGRAINE TYPE: ICD-10-CM

## 2024-06-27 DIAGNOSIS — E78.2 MIXED HYPERLIPIDEMIA: ICD-10-CM

## 2024-06-27 DIAGNOSIS — R35.0 URINARY FREQUENCY: ICD-10-CM

## 2024-06-27 DIAGNOSIS — K21.9 GASTROESOPHAGEAL REFLUX DISEASE, UNSPECIFIED WHETHER ESOPHAGITIS PRESENT: ICD-10-CM

## 2024-06-27 LAB
BILIRUBIN, POC: NEGATIVE
BLOOD URINE, POC: ABNORMAL
CLARITY, POC: ABNORMAL
COLOR, POC: YELLOW
GLUCOSE URINE, POC: NEGATIVE
KETONES, POC: NEGATIVE
LEUKOCYTE EST, POC: NEGATIVE
NITRITE, POC: NEGATIVE
PH, POC: 7
PROTEIN, POC: NEGATIVE
SPECIFIC GRAVITY, POC: 1.01
UROBILINOGEN, POC: 0.2

## 2024-06-27 PROCEDURE — 1090F PRES/ABSN URINE INCON ASSESS: CPT | Performed by: STUDENT IN AN ORGANIZED HEALTH CARE EDUCATION/TRAINING PROGRAM

## 2024-06-27 PROCEDURE — G2211 COMPLEX E/M VISIT ADD ON: HCPCS | Performed by: STUDENT IN AN ORGANIZED HEALTH CARE EDUCATION/TRAINING PROGRAM

## 2024-06-27 PROCEDURE — 3077F SYST BP >= 140 MM HG: CPT | Performed by: STUDENT IN AN ORGANIZED HEALTH CARE EDUCATION/TRAINING PROGRAM

## 2024-06-27 PROCEDURE — G8419 CALC BMI OUT NRM PARAM NOF/U: HCPCS | Performed by: STUDENT IN AN ORGANIZED HEALTH CARE EDUCATION/TRAINING PROGRAM

## 2024-06-27 PROCEDURE — 3078F DIAST BP <80 MM HG: CPT | Performed by: STUDENT IN AN ORGANIZED HEALTH CARE EDUCATION/TRAINING PROGRAM

## 2024-06-27 PROCEDURE — 1123F ACP DISCUSS/DSCN MKR DOCD: CPT | Performed by: STUDENT IN AN ORGANIZED HEALTH CARE EDUCATION/TRAINING PROGRAM

## 2024-06-27 PROCEDURE — G8428 CUR MEDS NOT DOCUMENT: HCPCS | Performed by: STUDENT IN AN ORGANIZED HEALTH CARE EDUCATION/TRAINING PROGRAM

## 2024-06-27 PROCEDURE — 81002 URINALYSIS NONAUTO W/O SCOPE: CPT | Performed by: STUDENT IN AN ORGANIZED HEALTH CARE EDUCATION/TRAINING PROGRAM

## 2024-06-27 PROCEDURE — G8399 PT W/DXA RESULTS DOCUMENT: HCPCS | Performed by: STUDENT IN AN ORGANIZED HEALTH CARE EDUCATION/TRAINING PROGRAM

## 2024-06-27 PROCEDURE — 1036F TOBACCO NON-USER: CPT | Performed by: STUDENT IN AN ORGANIZED HEALTH CARE EDUCATION/TRAINING PROGRAM

## 2024-06-27 PROCEDURE — 99214 OFFICE O/P EST MOD 30 MIN: CPT | Performed by: STUDENT IN AN ORGANIZED HEALTH CARE EDUCATION/TRAINING PROGRAM

## 2024-06-27 RX ORDER — LISINOPRIL 20 MG/1
20 TABLET ORAL DAILY
Qty: 40 TABLET | Refills: 0 | Status: SHIPPED | OUTPATIENT
Start: 2024-06-27 | End: 2024-08-06

## 2024-06-27 RX ORDER — ROSUVASTATIN CALCIUM 5 MG/1
5 TABLET, COATED ORAL DAILY
Qty: 90 EACH | Refills: 1 | Status: SHIPPED | OUTPATIENT
Start: 2024-06-27

## 2024-06-27 RX ORDER — AMLODIPINE BESYLATE 10 MG/1
10 TABLET ORAL DAILY
Qty: 90 TABLET | Refills: 0
Start: 2024-06-27

## 2024-06-27 ASSESSMENT — PATIENT HEALTH QUESTIONNAIRE - PHQ9
SUM OF ALL RESPONSES TO PHQ QUESTIONS 1-9: 0
1. LITTLE INTEREST OR PLEASURE IN DOING THINGS: NOT AT ALL
4. FEELING TIRED OR HAVING LITTLE ENERGY: NOT AT ALL
10. IF YOU CHECKED OFF ANY PROBLEMS, HOW DIFFICULT HAVE THESE PROBLEMS MADE IT FOR YOU TO DO YOUR WORK, TAKE CARE OF THINGS AT HOME, OR GET ALONG WITH OTHER PEOPLE: NOT DIFFICULT AT ALL
7. TROUBLE CONCENTRATING ON THINGS, SUCH AS READING THE NEWSPAPER OR WATCHING TELEVISION: NOT AT ALL
SUM OF ALL RESPONSES TO PHQ QUESTIONS 1-9: 0
3. TROUBLE FALLING OR STAYING ASLEEP: NOT AT ALL
6. FEELING BAD ABOUT YOURSELF - OR THAT YOU ARE A FAILURE OR HAVE LET YOURSELF OR YOUR FAMILY DOWN: NOT AT ALL
SUM OF ALL RESPONSES TO PHQ9 QUESTIONS 1 & 2: 0
8. MOVING OR SPEAKING SO SLOWLY THAT OTHER PEOPLE COULD HAVE NOTICED. OR THE OPPOSITE, BEING SO FIGETY OR RESTLESS THAT YOU HAVE BEEN MOVING AROUND A LOT MORE THAN USUAL: NOT AT ALL
5. POOR APPETITE OR OVEREATING: NOT AT ALL
9. THOUGHTS THAT YOU WOULD BE BETTER OFF DEAD, OR OF HURTING YOURSELF: NOT AT ALL

## 2024-06-27 NOTE — PROGRESS NOTES
Magdiel   2024    Erica Rowland (:  1945) is a 78 y.o. female, here for evaluation of the following medical concerns:    Chief Complaint   Patient presents with    Discuss Labs        ASSESSMENT/ PLAN  1. Osteopenia, unspecified location  Off of Fosamax at this time.  Repeat DEXA scan in 2 years.Due 2025    2. PAF (paroxysmal atrial fibrillation) (HCC)  Continue follow-up with cardiology.  On Eliquis without any bleeding events    3. Essential hypertension  Continue on amlodipine and hydrochlorothiazide.  Add lisinopril 20 mg daily.  Encouraged home blood pressure monitoring.  DASH diet.  Follow-up in 1 month with home blood pressure log and BMP.  She is asymptomatic at this time  - amLODIPine (NORVASC) 10 MG tablet; Take 1 tablet by mouth daily  Dispense: 90 tablet; Refill: 0  - lisinopril (PRINIVIL;ZESTRIL) 20 MG tablet; Take 1 tablet by mouth daily  Dispense: 40 tablet; Refill: 0  - Basic Metabolic Panel; Future    4. Gastroesophageal reflux disease, unspecified whether esophagitis present  -Symptoms well-controlled.  Use omeprazole as needed    5. Migraine without status migrainosus, not intractable, unspecified migraine type  Well-controlled    6. Acute pain of left knee  Does have some tenderness on palpation over medial knee, no swelling noted.  Tenderness elicited on flexion and extension at knee .  She is planned to set up a visit with orthopedic surgery    7. Spinal stenosis of lumbar region, unspecified whether neurogenic claudication present  Continue follow-up with Ortho    8. Urinary frequency  UA negative in clinic.  No evidence of urinary tract infection.  With urgency suspect overactive bladder.  Will refer her to urogynecology.  - Culture, Urine  - POCT Urinalysis no Micro  - [facility] Ismael Ennis MD, Urogynecology, [facility]    9. Mixed hyperlipidemia  She has been intolerant to simvastatin at a higher dose.  Her LDL continues to be elevated.  Switch to

## 2024-06-29 LAB — BACTERIA UR CULT: NORMAL

## 2024-07-10 ENCOUNTER — TELEPHONE (OUTPATIENT)
Dept: INTERNAL MEDICINE CLINIC | Age: 79
End: 2024-07-10

## 2024-07-10 DIAGNOSIS — I10 ESSENTIAL HYPERTENSION: ICD-10-CM

## 2024-07-10 RX ORDER — AMLODIPINE BESYLATE 10 MG/1
10 TABLET ORAL DAILY
Qty: 90 TABLET | Refills: 0 | Status: CANCELLED | OUTPATIENT
Start: 2024-07-10

## 2024-07-10 RX ORDER — AMLODIPINE BESYLATE 10 MG/1
10 TABLET ORAL DAILY
Qty: 90 TABLET | Refills: 1 | Status: SHIPPED | OUTPATIENT
Start: 2024-07-10

## 2024-07-10 RX ORDER — AMLODIPINE BESYLATE 10 MG/1
10 TABLET ORAL DAILY
Qty: 90 TABLET | Refills: 0 | Status: SHIPPED | OUTPATIENT
Start: 2024-07-10 | End: 2024-07-10 | Stop reason: SDUPTHER

## 2024-07-10 NOTE — TELEPHONE ENCOUNTER
Additional Comments Needs this to be sent to [facility]    Refill request for amlodipine medication.     Name of Pharmacy- [facility]      Last visit - 6/27/2024     Pending visit - 7/25/2024    Last refill -6/27/2024      Medication Contract signed -   Last Oarrs ran-

## 2024-07-10 NOTE — TELEPHONE ENCOUNTER
Patient states she is needing her norvasc.  To the mail order pharmacy.     Lov 06/27/2024  Fov 07/25/2024

## 2024-07-10 NOTE — TELEPHONE ENCOUNTER
Patient called and said she was already taking simvastatin 40mg, and then was prescribed rosuvastatin 5mg.  She wants to know if she is supposed to take both of these medications. Call patient at [phone]

## 2024-07-11 NOTE — TELEPHONE ENCOUNTER
Left a voice mail for the patient with Dr. Fairbanks message about Crestor only.     Please call us to confirm you received this information.

## 2024-07-22 ENCOUNTER — HOSPITAL ENCOUNTER (OUTPATIENT)
Age: 79
Discharge: HOME | End: 2024-07-22
Payer: MEDICARE

## 2024-07-22 DIAGNOSIS — I10 ESSENTIAL HYPERTENSION: ICD-10-CM

## 2024-07-22 LAB
ANION GAP SERPL CALCULATED.3IONS-SCNC: 10 MMOL/L (ref 3–16)
BUN SERPL-MCNC: 16 MG/DL (ref 7–20)
CALCIUM SERPL-MCNC: 9.5 MG/DL (ref 8.3–10.6)
CHLORIDE SERPL-SCNC: 99 MMOL/L (ref 99–110)
CO2 SERPL-SCNC: 30 MMOL/L (ref 21–32)
CREAT SERPL-MCNC: 0.7 MG/DL (ref 0.6–1.2)
GFR SERPLBLD CREATININE-BSD FMLA CKD-EPI: 89 ML/MIN/{1.73_M2}
GLUCOSE SERPL-MCNC: 95 MG/DL (ref 70–99)
POTASSIUM SERPL-SCNC: 3.3 MMOL/L (ref 3.5–5.1)
SODIUM SERPL-SCNC: 139 MMOL/L (ref 136–145)

## 2024-07-22 PROCEDURE — 80048 BASIC METABOLIC PNL TOTAL CA: CPT

## 2024-07-22 PROCEDURE — 36415 COLL VENOUS BLD VENIPUNCTURE: CPT

## 2024-07-25 ENCOUNTER — OFFICE VISIT (OUTPATIENT)
Dept: INTERNAL MEDICINE CLINIC | Age: 79
End: 2024-07-25
Payer: MEDICARE

## 2024-07-25 VITALS
TEMPERATURE: 97.9 F | SYSTOLIC BLOOD PRESSURE: 147 MMHG | HEIGHT: 57 IN | HEART RATE: 82 BPM | DIASTOLIC BLOOD PRESSURE: 73 MMHG | RESPIRATION RATE: 12 BRPM | BODY MASS INDEX: 26.97 KG/M2 | OXYGEN SATURATION: 95 % | WEIGHT: 125 LBS

## 2024-07-25 DIAGNOSIS — I10 ESSENTIAL HYPERTENSION: Primary | ICD-10-CM

## 2024-07-25 DIAGNOSIS — E78.2 MIXED HYPERLIPIDEMIA: ICD-10-CM

## 2024-07-25 PROCEDURE — 99214 OFFICE O/P EST MOD 30 MIN: CPT | Performed by: STUDENT IN AN ORGANIZED HEALTH CARE EDUCATION/TRAINING PROGRAM

## 2024-07-25 PROCEDURE — G2211 COMPLEX E/M VISIT ADD ON: HCPCS | Performed by: STUDENT IN AN ORGANIZED HEALTH CARE EDUCATION/TRAINING PROGRAM

## 2024-07-25 PROCEDURE — G8428 CUR MEDS NOT DOCUMENT: HCPCS | Performed by: STUDENT IN AN ORGANIZED HEALTH CARE EDUCATION/TRAINING PROGRAM

## 2024-07-25 PROCEDURE — 1090F PRES/ABSN URINE INCON ASSESS: CPT | Performed by: STUDENT IN AN ORGANIZED HEALTH CARE EDUCATION/TRAINING PROGRAM

## 2024-07-25 PROCEDURE — 3077F SYST BP >= 140 MM HG: CPT | Performed by: STUDENT IN AN ORGANIZED HEALTH CARE EDUCATION/TRAINING PROGRAM

## 2024-07-25 PROCEDURE — 3078F DIAST BP <80 MM HG: CPT | Performed by: STUDENT IN AN ORGANIZED HEALTH CARE EDUCATION/TRAINING PROGRAM

## 2024-07-25 PROCEDURE — 1123F ACP DISCUSS/DSCN MKR DOCD: CPT | Performed by: STUDENT IN AN ORGANIZED HEALTH CARE EDUCATION/TRAINING PROGRAM

## 2024-07-25 PROCEDURE — G8419 CALC BMI OUT NRM PARAM NOF/U: HCPCS | Performed by: STUDENT IN AN ORGANIZED HEALTH CARE EDUCATION/TRAINING PROGRAM

## 2024-07-25 PROCEDURE — G8399 PT W/DXA RESULTS DOCUMENT: HCPCS | Performed by: STUDENT IN AN ORGANIZED HEALTH CARE EDUCATION/TRAINING PROGRAM

## 2024-07-25 PROCEDURE — 1036F TOBACCO NON-USER: CPT | Performed by: STUDENT IN AN ORGANIZED HEALTH CARE EDUCATION/TRAINING PROGRAM

## 2024-07-25 RX ORDER — SIMVASTATIN 40 MG
40 TABLET ORAL NIGHTLY
Qty: 90 TABLET | Refills: 1
Start: 2024-07-25 | End: 2025-01-21

## 2024-07-25 NOTE — PROGRESS NOTES
[facility]  2024    Erica Rowland (:  1945) is a 78 y.o. female, here for evaluation of the following medical concerns:    Chief Complaint   Patient presents with    Hypertension     4 weeks        ASSESSMENT/ PLAN  1. Essential hypertension  Cannot stop lisinopril due to cough.  Patient noted she was not taking amlodipine as prescribed.  She has been taking amlodipine 10 mg and hydrochlorothiazide 25 mg with goal blood pressures at home.  Her blood pressure in the clinic is however elevated.  I have advised her to bring her blood pressure machine to the next visit.  Encouraged home blood pressure monitoring.  If home blood pressures consistently remain above 130/80 advised to call us.  Since potassium has remained low borderline we will also obtain renin and aldosterone levels.  Advised to take potassium rich foods like avocados, oranges  - Comprehensive Metabolic Panel; Future  - LIPID PANEL; Future  - Renin Activity and Aldosterone; Future    2. Mixed hyperlipidemia  -Patient is adamant on not taking Crestor.  She thinks it caused the cough.  I did discuss with her that it is most likely the lisinopril.  She would like to go back on the simvastatin at a higher dose and see how she does.  I have advised to take simvastatin 40 mg daily.  Repeat labs before next visit  - simvastatin (ZOCOR) 40 MG tablet; Take 1 tablet by mouth nightly  Dispense: 90 tablet; Refill: 1       Lab Review   Hospital Outpatient Visit on 2024   Component Date Value    Sodium 2024 139     Potassium 2024 3.3 (L)     Chloride 2024 99     CO2 2024 30     Anion Gap 2024 10     Glucose 2024 95     BUN 2024 16     Creatinine 2024 0.7     Est, Glom Filt Rate 2024 89     Calcium 2024 9.5    Office Visit on 2024   Component Date Value    Urine Culture, Routine 2024 No growth at 18 to 36 hours     Color, UA 2024 YELLOW     Clarity, UA 2024 CLOUDY

## 2024-07-31 NOTE — TELEPHONE ENCOUNTER
Last ov: 10/4/23 NPLR  Upcoming ov: none    Labs- BMP/CBC 7/22/24    Will call pt later to schedule OV

## 2024-08-01 RX ORDER — APIXABAN 5 MG/1
TABLET, FILM COATED ORAL
Qty: 180 TABLET | Refills: 0 | Status: SHIPPED | OUTPATIENT
Start: 2024-08-01

## 2024-08-26 DIAGNOSIS — E78.2 MIXED HYPERLIPIDEMIA: ICD-10-CM

## 2024-08-28 RX ORDER — SIMVASTATIN 40 MG
40 TABLET ORAL NIGHTLY
Qty: 90 TABLET | Refills: 1 | Status: SHIPPED | OUTPATIENT
Start: 2024-08-28 | End: 2025-02-24

## 2024-08-29 DIAGNOSIS — I10 PRIMARY HYPERTENSION: ICD-10-CM

## 2024-08-29 RX ORDER — HYDROCHLOROTHIAZIDE 25 MG/1
25 TABLET ORAL EVERY MORNING
Qty: 90 TABLET | Refills: 1 | Status: SHIPPED | OUTPATIENT
Start: 2024-08-29

## 2024-09-03 DIAGNOSIS — I10 ESSENTIAL HYPERTENSION: ICD-10-CM

## 2024-09-04 RX ORDER — AMLODIPINE BESYLATE 10 MG/1
10 TABLET ORAL DAILY
Qty: 90 TABLET | Refills: 3 | Status: SHIPPED | OUTPATIENT
Start: 2024-09-04

## 2024-09-04 NOTE — TELEPHONE ENCOUNTER
Refill request for amLODIPine Besylate 10 MG Oral Tablet medication.     Name of Pharmacy- [facility]      Last visit - 7-     Pending visit - 12-3-2024    Last refill - 7-      Medication Contract signed -   Last Oarrs ran-         Additional Comments

## 2024-09-06 ENCOUNTER — OFFICE VISIT (OUTPATIENT)
Dept: UROGYNECOLOGY | Age: 79
End: 2024-09-06

## 2024-09-06 VITALS
TEMPERATURE: 98.1 F | SYSTOLIC BLOOD PRESSURE: 130 MMHG | OXYGEN SATURATION: 94 % | DIASTOLIC BLOOD PRESSURE: 65 MMHG | RESPIRATION RATE: 15 BRPM | HEART RATE: 78 BPM

## 2024-09-06 DIAGNOSIS — R39.15 URINARY URGENCY: Primary | ICD-10-CM

## 2024-09-06 DIAGNOSIS — N39.3 STRESS INCONTINENCE: ICD-10-CM

## 2024-09-06 DIAGNOSIS — N95.2 VAGINAL ATROPHY: ICD-10-CM

## 2024-09-06 DIAGNOSIS — N39.0 RECURRENT UTI: ICD-10-CM

## 2024-09-06 DIAGNOSIS — R35.1 NOCTURIA: ICD-10-CM

## 2024-09-06 LAB
BILIRUBIN, POC: NORMAL
BLOOD URINE, POC: NORMAL
CLARITY, POC: CLEAR
COLOR, POC: YELLOW
GLUCOSE URINE, POC: NORMAL MG/DL
KETONES, POC: NORMAL MG/DL
LEUKOCYTE EST, POC: NORMAL
NITRITE, POC: NORMAL
PH, POC: 6.5
PROTEIN, POC: NORMAL MG/DL
SPECIFIC GRAVITY, POC: NORMAL
UROBILINOGEN, POC: NORMAL MG/DL

## 2024-09-06 RX ORDER — ESTRADIOL 0.1 MG/G
0.25 CREAM VAGINAL DAILY
Qty: 30 G | Refills: 0 | Status: SHIPPED | OUTPATIENT
Start: 2024-09-06

## 2024-09-06 NOTE — PROGRESS NOTES
0.25g with fingertip to the vaginal opening every night at bedtime for 2 weeks, then decrease to twice weekly.     Dispense:  30 g     Refill:  0       JEANNA Cross - CNP

## 2024-09-21 NOTE — TELEPHONE ENCOUNTER
Per dr Alston Pulling note, increased her gabapentin to 300 mg 3 times daily for the next 2 months. confimed script w/preferred pharmacy  gabapentin (NEURONTIN) 300 MG capsule Take 1 capsule by mouth 3 times daily for 30 days. Intended supply: 30 days     Pt aware.
Gastroenteritis in Children    Your child was seen in the Emergency Department for gastroenteritis.    Viral gastroenteritis, also known as the “stomach flu,” can be caused by different viruses and often leads to vomiting, diarrhea, and fever in children.  Children with gastroenteritis are at risk of becoming dehydrated. It is important to make sure your child drinks enough fluids to keep up with the fluids they lose through vomiting and diarrhea.    There is no medication for viral gastroenteritis. The body has to fight the virus on its own. There is a vaccine against rotavirus, which is one of the viruses known to cause viral gastroenteritis.  This can prevent future illnesses, but does not help this current illness.    General tips for managing gastroenteritis at home:  -Offer your child water, low-sugar popsicles, or diluted fruit juice. Limit sugary drinks because too much sugar can worsen diarrhea. You can also give your child an oral rehydration solution (like Pedialyte), available at pharmacies and grocery stores, to help replace electrolytes.  Infants should continue to breast and bottle feed. Infants less than 4 months should NOT be given water or juice.   -Avoid spicy or fatty foods, which can worsen gastroenteritis.  -Viral gastroenteritis is very contagious between children and adults. The viruses that cause gastroenteritis can live on surfaces or in contaminated food and water. To help prevent the spread of gastroenteritis, everyone should wash their hands frequently, especially before eating. Nobody should share utensils or personal items with the child who is sick. Children should not go back to school or  until their symptoms are gone.      Follow up with your pediatrician in 1-2 days to make sure that your child is doing better.    Return to the Emergency Department if your child:  -has fever more than 5 days  -will not drink fluids or cannot keep fluids down because of vomiting  -feels light-headed or dizzy   -has muscle cramps   -has severe abdominal pain   -has signs of severe dehydration, such as no urine in 8-12 hours, dry or cracked lips or dry mouth, not making tears while crying, sunken eyes, or excessive sleepiness or weakness  -bloody or black stools or stools that look like tar

## 2024-11-19 ENCOUNTER — HOSPITAL ENCOUNTER (OUTPATIENT)
Dept: MAMMOGRAPHY | Age: 79
Discharge: HOME | End: 2024-11-19
Payer: MEDICARE

## 2024-11-19 ENCOUNTER — HOSPITAL ENCOUNTER (OUTPATIENT)
Age: 79
Discharge: HOME | End: 2024-11-19
Payer: MEDICARE

## 2024-11-19 DIAGNOSIS — Z12.39 SCREENING BREAST EXAMINATION: ICD-10-CM

## 2024-11-19 DIAGNOSIS — I10 ESSENTIAL HYPERTENSION: ICD-10-CM

## 2024-11-19 LAB
ALBUMIN SERPL-MCNC: 4.5 G/DL (ref 3.4–5)
ALBUMIN/GLOB SERPL: 1.8 {RATIO} (ref 1.1–2.2)
ALP SERPL-CCNC: 85 U/L (ref 40–129)
ALT SERPL-CCNC: 15 U/L (ref 10–40)
ANION GAP SERPL CALCULATED.3IONS-SCNC: 10 MMOL/L (ref 3–16)
AST SERPL-CCNC: 21 U/L (ref 15–37)
BILIRUB SERPL-MCNC: 0.6 MG/DL (ref 0–1)
BUN SERPL-MCNC: 14 MG/DL (ref 7–20)
CALCIUM SERPL-MCNC: 10 MG/DL (ref 8.3–10.6)
CHLORIDE SERPL-SCNC: 96 MMOL/L (ref 99–110)
CHOLEST SERPL-MCNC: 194 MG/DL (ref 0–199)
CO2 SERPL-SCNC: 29 MMOL/L (ref 21–32)
CREAT SERPL-MCNC: 0.7 MG/DL (ref 0.6–1.2)
GFR SERPLBLD CREATININE-BSD FMLA CKD-EPI: 89 ML/MIN/{1.73_M2}
GLUCOSE SERPL-MCNC: 94 MG/DL (ref 70–99)
HDLC SERPL-MCNC: 46 MG/DL (ref 40–60)
LDLC SERPL CALC-MCNC: 89 MG/DL
POTASSIUM SERPL-SCNC: 3.5 MMOL/L (ref 3.5–5.1)
PROT SERPL-MCNC: 7 G/DL (ref 6.4–8.2)
SODIUM SERPL-SCNC: 135 MMOL/L (ref 136–145)
TRIGL SERPL-MCNC: 293 MG/DL (ref 0–150)
VLDLC SERPL CALC-MCNC: 59 MG/DL

## 2024-11-19 PROCEDURE — 84244 ASSAY OF RENIN: CPT

## 2024-11-19 PROCEDURE — 36415 COLL VENOUS BLD VENIPUNCTURE: CPT

## 2024-11-19 PROCEDURE — 80053 COMPREHEN METABOLIC PANEL: CPT

## 2024-11-19 PROCEDURE — 80061 LIPID PANEL: CPT

## 2024-11-19 PROCEDURE — 82088 ASSAY OF ALDOSTERONE: CPT

## 2024-11-19 PROCEDURE — 77063 BREAST TOMOSYNTHESIS BI: CPT

## 2024-11-23 LAB
ALDOST SERPL-MCNC: 22.8 NG/DL
ALDOST/RENIN PLAS-RTO: 25.3 RATIO
RENIN PLAS-CCNC: 0.9 NG/ML/HR

## 2024-12-03 ENCOUNTER — OFFICE VISIT (OUTPATIENT)
Dept: INTERNAL MEDICINE CLINIC | Age: 79
End: 2024-12-03

## 2024-12-03 VITALS
HEART RATE: 84 BPM | OXYGEN SATURATION: 95 % | TEMPERATURE: 97.3 F | DIASTOLIC BLOOD PRESSURE: 74 MMHG | SYSTOLIC BLOOD PRESSURE: 157 MMHG | WEIGHT: 128 LBS | BODY MASS INDEX: 27.61 KG/M2 | HEIGHT: 57 IN

## 2024-12-03 DIAGNOSIS — K21.9 GASTROESOPHAGEAL REFLUX DISEASE, UNSPECIFIED WHETHER ESOPHAGITIS PRESENT: ICD-10-CM

## 2024-12-03 DIAGNOSIS — I10 HYPERTENSION, UNSPECIFIED TYPE: ICD-10-CM

## 2024-12-03 DIAGNOSIS — R30.0 DYSURIA: Primary | ICD-10-CM

## 2024-12-03 DIAGNOSIS — M48.061 SPINAL STENOSIS OF LUMBAR REGION, UNSPECIFIED WHETHER NEUROGENIC CLAUDICATION PRESENT: ICD-10-CM

## 2024-12-03 DIAGNOSIS — E26.9 HYPERALDOSTERONISM (HCC): ICD-10-CM

## 2024-12-03 DIAGNOSIS — E78.2 MIXED HYPERLIPIDEMIA: ICD-10-CM

## 2024-12-03 DIAGNOSIS — Z12.11 ENCOUNTER FOR SCREENING COLONOSCOPY: ICD-10-CM

## 2024-12-03 DIAGNOSIS — I48.0 PAF (PAROXYSMAL ATRIAL FIBRILLATION) (HCC): ICD-10-CM

## 2024-12-03 DIAGNOSIS — M85.80 OSTEOPENIA, UNSPECIFIED LOCATION: ICD-10-CM

## 2024-12-03 DIAGNOSIS — G43.909 MIGRAINE WITHOUT STATUS MIGRAINOSUS, NOT INTRACTABLE, UNSPECIFIED MIGRAINE TYPE: ICD-10-CM

## 2024-12-03 LAB
BILIRUBIN, POC: NEGATIVE
BLOOD URINE, POC: NORMAL
CLARITY, POC: CLEAR
COLOR, POC: YELLOW
GLUCOSE URINE, POC: NEGATIVE MG/DL
KETONES, POC: NEGATIVE MG/DL
LEUKOCYTE EST, POC: NORMAL
NITRITE, POC: NEGATIVE
PH, POC: 7.5
PROTEIN, POC: NEGATIVE MG/DL
SPECIFIC GRAVITY, POC: 1.02
UROBILINOGEN, POC: 0.2 MG/DL

## 2024-12-03 RX ORDER — NITROFURANTOIN 25; 75 MG/1; MG/1
100 CAPSULE ORAL 2 TIMES DAILY
Qty: 10 CAPSULE | Refills: 0 | Status: SHIPPED | OUTPATIENT
Start: 2024-12-03 | End: 2024-12-08

## 2024-12-03 SDOH — ECONOMIC STABILITY: FOOD INSECURITY: WITHIN THE PAST 12 MONTHS, YOU WORRIED THAT YOUR FOOD WOULD RUN OUT BEFORE YOU GOT MONEY TO BUY MORE.: NEVER TRUE

## 2024-12-03 SDOH — ECONOMIC STABILITY: INCOME INSECURITY: HOW HARD IS IT FOR YOU TO PAY FOR THE VERY BASICS LIKE FOOD, HOUSING, MEDICAL CARE, AND HEATING?: NOT HARD AT ALL

## 2024-12-03 SDOH — ECONOMIC STABILITY: FOOD INSECURITY: WITHIN THE PAST 12 MONTHS, THE FOOD YOU BOUGHT JUST DIDN'T LAST AND YOU DIDN'T HAVE MONEY TO GET MORE.: NEVER TRUE

## 2024-12-03 NOTE — PROGRESS NOTES
9\")     Estimated body mass index is 27.7 kg/m² as calculated from the following:    Height as of this encounter: 1.448 m (4' 9\").    Weight as of this encounter: 58.1 kg (128 lb).      GENERAL APPEARANCE:  The patient is pleasant and well-appearing, in no acute distress A&Ox3, normal habitus  HEENT:  Normocephalic and atraumatic.   No lymphadenopathy.  LUNGS:  Equal air entry and clear of auscultation bilaterally.  There are not crackles, wheezes or rhonchi noted.  HEART:  Regular rate and rhythm.  No murmurs are noted.  There are no lifts, heaves, or thrills noted on palpation.  ABDOMEN:  Soft, non tender, non distended and no organomegaly.  There are good bowel sounds.  There is no rebounding or guarding.  There is no evidence of hernia.  No suprapubic or CVA angle tenderness  SKIN:  There are no rashes, lesions, or ulcers noted.  Warm and dry with good turgor.  MUSCULOSKELETAL:  Naomi is coordinated and smooth.  There is no clubbing, cyanosis or edema.  B/I pedal pulses are palpable.  NEUROLOGIC:  Cranial nerves II through XII are grossly intact.  Sensation to light touch and pain is intact and bilaterally.    Brandon Ledesma MD    Total time spent: Over 40 minutes. This includes time spent with the patient during the visit as well as time spent before and after the visit reviewing the chart, reviewing past/present studies and documenting the encounter.  This dictation was generated by voice recognition computer software.  Although all attempts are made to edit the dictation for accuracy, there may be errors in the transcription that are not intended.

## 2024-12-06 LAB
BACTERIA UR CULT: ABNORMAL
BACTERIA UR CULT: ABNORMAL
ORGANISM: ABNORMAL
ORGANISM: ABNORMAL

## 2024-12-09 NOTE — PROGRESS NOTES
[facility]   Cardiac Followup    Referring Provider:  Brandon Ledesma MD     Chief Complaint   Patient presents with    1 Year Follow Up    Hypertension    Hyperlipidemia    Atrial Fibrillation      Subjective: Ms. Rowland is here today for hospital follow up afib, HTN, HLD; c/o occasional pain and swelling in legs    History of Present Illness:   Erica Rowland is a 79 y.o. female with PMH HTN, PAF dx 10/20 (on eliquis for AC), HLD, hx cough with lisinopril and losartan, RLS, OA, and GERD. Admitted to [facility] 10/20/20 with c/o CP and noted afib with couple episodes of PAF then converting NSR. I reviewed EKG showing afib RVR 155bpm. Note she did NOT feel palps with afib.  Note underwent w/u for RLL opacities on CT imaging but PET scan not hypermetabolic. Right parotid lesion found with increased activity and US guided biopsy 10/9/20 performed and benign. Prior testing: ECHO 10/22/20 EF >65%. No WMA; Grade I DD with normal LV filling pressure (no change 4/19 ECHO). On 10/22/20  underwent EBUS with Dr. Judd- No malignant cells identified. Referred to Dr. Roque who then referred to CT surgeon, Dr. Doss. S/P VATS wedge biopsy on 11/16/20.  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     EKG today 6/28/22 SR, non-specific ST change, HR 65.  Last follow up was 10/4/23 win NP Valentin Kunz.  No changes made.    Today she reports feeling good.  EKG today shows SR, non specific ST change, no change from 11/2020).  Only complaint today is her legs hurts all the way down to her feet.  She has noticed swelling at times.  She takes PB medications between 6-7 AM.  States blood pressure eventually decreases to 130-136 (systolic) around 11 AM.  Patient denies chest pain, sob, palpitations, dizziness or syncope.  The patient is compliant with medications.  Cost of medications is affordable.  No endorsed side effects.  She denies any evidence of hematemesis, hemoptysis, melena, hematochezia or hematuria with blood thinner.

## 2024-12-10 ENCOUNTER — OFFICE VISIT (OUTPATIENT)
Dept: CARDIOLOGY CLINIC | Age: 79
End: 2024-12-10
Payer: MEDICARE

## 2024-12-10 ENCOUNTER — TELEPHONE (OUTPATIENT)
Dept: CARDIOLOGY CLINIC | Age: 79
End: 2024-12-10

## 2024-12-10 VITALS
HEART RATE: 83 BPM | DIASTOLIC BLOOD PRESSURE: 62 MMHG | WEIGHT: 128 LBS | SYSTOLIC BLOOD PRESSURE: 150 MMHG | HEIGHT: 57 IN | BODY MASS INDEX: 27.61 KG/M2 | OXYGEN SATURATION: 96 %

## 2024-12-10 DIAGNOSIS — I48.0 PAF (PAROXYSMAL ATRIAL FIBRILLATION) (HCC): ICD-10-CM

## 2024-12-10 DIAGNOSIS — I10 PRIMARY HYPERTENSION: Primary | ICD-10-CM

## 2024-12-10 DIAGNOSIS — Z87.891 HISTORY OF TOBACCO ABUSE: ICD-10-CM

## 2024-12-10 DIAGNOSIS — E78.2 MIXED HYPERLIPIDEMIA: ICD-10-CM

## 2024-12-10 PROCEDURE — 1159F MED LIST DOCD IN RCRD: CPT | Performed by: INTERNAL MEDICINE

## 2024-12-10 PROCEDURE — 3077F SYST BP >= 140 MM HG: CPT | Performed by: INTERNAL MEDICINE

## 2024-12-10 PROCEDURE — 93000 ELECTROCARDIOGRAM COMPLETE: CPT | Performed by: INTERNAL MEDICINE

## 2024-12-10 PROCEDURE — G8419 CALC BMI OUT NRM PARAM NOF/U: HCPCS | Performed by: INTERNAL MEDICINE

## 2024-12-10 PROCEDURE — 3078F DIAST BP <80 MM HG: CPT | Performed by: INTERNAL MEDICINE

## 2024-12-10 PROCEDURE — G8484 FLU IMMUNIZE NO ADMIN: HCPCS | Performed by: INTERNAL MEDICINE

## 2024-12-10 PROCEDURE — G8427 DOCREV CUR MEDS BY ELIG CLIN: HCPCS | Performed by: INTERNAL MEDICINE

## 2024-12-10 PROCEDURE — G8399 PT W/DXA RESULTS DOCUMENT: HCPCS | Performed by: INTERNAL MEDICINE

## 2024-12-10 PROCEDURE — 1123F ACP DISCUSS/DSCN MKR DOCD: CPT | Performed by: INTERNAL MEDICINE

## 2024-12-10 PROCEDURE — 99214 OFFICE O/P EST MOD 30 MIN: CPT | Performed by: INTERNAL MEDICINE

## 2024-12-10 PROCEDURE — 1036F TOBACCO NON-USER: CPT | Performed by: INTERNAL MEDICINE

## 2024-12-10 PROCEDURE — 1090F PRES/ABSN URINE INCON ASSESS: CPT | Performed by: INTERNAL MEDICINE

## 2024-12-10 RX ORDER — CARVEDILOL 3.12 MG/1
3.12 TABLET ORAL 2 TIMES DAILY
Qty: 60 TABLET | Refills: 1 | Status: SHIPPED | OUTPATIENT
Start: 2024-12-10

## 2024-12-10 NOTE — TELEPHONE ENCOUNTER
IRAIDA is requesting that pt be scheduled for a bp check and to have bp monitor calibration.  Unable to schedule appt at [facility] and [facility] system down at the time pt was at [facility].  IRAIDA would like this to be scheduled early in week of 12/16/24 and pt requesting an early appt.  Please contact pt to schedule appt

## 2024-12-10 NOTE — PATIENT INSTRUCTIONS
Plan:  Goal Blood Pressure 130/85 or less   2.  Continue conservative measures for dependent edema: Elevate your legs to heart level while at rest, low salt diet, compression stockings as tolerated.   (Information printed for low salt/sodium diet)   3.  Fasting Labs in 6 months (Prior to your next visit)   4.  Stop taking the Amlodipine  5.  Patient will be started on new medication Carvedilol 3.125 mg twice daily.  Patient verbalizes understanding of the need for treatment and education provided at today's visit.  Additional education materials will be provided in the AVS.   6.  Recommend bringing in your blood pressure machine to the office to have it checked for accuracy next week.

## 2024-12-16 RX ORDER — APIXABAN 5 MG/1
TABLET, FILM COATED ORAL
Qty: 120 TABLET | Refills: 5 | Status: SHIPPED | OUTPATIENT
Start: 2024-12-16

## 2024-12-16 NOTE — TELEPHONE ENCOUNTER
Patient last seen on 12/10/24. Advised to follow up 6 months. Next appointment 6/12/25. '    Lab Results   Component Value Date    WBC 7.5 09/28/2023    HGB 15.2 09/28/2023    HCT 45.4 09/28/2023    MCV 88.6 09/28/2023     09/28/2023

## 2024-12-18 ENCOUNTER — LAB (OUTPATIENT)
Dept: CARDIOLOGY CLINIC | Age: 79
End: 2024-12-18

## 2024-12-18 ENCOUNTER — TELEPHONE (OUTPATIENT)
Dept: CARDIOLOGY CLINIC | Age: 79
End: 2024-12-18

## 2024-12-18 VITALS — SYSTOLIC BLOOD PRESSURE: 168 MMHG | DIASTOLIC BLOOD PRESSURE: 70 MMHG

## 2024-12-18 NOTE — TELEPHONE ENCOUNTER
OK. BP machine likely accurate with 2nd reading correlating. I would restart norvasc at prior dose and call if any issues. Goal /85 or less but I will settle for < 140/90. Thanks.

## 2024-12-18 NOTE — TELEPHONE ENCOUNTER
Patient presented in office to have BP taken and have BP machine calibrated.  BP taken by Natalie VORA was high.  168/70 x 2.  Her machine resulted 190/86 the 1st time and 168/76 the 2nd time which was 2 minutes later. Patient said that it is never this high, but wants to know if it because you took her off amlodipine.

## 2024-12-30 NOTE — TELEPHONE ENCOUNTER
Pt called stating she's out of her eliquis. Pt is only requesting 8 pills to hold her over. [facility]    [facility] [address] [place] [address] [address] [facility] [phone] -  [phone] [[id number]]     Last ov 12/10/24  SMM  Next ov 6/12/25 SMM

## 2025-01-08 ENCOUNTER — TELEPHONE (OUTPATIENT)
Dept: CARDIOLOGY CLINIC | Age: 80
End: 2025-01-08

## 2025-01-08 NOTE — TELEPHONE ENCOUNTER
LOV 12/10/2024  Lab Results   Component Value Date    WBC 7.5 09/28/2023    HGB 15.2 09/28/2023    HCT 45.4 09/28/2023    MCV 88.6 09/28/2023     09/28/2023

## 2025-01-08 NOTE — TELEPHONE ENCOUNTER
[facility] pharmacy called and stated that pt contacted them and advised the that she was out of Eliquis.  Rep from [facility] requesting 90days supply be sent to [facility] in [facility] for pt.  [facility] stated that they would get with [facility] to coordinate with filling this script after this 90day supply

## 2025-01-28 DIAGNOSIS — E78.2 MIXED HYPERLIPIDEMIA: ICD-10-CM

## 2025-01-28 RX ORDER — SIMVASTATIN 40 MG
40 TABLET ORAL NIGHTLY
Qty: 90 TABLET | Refills: 3 | Status: SHIPPED | OUTPATIENT
Start: 2025-01-28

## 2025-01-28 NOTE — TELEPHONE ENCOUNTER
Refill request for Simvastatin medication.     Name of Pharmacy- [facility]       Last visit - 12/03/20224     Pending visit - 06/23/2025    Last refill -08/28/2024

## 2025-02-05 ENCOUNTER — TELEPHONE (OUTPATIENT)
Dept: CARDIOLOGY CLINIC | Age: 80
End: 2025-02-05

## 2025-02-05 NOTE — TELEPHONE ENCOUNTER
Cardiac clearance and HOLD Eliquis for 1 day prior to colonoscopy with Dr. MUÑOZ.  Fax to Johanna at [phone].        LOV 12/10/2024  EKG 12/10/2024

## 2025-02-05 NOTE — TELEPHONE ENCOUNTER
Lower risk clinically for lower risk procedure. Proceed as planned. OK to hold eliquis for 1 day prior.

## 2025-02-10 DIAGNOSIS — I10 PRIMARY HYPERTENSION: ICD-10-CM

## 2025-02-10 RX ORDER — HYDROCHLOROTHIAZIDE 25 MG/1
25 TABLET ORAL EVERY MORNING
Qty: 90 TABLET | Refills: 1 | Status: SHIPPED | OUTPATIENT
Start: 2025-02-10

## 2025-02-10 NOTE — TELEPHONE ENCOUNTER
HR=83 bpm, NIBP=97/60 mmhg, Resp=11 B/min, EtCO2=29 mmHg, Apnea=5 Seconds Last Office Visit: 12/10/24 Provider: IRAIDA  **Is provider OOT? No    Next Office Visit: 6/12/2025 Provider: IRAIDA  **If no OV, when does pt need to be seen? N/a  **Has patient already had 30 day supply? No    Lab orders needed? no   Encounter provider correct? Yes If not, change provider  Script changes since last refill? no    LAST LABS:   CMP:   Lab Results   Component Value Date     (L) 11/19/2024    K 3.5 11/19/2024    CL 96 (L) 11/19/2024    CO2 29 11/19/2024    BUN 14 11/19/2024    CREATININE 0.7 11/19/2024    GLUCOSE 94 11/19/2024    CALCIUM 10.0 11/19/2024    BILITOT 0.6 11/19/2024    ALKPHOS 85 11/19/2024    AST 21 11/19/2024    ALT 15 11/19/2024    LABGLOM 89 11/19/2024    GFRAA >60 09/16/2021    AGRATIO 1.8 11/19/2024    GLOB 2.5 09/16/2021

## 2025-03-25 NOTE — TELEPHONE ENCOUNTER
Refill request for ELIQUIS medication.     Name of Pharmacy- [facility]      Last visit - 12-3-24     Pending visit - 6-26-25    Last refill -1-8-25      Medication Contract signed -   Last Oarrs ran-         Additional Comments

## 2025-05-14 ENCOUNTER — OFFICE VISIT (OUTPATIENT)
Dept: ENDOCRINOLOGY | Age: 80
End: 2025-05-14
Payer: MEDICARE

## 2025-05-14 VITALS
SYSTOLIC BLOOD PRESSURE: 168 MMHG | HEIGHT: 58 IN | DIASTOLIC BLOOD PRESSURE: 74 MMHG | BODY MASS INDEX: 26.24 KG/M2 | HEART RATE: 74 BPM | WEIGHT: 125 LBS

## 2025-05-14 DIAGNOSIS — I15.9 SECONDARY HYPERTENSION: Primary | ICD-10-CM

## 2025-05-14 DIAGNOSIS — E26.09 PRIMARY HYPERALDOSTERONISM: ICD-10-CM

## 2025-05-14 DIAGNOSIS — I15.9 SECONDARY HYPERTENSION: ICD-10-CM

## 2025-05-14 PROBLEM — E27.9 ADRENAL NODULE: Status: ACTIVE | Noted: 2025-05-14

## 2025-05-14 LAB
ALBUMIN SERPL-MCNC: 4.7 G/DL (ref 3.4–5)
ANION GAP SERPL CALCULATED.3IONS-SCNC: 14 MMOL/L (ref 3–16)
BUN SERPL-MCNC: 17 MG/DL (ref 7–20)
CALCIUM SERPL-MCNC: 10.2 MG/DL (ref 8.3–10.6)
CHLORIDE SERPL-SCNC: 99 MMOL/L (ref 99–110)
CO2 SERPL-SCNC: 27 MMOL/L (ref 21–32)
CREAT SERPL-MCNC: 0.6 MG/DL (ref 0.6–1.2)
GFR SERPLBLD CREATININE-BSD FMLA CKD-EPI: >90 ML/MIN/{1.73_M2}
GLUCOSE SERPL-MCNC: 105 MG/DL (ref 70–99)
PHOSPHATE SERPL-MCNC: 3.7 MG/DL (ref 2.5–4.9)
POTASSIUM SERPL-SCNC: 3.2 MMOL/L (ref 3.5–5.1)
SODIUM SERPL-SCNC: 140 MMOL/L (ref 136–145)

## 2025-05-14 PROCEDURE — G8427 DOCREV CUR MEDS BY ELIG CLIN: HCPCS | Performed by: INTERNAL MEDICINE

## 2025-05-14 PROCEDURE — 1090F PRES/ABSN URINE INCON ASSESS: CPT | Performed by: INTERNAL MEDICINE

## 2025-05-14 PROCEDURE — G2211 COMPLEX E/M VISIT ADD ON: HCPCS | Performed by: INTERNAL MEDICINE

## 2025-05-14 PROCEDURE — 99204 OFFICE O/P NEW MOD 45 MIN: CPT | Performed by: INTERNAL MEDICINE

## 2025-05-14 PROCEDURE — G8419 CALC BMI OUT NRM PARAM NOF/U: HCPCS | Performed by: INTERNAL MEDICINE

## 2025-05-14 PROCEDURE — 3077F SYST BP >= 140 MM HG: CPT | Performed by: INTERNAL MEDICINE

## 2025-05-14 PROCEDURE — 1036F TOBACCO NON-USER: CPT | Performed by: INTERNAL MEDICINE

## 2025-05-14 PROCEDURE — 1159F MED LIST DOCD IN RCRD: CPT | Performed by: INTERNAL MEDICINE

## 2025-05-14 PROCEDURE — 1123F ACP DISCUSS/DSCN MKR DOCD: CPT | Performed by: INTERNAL MEDICINE

## 2025-05-14 PROCEDURE — G8399 PT W/DXA RESULTS DOCUMENT: HCPCS | Performed by: INTERNAL MEDICINE

## 2025-05-14 PROCEDURE — 3078F DIAST BP <80 MM HG: CPT | Performed by: INTERNAL MEDICINE

## 2025-05-14 NOTE — PROGRESS NOTES
vaginal opening every night at bedtime for 2 weeks, then decrease to twice weekly. 9/6/24  Yes Keily Hernandez APRN - CNP   amLODIPine (NORVASC) 10 MG tablet TAKE 1 TABLET BY MOUTH DAILY 9/4/24  Yes Brandon Ledesma MD   Multiple Vitamins-Minerals (PRESERVISION AREDS 2) CAPS Take by mouth   Yes Alexx Bhatt MD   omeprazole (PRILOSEC) 10 MG delayed release capsule TAKE 1 CAPSULE BY MOUTH  DAILY  Patient taking differently: Take 1 capsule by mouth every other day 5/23/21  Yes Jessica Dunaway MD   acetaminophen (TYLENOL) 500 MG tablet Take 1 tablet by mouth every 6 hours as needed for Pain   Yes Alexx Bhatt MD   Calcium Carb-Cholecalciferol (CALCIUM + D3) 600-200 MG-UNIT TABS Take 1 tablet by mouth 2 times daily   Yes Alexx Bhatt MD   vitamin B-12 (CYANOCOBALAMIN) 100 MCG tablet Take 0.5 tablets by mouth daily  Patient not taking: Reported on 5/14/2025    Alexx Bhatt MD       Physical Exam:      Vitals: BP (!) 168/74   Pulse 74   Ht 1.461 m (4' 9.5\")   Wt 56.7 kg (125 lb)   LMP  (LMP Unknown)   BMI 26.58 kg/m²                Wt Readings from Last 3 Encounters:   05/14/25 56.7 kg (125 lb)   12/10/24 58.1 kg (128 lb)   12/03/24 58.1 kg (128 lb)       Physical Exam  Head and Neck: Palpation of the neck revealed no tenderness or abnormalities.  Musculoskeletal: No swelling noted in the extremities.  Extremities: No swelling noted.      LABS:    Results  Labs:  Aldosterone was high. Renin was low.    Imaging:  CAT scan of the stomach in 2020 showed normal adrenal glands with no growth noted.      Hemoglobin A1C (%)   Date Value   09/28/2023 5.7   09/16/2021 5.5     LDL Cholesterol (mg/dL)   Date Value   11/19/2024 89   06/25/2024 109 (H)     LDL Calculated (mg/dL)   Date Value   09/28/2023 109 (H)   09/16/2021 96     Triglyceride, Fasting (mg/dL)   Date Value   06/05/2017 161 (H)     HDL   Date Value   11/19/2024 46 mg/dL   06/25/2024 55 mg/dL   10/29/2010 43 mg/dl

## 2025-05-16 LAB — ALDOST SERPL-MCNC: 19.7 NG/DL

## 2025-05-17 LAB — RENIN PLAS-CCNC: 1.3 NG/ML/HR

## 2025-05-21 ENCOUNTER — RESULTS FOLLOW-UP (OUTPATIENT)
Dept: ENDOCRINOLOGY | Age: 80
End: 2025-05-21

## 2025-05-21 DIAGNOSIS — I15.9 SECONDARY HYPERTENSION: Primary | ICD-10-CM

## 2025-05-21 RX ORDER — SPIRONOLACTONE 25 MG/1
25 TABLET ORAL DAILY
Qty: 90 TABLET | Refills: 1 | Status: SHIPPED | OUTPATIENT
Start: 2025-05-21 | End: 2025-05-21 | Stop reason: SDUPTHER

## 2025-05-21 RX ORDER — SPIRONOLACTONE 25 MG/1
25 TABLET ORAL DAILY
Qty: 90 TABLET | Refills: 1 | Status: SHIPPED | OUTPATIENT
Start: 2025-05-21

## 2025-05-21 NOTE — TELEPHONE ENCOUNTER
----- Message from Dr. Kelly Angel MD sent at 5/21/2025  3:03 PM EDT -----  Please advise patient that her recent labs showed low potassium.  Would recommend to stop hydrochlorothiazide and start spironolactone 25 mg once daily instead.  Take that in the morning.  Repeat labs in 1 week and again before next visit.

## 2025-05-22 RX ORDER — SPIRONOLACTONE 25 MG/1
25 TABLET ORAL DAILY
Qty: 30 TABLET | Refills: 0 | Status: SHIPPED | OUTPATIENT
Start: 2025-05-22

## 2025-05-22 NOTE — TELEPHONE ENCOUNTER
Call from pt stating that she spoke with paty yesterday and requested a short supply for her spironolactone to be sent to [facility] pharmacy and then continue with the mail order to [facility]    Pt stated that she went to  her script from [facility] and they informed her that it was cancelled by the provider    She stated that she needs at least a 2 wk or 30 day supply sent in until she receives her mail order     Please advise

## 2025-06-05 NOTE — PROGRESS NOTES
labs; -LIPID FASTING, CBC, CMP, TSH reflex to FT4, FT3     Follow up  6 months    Scribe's attestation:  This note was scribed in the presence of Dr. Carter Perla MD. By Carol Torres RN      I, Dr. Carter Perla, personally performed the services described in this documentation, as scribed by the above signed scribe in my presence. It is both accurate and complete to my knowledge. I agree with the details independently gathered by the clinical support staff, while the remaining scribed note accurately describes my personal service to the patient.    Cost of prescription medications and patient compliance have been reviewed with patient. All questions answered.     Thank you for allowing me to participate in the care of this individual.    Carter Perla M.D., [facility]

## 2025-06-06 ENCOUNTER — OFFICE VISIT (OUTPATIENT)
Dept: UROGYNECOLOGY | Age: 80
End: 2025-06-06

## 2025-06-06 VITALS
OXYGEN SATURATION: 97 % | RESPIRATION RATE: 16 BRPM | HEART RATE: 80 BPM | SYSTOLIC BLOOD PRESSURE: 130 MMHG | TEMPERATURE: 98.8 F | DIASTOLIC BLOOD PRESSURE: 80 MMHG

## 2025-06-06 DIAGNOSIS — N39.0 RECURRENT UTI: Primary | ICD-10-CM

## 2025-06-06 DIAGNOSIS — N95.2 VAGINAL ATROPHY: ICD-10-CM

## 2025-06-06 NOTE — PROGRESS NOTES
2025       HPI:     Name: Erica Rowland  YOB: 1945    CC: Patient is a 79 y.o. presenting for evaluation of Recurrent UTI and urge incontinence .     Denies any UTI symptoms today. States vagina estrogen and cranberry supplements are helping.   HPI: How long have you had this problem?    Please rate the severity of your problem: moderate  Anything make it better?     Has had one UTI since last visit, January  Did seek care while in [place].  Treatment effective  Continues estrogen cream twice weekly  Continues cranberry supplement      Ob/Gyn History:    OB History    Para Term  AB Living   4 4 4   4   SAB IAB Ectopic Molar Multiple Live Births        4      # Outcome Date GA Lbr Alvarado/2nd Weight Sex Type Anes PTL Lv   4 Term      Vag-Spont   ROLY   3 Term      Vag-Spont   ROLY   2 Term 1969     Vag-Spont   ROLY   1 Term      Vag-Spont   ROLY     Past Medical History:   Past Medical History:   Diagnosis Date    Atrial fibrillation (HCC) 10/21/2020    Chest pain 10/20/2020    Complete tear of right rotator cuff 2020    COPD (chronic obstructive pulmonary disease) (HCC)     history 30 years of chronic smoking, DLCO 73%    Difficult intravenous access     Diverticulitis     GERD (gastroesophageal reflux disease)     Hilar adenopathy     Hilar lymphadenopathy     History of tobacco abuse 11/10/2020    Hyperlipidemia     Hypertension     Hypokalemia     IFG (impaired fasting glucose) 2014    Infected sebaceous cyst     Macromastia 2015    Osteoarthritis     Osteopenia     Parotid mass 10/6/2020    PONV (postoperative nausea and vomiting)     Prolonged emergence from general anesthesia     Restless legs syndrome     S/P shoulder surgery 2020    RIGHT - still healing    Thoracic sprain 2015    Urinary tract infection with hematuria 2021     Past Surgical History:   Past Surgical History:   Procedure Laterality Date    BREAST REDUCTION SURGERY

## 2025-06-12 ENCOUNTER — OFFICE VISIT (OUTPATIENT)
Dept: CARDIOLOGY CLINIC | Age: 80
End: 2025-06-12
Payer: MEDICARE

## 2025-06-12 VITALS
SYSTOLIC BLOOD PRESSURE: 138 MMHG | WEIGHT: 121 LBS | DIASTOLIC BLOOD PRESSURE: 60 MMHG | HEART RATE: 68 BPM | BODY MASS INDEX: 26.1 KG/M2 | HEIGHT: 57 IN | OXYGEN SATURATION: 98 %

## 2025-06-12 DIAGNOSIS — Z87.891 HISTORY OF TOBACCO ABUSE: ICD-10-CM

## 2025-06-12 DIAGNOSIS — I10 ESSENTIAL HYPERTENSION: ICD-10-CM

## 2025-06-12 DIAGNOSIS — E78.2 MIXED HYPERLIPIDEMIA: ICD-10-CM

## 2025-06-12 DIAGNOSIS — I48.0 PAF (PAROXYSMAL ATRIAL FIBRILLATION) (HCC): Primary | ICD-10-CM

## 2025-06-12 DIAGNOSIS — Z79.899 MEDICATION MANAGEMENT: ICD-10-CM

## 2025-06-12 PROCEDURE — 1090F PRES/ABSN URINE INCON ASSESS: CPT | Performed by: INTERNAL MEDICINE

## 2025-06-12 PROCEDURE — 1036F TOBACCO NON-USER: CPT | Performed by: INTERNAL MEDICINE

## 2025-06-12 PROCEDURE — 99214 OFFICE O/P EST MOD 30 MIN: CPT | Performed by: INTERNAL MEDICINE

## 2025-06-12 PROCEDURE — 3075F SYST BP GE 130 - 139MM HG: CPT | Performed by: INTERNAL MEDICINE

## 2025-06-12 PROCEDURE — 1159F MED LIST DOCD IN RCRD: CPT | Performed by: INTERNAL MEDICINE

## 2025-06-12 PROCEDURE — G8399 PT W/DXA RESULTS DOCUMENT: HCPCS | Performed by: INTERNAL MEDICINE

## 2025-06-12 PROCEDURE — G8427 DOCREV CUR MEDS BY ELIG CLIN: HCPCS | Performed by: INTERNAL MEDICINE

## 2025-06-12 PROCEDURE — 1123F ACP DISCUSS/DSCN MKR DOCD: CPT | Performed by: INTERNAL MEDICINE

## 2025-06-12 PROCEDURE — 3078F DIAST BP <80 MM HG: CPT | Performed by: INTERNAL MEDICINE

## 2025-06-12 PROCEDURE — G8419 CALC BMI OUT NRM PARAM NOF/U: HCPCS | Performed by: INTERNAL MEDICINE

## 2025-06-12 NOTE — PATIENT INSTRUCTIONS
Labs reviewed in epic and discussed with patient.  Medications reviewed in office. Medications refilled as warranted  Please call with your medication list that you have at home.   - you should be on eliquis 5mg twice a day, amlodipine 10mg daily, simvastatin 40mg  4. Please obtain the following labs; -LIPID FASTING, CBC, CMP, TSH reflex to FT4, FT3

## 2025-06-18 ENCOUNTER — HOSPITAL ENCOUNTER (OUTPATIENT)
Dept: LAB | Age: 80
Discharge: HOME | End: 2025-06-18
Payer: MEDICARE

## 2025-06-18 ENCOUNTER — RESULTS FOLLOW-UP (OUTPATIENT)
Dept: CARDIOLOGY | Age: 80
End: 2025-06-18

## 2025-06-18 DIAGNOSIS — I15.9 SECONDARY HYPERTENSION: ICD-10-CM

## 2025-06-18 DIAGNOSIS — E78.2 MIXED HYPERLIPIDEMIA: ICD-10-CM

## 2025-06-18 DIAGNOSIS — Z79.899 MEDICATION MANAGEMENT: ICD-10-CM

## 2025-06-18 LAB
ALBUMIN SERPL-MCNC: 4.5 G/DL (ref 3.4–5)
ALBUMIN SERPL-MCNC: 4.5 G/DL (ref 3.4–5)
ALBUMIN/GLOB SERPL: 1.9 {RATIO} (ref 1.1–2.2)
ALP SERPL-CCNC: 89 U/L (ref 40–129)
ALT SERPL-CCNC: 18 U/L (ref 10–40)
ANION GAP SERPL CALCULATED.3IONS-SCNC: 10 MMOL/L (ref 3–16)
ANION GAP SERPL CALCULATED.3IONS-SCNC: 12 MMOL/L (ref 3–16)
AST SERPL-CCNC: 18 U/L (ref 15–37)
BILIRUB SERPL-MCNC: 0.5 MG/DL (ref 0–1)
BUN SERPL-MCNC: 15 MG/DL (ref 7–20)
BUN SERPL-MCNC: 16 MG/DL (ref 7–20)
CALCIUM SERPL-MCNC: 9.4 MG/DL (ref 8.3–10.6)
CALCIUM SERPL-MCNC: 9.6 MG/DL (ref 8.3–10.6)
CHLORIDE SERPL-SCNC: 105 MMOL/L (ref 99–110)
CHLORIDE SERPL-SCNC: 106 MMOL/L (ref 99–110)
CHOLEST SERPL-MCNC: 159 MG/DL (ref 0–199)
CO2 SERPL-SCNC: 24 MMOL/L (ref 21–32)
CO2 SERPL-SCNC: 25 MMOL/L (ref 21–32)
CREAT SERPL-MCNC: 0.8 MG/DL (ref 0.6–1.2)
CREAT SERPL-MCNC: 0.8 MG/DL (ref 0.6–1.2)
GFR SERPLBLD CREATININE-BSD FMLA CKD-EPI: 75 ML/MIN/{1.73_M2}
GFR SERPLBLD CREATININE-BSD FMLA CKD-EPI: 75 ML/MIN/{1.73_M2}
GLUCOSE SERPL-MCNC: 100 MG/DL (ref 70–99)
GLUCOSE SERPL-MCNC: 99 MG/DL (ref 70–99)
HDLC SERPL-MCNC: 41 MG/DL (ref 40–60)
LDLC SERPL CALC-MCNC: 84 MG/DL
PHOSPHATE SERPL-MCNC: 3.1 MG/DL (ref 2.5–4.9)
POTASSIUM SERPL-SCNC: 4.5 MMOL/L (ref 3.5–5.1)
POTASSIUM SERPL-SCNC: 4.5 MMOL/L (ref 3.5–5.1)
PROT SERPL-MCNC: 6.9 G/DL (ref 6.4–8.2)
SODIUM SERPL-SCNC: 141 MMOL/L (ref 136–145)
SODIUM SERPL-SCNC: 141 MMOL/L (ref 136–145)
TRIGL SERPL-MCNC: 169 MG/DL (ref 0–150)
TSH SERPL DL<=0.005 MIU/L-ACNC: 3.97 UIU/ML (ref 0.27–4.2)
VLDLC SERPL CALC-MCNC: 34 MG/DL

## 2025-06-18 PROCEDURE — 80069 RENAL FUNCTION PANEL: CPT

## 2025-06-18 PROCEDURE — 84443 ASSAY THYROID STIM HORMONE: CPT

## 2025-06-18 PROCEDURE — 36415 COLL VENOUS BLD VENIPUNCTURE: CPT

## 2025-06-18 PROCEDURE — 80053 COMPREHEN METABOLIC PANEL: CPT

## 2025-06-18 PROCEDURE — 80061 LIPID PANEL: CPT

## 2025-06-19 DIAGNOSIS — K21.00 GASTROESOPHAGEAL REFLUX DISEASE WITH ESOPHAGITIS WITHOUT HEMORRHAGE: ICD-10-CM

## 2025-06-19 DIAGNOSIS — K21.9 GASTROESOPHAGEAL REFLUX DISEASE: ICD-10-CM

## 2025-06-19 RX ORDER — OMEPRAZOLE 10 MG/1
CAPSULE, DELAYED RELEASE ORAL
Qty: 90 CAPSULE | Refills: 0 | Status: SHIPPED | OUTPATIENT
Start: 2025-06-19

## 2025-06-19 NOTE — TELEPHONE ENCOUNTER
Refill request for OMEPRAZOLE medication.     Name of Pharmacy- [facility]      Last visit - 12/3/24     Pending visit - 6/26/25    Last refill -5/23/21      Medication Contract signed -   Last Oarrs ran-         Additional Comments

## 2025-06-23 ENCOUNTER — TELEPHONE (OUTPATIENT)
Dept: INTERNAL MEDICINE CLINIC | Age: 80
End: 2025-06-23

## 2025-06-23 NOTE — TELEPHONE ENCOUNTER
----- Message from Jetrob R sent at 6/23/2025  8:20 AM EDT -----  Regarding: ECC Message to Provider  ECC Message to Provider    Relationship to Patient: Self     Additional Information Patient is asking is she need to have blood drawn before her appointment in June 26, 2025. The patient have already blood drawn and patient want to ask if he can used that blood drawn from a different office.  --------------------------------------------------------------------------------------------------------------------------    Call Back Information: OK to leave message on voicemail  Preferred Call Back Number: Phone

## 2025-06-24 NOTE — TELEPHONE ENCOUNTER
Spoke with patient and informed her that no additional labs are needed at this time. Patient voiced understanding.

## 2025-06-26 ENCOUNTER — TELEPHONE (OUTPATIENT)
Dept: INTERNAL MEDICINE CLINIC | Age: 80
End: 2025-06-26

## 2025-06-26 ENCOUNTER — OFFICE VISIT (OUTPATIENT)
Dept: INTERNAL MEDICINE CLINIC | Age: 80
End: 2025-06-26

## 2025-06-26 VITALS
BODY MASS INDEX: 25.89 KG/M2 | HEIGHT: 57 IN | WEIGHT: 120 LBS | SYSTOLIC BLOOD PRESSURE: 132 MMHG | TEMPERATURE: 97.1 F | HEART RATE: 79 BPM | OXYGEN SATURATION: 97 % | DIASTOLIC BLOOD PRESSURE: 64 MMHG

## 2025-06-26 DIAGNOSIS — Z78.0 ASYMPTOMATIC MENOPAUSE: Primary | ICD-10-CM

## 2025-06-26 DIAGNOSIS — I15.2 HYPERTENSION DUE TO ENDOCRINE DISORDER: ICD-10-CM

## 2025-06-26 DIAGNOSIS — H91.93 DECREASED HEARING OF BOTH EARS: ICD-10-CM

## 2025-06-26 DIAGNOSIS — E26.9 HYPERALDOSTERONISM: ICD-10-CM

## 2025-06-26 DIAGNOSIS — M48.061 SPINAL STENOSIS OF LUMBAR REGION, UNSPECIFIED WHETHER NEUROGENIC CLAUDICATION PRESENT: ICD-10-CM

## 2025-06-26 DIAGNOSIS — E78.2 MIXED HYPERLIPIDEMIA: ICD-10-CM

## 2025-06-26 DIAGNOSIS — G43.909 MIGRAINE WITHOUT STATUS MIGRAINOSUS, NOT INTRACTABLE, UNSPECIFIED MIGRAINE TYPE: ICD-10-CM

## 2025-06-26 DIAGNOSIS — Z00.00 MEDICARE ANNUAL WELLNESS VISIT, SUBSEQUENT: Primary | ICD-10-CM

## 2025-06-26 DIAGNOSIS — I48.0 PAF (PAROXYSMAL ATRIAL FIBRILLATION) (HCC): ICD-10-CM

## 2025-06-26 SDOH — ECONOMIC STABILITY: FOOD INSECURITY: WITHIN THE PAST 12 MONTHS, YOU WORRIED THAT YOUR FOOD WOULD RUN OUT BEFORE YOU GOT MONEY TO BUY MORE.: NEVER TRUE

## 2025-06-26 SDOH — ECONOMIC STABILITY: FOOD INSECURITY: WITHIN THE PAST 12 MONTHS, THE FOOD YOU BOUGHT JUST DIDN'T LAST AND YOU DIDN'T HAVE MONEY TO GET MORE.: NEVER TRUE

## 2025-06-26 ASSESSMENT — PATIENT HEALTH QUESTIONNAIRE - PHQ9
SUM OF ALL RESPONSES TO PHQ QUESTIONS 1-9: 0
1. LITTLE INTEREST OR PLEASURE IN DOING THINGS: NOT AT ALL
2. FEELING DOWN, DEPRESSED OR HOPELESS: NOT AT ALL

## 2025-06-26 ASSESSMENT — LIFESTYLE VARIABLES
HOW MANY STANDARD DRINKS CONTAINING ALCOHOL DO YOU HAVE ON A TYPICAL DAY: PATIENT DOES NOT DRINK
HOW OFTEN DO YOU HAVE A DRINK CONTAINING ALCOHOL: NEVER

## 2025-06-26 NOTE — PATIENT INSTRUCTIONS
can affect your work and home life. It can make you feel lonely or depressed. You may feel that you have lost your independence. But hearing aids and other devices can help you hear better and feel connected to others.  Follow-up care is a key part of your treatment and safety. Be sure to make and go to all appointments, and call your doctor if you are having problems. It's also a good idea to know your test results and keep a list of the medicines you take.  How can you care for yourself at home?  Avoid loud noises whenever possible. This helps keep your hearing from getting worse.  Always wear hearing protection around loud noises.  Wear a hearing aid as directed.  A professional can help you pick a hearing aid that will work best for you.  You can also get hearing aids over the counter for mild to moderate hearing loss.  Have hearing tests as your doctor suggests. They can show whether your hearing has changed. Your hearing aid may need to be adjusted.  Use other devices as needed. These may include:  Telephone amplifiers and hearing aids that can connect to a television, stereo, radio, or microphone.  Devices that use lights or vibrations. These alert you to the doorbell, a ringing telephone, or a baby monitor.  Television closed-captioning. This shows the words at the bottom of the screen. Most new TVs can do this.  TTY (text telephone). This lets you type messages back and forth on the telephone instead of talking or listening. These devices are also called TDD. When messages are typed on the keyboard, they are sent over the phone line to a receiving TTY. The message is shown on a monitor.  Use text messaging, social media, and email if it is hard for you to communicate by telephone.  Try to learn a listening technique called speechreading. It is not lipreading. You pay attention to people's gestures, expressions, posture, and tone of voice. These clues can help you understand what a person is saying. Face the

## 2025-06-26 NOTE — PROGRESS NOTES
Medicare Annual Wellness Visit    Erica Rowland is here for Medicare AWV and 6 Month Follow-Up    Assessment & Plan   Medicare annual wellness visit, subsequent  Mixed hyperlipidemia  -Most recent LDL at goal.  Continue on simvastatin 40 mg  -     Comprehensive Metabolic Panel; Future  -     LIPID PANEL; Future  PAF (paroxysmal atrial fibrillation) (HCC)  -Following up with cardiology.  Denies any new symptoms  Migraine without status migrainosus, not intractable, unspecified migraine type  - Well-controlled  Hyperaldosteronism  Hypertension due to endocrine disorder  -Sees Endo.  Currently on amlodipine and spironolactone.  Potassium level has come back to normal.  Blood pressure near goal.  Encouraged to continue measuring home blood pressures.  She does feel better in general  -     CBC with Auto Differential; Future  Spinal stenosis of lumbar region, unspecified whether neurogenic claudication present  -Stable, no new symptoms.  Follows up with Ortho  Decreased hearing of both ears  -Endorses decreased hearing of bilateral ears.  Examination without any impacted cerumen.  Discussed referral to audiology and ENT which patient agrees  -     Mona Dasilva AuD., Audiology, [facility]  -     [facility]     Mammo 11/25/2024 normal  Colonoscopy 5/22/2025  dr MUÑOZ repeat 5 yrs   Dexa 9/26/2022 osteopenia repeat later this year     Return in about 6 months (around 12/26/2025) for HTN .     Subjective       Patient's complete Health Risk Assessment and screening values have been reviewed and are found in Flowsheets. The following problems were reviewed today and where indicated follow up appointments were made and/or referrals ordered.    Positive Risk Factor Screenings with Interventions:              Inactivity:  On average, how many days per week do you engage in moderate to strenuous exercise (like a brisk walk)?: 0 days (!) Abnormal  On average, how many minutes do you engage in

## 2025-06-26 NOTE — TELEPHONE ENCOUNTER
Patient states she was supposed to get an order for a Dexa scan at her appointment.     Order pended.

## 2025-07-08 DIAGNOSIS — I10 ESSENTIAL HYPERTENSION: ICD-10-CM

## 2025-07-08 RX ORDER — AMLODIPINE BESYLATE 10 MG/1
10 TABLET ORAL DAILY
Qty: 90 TABLET | Refills: 3 | Status: SHIPPED | OUTPATIENT
Start: 2025-07-08

## 2025-07-08 NOTE — TELEPHONE ENCOUNTER
Refill request for AMLODIPINE,medication.     Name of Pharmacy- [facility]      Last visit - 6/26/25     Pending visit - 12/2/25    Last refill -5/13/25      Medication Contract signed -   Last Oarrs ran-         Additional Comments

## 2025-08-11 ENCOUNTER — OFFICE VISIT (OUTPATIENT)
Dept: ENT CLINIC | Age: 80
End: 2025-08-11
Payer: MEDICARE

## 2025-08-11 ENCOUNTER — PROCEDURE VISIT (OUTPATIENT)
Dept: AUDIOLOGY | Age: 80
End: 2025-08-11
Payer: MEDICARE

## 2025-08-11 VITALS
BODY MASS INDEX: 25.89 KG/M2 | HEART RATE: 76 BPM | HEIGHT: 57 IN | SYSTOLIC BLOOD PRESSURE: 155 MMHG | WEIGHT: 120 LBS | DIASTOLIC BLOOD PRESSURE: 69 MMHG

## 2025-08-11 DIAGNOSIS — H90.3 ASYMMETRICAL SENSORINEURAL HEARING LOSS: Primary | ICD-10-CM

## 2025-08-11 DIAGNOSIS — H90.3 SENSORINEURAL HEARING LOSS, BILATERAL: Primary | ICD-10-CM

## 2025-08-11 DIAGNOSIS — H93.13 TINNITUS OF BOTH EARS: ICD-10-CM

## 2025-08-11 PROCEDURE — 1160F RVW MEDS BY RX/DR IN RCRD: CPT | Performed by: STUDENT IN AN ORGANIZED HEALTH CARE EDUCATION/TRAINING PROGRAM

## 2025-08-11 PROCEDURE — 1159F MED LIST DOCD IN RCRD: CPT | Performed by: STUDENT IN AN ORGANIZED HEALTH CARE EDUCATION/TRAINING PROGRAM

## 2025-08-11 PROCEDURE — G8419 CALC BMI OUT NRM PARAM NOF/U: HCPCS | Performed by: STUDENT IN AN ORGANIZED HEALTH CARE EDUCATION/TRAINING PROGRAM

## 2025-08-11 PROCEDURE — 92557 COMPREHENSIVE HEARING TEST: CPT | Performed by: AUDIOLOGIST

## 2025-08-11 PROCEDURE — G8399 PT W/DXA RESULTS DOCUMENT: HCPCS | Performed by: STUDENT IN AN ORGANIZED HEALTH CARE EDUCATION/TRAINING PROGRAM

## 2025-08-11 PROCEDURE — G8427 DOCREV CUR MEDS BY ELIG CLIN: HCPCS | Performed by: STUDENT IN AN ORGANIZED HEALTH CARE EDUCATION/TRAINING PROGRAM

## 2025-08-11 PROCEDURE — 3078F DIAST BP <80 MM HG: CPT | Performed by: STUDENT IN AN ORGANIZED HEALTH CARE EDUCATION/TRAINING PROGRAM

## 2025-08-11 PROCEDURE — 99204 OFFICE O/P NEW MOD 45 MIN: CPT | Performed by: STUDENT IN AN ORGANIZED HEALTH CARE EDUCATION/TRAINING PROGRAM

## 2025-08-11 PROCEDURE — 1090F PRES/ABSN URINE INCON ASSESS: CPT | Performed by: STUDENT IN AN ORGANIZED HEALTH CARE EDUCATION/TRAINING PROGRAM

## 2025-08-11 PROCEDURE — 3077F SYST BP >= 140 MM HG: CPT | Performed by: STUDENT IN AN ORGANIZED HEALTH CARE EDUCATION/TRAINING PROGRAM

## 2025-08-11 PROCEDURE — 1123F ACP DISCUSS/DSCN MKR DOCD: CPT | Performed by: STUDENT IN AN ORGANIZED HEALTH CARE EDUCATION/TRAINING PROGRAM

## 2025-08-11 PROCEDURE — 1036F TOBACCO NON-USER: CPT | Performed by: STUDENT IN AN ORGANIZED HEALTH CARE EDUCATION/TRAINING PROGRAM

## 2025-08-11 PROCEDURE — 92567 TYMPANOMETRY: CPT | Performed by: AUDIOLOGIST

## 2025-08-11 PROCEDURE — G8428 CUR MEDS NOT DOCUMENT: HCPCS | Performed by: AUDIOLOGIST

## 2025-08-11 ASSESSMENT — ENCOUNTER SYMPTOMS
EYE PAIN: 0
NAUSEA: 0
COUGH: 0
VOMITING: 0
SHORTNESS OF BREATH: 0
RHINORRHEA: 0

## 2025-08-14 RX ORDER — OMEPRAZOLE 10 MG/1
10 CAPSULE, DELAYED RELEASE ORAL DAILY
Qty: 90 CAPSULE | Refills: 3 | Status: SHIPPED | OUTPATIENT
Start: 2025-08-14

## (undated) DEVICE — TOWEL OR BLUEE 16X26IN ST 8 PACK ORB08 16X26ORTWL

## (undated) DEVICE — BLADE, TONGUE, 6", STERILE: Brand: MEDLINE

## (undated) DEVICE — DERMATOME BLADES: Brand: DERMATOME

## (undated) DEVICE — SOLUTION IV IRRIG 500ML 0.9% SODIUM CHL 2F7123

## (undated) DEVICE — 3M™ STERI-STRIP™ COMPOUND BENZOIN TINCTURE 40 BAGS/CARTON 4 CARTONS/CASE C1544: Brand: 3M™ STERI-STRIP™

## (undated) DEVICE — TOWEL,OR,DSP,ST,BLUE,STD,8/PK,10PK/CS: Brand: MEDLINE

## (undated) DEVICE — SET ADMIN PRIMING 7ML L30IN 7.35LB 20 GTT 2ND RLER CLMP

## (undated) DEVICE — SYRINGE MED 10ML SLIP TIP BLNT FILL AND LUERLOCK DISP

## (undated) DEVICE — SHEET,DRAPE,40X58,STERILE: Brand: MEDLINE

## (undated) DEVICE — GOWN,REINFORCED,POLY,AURORA,XXLARGE,STR: Brand: MEDLINE

## (undated) DEVICE — SET GRAV VENT NVENT CK VLV 3 NDL FREE PRT 10 GTT

## (undated) DEVICE — GLOVE SURG SZ 65 L12IN FNGR THK94MIL STD WHT LTX FREE

## (undated) DEVICE — 3M™ TEGADERM™ TRANSPARENT FILM DRESSING FRAME STYLE, 1626W, 4 IN X 4-3/4 IN (10 CM X 12 CM), 50/CT 4CT/CASE: Brand: 3M™ TEGADERM™

## (undated) DEVICE — ANTI-FOG SOLUTION WITH FOAM PAD: Brand: DEVON

## (undated) DEVICE — TROCAR: Brand: KII SLEEVE

## (undated) DEVICE — 3M™ MEDIPORE™ SOFT CLOTH TAPE, 4 INCH X 10 YARDS, 12 ROLLS/CASE, 2964: Brand: 3M™ MEDIPORE™

## (undated) DEVICE — STAPLER INT L16CM STD UNIV RELD DISP TRI-STAPLE ENDO GIA

## (undated) DEVICE — ABDOMINAL PAD: Brand: DERMACEA

## (undated) DEVICE — SYRINGE BLB 50CC IRRIG PLIABLE FNGR FLNG GRAD FLSK DISP

## (undated) DEVICE — SUTURE PERMA-HAND SZ 0 L18IN NONABSORBABLE BLK L30MM FSL 678G

## (undated) DEVICE — 3M™ IOBAN™ 2 ANTIMICROBIAL INCISE DRAPE 6650EZ: Brand: IOBAN™ 2

## (undated) DEVICE — SOLUTION IV 1000ML LAC RINGERS PH 6.5 INJ USP VIAFLX PLAS

## (undated) DEVICE — GLIDESCOPE BFLEX 3.8 BRONCHOSCOPE

## (undated) DEVICE — FIRSTPASS NEEDLE AND SUTURE                                    CAPTURE, 5 PER BOX: Brand: FIRSTPASS

## (undated) DEVICE — GLOVE SURG SZ 8 L12IN FNGR THK94MIL STD WHT LTX FREE

## (undated) DEVICE — RELOAD STPL 3.5MM L60MM 0DEG UNIV TISS PUR TI 6 ROW LIN

## (undated) DEVICE — SUTURE VCRL SZ 4-0 L18IN ABSRB UD L19MM PS-2 3/8 CIR PRIM J496H

## (undated) DEVICE — 4-PORT MANIFOLD: Brand: NEPTUNE 2

## (undated) DEVICE — SHOULDER STABILIZATION KIT,                                    DISPOSABLE 12 PER BOX

## (undated) DEVICE — SUTURE VCRL SZ 3-0 L18IN ABSRB UD L26MM SH 1/2 CIR J864D

## (undated) DEVICE — SUTURE NONABSORBABLE MONOFILAMENT 3-0 PS-1 18 IN BLK ETHILON 1663H

## (undated) DEVICE — NEEDLE HYPO 25GA L1.5IN BLU POLYPR HUB S STL REG BVL STR

## (undated) DEVICE — PROBE ABLATN NERVE BLOCK 180 DEG 8 MM BALL TIP CRYOSPHERE

## (undated) DEVICE — AGENT HEMSTAT W3XL4IN OXIDIZED REGENERATED CELOS ABSRB FOR

## (undated) DEVICE — SOLUTION IRRIG 5L LAC R BG

## (undated) DEVICE — MAJOR SET UP PK

## (undated) DEVICE — APPLICATOR PREP 26ML 0.7% IOD POVACRYLEX 74% ISO ALC ST

## (undated) DEVICE — GAUZE,SPONGE,4"X4",8PLY,STRL,LF,10/TRAY: Brand: MEDLINE

## (undated) DEVICE — SOLUTION IV IRRIG POUR BRL 0.9% SODIUM CHL 2F7124

## (undated) DEVICE — DRESSING,GAUZE,XEROFORM,CURAD,1"X8",ST: Brand: CURAD

## (undated) DEVICE — 3M™ TEGADERM™ TRANSPARENT FILM DRESSING FRAME STYLE, 1624W, 2-3/8 IN X 2-3/4 IN (6 CM X 7 CM), 100/CT 4CT/CASE: Brand: 3M™ TEGADERM™

## (undated) DEVICE — NEEDLE SPNL L3.5IN PNK HUB S STL REG WALL FIT STYL W/ QNCKE

## (undated) DEVICE — CANNULA THREADED FLEX 8.0 X 72MM: Brand: CLEAR-TRAC

## (undated) DEVICE — ELECTRODE PT RET AD L9FT HI MOIST COND ADH HYDRGEL CORDED

## (undated) DEVICE — GOWN SIRUS NONREIN XL W/TWL: Brand: MEDLINE INDUSTRIES, INC.

## (undated) DEVICE — GOWN,SIRUS,POLYRNF,SETINSLV,L,20/CS: Brand: MEDLINE

## (undated) DEVICE — SUTURE COAT VCRL SZ 4-0 L18IN ABSRB UD L19MM PS-2 1/2 CIR J496G

## (undated) DEVICE — CATHETER URETH 20FR W/ RED RUB INTMIT ALL PURP

## (undated) DEVICE — SUTURE ABSORBABLE BRAIDED 2-0 CT-1 27 IN UD VICRYL J259H

## (undated) DEVICE — SYRINGE MED 50ML LUERSLIP TIP

## (undated) DEVICE — 3M™ BAIR HUGGER® CARDIAC ACCESS BLANKET, 5 PER CASE 63000: Brand: BAIR HUGGER™

## (undated) DEVICE — PACK PROCEDURE SURG ARTHSCP CUST

## (undated) DEVICE — TIP SUCT DIA12FR W STYL CTRL VENT DISPOSABLE FRAZ

## (undated) DEVICE — MEDI-VAC NON-CONDUCTIVE SUCTION TUBING: Brand: CARDINAL HEALTH

## (undated) DEVICE — GLOVE ORTHO 8   MSG9480

## (undated) DEVICE — CANNULA NSL 13FT TUBE AD ETCO2 DIV SAMP M

## (undated) DEVICE — SWITCH DRAPE TENET 7633

## (undated) DEVICE — NEBULIZER AEROSOL TBNG 7 FT FOR ACUTE CARE NEBUTECH

## (undated) DEVICE — SYRINGE MED 10ML LUERLOCK TIP W/O SFTY DISP

## (undated) DEVICE — HEALICOIL RG SA 5.5MM W/3 UB-BL CB                                    BL BK
Type: IMPLANTABLE DEVICE | Site: SHOULDER | Status: NON-FUNCTIONAL
Brand: HEALICOIL / ULTRABRAID
Removed: 2020-08-25

## (undated) DEVICE — TROCAR: Brand: KII FIOS FIRST ENTRY

## (undated) DEVICE — DYONICS 4.0 MM ELITE                                    ACROMIOBLASTER STRAIGHT DISPOSABLE                                    BURRS, SAGE GREEN, 10000 MAXIMUM                                    RPM, PACKAGED 6 PER BOX, STERILE

## (undated) DEVICE — THORACIC CATHETER, STRAIGHT, SILICONE, WITH CLOTSTOP®: Brand: AXIOM® ATRAUM™ WITH CLOTSTOP®

## (undated) DEVICE — Z CONVERTED USE 2271043 CONTAINER SPEC COLL 4OZ SCR ON LID PEEL PCH

## (undated) DEVICE — 3M™ STERI-DRAPE™ U-DRAPE, LONG 1019: Brand: STERI-DRAPE™

## (undated) DEVICE — DISSECTOR LAP DIA5MM BLNT TIP ENDOPATH

## (undated) DEVICE — GLOVE ORANGE PI 7 1/2   MSG9075

## (undated) DEVICE — DRAPE,REIN 53X77,STERILE: Brand: MEDLINE

## (undated) DEVICE — TUBE IRRIG L8IN LNG PT W/ CONN FOR PMP SYS REDEUCE

## (undated) DEVICE — ELECTRODE LAP L36CM PTFE WIRE J HK CLEANCOAT

## (undated) DEVICE — RELOAD STPL 45MM THCK TISS GRN W/ GRIPPING SURF TECHNOLOGY

## (undated) DEVICE — SINGLE USE ASPIRATION NEEDLE: Brand: SINGLE USE ASPIRATION NEEDLE

## (undated) DEVICE — SINGLE USE SUCTION VALVE MAJ-209: Brand: SINGLE USE SUCTION VALVE (STERILE)

## (undated) DEVICE — TUBING PMP L8FT LNG W/ CONN FOR AR-6400 REDEUCE

## (undated) DEVICE — AMBIENT SUPER TURBOVAC 90: Brand: COBLATION

## (undated) DEVICE — SINGLE USE BIOPSY VALVE MAJ-210: Brand: SINGLE USE BIOPSY VALVE (STERILE)

## (undated) DEVICE — SYRINGE MED 50ML LUERLOCK TIP

## (undated) DEVICE — SUCTION IRRIGATOR: Brand: ENDOWRIST

## (undated) DEVICE — COVER LT HNDL PLAS RIG 2 PER PK

## (undated) DEVICE — SUTURE MCRYL + SZ 4-0 L18IN ABSRB UD L19MM PS-2 3/8 CIR MCP496G

## (undated) DEVICE — CLEANER ES TIP W2XL2IN ADH BK RADPQ FOR S STL ELECTRD

## (undated) DEVICE — GLOVE,SURG,SENSICARE,ALOE,LF,PF,7: Brand: MEDLINE

## (undated) DEVICE — FLUID CONTROL SHOULDER DRAPE PACK: Brand: CONVERTORS

## (undated) DEVICE — 3.5 MM FULL RADIUS STRAIGHT                                    BLADES, POWER/EP-1, BEIGE, PACKAGED                                    6 PER BOX, STERILE

## (undated) DEVICE — Device

## (undated) DEVICE — SOLUTION ANTIFOG VIS SYS CLEARIFY LAPSCP

## (undated) DEVICE — AIRLIFE™ ADULT AEROSOL MASK VINYL, UNDER-THE-CHIN STYLE: Brand: AIRLIFE™

## (undated) DEVICE — BRUSH CYTO L150CM CHN L2MM BRIST DIA1.9MM PTFE S STL BULL

## (undated) DEVICE — ZIMMER SKIN GRAFT CARRIER 8 INCH LENGTH: Brand: DERMACARRIERS

## (undated) DEVICE — GAUZE,SPONGE,4"X4",16PLY,STRL,LF,10/TRAY: Brand: MEDLINE

## (undated) DEVICE — CATHETER IV 20GA L1.25IN PNK FEP SFTY STR HUB RADPQ DISP